# Patient Record
Sex: MALE | Race: WHITE | HISPANIC OR LATINO | Employment: FULL TIME | URBAN - METROPOLITAN AREA
[De-identification: names, ages, dates, MRNs, and addresses within clinical notes are randomized per-mention and may not be internally consistent; named-entity substitution may affect disease eponyms.]

---

## 2019-05-17 ENCOUNTER — HOSPITAL ENCOUNTER (OUTPATIENT)
Dept: RADIOLOGY | Facility: HOSPITAL | Age: 59
Discharge: HOME | End: 2019-05-17
Attending: NURSE PRACTITIONER
Payer: COMMERCIAL

## 2019-05-17 DIAGNOSIS — R10.30 INGUINAL PAIN: Primary | ICD-10-CM

## 2019-05-17 PROCEDURE — 76705 US ABDOMEN LIMITED_HERNIA: ICD-10-PCS | Mod: 26,,, | Performed by: RADIOLOGY

## 2019-05-17 PROCEDURE — 76705 ECHO EXAM OF ABDOMEN: CPT | Mod: TC,PN

## 2019-05-17 PROCEDURE — 76705 ECHO EXAM OF ABDOMEN: CPT | Mod: 26,,, | Performed by: RADIOLOGY

## 2021-11-08 ENCOUNTER — OFFICE VISIT (OUTPATIENT)
Dept: FAMILY MEDICINE | Facility: CLINIC | Age: 61
End: 2021-11-08
Payer: COMMERCIAL

## 2021-11-08 VITALS
WEIGHT: 176.81 LBS | OXYGEN SATURATION: 98 % | HEART RATE: 61 BPM | BODY MASS INDEX: 26.8 KG/M2 | DIASTOLIC BLOOD PRESSURE: 77 MMHG | HEIGHT: 68 IN | RESPIRATION RATE: 16 BRPM | SYSTOLIC BLOOD PRESSURE: 123 MMHG

## 2021-11-08 DIAGNOSIS — Z12.11 COLON CANCER SCREENING: ICD-10-CM

## 2021-11-08 DIAGNOSIS — Z00.00 ANNUAL PHYSICAL EXAM: Primary | ICD-10-CM

## 2021-11-08 PROCEDURE — 1159F MED LIST DOCD IN RCRD: CPT | Mod: S$GLB,,, | Performed by: FAMILY MEDICINE

## 2021-11-08 PROCEDURE — 99999 PR PBB SHADOW E&M-EST. PATIENT-LVL III: ICD-10-PCS | Mod: PBBFAC,,, | Performed by: FAMILY MEDICINE

## 2021-11-08 PROCEDURE — 3078F PR MOST RECENT DIASTOLIC BLOOD PRESSURE < 80 MM HG: ICD-10-PCS | Mod: S$GLB,,, | Performed by: FAMILY MEDICINE

## 2021-11-08 PROCEDURE — 3074F SYST BP LT 130 MM HG: CPT | Mod: S$GLB,,, | Performed by: FAMILY MEDICINE

## 2021-11-08 PROCEDURE — 4010F ACE/ARB THERAPY RXD/TAKEN: CPT | Mod: S$GLB,,, | Performed by: FAMILY MEDICINE

## 2021-11-08 PROCEDURE — 3008F PR BODY MASS INDEX (BMI) DOCUMENTED: ICD-10-PCS | Mod: S$GLB,,, | Performed by: FAMILY MEDICINE

## 2021-11-08 PROCEDURE — 4010F PR ACE/ARB THEARPY RXD/TAKEN: ICD-10-PCS | Mod: S$GLB,,, | Performed by: FAMILY MEDICINE

## 2021-11-08 PROCEDURE — 3074F PR MOST RECENT SYSTOLIC BLOOD PRESSURE < 130 MM HG: ICD-10-PCS | Mod: S$GLB,,, | Performed by: FAMILY MEDICINE

## 2021-11-08 PROCEDURE — 99386 PR PREVENTIVE VISIT,NEW,40-64: ICD-10-PCS | Mod: S$GLB,,, | Performed by: FAMILY MEDICINE

## 2021-11-08 PROCEDURE — 99386 PREV VISIT NEW AGE 40-64: CPT | Mod: S$GLB,,, | Performed by: FAMILY MEDICINE

## 2021-11-08 PROCEDURE — 3078F DIAST BP <80 MM HG: CPT | Mod: S$GLB,,, | Performed by: FAMILY MEDICINE

## 2021-11-08 PROCEDURE — 1159F PR MEDICATION LIST DOCUMENTED IN MEDICAL RECORD: ICD-10-PCS | Mod: S$GLB,,, | Performed by: FAMILY MEDICINE

## 2021-11-08 PROCEDURE — 3008F BODY MASS INDEX DOCD: CPT | Mod: S$GLB,,, | Performed by: FAMILY MEDICINE

## 2021-11-08 PROCEDURE — 99999 PR PBB SHADOW E&M-EST. PATIENT-LVL III: CPT | Mod: PBBFAC,,, | Performed by: FAMILY MEDICINE

## 2021-11-08 RX ORDER — LISINOPRIL 2.5 MG/1
2.5 TABLET ORAL DAILY
COMMUNITY
Start: 2021-08-25 | End: 2022-02-01

## 2021-11-08 RX ORDER — CHLORHEXIDINE GLUCONATE ORAL RINSE 1.2 MG/ML
SOLUTION DENTAL
COMMUNITY
Start: 2021-05-13 | End: 2022-02-01

## 2021-11-08 RX ORDER — ATORVASTATIN CALCIUM 20 MG/1
20 TABLET, FILM COATED ORAL DAILY
COMMUNITY
Start: 2021-08-25 | End: 2022-08-02 | Stop reason: SDUPTHER

## 2021-11-08 RX ORDER — ASPIRIN 325 MG
325 TABLET, DELAYED RELEASE (ENTERIC COATED) ORAL DAILY
COMMUNITY
End: 2022-02-01 | Stop reason: SDUPTHER

## 2021-11-08 RX ORDER — ATENOLOL AND CHLORTHALIDONE TABLET 50; 25 MG/1; MG/1
1 TABLET ORAL DAILY
COMMUNITY
Start: 2021-08-25 | End: 2022-06-01

## 2021-11-12 DIAGNOSIS — Z11.59 NEED FOR HEPATITIS C SCREENING TEST: ICD-10-CM

## 2021-12-06 ENCOUNTER — TELEPHONE (OUTPATIENT)
Dept: FAMILY MEDICINE | Facility: CLINIC | Age: 61
End: 2021-12-06
Payer: COMMERCIAL

## 2021-12-06 RX ORDER — POLYMYXIN B SULFATE AND TRIMETHOPRIM 1; 10000 MG/ML; [USP'U]/ML
1 SOLUTION OPHTHALMIC EVERY 6 HOURS
Qty: 10 ML | Refills: 0 | Status: SHIPPED | OUTPATIENT
Start: 2021-12-06 | End: 2022-02-01

## 2021-12-21 ENCOUNTER — PATIENT MESSAGE (OUTPATIENT)
Dept: FAMILY MEDICINE | Facility: CLINIC | Age: 61
End: 2021-12-21
Payer: COMMERCIAL

## 2021-12-21 LAB — NONINV COLON CA DNA+OCC BLD SCRN STL QL: NEGATIVE

## 2021-12-28 ENCOUNTER — TELEPHONE (OUTPATIENT)
Dept: FAMILY MEDICINE | Facility: CLINIC | Age: 61
End: 2021-12-28

## 2021-12-28 ENCOUNTER — CLINICAL SUPPORT (OUTPATIENT)
Dept: FAMILY MEDICINE | Facility: CLINIC | Age: 61
End: 2021-12-28
Payer: COMMERCIAL

## 2021-12-28 DIAGNOSIS — U07.1 COVID-19: Primary | ICD-10-CM

## 2021-12-28 DIAGNOSIS — Z11.52 ENCOUNTER FOR SCREENING FOR COVID-19: ICD-10-CM

## 2021-12-28 DIAGNOSIS — Z11.52 ENCOUNTER FOR SCREENING FOR COVID-19: Primary | ICD-10-CM

## 2021-12-28 PROCEDURE — U0002: ICD-10-PCS | Mod: QW,S$GLB,, | Performed by: FAMILY MEDICINE

## 2021-12-28 PROCEDURE — U0002 COVID-19 LAB TEST NON-CDC: HCPCS | Mod: QW,S$GLB,, | Performed by: FAMILY MEDICINE

## 2021-12-28 RX ORDER — PROMETHAZINE HYDROCHLORIDE AND DEXTROMETHORPHAN HYDROBROMIDE 6.25; 15 MG/5ML; MG/5ML
5 SYRUP ORAL EVERY 6 HOURS PRN
Qty: 240 ML | Refills: 1 | Status: SHIPPED | OUTPATIENT
Start: 2021-12-28 | End: 2022-02-01

## 2021-12-28 RX ORDER — AZITHROMYCIN 250 MG/1
TABLET, FILM COATED ORAL
Qty: 6 TABLET | Refills: 0 | Status: SHIPPED | OUTPATIENT
Start: 2021-12-28 | End: 2022-02-01

## 2021-12-28 RX ORDER — PREDNISONE 20 MG/1
20 TABLET ORAL 2 TIMES DAILY
Qty: 10 TABLET | Refills: 0 | Status: SHIPPED | OUTPATIENT
Start: 2021-12-28 | End: 2022-01-02

## 2021-12-29 ENCOUNTER — NURSE TRIAGE (OUTPATIENT)
Dept: ADMINISTRATIVE | Facility: CLINIC | Age: 61
End: 2021-12-29
Payer: COMMERCIAL

## 2021-12-30 ENCOUNTER — NURSE TRIAGE (OUTPATIENT)
Dept: ADMINISTRATIVE | Facility: CLINIC | Age: 61
End: 2021-12-30
Payer: COMMERCIAL

## 2021-12-30 ENCOUNTER — TELEPHONE (OUTPATIENT)
Dept: FAMILY MEDICINE | Facility: CLINIC | Age: 61
End: 2021-12-30
Payer: COMMERCIAL

## 2021-12-30 DIAGNOSIS — U07.1 COVID-19: Primary | ICD-10-CM

## 2022-01-02 ENCOUNTER — PATIENT MESSAGE (OUTPATIENT)
Dept: FAMILY MEDICINE | Facility: CLINIC | Age: 62
End: 2022-01-02
Payer: COMMERCIAL

## 2022-01-03 LAB
CTP QC/QA: YES
SARS-COV-2 RDRP RESP QL NAA+PROBE: POSITIVE

## 2022-01-05 ENCOUNTER — PATIENT MESSAGE (OUTPATIENT)
Dept: FAMILY MEDICINE | Facility: CLINIC | Age: 62
End: 2022-01-05
Payer: COMMERCIAL

## 2022-01-05 RX ORDER — PREDNISONE 20 MG/1
20 TABLET ORAL 2 TIMES DAILY
Qty: 10 TABLET | Refills: 0 | Status: SHIPPED | OUTPATIENT
Start: 2022-01-05 | End: 2022-01-10

## 2022-01-18 ENCOUNTER — PATIENT MESSAGE (OUTPATIENT)
Dept: FAMILY MEDICINE | Facility: CLINIC | Age: 62
End: 2022-01-18
Payer: COMMERCIAL

## 2022-01-18 DIAGNOSIS — R06.09 DOE (DYSPNEA ON EXERTION): Primary | ICD-10-CM

## 2022-01-19 ENCOUNTER — TELEPHONE (OUTPATIENT)
Dept: FAMILY MEDICINE | Facility: CLINIC | Age: 62
End: 2022-01-19
Payer: COMMERCIAL

## 2022-01-19 ENCOUNTER — PATIENT MESSAGE (OUTPATIENT)
Dept: FAMILY MEDICINE | Facility: CLINIC | Age: 62
End: 2022-01-19
Payer: COMMERCIAL

## 2022-01-24 ENCOUNTER — TELEPHONE (OUTPATIENT)
Dept: FAMILY MEDICINE | Facility: CLINIC | Age: 62
End: 2022-01-24
Payer: COMMERCIAL

## 2022-02-01 ENCOUNTER — PATIENT MESSAGE (OUTPATIENT)
Dept: CARDIOLOGY | Facility: CLINIC | Age: 62
End: 2022-02-01

## 2022-02-01 ENCOUNTER — OFFICE VISIT (OUTPATIENT)
Dept: CARDIOLOGY | Facility: CLINIC | Age: 62
End: 2022-02-01
Payer: COMMERCIAL

## 2022-02-01 VITALS
OXYGEN SATURATION: 97 % | DIASTOLIC BLOOD PRESSURE: 78 MMHG | WEIGHT: 180 LBS | SYSTOLIC BLOOD PRESSURE: 110 MMHG | BODY MASS INDEX: 27.28 KG/M2 | HEIGHT: 68 IN | HEART RATE: 64 BPM

## 2022-02-01 DIAGNOSIS — I10 ESSENTIAL HYPERTENSION: ICD-10-CM

## 2022-02-01 DIAGNOSIS — I20.89 STABLE ANGINA: Primary | ICD-10-CM

## 2022-02-01 DIAGNOSIS — I20.89 STABLE ANGINA PECTORIS: ICD-10-CM

## 2022-02-01 DIAGNOSIS — Z95.5 STATUS POST INSERTION OF DRUG ELUTING CORONARY ARTERY STENT: ICD-10-CM

## 2022-02-01 DIAGNOSIS — Z72.0 TOBACCO ABUSE: ICD-10-CM

## 2022-02-01 DIAGNOSIS — R06.09 DOE (DYSPNEA ON EXERTION): ICD-10-CM

## 2022-02-01 DIAGNOSIS — I25.2 HISTORY OF MYOCARDIAL INFARCTION: ICD-10-CM

## 2022-02-01 PROCEDURE — 99204 PR OFFICE/OUTPT VISIT, NEW, LEVL IV, 45-59 MIN: ICD-10-PCS | Mod: S$GLB,,, | Performed by: INTERNAL MEDICINE

## 2022-02-01 PROCEDURE — 3074F SYST BP LT 130 MM HG: CPT | Mod: CPTII,S$GLB,, | Performed by: INTERNAL MEDICINE

## 2022-02-01 PROCEDURE — 3078F PR MOST RECENT DIASTOLIC BLOOD PRESSURE < 80 MM HG: ICD-10-PCS | Mod: CPTII,S$GLB,, | Performed by: INTERNAL MEDICINE

## 2022-02-01 PROCEDURE — 99204 OFFICE O/P NEW MOD 45 MIN: CPT | Mod: S$GLB,,, | Performed by: INTERNAL MEDICINE

## 2022-02-01 PROCEDURE — 3074F PR MOST RECENT SYSTOLIC BLOOD PRESSURE < 130 MM HG: ICD-10-PCS | Mod: CPTII,S$GLB,, | Performed by: INTERNAL MEDICINE

## 2022-02-01 PROCEDURE — 3008F BODY MASS INDEX DOCD: CPT | Mod: CPTII,S$GLB,, | Performed by: INTERNAL MEDICINE

## 2022-02-01 PROCEDURE — 3008F PR BODY MASS INDEX (BMI) DOCUMENTED: ICD-10-PCS | Mod: CPTII,S$GLB,, | Performed by: INTERNAL MEDICINE

## 2022-02-01 PROCEDURE — 93000 EKG 12-LEAD: ICD-10-PCS | Mod: S$GLB,,, | Performed by: INTERNAL MEDICINE

## 2022-02-01 PROCEDURE — 3078F DIAST BP <80 MM HG: CPT | Mod: CPTII,S$GLB,, | Performed by: INTERNAL MEDICINE

## 2022-02-01 PROCEDURE — 1159F PR MEDICATION LIST DOCUMENTED IN MEDICAL RECORD: ICD-10-PCS | Mod: CPTII,S$GLB,, | Performed by: INTERNAL MEDICINE

## 2022-02-01 PROCEDURE — 93000 ELECTROCARDIOGRAM COMPLETE: CPT | Mod: S$GLB,,, | Performed by: INTERNAL MEDICINE

## 2022-02-01 PROCEDURE — 1159F MED LIST DOCD IN RCRD: CPT | Mod: CPTII,S$GLB,, | Performed by: INTERNAL MEDICINE

## 2022-02-01 RX ORDER — NITROGLYCERIN 0.4 MG/1
0.4 TABLET SUBLINGUAL EVERY 5 MIN PRN
Qty: 100 TABLET | Refills: 3 | Status: SHIPPED | OUTPATIENT
Start: 2022-02-01 | End: 2022-08-10 | Stop reason: SDUPTHER

## 2022-02-01 RX ORDER — ASPIRIN 81 MG/1
81 TABLET ORAL DAILY
Qty: 360 TABLET | Refills: 0 | Status: SHIPPED | OUTPATIENT
Start: 2022-02-01 | End: 2022-08-10 | Stop reason: SDUPTHER

## 2022-02-01 RX ORDER — FAMOTIDINE 20 MG/1
20 TABLET, FILM COATED ORAL 2 TIMES DAILY
Qty: 180 TABLET | Refills: 3 | Status: SHIPPED | OUTPATIENT
Start: 2022-02-01 | End: 2022-08-10 | Stop reason: SDUPTHER

## 2022-02-01 NOTE — PROGRESS NOTES
[facility] - Cardiology    Subjective:     Patient ID:  Giorgi Eden is a 61 y.o. male patient here for evaluation Shortness of Breath (DALTON) and Heart Problem (Chest burning)      HPI:  61-year-old male with history of MI in the past status post a PCI as per the patient, hypertension, hyperlipidemia, tobacco abuse here to establish care.  Patient reports that on exertion he has been developing a burning discomfort which resolves with rest.  He also reports shaking of his entire body a times.  He reports drinking multiple cups of coffee and diet Coke a day.    Review of Systems   All other systems reviewed and are negative.       Past Medical History:   Diagnosis Date    Hyperlipidemia     Hypertension     MI (myocardial infarction)     X2       Past Surgical History:   Procedure Laterality Date    CORONARY ANGIOPLASTY WITH STENT PLACEMENT      x2       Family History   Problem Relation Age of Onset    COPD Mother     Heart disease Mother     Stroke Mother        Social History     Socioeconomic History    Marital status: Single   Tobacco Use    Smoking status: Former Smoker     Packs/day: 1.00     Years: 25.00     Pack years: 25.00     Types: Cigarettes     Quit date: 2021     Years since quittin.5    Smokeless tobacco: Never Used    Tobacco comment: I now use a vape pen at 5 mg per day   Substance and Sexual Activity    Alcohol use: Yes     Comment: socially    Sexual activity: Yes     Partners: Female       Current Outpatient Medications   Medication Sig Dispense Refill    atenoloL-chlorthalidone (TENORETIC) 50-25 mg Tab Take 1 tablet by mouth once daily.      atorvastatin (LIPITOR) 20 MG tablet Take 20 mg by mouth once daily.      aspirin (ECOTRIN) 81 MG EC tablet Take 1 tablet (81 mg total) by mouth once daily. 360 tablet 0    azithromycin (Z-PREMA) 250 MG tablet Take two tablets on day 1, then 1 tablet on days 2-5. 6 tablet 0    chlorhexidine (PERIDEX) 0.12 % solution SMARTSIG:By Mouth       famotidine (PEPCID) 20 MG tablet Take 1 tablet (20 mg total) by mouth 2 (two) times daily. 180 tablet 3    nitroGLYCERIN (NITROSTAT) 0.4 MG SL tablet Place 1 tablet (0.4 mg total) under the tongue every 5 (five) minutes as needed for Chest pain. 100 tablet 3    polymyxin B sulf-trimethoprim (POLYTRIM) 10,000 unit- 1 mg/mL Drop Place 1 drop into both eyes every 6 (six) hours. 10 mL 0    promethazine-dextromethorphan (PROMETHAZINE-DM) 6.25-15 mg/5 mL Syrp Take 5 mLs by mouth every 6 (six) hours as needed (cough). 240 mL 1     No current facility-administered medications for this visit.       Review of patient's allergies indicates:  No Known Allergies      Objective:        Vitals:    02/01/22 1400   BP: 110/78   Pulse: 64       Physical Exam  Vitals reviewed.   HENT:      Mouth/Throat:      Mouth: Mucous membranes are moist.   Eyes:      Extraocular Movements: Extraocular movements intact.      Pupils: Pupils are equal, round, and reactive to light.   Cardiovascular:      Rate and Rhythm: Normal rate.      Pulses: Normal pulses.      Heart sounds: Normal heart sounds. No murmur heard.  No gallop.    Pulmonary:      Effort: Pulmonary effort is normal.      Breath sounds: Normal breath sounds.   Abdominal:      General: Bowel sounds are normal.      Palpations: Abdomen is soft.   Musculoskeletal:         General: Normal range of motion.      Cervical back: Normal range of motion.   Skin:     General: Skin is warm and dry.   Neurological:      General: No focal deficit present.      Mental Status: He is alert and oriented to person, place, and time.   Psychiatric:         Mood and Affect: Mood normal.         LIPIDS - LAST 2   No results found for: CHOL, HDL, LDLCALC, TRIG, CHOLHDL    CBC - LAST 2  No results found for: WBC, RBC, HGB, HCT, MCV, MCH, MCHC, RDW, PLT, MPV, GRAN, LYMPH, MONO, BASO, NRBC    CHEMISTRY & LIVER FUNCTION - LAST 2  No results found for: NA, K, CL, CO2, ANIONGAP, BUN, CREATININE, GLU, CALCIUM,  PH, MG, ALBUMIN, PROT, ALKPHOS, ALT, AST, BILITOT     CARDIAC PROFILE - LAST 2  No results found for: BNP, CPK, CPKMB, LDH, TROPONINI     COAGULATION - LAST 2  No results found for: LABPT, INR, APTT    ENDOCRINE & PSA - LAST 2  No results found for: HGBA1C, MICROALBUR, TSH, PROCAL, PSA     ECHOCARDIOGRAM RESULTS  No results found for this or any previous visit.      CURRENT/PREVIOUS VISIT EKG  No results found for this or any previous visit.  No valid procedures specified.   No results found for this or any previous visit.    No valid procedures specified.      His EKG today shows a sinus rhythm with small Q-waves in inferior leads concerning for inferior infarct.  Assessment:       1. Stable angina    2. DALTON (dyspnea on exertion)    3. Stable angina pectoris    4. Tobacco abuse    5. Essential hypertension    6. Status post insertion of drug eluting coronary artery stent    7. History of myocardial infarction           Plan:       Stable angina  -     Nuclear Stress Test; Future  -     Echo; Future    DALTON (dyspnea on exertion)  -     Ambulatory referral/consult to Cardiology  -     IN OFFICE EKG 12-LEAD (to [facility])    Stable angina pectoris  -     NM Myocardial Perfusion Spect Multi Pharmacologic; Future; Expected date: 02/01/2022    Tobacco abuse    Essential hypertension    Status post insertion of drug eluting coronary artery stent    History of myocardial infarction    Other orders  -     aspirin (ECOTRIN) 81 MG EC tablet; Take 1 tablet (81 mg total) by mouth once daily.  Dispense: 360 tablet; Refill: 0  -     famotidine (PEPCID) 20 MG tablet; Take 1 tablet (20 mg total) by mouth 2 (two) times daily.  Dispense: 180 tablet; Refill: 3  -     nitroGLYCERIN (NITROSTAT) 0.4 MG SL tablet; Place 1 tablet (0.4 mg total) under the tongue every 5 (five) minutes as needed for Chest pain.  Dispense: 100 tablet; Refill: 3    Will decrease aspirin to 81 mg especially given concerns for reflux.  Continue with statin.  LDL that  was checked at outside hospitals well controlled at around 64. Advised him to quit tobacco abuse.  Hypertension is well controlled.  Patient reports fatigue at home, will stop lisinopril and monitor.  Will evaluate his symptoms of chest discomfort on exertion with stress test.  Will also get an echocardiogram.  Patient instructed to moderate his caffeine intake.    Follow up in about 6 weeks (around 3/15/2022) for stable angina, stress test follow up. .          Tera Su MD  [facility] - Cardiology

## 2022-02-03 DIAGNOSIS — I10 ESSENTIAL HYPERTENSION: ICD-10-CM

## 2022-02-16 DIAGNOSIS — R07.9 CHEST PAIN, UNSPECIFIED TYPE: ICD-10-CM

## 2022-02-24 ENCOUNTER — TELEPHONE (OUTPATIENT)
Dept: CARDIOLOGY | Facility: HOSPITAL | Age: 62
End: 2022-02-24
Payer: COMMERCIAL

## 2022-02-25 ENCOUNTER — HOSPITAL ENCOUNTER (OUTPATIENT)
Dept: RADIOLOGY | Facility: HOSPITAL | Age: 62
Discharge: HOME | End: 2022-02-25
Attending: INTERNAL MEDICINE
Payer: COMMERCIAL

## 2022-02-25 ENCOUNTER — CLINICAL SUPPORT (OUTPATIENT)
Dept: CARDIOLOGY | Facility: HOSPITAL | Age: 62
End: 2022-02-25
Attending: INTERNAL MEDICINE
Payer: COMMERCIAL

## 2022-02-25 DIAGNOSIS — I20.89 STABLE ANGINA PECTORIS: ICD-10-CM

## 2022-02-25 DIAGNOSIS — I20.89 STABLE ANGINA: ICD-10-CM

## 2022-02-25 LAB
CV PHARM DOSE: 0.4 MG
CV STRESS BASE HR: 51 BPM
DIASTOLIC BLOOD PRESSURE: 82 MMHG
OHS CV CPX 1 MINUTE RECOVERY HEART RATE: 78 BPM
OHS CV CPX 85 PERCENT MAX PREDICTED HEART RATE MALE: 137
OHS CV CPX MAX PREDICTED HEART RATE: 161
OHS CV CPX PATIENT IS FEMALE: 0
OHS CV CPX PATIENT IS MALE: 1
OHS CV CPX PEAK DIASTOLIC BLOOD PRESSURE: 73 MMHG
OHS CV CPX PEAK HEAR RATE: 78 BPM
OHS CV CPX PEAK RATE PRESSURE PRODUCT: 8424
OHS CV CPX PEAK SYSTOLIC BLOOD PRESSURE: 108 MMHG
OHS CV CPX PERCENT MAX PREDICTED HEART RATE ACHIEVED: 48
OHS CV CPX RATE PRESSURE PRODUCT PRESENTING: 5814
SYSTOLIC BLOOD PRESSURE: 114 MMHG

## 2022-02-25 PROCEDURE — 93017 CV STRESS TEST TRACING ONLY: CPT

## 2022-02-25 PROCEDURE — 93018 NUCLEAR STRESS TEST (CUPID ONLY): ICD-10-PCS | Mod: ,,, | Performed by: INTERNAL MEDICINE

## 2022-02-25 PROCEDURE — 93016 NUCLEAR STRESS TEST (CUPID ONLY): ICD-10-PCS | Mod: ,,, | Performed by: INTERNAL MEDICINE

## 2022-02-25 PROCEDURE — 63600175 PHARM REV CODE 636 W HCPCS: Performed by: INTERNAL MEDICINE

## 2022-02-25 PROCEDURE — 93016 CV STRESS TEST SUPVJ ONLY: CPT | Mod: ,,, | Performed by: INTERNAL MEDICINE

## 2022-02-25 PROCEDURE — A9502 TC99M TETROFOSMIN: HCPCS

## 2022-02-25 PROCEDURE — 93018 CV STRESS TEST I&R ONLY: CPT | Mod: ,,, | Performed by: INTERNAL MEDICINE

## 2022-02-25 RX ORDER — REGADENOSON 0.08 MG/ML
0.4 INJECTION, SOLUTION INTRAVENOUS
Status: COMPLETED | OUTPATIENT
Start: 2022-02-25 | End: 2022-02-25

## 2022-02-25 RX ADMIN — REGADENOSON 0.4 MG: 0.08 INJECTION, SOLUTION INTRAVENOUS at 10:02

## 2022-03-08 ENCOUNTER — HOSPITAL ENCOUNTER (OUTPATIENT)
Dept: RADIOLOGY | Facility: HOSPITAL | Age: 62
Discharge: HOME | End: 2022-03-08
Attending: FAMILY MEDICINE
Payer: COMMERCIAL

## 2022-03-08 ENCOUNTER — PATIENT MESSAGE (OUTPATIENT)
Dept: FAMILY MEDICINE | Facility: CLINIC | Age: 62
End: 2022-03-08
Payer: COMMERCIAL

## 2022-03-08 DIAGNOSIS — R10.9 FLANK PAIN: ICD-10-CM

## 2022-03-08 DIAGNOSIS — R10.9 FLANK PAIN, ACUTE: Primary | ICD-10-CM

## 2022-03-08 DIAGNOSIS — R10.9 FLANK PAIN, ACUTE: ICD-10-CM

## 2022-03-08 PROCEDURE — 74018 XR ABDOMEN AP 1 VIEW: ICD-10-PCS | Mod: 26,,, | Performed by: RADIOLOGY

## 2022-03-08 PROCEDURE — 74018 RADEX ABDOMEN 1 VIEW: CPT | Mod: 26,,, | Performed by: RADIOLOGY

## 2022-03-08 PROCEDURE — 74018 RADEX ABDOMEN 1 VIEW: CPT | Mod: TC,FY

## 2022-03-09 ENCOUNTER — PATIENT MESSAGE (OUTPATIENT)
Dept: FAMILY MEDICINE | Facility: CLINIC | Age: 62
End: 2022-03-09
Payer: COMMERCIAL

## 2022-03-09 DIAGNOSIS — N20.0 NEPHROLITHIASIS: Primary | ICD-10-CM

## 2022-03-09 RX ORDER — TAMSULOSIN HYDROCHLORIDE 0.4 MG/1
0.4 CAPSULE ORAL DAILY
Qty: 30 CAPSULE | Refills: 0 | Status: SHIPPED | OUTPATIENT
Start: 2022-03-09 | End: 2022-03-31

## 2022-03-09 RX ORDER — ONDANSETRON 8 MG/1
8 TABLET, ORALLY DISINTEGRATING ORAL EVERY 8 HOURS PRN
Qty: 30 TABLET | Refills: 0 | Status: SHIPPED | OUTPATIENT
Start: 2022-03-09 | End: 2022-05-30

## 2022-03-09 RX ORDER — CIPROFLOXACIN 500 MG/1
500 TABLET ORAL EVERY 12 HOURS
Qty: 10 TABLET | Refills: 0 | Status: SHIPPED | OUTPATIENT
Start: 2022-03-09 | End: 2022-03-15

## 2022-03-09 RX ORDER — HYDROCODONE BITARTRATE AND ACETAMINOPHEN 5; 325 MG/1; MG/1
1 TABLET ORAL EVERY 6 HOURS PRN
Qty: 12 TABLET | Refills: 0 | Status: SHIPPED | OUTPATIENT
Start: 2022-03-09 | End: 2022-03-12

## 2022-03-10 ENCOUNTER — PATIENT MESSAGE (OUTPATIENT)
Dept: FAMILY MEDICINE | Facility: CLINIC | Age: 62
End: 2022-03-10
Payer: COMMERCIAL

## 2022-03-11 ENCOUNTER — OFFICE VISIT (OUTPATIENT)
Dept: CARDIOLOGY | Facility: CLINIC | Age: 62
End: 2022-03-11
Payer: COMMERCIAL

## 2022-03-11 VITALS
HEIGHT: 68 IN | OXYGEN SATURATION: 97 % | SYSTOLIC BLOOD PRESSURE: 114 MMHG | BODY MASS INDEX: 27.43 KG/M2 | DIASTOLIC BLOOD PRESSURE: 74 MMHG | WEIGHT: 181 LBS | HEART RATE: 63 BPM

## 2022-03-11 DIAGNOSIS — I25.2 HISTORY OF MYOCARDIAL INFARCTION: ICD-10-CM

## 2022-03-11 DIAGNOSIS — I10 ESSENTIAL HYPERTENSION: Primary | ICD-10-CM

## 2022-03-11 DIAGNOSIS — Z95.5 STATUS POST INSERTION OF DRUG ELUTING CORONARY ARTERY STENT: ICD-10-CM

## 2022-03-11 DIAGNOSIS — E78.2 MIXED HYPERLIPIDEMIA: ICD-10-CM

## 2022-03-11 DIAGNOSIS — Z72.0 TOBACCO ABUSE: ICD-10-CM

## 2022-03-11 DIAGNOSIS — I20.89 STABLE ANGINA: ICD-10-CM

## 2022-03-11 PROCEDURE — 4010F PR ACE/ARB THEARPY RXD/TAKEN: ICD-10-PCS | Mod: CPTII,S$GLB,, | Performed by: INTERNAL MEDICINE

## 2022-03-11 PROCEDURE — 3074F SYST BP LT 130 MM HG: CPT | Mod: CPTII,S$GLB,, | Performed by: INTERNAL MEDICINE

## 2022-03-11 PROCEDURE — 1160F PR REVIEW ALL MEDS BY PRESCRIBER/CLIN PHARMACIST DOCUMENTED: ICD-10-PCS | Mod: CPTII,S$GLB,, | Performed by: INTERNAL MEDICINE

## 2022-03-11 PROCEDURE — 3074F PR MOST RECENT SYSTOLIC BLOOD PRESSURE < 130 MM HG: ICD-10-PCS | Mod: CPTII,S$GLB,, | Performed by: INTERNAL MEDICINE

## 2022-03-11 PROCEDURE — 3008F PR BODY MASS INDEX (BMI) DOCUMENTED: ICD-10-PCS | Mod: CPTII,S$GLB,, | Performed by: INTERNAL MEDICINE

## 2022-03-11 PROCEDURE — 1160F RVW MEDS BY RX/DR IN RCRD: CPT | Mod: CPTII,S$GLB,, | Performed by: INTERNAL MEDICINE

## 2022-03-11 PROCEDURE — 3078F PR MOST RECENT DIASTOLIC BLOOD PRESSURE < 80 MM HG: ICD-10-PCS | Mod: CPTII,S$GLB,, | Performed by: INTERNAL MEDICINE

## 2022-03-11 PROCEDURE — 3008F BODY MASS INDEX DOCD: CPT | Mod: CPTII,S$GLB,, | Performed by: INTERNAL MEDICINE

## 2022-03-11 PROCEDURE — 1159F PR MEDICATION LIST DOCUMENTED IN MEDICAL RECORD: ICD-10-PCS | Mod: CPTII,S$GLB,, | Performed by: INTERNAL MEDICINE

## 2022-03-11 PROCEDURE — 1159F MED LIST DOCD IN RCRD: CPT | Mod: CPTII,S$GLB,, | Performed by: INTERNAL MEDICINE

## 2022-03-11 PROCEDURE — 4010F ACE/ARB THERAPY RXD/TAKEN: CPT | Mod: CPTII,S$GLB,, | Performed by: INTERNAL MEDICINE

## 2022-03-11 PROCEDURE — 99214 PR OFFICE/OUTPT VISIT, EST, LEVL IV, 30-39 MIN: ICD-10-PCS | Mod: S$GLB,,, | Performed by: INTERNAL MEDICINE

## 2022-03-11 PROCEDURE — 3078F DIAST BP <80 MM HG: CPT | Mod: CPTII,S$GLB,, | Performed by: INTERNAL MEDICINE

## 2022-03-11 PROCEDURE — 99214 OFFICE O/P EST MOD 30 MIN: CPT | Mod: S$GLB,,, | Performed by: INTERNAL MEDICINE

## 2022-03-11 RX ORDER — SODIUM CHLORIDE 9 MG/ML
INJECTION, SOLUTION INTRAVENOUS ONCE
Status: CANCELLED | OUTPATIENT
Start: 2022-03-11 | End: 2022-03-11

## 2022-03-11 RX ORDER — LISINOPRIL 2.5 MG/1
2.5 TABLET ORAL DAILY
COMMUNITY
End: 2022-08-02 | Stop reason: SDUPTHER

## 2022-03-11 NOTE — PROGRESS NOTES
[facility] - Cardiology    Subjective:     Patient ID:  Giorgi Eden is a 61 y.o. male patient here for evaluation Results (ECHO and STRESS)      HPI: 60 yo male with h/o CAD s/p PCI here for follow up burning discomfort in his chest.  He continues to report these symptoms with exertion especially during sex.  He reports he had similar symptoms prior to his stent.  For the same we did a stress test.  His stress test was negative for ischemia.  His echocardiogram shows normal EF.  He reports to would episodes since the last visit.    Review of Systems   All other systems reviewed and are negative.       Past Medical History:   Diagnosis Date    Hyperlipidemia     Hypertension     MI (myocardial infarction)     X2       Past Surgical History:   Procedure Laterality Date    CORONARY ANGIOPLASTY WITH STENT PLACEMENT      x2       Family History   Problem Relation Age of Onset    COPD Mother     Heart disease Mother     Stroke Mother        Social History     Socioeconomic History    Marital status: Single   Tobacco Use    Smoking status: Former Smoker     Packs/day: 1.00     Years: 25.00     Pack years: 25.00     Types: Cigarettes     Quit date: 2021     Years since quittin.6    Smokeless tobacco: Never Used    Tobacco comment: I now use a vape pen at 5 mg per day   Substance and Sexual Activity    Alcohol use: Yes     Comment: socially    Sexual activity: Yes     Partners: Female       Current Outpatient Medications   Medication Sig Dispense Refill    aspirin (ECOTRIN) 81 MG EC tablet Take 1 tablet (81 mg total) by mouth once daily. 360 tablet 0    atenoloL-chlorthalidone (TENORETIC) 50-25 mg Tab Take 1 tablet by mouth once daily.      ciprofloxacin HCl (CIPRO) 500 MG tablet Take 1 tablet (500 mg total) by mouth every 12 (twelve) hours. for 5 days 10 tablet 0    famotidine (PEPCID) 20 MG tablet Take 1 tablet (20 mg total) by mouth 2 (two) times daily. 180 tablet 3    HYDROcodone-acetaminophen (NORCO)  5-325 mg per tablet Take 1 tablet by mouth every 6 (six) hours as needed for Pain. 12 tablet 0    lisinopriL (PRINIVIL,ZESTRIL) 2.5 MG tablet Take 2.5 mg by mouth once daily.      nitroGLYCERIN (NITROSTAT) 0.4 MG SL tablet Place 1 tablet (0.4 mg total) under the tongue every 5 (five) minutes as needed for Chest pain. 100 tablet 3    ondansetron (ZOFRAN-ODT) 8 MG TbDL Take 1 tablet (8 mg total) by mouth every 8 (eight) hours as needed (nausea). 30 tablet 0    tamsulosin (FLOMAX) 0.4 mg Cap Take 1 capsule (0.4 mg total) by mouth once daily. 30 capsule 0    atorvastatin (LIPITOR) 20 MG tablet Take 20 mg by mouth once daily.       No current facility-administered medications for this visit.       Review of patient's allergies indicates:  No Known Allergies      Objective:        Vitals:    03/11/22 1603   BP: 114/74   Pulse: 63       Physical Exam  Vitals reviewed.   Constitutional:       Appearance: Normal appearance.   HENT:      Mouth/Throat:      Mouth: Mucous membranes are moist.   Eyes:      Extraocular Movements: Extraocular movements intact.      Pupils: Pupils are equal, round, and reactive to light.   Cardiovascular:      Rate and Rhythm: Normal rate and regular rhythm.      Pulses: Normal pulses.      Heart sounds: Normal heart sounds. No murmur heard.    No gallop.   Pulmonary:      Effort: Pulmonary effort is normal.      Breath sounds: Normal breath sounds.   Abdominal:      General: Bowel sounds are normal.      Palpations: Abdomen is soft.   Musculoskeletal:         General: Normal range of motion.      Cervical back: Normal range of motion.   Skin:     General: Skin is warm and dry.   Neurological:      General: No focal deficit present.      Mental Status: He is alert and oriented to person, place, and time.   Psychiatric:         Mood and Affect: Mood normal.         LIPIDS - LAST 2   No results found for: CHOL, HDL, LDLCALC, TRIG, CHOLHDL    CBC - LAST 2  Lab Results   Component Value Date     WBC 8.19 03/08/2022    RBC 5.96 03/08/2022    HGB 17.2 03/08/2022    HCT 50.7 03/08/2022    MCV 85 03/08/2022    MCH 28.9 03/08/2022    MCHC 33.9 03/08/2022    RDW 14.1 03/08/2022     03/08/2022    MPV 9.7 03/08/2022    GRAN 4.1 03/08/2022    GRAN 50.0 03/08/2022    LYMPH 2.9 03/08/2022    LYMPH 34.8 03/08/2022    MONO 1.0 03/08/2022    MONO 12.2 03/08/2022    BASO 0.04 03/08/2022    NRBC 0 03/08/2022       CHEMISTRY & LIVER FUNCTION - LAST 2  Lab Results   Component Value Date     03/08/2022    K 4.0 03/08/2022     03/08/2022    CO2 26 03/08/2022    ANIONGAP 9 03/08/2022    BUN 22 (H) 03/08/2022    CREATININE 1.1 03/08/2022    GLU 80 03/08/2022    CALCIUM 9.4 03/08/2022    ALBUMIN 4.2 03/08/2022    PROT 7.6 03/08/2022    ALKPHOS 54 (L) 03/08/2022    ALT 31 03/08/2022    AST 25 03/08/2022    BILITOT 0.4 03/08/2022        CARDIAC PROFILE - LAST 2  No results found for: BNP, CPK, CPKMB, LDH, TROPONINI     COAGULATION - LAST 2  No results found for: LABPT, INR, APTT    ENDOCRINE & PSA - LAST 2  No results found for: HGBA1C, MICROALBUR, TSH, PROCAL, PSA     ECHOCARDIOGRAM RESULTS  Results for orders placed during the hospital encounter of 02/25/22    Echo    Interpretation Summary  · The left ventricle is normal in size with mild concentric hypertrophy and normal systolic function.  · The estimated ejection fraction is 60%.  · Normal left ventricular diastolic function.  · Normal right ventricular size with normal right ventricular systolic function.      CURRENT/PREVIOUS VISIT EKG  Results for orders placed or performed in visit on 02/01/22   IN OFFICE EKG 12-LEAD (to [facility])    Collection Time: 02/01/22  2:04 PM    Narrative    Test Reason : R06.00,    Vent. Rate : 064 BPM     Atrial Rate : 064 BPM     P-R Int : 186 ms          QRS Dur : 092 ms      QT Int : 390 ms       P-R-T Axes : 039 -11 035 degrees     QTc Int : 402 ms    Normal sinus rhythm  Septal infarct ,age undetermined  Inferior infarct  ,age undetermined  Abnormal ECG  No previous ECGs available  Confirmed by Rony Han MD ([id number]) on 2/17/2022 2:00:12 PM    Referred By: BILL AZEVEDO           Confirmed By:Rony Han MD     No valid procedures specified.   Results for orders placed in visit on 02/25/22    Nuclear Stress Test    Interpretation Summary    PET CT Findings The nuclear portion of this study will be reported separately.    The EKG portion of this study is negative for ischemia.    The patient reported no chest pain during the stress test.    During stress, occasional PVCs are noted.    No valid procedures specified.        Assessment:       1. Essential hypertension    2. History of myocardial infarction    3. Status post insertion of drug eluting coronary artery stent    4. Mixed hyperlipidemia    5. Tobacco abuse    6. Stable angina           Plan:       Essential hypertension  -     Case Request-Cath Lab: Angiogram, Coronary, with Left Heart Cath; Standing    History of myocardial infarction    Status post insertion of drug eluting coronary artery stent  -     Case Request-Cath Lab: Angiogram, Coronary, with Left Heart Cath; Standing    Mixed hyperlipidemia    Tobacco abuse    Stable angina  -     Case Request-Cath Lab: Angiogram, Coronary, with Left Heart Cath; Standing  -     Full code; Standing  -     Vital signs; Standing  -     Cardiac Monitoring - Adult; Standing     Patient extremely concerned about his symptoms as they always happen with stress and improved with relaxation.  Given his risk factors as well as given persistent of symptoms will proceed with angiogram despite normal stress test.  Risk and benefits of the procedure were discussed with the patient including need for dual antiplatelet therapy.  Patient verbalized understanding.  Hypertension is well controlled, continue current medications.  Continue with aspirin.  Resume statin.  Advised tobacco cessation.  Continue with atenolol.  Discussed  importance of Mediterranean diet as well as regular physical exercise.    Follow up in about 4 weeks (around 4/8/2022) for post angiogram.          Tera Su MD  [facility] - Cardiology

## 2022-03-14 ENCOUNTER — TELEPHONE (OUTPATIENT)
Dept: CARDIOLOGY | Facility: CLINIC | Age: 62
End: 2022-03-14
Payer: COMMERCIAL

## 2022-03-14 NOTE — TELEPHONE ENCOUNTER
Angiogram  Pre op time is 03/23/2022 at 11am  Procedure time is 03/25/2022 at 7:30 am     Confirmed with patient.

## 2022-03-15 ENCOUNTER — HOSPITAL ENCOUNTER (OUTPATIENT)
Dept: RADIOLOGY | Facility: HOSPITAL | Age: 62
Discharge: HOME | End: 2022-03-15
Attending: NURSE PRACTITIONER
Payer: COMMERCIAL

## 2022-03-15 ENCOUNTER — PATIENT MESSAGE (OUTPATIENT)
Dept: UROLOGY | Facility: CLINIC | Age: 62
End: 2022-03-15

## 2022-03-15 ENCOUNTER — OFFICE VISIT (OUTPATIENT)
Dept: UROLOGY | Facility: CLINIC | Age: 62
End: 2022-03-15
Payer: COMMERCIAL

## 2022-03-15 ENCOUNTER — PATIENT MESSAGE (OUTPATIENT)
Dept: CARDIOLOGY | Facility: HOSPITAL | Age: 62
End: 2022-03-15
Payer: COMMERCIAL

## 2022-03-15 VITALS
DIASTOLIC BLOOD PRESSURE: 88 MMHG | HEART RATE: 69 BPM | BODY MASS INDEX: 27.76 KG/M2 | HEIGHT: 68 IN | WEIGHT: 183.19 LBS | SYSTOLIC BLOOD PRESSURE: 129 MMHG

## 2022-03-15 DIAGNOSIS — I71.40 ABDOMINAL AORTIC ANEURYSM (AAA) WITHOUT RUPTURE: Primary | ICD-10-CM

## 2022-03-15 DIAGNOSIS — R10.9 RIGHT FLANK PAIN: ICD-10-CM

## 2022-03-15 DIAGNOSIS — N20.0 NEPHROLITHIASIS: ICD-10-CM

## 2022-03-15 LAB
BILIRUB SERPL-MCNC: NORMAL MG/DL
BLOOD URINE, POC: NORMAL
CLARITY, POC UA: CLEAR
COLOR, POC UA: YELLOW
GLUCOSE UR QL STRIP: NORMAL
KETONES UR QL STRIP: NORMAL
LEUKOCYTE ESTERASE URINE, POC: NORMAL
NITRITE, POC UA: NORMAL
PH, POC UA: 6.5
PROTEIN, POC: NORMAL
SPECIFIC GRAVITY, POC UA: 1.02
UROBILINOGEN, POC UA: NORMAL

## 2022-03-15 PROCEDURE — 1160F RVW MEDS BY RX/DR IN RCRD: CPT | Mod: CPTII,S$GLB,, | Performed by: NURSE PRACTITIONER

## 2022-03-15 PROCEDURE — 3079F PR MOST RECENT DIASTOLIC BLOOD PRESSURE 80-89 MM HG: ICD-10-PCS | Mod: CPTII,S$GLB,, | Performed by: NURSE PRACTITIONER

## 2022-03-15 PROCEDURE — 74176 CT ABD & PELVIS W/O CONTRAST: CPT | Mod: 26,,, | Performed by: RADIOLOGY

## 2022-03-15 PROCEDURE — 99204 OFFICE O/P NEW MOD 45 MIN: CPT | Mod: S$GLB,,, | Performed by: NURSE PRACTITIONER

## 2022-03-15 PROCEDURE — 74176 CT ABD & PELVIS W/O CONTRAST: CPT | Mod: TC

## 2022-03-15 PROCEDURE — 81002 POCT URINE DIPSTICK WITHOUT MICROSCOPE: ICD-10-PCS | Mod: S$GLB,,, | Performed by: NURSE PRACTITIONER

## 2022-03-15 PROCEDURE — 4010F PR ACE/ARB THEARPY RXD/TAKEN: ICD-10-PCS | Mod: CPTII,S$GLB,, | Performed by: NURSE PRACTITIONER

## 2022-03-15 PROCEDURE — 3008F BODY MASS INDEX DOCD: CPT | Mod: CPTII,S$GLB,, | Performed by: NURSE PRACTITIONER

## 2022-03-15 PROCEDURE — 3074F PR MOST RECENT SYSTOLIC BLOOD PRESSURE < 130 MM HG: ICD-10-PCS | Mod: CPTII,S$GLB,, | Performed by: NURSE PRACTITIONER

## 2022-03-15 PROCEDURE — 3079F DIAST BP 80-89 MM HG: CPT | Mod: CPTII,S$GLB,, | Performed by: NURSE PRACTITIONER

## 2022-03-15 PROCEDURE — 81002 URINALYSIS NONAUTO W/O SCOPE: CPT | Mod: S$GLB,,, | Performed by: NURSE PRACTITIONER

## 2022-03-15 PROCEDURE — 3008F PR BODY MASS INDEX (BMI) DOCUMENTED: ICD-10-PCS | Mod: CPTII,S$GLB,, | Performed by: NURSE PRACTITIONER

## 2022-03-15 PROCEDURE — 3074F SYST BP LT 130 MM HG: CPT | Mod: CPTII,S$GLB,, | Performed by: NURSE PRACTITIONER

## 2022-03-15 PROCEDURE — 74176 CT RENAL STONE STUDY ABD PELVIS WO: ICD-10-PCS | Mod: 26,,, | Performed by: RADIOLOGY

## 2022-03-15 PROCEDURE — 99999 PR PBB SHADOW E&M-EST. PATIENT-LVL IV: CPT | Mod: PBBFAC,,, | Performed by: NURSE PRACTITIONER

## 2022-03-15 PROCEDURE — 1159F PR MEDICATION LIST DOCUMENTED IN MEDICAL RECORD: ICD-10-PCS | Mod: CPTII,S$GLB,, | Performed by: NURSE PRACTITIONER

## 2022-03-15 PROCEDURE — 1159F MED LIST DOCD IN RCRD: CPT | Mod: CPTII,S$GLB,, | Performed by: NURSE PRACTITIONER

## 2022-03-15 PROCEDURE — 99204 PR OFFICE/OUTPT VISIT, NEW, LEVL IV, 45-59 MIN: ICD-10-PCS | Mod: S$GLB,,, | Performed by: NURSE PRACTITIONER

## 2022-03-15 PROCEDURE — 1160F PR REVIEW ALL MEDS BY PRESCRIBER/CLIN PHARMACIST DOCUMENTED: ICD-10-PCS | Mod: CPTII,S$GLB,, | Performed by: NURSE PRACTITIONER

## 2022-03-15 PROCEDURE — 99999 PR PBB SHADOW E&M-EST. PATIENT-LVL IV: ICD-10-PCS | Mod: PBBFAC,,, | Performed by: NURSE PRACTITIONER

## 2022-03-15 PROCEDURE — 4010F ACE/ARB THERAPY RXD/TAKEN: CPT | Mod: CPTII,S$GLB,, | Performed by: NURSE PRACTITIONER

## 2022-03-15 NOTE — PROGRESS NOTES
[facility] Note  Staff: VERO ChengP-C    PCP: JOSELYN Villareal    Chief Complaint: Right Flank pain    Subjective:        HPI: Giorgi Eden is a 61 y.o. male NEW PT presents today with c/o ongoing Right sided flank pains for the past 2-3 weeks.    Pt initially was evaluated by Dr. Villareal PCP for his complaints.  Pt has hx of kidney stones, last episode was 2015.    KUB XR 3/8/22:  IMPRESSION:  1. Large amount of stool throughout the colon without plain film evidence for bowel obstruction.  2. Small linear calcification of the medial right abdomen may be vascular in etiology.  If there is clinical concern for nephrolithiasis, further evaluation may be obtained with CT abdomen pelvis.    REVIEW OF SYSTEMS:  A comprehensive 10 system review was performed and is negative except as noted above in HPI    PMHx:  Past Medical History:   Diagnosis Date    Hyperlipidemia     Hypertension     MI (myocardial infarction)     X2     PSHx:  Past Surgical History:   Procedure Laterality Date    CORONARY ANGIOPLASTY WITH STENT PLACEMENT      x2     Allergies:  Patient has no known allergies.    Medications: reviewed     Objective:     Vitals:    03/15/22 0901   BP: 129/88   Pulse: 69     General:WDWN in NAD  Eyes: PERRLA, normal conjunctiva  Respiratory: no increased work on breathing, clear to auscultation  Cardiovascular: regular rate and rhythm. No obvious extremity edema.  GI: palpation of masses. No tenderness. No hepatosplenomegaly to palpation.  Musculoskeletal: normal range of motion of bilateral upper extremities. Normal muscle strength and tone.  *When palpating Right flank area, pt jumped with ++Tenderness.  Skin: no obvious rashes or lesions. No tightening of skin noted.  Neurologic: CN grossly normal. Normal sensation.   Psychiatric: awake, alert and oriented x 3. Mood and affect normal. Cooperative.    LABS REVIEW:  UA today:  Color:Clear, Yellow  Spec. Grav.  1.020  PH  6.5  Negative for  leukocytes, nitrates, protein, glucose, ketones, urobili, bili, and blood.    Assessment:       1. Nephrolithiasis    2. Right flank pain          Plan:   Right Flank Pain, Hx of kidney stones    CT Renal Stone Study to be performed today, due to pt's severity of pain at this time.  Undetermined whether his pain is kidney stone related at this time and after assessment today.    F/u--We will contact pt after we review imaging results in order to determine next poc.  Pt and family verbalized understanding at this time.    [facility]: Active    Mariam Talbert, SHANIQUA-C

## 2022-03-16 ENCOUNTER — PATIENT MESSAGE (OUTPATIENT)
Dept: UROLOGY | Facility: CLINIC | Age: 62
End: 2022-03-16
Payer: COMMERCIAL

## 2022-03-16 ENCOUNTER — PATIENT MESSAGE (OUTPATIENT)
Dept: CARDIOLOGY | Facility: HOSPITAL | Age: 62
End: 2022-03-16
Payer: COMMERCIAL

## 2022-03-16 ENCOUNTER — PATIENT MESSAGE (OUTPATIENT)
Dept: FAMILY MEDICINE | Facility: CLINIC | Age: 62
End: 2022-03-16
Payer: COMMERCIAL

## 2022-03-16 ENCOUNTER — TELEPHONE (OUTPATIENT)
Dept: CARDIOLOGY | Facility: CLINIC | Age: 62
End: 2022-03-16
Payer: COMMERCIAL

## 2022-03-16 NOTE — TELEPHONE ENCOUNTER
----- Message from Elvin Sagastume MA sent at 3/16/2022  9:28 AM CDT -----  Contact: BO MARTÍNEZ [[id number]]  Type: Needs Medical Advice    Who Called: BO MARTÍNEZ [[id number]]  Best Call Back Number: [phone]  Inquiry/Question: Would you kindly call BO MARTÍNEZ [[id number]] regarding canceling upcoming procedure  Thank you~

## 2022-03-18 ENCOUNTER — OFFICE VISIT (OUTPATIENT)
Dept: FAMILY MEDICINE | Facility: CLINIC | Age: 62
End: 2022-03-18
Payer: COMMERCIAL

## 2022-03-18 ENCOUNTER — PATIENT MESSAGE (OUTPATIENT)
Dept: CARDIOLOGY | Facility: HOSPITAL | Age: 62
End: 2022-03-18
Payer: COMMERCIAL

## 2022-03-18 VITALS
HEART RATE: 77 BPM | OXYGEN SATURATION: 98 % | SYSTOLIC BLOOD PRESSURE: 108 MMHG | DIASTOLIC BLOOD PRESSURE: 70 MMHG | TEMPERATURE: 99 F | BODY MASS INDEX: 27.53 KG/M2 | RESPIRATION RATE: 15 BRPM | WEIGHT: 181.63 LBS | HEIGHT: 68 IN

## 2022-03-18 DIAGNOSIS — K59.04 CHRONIC IDIOPATHIC CONSTIPATION: Primary | ICD-10-CM

## 2022-03-18 DIAGNOSIS — M94.0 COSTOCHONDRITIS: ICD-10-CM

## 2022-03-18 DIAGNOSIS — I71.40 ABDOMINAL AORTIC ANEURYSM (AAA) WITHOUT RUPTURE: Primary | ICD-10-CM

## 2022-03-18 PROCEDURE — 99214 PR OFFICE/OUTPT VISIT, EST, LEVL IV, 30-39 MIN: ICD-10-PCS | Mod: S$GLB,,, | Performed by: FAMILY MEDICINE

## 2022-03-18 PROCEDURE — 1159F PR MEDICATION LIST DOCUMENTED IN MEDICAL RECORD: ICD-10-PCS | Mod: S$GLB,,, | Performed by: FAMILY MEDICINE

## 2022-03-18 PROCEDURE — 4010F PR ACE/ARB THEARPY RXD/TAKEN: ICD-10-PCS | Mod: S$GLB,,, | Performed by: FAMILY MEDICINE

## 2022-03-18 PROCEDURE — 3078F PR MOST RECENT DIASTOLIC BLOOD PRESSURE < 80 MM HG: ICD-10-PCS | Mod: S$GLB,,, | Performed by: FAMILY MEDICINE

## 2022-03-18 PROCEDURE — 4010F ACE/ARB THERAPY RXD/TAKEN: CPT | Mod: S$GLB,,, | Performed by: FAMILY MEDICINE

## 2022-03-18 PROCEDURE — 3008F BODY MASS INDEX DOCD: CPT | Mod: S$GLB,,, | Performed by: FAMILY MEDICINE

## 2022-03-18 PROCEDURE — 1160F RVW MEDS BY RX/DR IN RCRD: CPT | Mod: S$GLB,,, | Performed by: FAMILY MEDICINE

## 2022-03-18 PROCEDURE — 1160F PR REVIEW ALL MEDS BY PRESCRIBER/CLIN PHARMACIST DOCUMENTED: ICD-10-PCS | Mod: S$GLB,,, | Performed by: FAMILY MEDICINE

## 2022-03-18 PROCEDURE — 3078F DIAST BP <80 MM HG: CPT | Mod: S$GLB,,, | Performed by: FAMILY MEDICINE

## 2022-03-18 PROCEDURE — 99999 PR PBB SHADOW E&M-EST. PATIENT-LVL IV: ICD-10-PCS | Mod: PBBFAC,,, | Performed by: FAMILY MEDICINE

## 2022-03-18 PROCEDURE — 3008F PR BODY MASS INDEX (BMI) DOCUMENTED: ICD-10-PCS | Mod: S$GLB,,, | Performed by: FAMILY MEDICINE

## 2022-03-18 PROCEDURE — 3074F PR MOST RECENT SYSTOLIC BLOOD PRESSURE < 130 MM HG: ICD-10-PCS | Mod: S$GLB,,, | Performed by: FAMILY MEDICINE

## 2022-03-18 PROCEDURE — 99999 PR PBB SHADOW E&M-EST. PATIENT-LVL IV: CPT | Mod: PBBFAC,,, | Performed by: FAMILY MEDICINE

## 2022-03-18 PROCEDURE — 3074F SYST BP LT 130 MM HG: CPT | Mod: S$GLB,,, | Performed by: FAMILY MEDICINE

## 2022-03-18 PROCEDURE — 99214 OFFICE O/P EST MOD 30 MIN: CPT | Mod: S$GLB,,, | Performed by: FAMILY MEDICINE

## 2022-03-18 PROCEDURE — 1159F MED LIST DOCD IN RCRD: CPT | Mod: S$GLB,,, | Performed by: FAMILY MEDICINE

## 2022-03-18 RX ORDER — TIZANIDINE 4 MG/1
4 TABLET ORAL EVERY 6 HOURS PRN
Qty: 30 TABLET | Refills: 0 | Status: SHIPPED | OUTPATIENT
Start: 2022-03-18 | End: 2022-03-31 | Stop reason: SDUPTHER

## 2022-03-18 RX ORDER — PREDNISONE 20 MG/1
20 TABLET ORAL 2 TIMES DAILY
Qty: 10 TABLET | Refills: 0 | Status: SHIPPED | OUTPATIENT
Start: 2022-03-18 | End: 2022-03-23

## 2022-03-18 NOTE — PROGRESS NOTES
" Patient ID: Giorgi Eden is a 61 y.o. male.    Chief Complaint: Follow-up and Abdominal Pain (R side)      Reviewed family, medical, surgical, and social history.    No cp/sob  No change in mental status  No fever  No asymmetrical limb swelling    Objective:      /70 (BP Location: Left arm, Patient Position: Sitting, BP Method: Medium (Manual))   Pulse 77   Temp 98.6 °F (37 °C)   Resp 15   Ht 5' 8" (1.727 m)   Wt 82.4 kg (181 lb 9.6 oz)   SpO2 98%   BMI 27.61 kg/m²   RRR, CTAB, s/nt/nd, no c/c/e, non-toxic appearing, no focal weakness  Assessment:       1. Chronic idiopathic constipation    2. Costochondritis        Plan:       Chronic idiopathic constipation  -     linaCLOtide (LINZESS) 72 mcg Cap capsule; Take 1 capsule (72 mcg total) by mouth before breakfast. for 7 days  Dispense: 7 capsule; Refill: 0    Costochondritis  -     predniSONE (DELTASONE) 20 MG tablet; Take 1 tablet (20 mg total) by mouth 2 (two) times daily. for 5 days  Dispense: 10 tablet; Refill: 0  -     tiZANidine (ZANAFLEX) 4 MG tablet; Take 1 tablet (4 mg total) by mouth every 6 (six) hours as needed.  Dispense: 30 tablet; Refill: 0            Reviewed, monitored, evaluated, and assessed chronic conditions as outlined above  Continue current medicines, any changes noted above  Appears to have pain on R flank along rib  Worse when lying down  ttp over that area  CT scan without stones on R  linzess for constipation  Prednisone and tizanidine for pain  Labs, radiology studies, and procedures/notes from the last 3 months were reviewed.    Risks, benefits, and side effects were discussed with the patient. All questions were answered to the fullest satisfaction of the patient, and pt verbalized understanding and agreement to treatment plan. Pt was to call with any new or worsening symptoms, or present to the ER.    "

## 2022-03-23 ENCOUNTER — TELEPHONE (OUTPATIENT)
Dept: CARDIOLOGY | Facility: CLINIC | Age: 62
End: 2022-03-23
Payer: COMMERCIAL

## 2022-03-23 NOTE — TELEPHONE ENCOUNTER
----- Message from Robbie Howell sent at 3/23/2022 10:49 AM CDT -----  Type: Needs Medical Advice  Who Called:  Kimmie cline/ Dr Ryan office    Best Call Back Number: [phone]  Additional Information: Sts they received the referral but needs office notes and med list and testing for the AAA  before pt can be seen . .... Please advise == Thank you  Fax [phone]

## 2022-03-31 DIAGNOSIS — M94.0 COSTOCHONDRITIS: ICD-10-CM

## 2022-03-31 RX ORDER — TIZANIDINE 4 MG/1
4 TABLET ORAL EVERY 6 HOURS PRN
Qty: 30 TABLET | Refills: 11 | Status: SHIPPED | OUTPATIENT
Start: 2022-03-31 | End: 2022-08-11 | Stop reason: SDUPTHER

## 2022-05-30 ENCOUNTER — HOSPITAL ENCOUNTER (INPATIENT)
Facility: HOSPITAL | Age: 62
LOS: 1 days | Discharge: HOME | DRG: 247 | End: 2022-06-01
Attending: EMERGENCY MEDICINE | Admitting: INTERNAL MEDICINE
Payer: COMMERCIAL

## 2022-05-30 DIAGNOSIS — I24.9 ACS (ACUTE CORONARY SYNDROME): ICD-10-CM

## 2022-05-30 DIAGNOSIS — Z95.5 H/O HEART ARTERY STENT: ICD-10-CM

## 2022-05-30 DIAGNOSIS — Z95.5 HISTORY OF HEART ARTERY STENT: ICD-10-CM

## 2022-05-30 DIAGNOSIS — I20.89 ANGINA AT REST: Primary | ICD-10-CM

## 2022-05-30 DIAGNOSIS — R07.9 CHEST PAIN: ICD-10-CM

## 2022-05-30 DIAGNOSIS — Z95.5 STATUS POST INSERTION OF DRUG ELUTING CORONARY ARTERY STENT: ICD-10-CM

## 2022-05-30 LAB
ALBUMIN SERPL BCP-MCNC: 4.1 G/DL (ref 3.5–5.2)
ALP SERPL-CCNC: 51 U/L (ref 55–135)
ALT SERPL W/O P-5'-P-CCNC: 36 U/L (ref 10–44)
ANION GAP SERPL CALC-SCNC: 9 MMOL/L (ref 8–16)
AST SERPL-CCNC: 28 U/L (ref 10–40)
BASOPHILS # BLD AUTO: 0.05 K/UL (ref 0–0.2)
BASOPHILS NFR BLD: 0.6 % (ref 0–1.9)
BILIRUB SERPL-MCNC: 0.8 MG/DL (ref 0.1–1)
BNP SERPL-MCNC: 31 PG/ML (ref 0–99)
BUN SERPL-MCNC: 20 MG/DL (ref 6–20)
CALCIUM SERPL-MCNC: 9.4 MG/DL (ref 8.7–10.5)
CHLORIDE SERPL-SCNC: 100 MMOL/L (ref 95–110)
CO2 SERPL-SCNC: 27 MMOL/L (ref 23–29)
CREAT SERPL-MCNC: 1.3 MG/DL (ref 0.5–1.4)
DIFFERENTIAL METHOD: ABNORMAL
EOSINOPHIL # BLD AUTO: 0.2 K/UL (ref 0–0.5)
EOSINOPHIL NFR BLD: 2.4 % (ref 0–8)
ERYTHROCYTE [DISTWIDTH] IN BLOOD BY AUTOMATED COUNT: 14.4 % (ref 11.5–14.5)
EST. GFR  (AFRICAN AMERICAN): >60 ML/MIN/1.73 M^2
EST. GFR  (NON AFRICAN AMERICAN): 59.7 ML/MIN/1.73 M^2
GLUCOSE SERPL-MCNC: 101 MG/DL (ref 70–110)
HCT VFR BLD AUTO: 50.1 % (ref 40–54)
HGB BLD-MCNC: 17.2 G/DL (ref 14–18)
IMM GRANULOCYTES # BLD AUTO: 0.02 K/UL (ref 0–0.04)
IMM GRANULOCYTES NFR BLD AUTO: 0.2 % (ref 0–0.5)
INR PPP: 1.2
LYMPHOCYTES # BLD AUTO: 3.5 K/UL (ref 1–4.8)
LYMPHOCYTES NFR BLD: 38.6 % (ref 18–48)
MCH RBC QN AUTO: 28.8 PG (ref 27–31)
MCHC RBC AUTO-ENTMCNC: 34.3 G/DL (ref 32–36)
MCV RBC AUTO: 84 FL (ref 82–98)
MONOCYTES # BLD AUTO: 1.1 K/UL (ref 0.3–1)
MONOCYTES NFR BLD: 12.4 % (ref 4–15)
NEUTROPHILS # BLD AUTO: 4.1 K/UL (ref 1.8–7.7)
NEUTROPHILS NFR BLD: 45.8 % (ref 38–73)
NRBC BLD-RTO: 0 /100 WBC
PLATELET # BLD AUTO: 218 K/UL (ref 150–450)
PMV BLD AUTO: 9.8 FL (ref 9.2–12.9)
POTASSIUM SERPL-SCNC: 3.7 MMOL/L (ref 3.5–5.1)
PROT SERPL-MCNC: 7.2 G/DL (ref 6–8.4)
PROTHROMBIN TIME: 14 SEC (ref 11.4–13.7)
RBC # BLD AUTO: 5.98 M/UL (ref 4.6–6.2)
SARS-COV-2 RDRP RESP QL NAA+PROBE: NEGATIVE
SODIUM SERPL-SCNC: 136 MMOL/L (ref 136–145)
TROPONIN I SERPL DL<=0.01 NG/ML-MCNC: <0.03 NG/ML
WBC # BLD AUTO: 9.03 K/UL (ref 3.9–12.7)

## 2022-05-30 PROCEDURE — 80053 COMPREHEN METABOLIC PANEL: CPT | Performed by: EMERGENCY MEDICINE

## 2022-05-30 PROCEDURE — 96361 HYDRATE IV INFUSION ADD-ON: CPT

## 2022-05-30 PROCEDURE — 85025 COMPLETE CBC W/AUTO DIFF WBC: CPT | Performed by: EMERGENCY MEDICINE

## 2022-05-30 PROCEDURE — 85610 PROTHROMBIN TIME: CPT | Performed by: EMERGENCY MEDICINE

## 2022-05-30 PROCEDURE — 93010 EKG 12-LEAD: ICD-10-PCS | Mod: ,,, | Performed by: INTERNAL MEDICINE

## 2022-05-30 PROCEDURE — 96374 THER/PROPH/DIAG INJ IV PUSH: CPT

## 2022-05-30 PROCEDURE — 25000003 PHARM REV CODE 250: Performed by: EMERGENCY MEDICINE

## 2022-05-30 PROCEDURE — 83880 ASSAY OF NATRIURETIC PEPTIDE: CPT | Performed by: EMERGENCY MEDICINE

## 2022-05-30 PROCEDURE — U0002 COVID-19 LAB TEST NON-CDC: HCPCS | Performed by: EMERGENCY MEDICINE

## 2022-05-30 PROCEDURE — G0378 HOSPITAL OBSERVATION PER HR: HCPCS

## 2022-05-30 PROCEDURE — 93010 ELECTROCARDIOGRAM REPORT: CPT | Mod: ,,, | Performed by: INTERNAL MEDICINE

## 2022-05-30 PROCEDURE — 84484 ASSAY OF TROPONIN QUANT: CPT | Performed by: EMERGENCY MEDICINE

## 2022-05-30 PROCEDURE — 93005 ELECTROCARDIOGRAM TRACING: CPT | Performed by: INTERNAL MEDICINE

## 2022-05-30 PROCEDURE — 99285 EMERGENCY DEPT VISIT HI MDM: CPT | Mod: 25

## 2022-05-30 RX ORDER — NAPROXEN SODIUM 220 MG/1
324 TABLET, FILM COATED ORAL ONCE
Status: COMPLETED | OUTPATIENT
Start: 2022-05-30 | End: 2022-05-30

## 2022-05-30 RX ORDER — MAG HYDROX/ALUMINUM HYD/SIMETH 200-200-20
30 SUSPENSION, ORAL (FINAL DOSE FORM) ORAL ONCE
Status: COMPLETED | OUTPATIENT
Start: 2022-05-30 | End: 2022-05-30

## 2022-05-30 RX ORDER — LIDOCAINE HYDROCHLORIDE 20 MG/ML
10 SOLUTION OROPHARYNGEAL ONCE
Status: COMPLETED | OUTPATIENT
Start: 2022-05-30 | End: 2022-05-30

## 2022-05-30 RX ORDER — FAMOTIDINE 10 MG/ML
20 INJECTION INTRAVENOUS
Status: COMPLETED | OUTPATIENT
Start: 2022-05-30 | End: 2022-05-30

## 2022-05-30 RX ADMIN — ALUMINA, MAGNESIA, AND SIMETHICONE ORAL SUSPENSION REGULAR STRENGTH 30 ML: 1200; 1200; 120 SUSPENSION ORAL at 11:05

## 2022-05-30 RX ADMIN — FAMOTIDINE 20 MG: 10 INJECTION, SOLUTION INTRAVENOUS at 11:05

## 2022-05-30 RX ADMIN — LIDOCAINE HYDROCHLORIDE 10 ML: 20 SOLUTION ORAL; TOPICAL at 11:05

## 2022-05-30 RX ADMIN — ASPIRIN 81 MG 324 MG: 81 TABLET ORAL at 08:05

## 2022-05-30 RX ADMIN — SODIUM CHLORIDE 1000 ML: 0.9 INJECTION, SOLUTION INTRAVENOUS at 11:05

## 2022-05-30 NOTE — Clinical Note
The catheter was inserted into the ostium   left anterior descending. An angiography was performed of the left coronary arteries. Multiple views were taken. The angiography was performed via power injection. The injected amount was 8 mL contrast at 4 mL/s.

## 2022-05-30 NOTE — Clinical Note
The catheter was inserted into the ostium   right coronary artery. An angiography was performed of the right coronary arteries. Multiple views were taken. The angiography was performed via power injection. The injected amount was 6 mL contrast at 3 mL/s.

## 2022-05-30 NOTE — Clinical Note
The groin was prepped. The site was prepped with ChloraPrep. The site was clipped. The patient was draped. The patient was positioned supine. The patient was secured using safety straps.

## 2022-05-31 ENCOUNTER — PATIENT MESSAGE (OUTPATIENT)
Dept: FAMILY MEDICINE | Facility: CLINIC | Age: 62
End: 2022-05-31
Payer: COMMERCIAL

## 2022-05-31 ENCOUNTER — TELEPHONE (OUTPATIENT)
Dept: FAMILY MEDICINE | Facility: CLINIC | Age: 62
End: 2022-05-31
Payer: COMMERCIAL

## 2022-05-31 PROBLEM — R07.9 CHEST PAIN: Status: RESOLVED | Noted: 2022-05-30 | Resolved: 2022-05-31

## 2022-05-31 PROBLEM — I25.110 ATHEROSCLEROTIC HEART DISEASE OF NATIVE CORONARY ARTERY WITH UNSTABLE ANGINA PECTORIS: Status: ACTIVE | Noted: 2022-05-31

## 2022-05-31 LAB
ALBUMIN SERPL BCP-MCNC: 3.9 G/DL (ref 3.5–5.2)
ALP SERPL-CCNC: 50 U/L (ref 55–135)
ALT SERPL W/O P-5'-P-CCNC: 32 U/L (ref 10–44)
ANION GAP SERPL CALC-SCNC: 9 MMOL/L (ref 8–16)
ANION GAP SERPL CALC-SCNC: 9 MMOL/L (ref 8–16)
AST SERPL-CCNC: 26 U/L (ref 10–40)
BASOPHILS # BLD AUTO: 0.05 K/UL (ref 0–0.2)
BASOPHILS NFR BLD: 0.7 % (ref 0–1.9)
BILIRUB SERPL-MCNC: 1.2 MG/DL (ref 0.1–1)
BUN SERPL-MCNC: 17 MG/DL (ref 6–20)
BUN SERPL-MCNC: 19 MG/DL (ref 6–20)
CALCIUM SERPL-MCNC: 8.9 MG/DL (ref 8.7–10.5)
CALCIUM SERPL-MCNC: 9 MG/DL (ref 8.7–10.5)
CHLORIDE SERPL-SCNC: 102 MMOL/L (ref 95–110)
CHLORIDE SERPL-SCNC: 104 MMOL/L (ref 95–110)
CHOLEST SERPL-MCNC: 124 MG/DL (ref 120–199)
CHOLEST/HDLC SERPL: 4.3 {RATIO} (ref 2–5)
CO2 SERPL-SCNC: 24 MMOL/L (ref 23–29)
CO2 SERPL-SCNC: 25 MMOL/L (ref 23–29)
CREAT SERPL-MCNC: 0.9 MG/DL (ref 0.5–1.4)
CREAT SERPL-MCNC: 1 MG/DL (ref 0.5–1.4)
DIFFERENTIAL METHOD: ABNORMAL
EOSINOPHIL # BLD AUTO: 0.2 K/UL (ref 0–0.5)
EOSINOPHIL NFR BLD: 2.9 % (ref 0–8)
ERYTHROCYTE [DISTWIDTH] IN BLOOD BY AUTOMATED COUNT: 14.3 % (ref 11.5–14.5)
EST. GFR  (AFRICAN AMERICAN): >60 ML/MIN/1.73 M^2
EST. GFR  (AFRICAN AMERICAN): >60 ML/MIN/1.73 M^2
EST. GFR  (NON AFRICAN AMERICAN): >60 ML/MIN/1.73 M^2
EST. GFR  (NON AFRICAN AMERICAN): >60 ML/MIN/1.73 M^2
GLUCOSE SERPL-MCNC: 102 MG/DL (ref 70–110)
GLUCOSE SERPL-MCNC: 85 MG/DL (ref 70–110)
HCT VFR BLD AUTO: 49.8 % (ref 40–54)
HDLC SERPL-MCNC: 29 MG/DL (ref 40–75)
HDLC SERPL: 23.4 % (ref 20–50)
HGB BLD-MCNC: 16.7 G/DL (ref 14–18)
IMM GRANULOCYTES # BLD AUTO: 0.02 K/UL (ref 0–0.04)
IMM GRANULOCYTES NFR BLD AUTO: 0.3 % (ref 0–0.5)
LDLC SERPL CALC-MCNC: 49.8 MG/DL (ref 63–159)
LYMPHOCYTES # BLD AUTO: 3.3 K/UL (ref 1–4.8)
LYMPHOCYTES NFR BLD: 45 % (ref 18–48)
MAGNESIUM SERPL-MCNC: 1.8 MG/DL (ref 1.6–2.6)
MCH RBC QN AUTO: 28.8 PG (ref 27–31)
MCHC RBC AUTO-ENTMCNC: 33.5 G/DL (ref 32–36)
MCV RBC AUTO: 86 FL (ref 82–98)
MONOCYTES # BLD AUTO: 1 K/UL (ref 0.3–1)
MONOCYTES NFR BLD: 14 % (ref 4–15)
NEUTROPHILS # BLD AUTO: 2.7 K/UL (ref 1.8–7.7)
NEUTROPHILS NFR BLD: 37.1 % (ref 38–73)
NONHDLC SERPL-MCNC: 95 MG/DL
NRBC BLD-RTO: 0 /100 WBC
PLATELET # BLD AUTO: 216 K/UL (ref 150–450)
PMV BLD AUTO: 10.2 FL (ref 9.2–12.9)
POTASSIUM SERPL-SCNC: 3.2 MMOL/L (ref 3.5–5.1)
POTASSIUM SERPL-SCNC: 4 MMOL/L (ref 3.5–5.1)
PROT SERPL-MCNC: 6.9 G/DL (ref 6–8.4)
RBC # BLD AUTO: 5.8 M/UL (ref 4.6–6.2)
SODIUM SERPL-SCNC: 135 MMOL/L (ref 136–145)
SODIUM SERPL-SCNC: 138 MMOL/L (ref 136–145)
TRIGL SERPL-MCNC: 226 MG/DL (ref 30–150)
TROPONIN I SERPL DL<=0.01 NG/ML-MCNC: <0.03 NG/ML
TROPONIN I SERPL DL<=0.01 NG/ML-MCNC: <0.03 NG/ML
WBC # BLD AUTO: 7.22 K/UL (ref 3.9–12.7)

## 2022-05-31 PROCEDURE — C9600 PERC DRUG-EL COR STENT SING: HCPCS | Mod: RC | Performed by: GENERAL PRACTICE

## 2022-05-31 PROCEDURE — 99153 MOD SED SAME PHYS/QHP EA: CPT | Performed by: GENERAL PRACTICE

## 2022-05-31 PROCEDURE — 36415 COLL VENOUS BLD VENIPUNCTURE: CPT | Performed by: NURSE PRACTITIONER

## 2022-05-31 PROCEDURE — C1887 CATHETER, GUIDING: HCPCS | Performed by: GENERAL PRACTICE

## 2022-05-31 PROCEDURE — 21400001 HC TELEMETRY ROOM

## 2022-05-31 PROCEDURE — 36415 COLL VENOUS BLD VENIPUNCTURE: CPT

## 2022-05-31 PROCEDURE — 93454 CORONARY ARTERY ANGIO S&I: CPT | Mod: 26,59,51, | Performed by: GENERAL PRACTICE

## 2022-05-31 PROCEDURE — 25000003 PHARM REV CODE 250: Performed by: GENERAL PRACTICE

## 2022-05-31 PROCEDURE — C1760 CLOSURE DEV, VASC: HCPCS | Performed by: GENERAL PRACTICE

## 2022-05-31 PROCEDURE — 25500020 PHARM REV CODE 255: Performed by: GENERAL PRACTICE

## 2022-05-31 PROCEDURE — 99204 OFFICE O/P NEW MOD 45 MIN: CPT | Mod: 25,,, | Performed by: GENERAL PRACTICE

## 2022-05-31 PROCEDURE — C1894 INTRO/SHEATH, NON-LASER: HCPCS | Performed by: GENERAL PRACTICE

## 2022-05-31 PROCEDURE — 80061 LIPID PANEL: CPT | Performed by: NURSE PRACTITIONER

## 2022-05-31 PROCEDURE — 80048 BASIC METABOLIC PNL TOTAL CA: CPT | Performed by: NURSE PRACTITIONER

## 2022-05-31 PROCEDURE — 93454 CORONARY ARTERY ANGIO S&I: CPT | Mod: XU | Performed by: GENERAL PRACTICE

## 2022-05-31 PROCEDURE — 93454 PR CATH PLACE/CORONARY ANGIO, IMG SUPER/INTERP: ICD-10-PCS | Mod: 26,59,51, | Performed by: GENERAL PRACTICE

## 2022-05-31 PROCEDURE — C1725 CATH, TRANSLUMIN NON-LASER: HCPCS | Performed by: GENERAL PRACTICE

## 2022-05-31 PROCEDURE — 99152 MOD SED SAME PHYS/QHP 5/>YRS: CPT | Mod: ,,, | Performed by: GENERAL PRACTICE

## 2022-05-31 PROCEDURE — 99204 PR OFFICE/OUTPT VISIT, NEW, LEVL IV, 45-59 MIN: ICD-10-PCS | Mod: 25,,, | Performed by: GENERAL PRACTICE

## 2022-05-31 PROCEDURE — 92928 PR STENT: ICD-10-PCS | Mod: RC,,, | Performed by: GENERAL PRACTICE

## 2022-05-31 PROCEDURE — 99152 PR MOD CONSCIOUS SEDATION, SAME PHYS, 5+ YRS, FIRST 15 MIN: ICD-10-PCS | Mod: ,,, | Performed by: GENERAL PRACTICE

## 2022-05-31 PROCEDURE — 92928 PRQ TCAT PLMT NTRAC ST 1 LES: CPT | Mod: RC,,, | Performed by: GENERAL PRACTICE

## 2022-05-31 PROCEDURE — 80053 COMPREHEN METABOLIC PANEL: CPT

## 2022-05-31 PROCEDURE — 85025 COMPLETE CBC W/AUTO DIFF WBC: CPT | Performed by: NURSE PRACTITIONER

## 2022-05-31 PROCEDURE — 25000003 PHARM REV CODE 250: Performed by: INTERNAL MEDICINE

## 2022-05-31 PROCEDURE — 83735 ASSAY OF MAGNESIUM: CPT | Performed by: NURSE PRACTITIONER

## 2022-05-31 PROCEDURE — 63600175 PHARM REV CODE 636 W HCPCS: Performed by: GENERAL PRACTICE

## 2022-05-31 PROCEDURE — 84484 ASSAY OF TROPONIN QUANT: CPT | Performed by: NURSE PRACTITIONER

## 2022-05-31 PROCEDURE — 99152 MOD SED SAME PHYS/QHP 5/>YRS: CPT | Performed by: GENERAL PRACTICE

## 2022-05-31 PROCEDURE — C1769 GUIDE WIRE: HCPCS | Performed by: GENERAL PRACTICE

## 2022-05-31 PROCEDURE — C1874 STENT, COATED/COV W/DEL SYS: HCPCS | Performed by: GENERAL PRACTICE

## 2022-05-31 PROCEDURE — 25000003 PHARM REV CODE 250: Performed by: NURSE PRACTITIONER

## 2022-05-31 DEVICE — STENT RONYX27538UX RESOLUTE ONYX 2.75X38
Type: IMPLANTABLE DEVICE | Site: CORONARY | Status: FUNCTIONAL
Brand: RESOLUTE ONYX™

## 2022-05-31 DEVICE — ANGIO-SEAL VIP VASCULAR CLOSURE DEVICE
Type: IMPLANTABLE DEVICE | Site: CORONARY | Status: FUNCTIONAL
Brand: ANGIO-SEAL

## 2022-05-31 RX ORDER — ATENOLOL 50 MG/1
50 TABLET ORAL DAILY
Status: DISCONTINUED | OUTPATIENT
Start: 2022-05-31 | End: 2022-06-01 | Stop reason: HOSPADM

## 2022-05-31 RX ORDER — ASPIRIN 81 MG/1
81 TABLET ORAL DAILY
Status: DISCONTINUED | OUTPATIENT
Start: 2022-05-31 | End: 2022-05-31

## 2022-05-31 RX ORDER — ENOXAPARIN SODIUM 100 MG/ML
40 INJECTION SUBCUTANEOUS EVERY 24 HOURS
Status: DISCONTINUED | OUTPATIENT
Start: 2022-05-31 | End: 2022-05-31

## 2022-05-31 RX ORDER — LOPERAMIDE HYDROCHLORIDE 2 MG/1
2 CAPSULE ORAL
Status: DISCONTINUED | OUTPATIENT
Start: 2022-05-31 | End: 2022-06-01 | Stop reason: HOSPADM

## 2022-05-31 RX ORDER — POTASSIUM CHLORIDE 20 MEQ/1
40 TABLET, EXTENDED RELEASE ORAL ONCE
Status: COMPLETED | OUTPATIENT
Start: 2022-05-31 | End: 2022-05-31

## 2022-05-31 RX ORDER — SODIUM CHLORIDE 9 MG/ML
INJECTION, SOLUTION INTRAVENOUS CONTINUOUS
Status: DISCONTINUED | OUTPATIENT
Start: 2022-05-31 | End: 2022-06-01 | Stop reason: HOSPADM

## 2022-05-31 RX ORDER — ASPIRIN 81 MG/1
81 TABLET ORAL DAILY
Status: DISCONTINUED | OUTPATIENT
Start: 2022-06-01 | End: 2022-06-01 | Stop reason: HOSPADM

## 2022-05-31 RX ORDER — AMOXICILLIN 250 MG
1 CAPSULE ORAL 2 TIMES DAILY PRN
Status: DISCONTINUED | OUTPATIENT
Start: 2022-05-31 | End: 2022-06-01 | Stop reason: HOSPADM

## 2022-05-31 RX ORDER — FENTANYL CITRATE 50 UG/ML
INJECTION, SOLUTION INTRAMUSCULAR; INTRAVENOUS
Status: DISCONTINUED | OUTPATIENT
Start: 2022-05-31 | End: 2022-05-31 | Stop reason: HOSPADM

## 2022-05-31 RX ORDER — SODIUM CHLORIDE 9 MG/ML
INJECTION, SOLUTION INTRAVENOUS ONCE
Status: CANCELLED | OUTPATIENT
Start: 2022-05-31 | End: 2022-05-31

## 2022-05-31 RX ORDER — MIDAZOLAM HYDROCHLORIDE 1 MG/ML
INJECTION INTRAMUSCULAR; INTRAVENOUS
Status: DISCONTINUED | OUTPATIENT
Start: 2022-05-31 | End: 2022-05-31 | Stop reason: HOSPADM

## 2022-05-31 RX ORDER — NITROGLYCERIN 0.4 MG/1
0.4 TABLET SUBLINGUAL EVERY 5 MIN PRN
Status: DISCONTINUED | OUTPATIENT
Start: 2022-05-31 | End: 2022-06-01 | Stop reason: HOSPADM

## 2022-05-31 RX ORDER — FAMOTIDINE 20 MG/1
20 TABLET, FILM COATED ORAL 2 TIMES DAILY
Status: DISCONTINUED | OUTPATIENT
Start: 2022-05-31 | End: 2022-06-01 | Stop reason: HOSPADM

## 2022-05-31 RX ORDER — ONDANSETRON 2 MG/ML
4 INJECTION INTRAMUSCULAR; INTRAVENOUS EVERY 6 HOURS PRN
Status: DISCONTINUED | OUTPATIENT
Start: 2022-05-31 | End: 2022-06-01 | Stop reason: HOSPADM

## 2022-05-31 RX ORDER — TAMSULOSIN HYDROCHLORIDE 0.4 MG/1
1 CAPSULE ORAL DAILY
Status: DISCONTINUED | OUTPATIENT
Start: 2022-05-31 | End: 2022-06-01 | Stop reason: HOSPADM

## 2022-05-31 RX ORDER — DIPHENHYDRAMINE HCL 25 MG
50 CAPSULE ORAL
Status: CANCELLED | OUTPATIENT
Start: 2022-05-31

## 2022-05-31 RX ORDER — ACETAMINOPHEN 325 MG/1
650 TABLET ORAL EVERY 4 HOURS PRN
Status: DISCONTINUED | OUTPATIENT
Start: 2022-05-31 | End: 2022-06-01 | Stop reason: HOSPADM

## 2022-05-31 RX ORDER — PHENYLEPHRINE HYDROCHLORIDE 10 MG/ML
INJECTION INTRAVENOUS
Status: DISCONTINUED | OUTPATIENT
Start: 2022-05-31 | End: 2022-05-31 | Stop reason: HOSPADM

## 2022-05-31 RX ORDER — LISINOPRIL 2.5 MG/1
2.5 TABLET ORAL DAILY
Status: DISCONTINUED | OUTPATIENT
Start: 2022-05-31 | End: 2022-06-01 | Stop reason: HOSPADM

## 2022-05-31 RX ORDER — ACETAMINOPHEN 325 MG/1
650 TABLET ORAL EVERY 6 HOURS PRN
Status: DISCONTINUED | OUTPATIENT
Start: 2022-05-31 | End: 2022-06-01 | Stop reason: HOSPADM

## 2022-05-31 RX ORDER — SODIUM CHLORIDE 0.9 % (FLUSH) 0.9 %
10 SYRINGE (ML) INJECTION
Status: DISCONTINUED | OUTPATIENT
Start: 2022-05-31 | End: 2022-06-01 | Stop reason: HOSPADM

## 2022-05-31 RX ORDER — POTASSIUM CHLORIDE 20 MEQ/1
40 TABLET, EXTENDED RELEASE ORAL ONCE
Status: DISCONTINUED | OUTPATIENT
Start: 2022-05-31 | End: 2022-06-01 | Stop reason: HOSPADM

## 2022-05-31 RX ORDER — ATORVASTATIN CALCIUM 20 MG/1
20 TABLET, FILM COATED ORAL DAILY
Status: DISCONTINUED | OUTPATIENT
Start: 2022-05-31 | End: 2022-06-01 | Stop reason: HOSPADM

## 2022-05-31 RX ORDER — HEPARIN SODIUM 1000 [USP'U]/ML
INJECTION, SOLUTION INTRAVENOUS; SUBCUTANEOUS
Status: DISCONTINUED | OUTPATIENT
Start: 2022-05-31 | End: 2022-05-31 | Stop reason: HOSPADM

## 2022-05-31 RX ORDER — LIDOCAINE HYDROCHLORIDE 10 MG/ML
INJECTION, SOLUTION EPIDURAL; INFILTRATION; INTRACAUDAL; PERINEURAL
Status: DISCONTINUED | OUTPATIENT
Start: 2022-05-31 | End: 2022-05-31 | Stop reason: HOSPADM

## 2022-05-31 RX ORDER — NITROGLYCERIN 5 MG/ML
INJECTION, SOLUTION INTRAVENOUS
Status: DISCONTINUED | OUTPATIENT
Start: 2022-05-31 | End: 2022-05-31 | Stop reason: HOSPADM

## 2022-05-31 RX ADMIN — ATORVASTATIN CALCIUM 20 MG: 20 TABLET, FILM COATED ORAL at 08:05

## 2022-05-31 RX ADMIN — LISINOPRIL 2.5 MG: 2.5 TABLET ORAL at 08:05

## 2022-05-31 RX ADMIN — POTASSIUM CHLORIDE 40 MEQ: 1500 TABLET, EXTENDED RELEASE ORAL at 08:05

## 2022-05-31 RX ADMIN — SODIUM CHLORIDE: 0.9 INJECTION, SOLUTION INTRAVENOUS at 05:05

## 2022-05-31 RX ADMIN — TAMSULOSIN HYDROCHLORIDE 0.4 MG: 0.4 CAPSULE ORAL at 08:05

## 2022-05-31 RX ADMIN — ASPIRIN 81 MG: 81 TABLET, COATED ORAL at 08:05

## 2022-05-31 RX ADMIN — ATENOLOL 50 MG: 50 TABLET ORAL at 08:05

## 2022-05-31 RX ADMIN — TICAGRELOR 90 MG: 90 TABLET ORAL at 08:05

## 2022-05-31 RX ADMIN — FAMOTIDINE 20 MG: 20 TABLET ORAL at 08:05

## 2022-05-31 NOTE — SUBJECTIVE & OBJECTIVE
Interval History:  No acute events overnight  Cardiology following patient currently in cath lab getting stent      Objective:     Vital Signs (Most Recent):  Temp: 97.9 °F (36.6 °C) (05/31/22 1100)  Pulse: 61 (05/31/22 1100)  Resp: 18 (05/31/22 1100)  BP: (!) 136/91 (05/31/22 1100)  SpO2: 98 % (05/31/22 1100)   Vital Signs (24h Range):  Temp:  [97.5 °F (36.4 °C)-98.7 °F (37.1 °C)] 97.9 °F (36.6 °C)  Pulse:  [52-72] 61  Resp:  [13-31] 18  SpO2:  [95 %-100 %] 98 %  BP: ()/(57-91) 136/91     Weight: 83 kg (182 lb 15.7 oz)  Body mass index is 27.02 kg/m².    Intake/Output Summary (Last 24 hours) at 5/31/2022 1412  Last data filed at 5/31/2022 0900  Gross per 24 hour   Intake 60 ml   Output 300 ml   Net -240 ml      Physical Exam patient appears in no acute distress  In cath lab currently  Lungs symmetrical expansion no wheeze  Heart regular rate and rhythm  Abdomen is soft nontender  Extremities no edema no deformities  Neuro patient is alert and oriented x3  Motor exam is nonfocal   no Morales     Significant Labs: All pertinent labs within the past 24 hours have been reviewed.  BMP:   Recent Labs   Lab 05/31/22  0416         K 3.2*      CO2 25   BUN 19   CREATININE 0.9   CALCIUM 8.9   MG 1.8     CBC:   Recent Labs   Lab 05/30/22 2051 05/31/22  0416   WBC 9.03 7.22   HGB 17.2 16.7   HCT 50.1 49.8    216     CMP:   Recent Labs   Lab 05/30/22 2051 05/31/22  0416    138   K 3.7 3.2*    104   CO2 27 25    102   BUN 20 19   CREATININE 1.3 0.9   CALCIUM 9.4 8.9   PROT 7.2  --    ALBUMIN 4.1  --    BILITOT 0.8  --    ALKPHOS 51*  --    AST 28  --    ALT 36  --    ANIONGAP 9 9   EGFRNONAA 59.7* >60.0     Troponin:   Recent Labs   Lab 05/30/22 2051 05/31/22  0416 05/31/22  1011   TROPONINI <0.030 <0.030 <0.030       Significant Imaging:

## 2022-05-31 NOTE — PROGRESS NOTES
[facility] Medicine  Progress Note    Patient Name: Giorgi Eden  MRN: [id number]  Patient Class: OP- Observation   Admission Date: 5/30/2022  Length of Stay: 0 days  Attending Physician: Naldo Lang DO  Primary Care Provider: Nitin Villareal MD        Subjective:     Principal Problem:Atherosclerotic heart disease of native coronary artery with unstable angina pectoris        HPI:  No notes on file    Overview/Hospital Course:  5/31 Dr. Lang assume patient's care  Patient seen on cardiac unit and then again and cath lab.  Case discussed with Cardiology.  Will require stent      Interval History:  No acute events overnight  Cardiology following patient currently in cath lab getting stent      Objective:     Vital Signs (Most Recent):  Temp: 97.9 °F (36.6 °C) (05/31/22 1100)  Pulse: 61 (05/31/22 1100)  Resp: 18 (05/31/22 1100)  BP: (!) 136/91 (05/31/22 1100)  SpO2: 98 % (05/31/22 1100)   Vital Signs (24h Range):  Temp:  [97.5 °F (36.4 °C)-98.7 °F (37.1 °C)] 97.9 °F (36.6 °C)  Pulse:  [52-72] 61  Resp:  [13-31] 18  SpO2:  [95 %-100 %] 98 %  BP: ()/(57-91) 136/91     Weight: 83 kg (182 lb 15.7 oz)  Body mass index is 27.02 kg/m².    Intake/Output Summary (Last 24 hours) at 5/31/2022 1412  Last data filed at 5/31/2022 0900  Gross per 24 hour   Intake 60 ml   Output 300 ml   Net -240 ml      Physical Exam patient appears in no acute distress  In cath lab currently  Lungs symmetrical expansion no wheeze  Heart regular rate and rhythm  Abdomen is soft nontender  Extremities no edema no deformities  Neuro patient is alert and oriented x3  Motor exam is nonfocal   no Morales     Significant Labs: All pertinent labs within the past 24 hours have been reviewed.  BMP:   Recent Labs   Lab 05/31/22 0416         K 3.2*      CO2 25   BUN 19   CREATININE 0.9   CALCIUM 8.9   MG 1.8     CBC:   Recent Labs   Lab 05/30/22 2051 05/31/22 0416   WBC 9.03 7.22   HGB 17.2 16.7   HCT  50.1 49.8    216     CMP:   Recent Labs   Lab 05/30/22 2051 05/31/22  0416    138   K 3.7 3.2*    104   CO2 27 25    102   BUN 20 19   CREATININE 1.3 0.9   CALCIUM 9.4 8.9   PROT 7.2  --    ALBUMIN 4.1  --    BILITOT 0.8  --    ALKPHOS 51*  --    AST 28  --    ALT 36  --    ANIONGAP 9 9   EGFRNONAA 59.7* >60.0     Troponin:   Recent Labs   Lab 05/30/22 2051 05/31/22 0416 05/31/22  1011   TROPONINI <0.030 <0.030 <0.030       Significant Imaging:       Assessment/Plan:      * Atherosclerotic heart disease of native coronary artery with unstable angina pectoris      Patient currently in cath lab undergoing PCI    #1 CAD Hx stent 2010/2014. Now undergoing PCI  #2 Hypokalemia  #3 Dyslipidemia      Risk factor management  Telemetry monitoring  Cardiology following       VTE Risk Mitigation (From admission, onward)         Ordered     heparin (porcine) injection  As needed (PRN)         05/31/22 1342     IP VTE LOW RISK PATIENT  Once         05/31/22 0316                Discharge Planning   LISA:      Code Status: Full Code   Is the patient medically ready for discharge?:     Reason for patient still in hospital (select all that apply):                      Naldo Lang DO  Department of Hospital Medicine   [facility]

## 2022-05-31 NOTE — ED PROVIDER NOTES
Encounter Date: 2022       History     Chief Complaint   Patient presents with    Chest Pain     Midsternal chest pressure x 2 hours.  Hx of mi, states feels the same now.      61-year-old male with past medical history of MI, hypertension, hyperlipidemia presents secondary to chest pain.  Patient states his pain felt similar to his previous MI which he had . He states the pain was located to the left side of his chest.  He denies any nausea, vomiting, fever, chills, sweats.  Patient is otherwise stable and has no other complaints.        Review of patient's allergies indicates:  No Known Allergies  Past Medical History:   Diagnosis Date    Hyperlipidemia     Hypertension     MI (myocardial infarction)     X2     Past Surgical History:   Procedure Laterality Date    CORONARY ANGIOPLASTY WITH STENT PLACEMENT      x2     Family History   Problem Relation Age of Onset    COPD Mother     Heart disease Mother     Stroke Mother      Social History     Tobacco Use    Smoking status: Former Smoker     Packs/day: 1.00     Years: 25.00     Pack years: 25.00     Types: Cigarettes     Quit date: 2021     Years since quittin.8    Smokeless tobacco: Never Used    Tobacco comment: I now use a vape pen at 5 mg per day   Substance Use Topics    Alcohol use: Yes     Comment: socially     Review of Systems   Cardiovascular: Positive for chest pain.   All other systems reviewed and are negative.      Physical Exam     Initial Vitals [22]   BP Pulse Resp Temp SpO2   135/85 67 20 98.7 °F (37.1 °C) 100 %      MAP       --         Physical Exam    Nursing note and vitals reviewed.  Constitutional: He appears well-developed and well-nourished. He is not diaphoretic. No distress.   HENT:   Head: Normocephalic and atraumatic.   Mouth/Throat: Oropharynx is clear and moist.   Eyes: Conjunctivae and EOM are normal. Pupils are equal, round, and reactive to light.   Neck: Neck supple. No thyromegaly  present. No JVD present.   Normal range of motion.  Cardiovascular: Normal rate, regular rhythm and normal heart sounds. Exam reveals no gallop and no friction rub.    No murmur heard.  Pulmonary/Chest: Breath sounds normal. No respiratory distress. He has no wheezes. He exhibits no tenderness.   Abdominal: Abdomen is soft. Bowel sounds are normal. He exhibits no distension. There is no abdominal tenderness. There is no rebound and no guarding.   Musculoskeletal:         General: No tenderness or edema. Normal range of motion.      Cervical back: Normal range of motion and neck supple.     Neurological: He is alert and oriented to person, place, and time. He has normal strength. No cranial nerve deficit or sensory deficit.   Skin: Skin is warm and dry. Capillary refill takes less than 2 seconds. No rash noted. No erythema.   Psychiatric: He has a normal mood and affect.         ED Course   Procedures  Labs Reviewed   CBC W/ AUTO DIFFERENTIAL - Abnormal; Notable for the following components:       Result Value    Mono # 1.1 (*)     All other components within normal limits   COMPREHENSIVE METABOLIC PANEL - Abnormal; Notable for the following components:    Alkaline Phosphatase 51 (*)     eGFR if non  59.7 (*)     All other components within normal limits   PROTIME-INR - Abnormal; Notable for the following components:    PT 14.0 (*)     All other components within normal limits   B-TYPE NATRIURETIC PEPTIDE   TROPONIN I   SARS-COV-2 RNA AMPLIFICATION, QUAL          Imaging Results          X-Ray Chest AP Portable (In process)                  Medications   sodium chloride 0.9% bolus 1,000 mL (1,000 mLs Intravenous New Bag 5/30/22 2348)   aspirin chewable tablet 324 mg (324 mg Oral Given 5/30/22 2046)   famotidine (PF) injection 20 mg (20 mg Intravenous Given 5/30/22 2347)   aluminum-magnesium hydroxide-simethicone 200-200-20 mg/5 mL suspension 30 mL (30 mLs Oral Given 5/30/22 2347)     And   LIDOcaine  HCl 2% oral solution 10 mL (10 mLs Oral Given 5/30/22 9414)     Medical Decision Making:   Initial Assessment:   [age] year male initial assessment in mild distress secondary to chest pain.  Patient is alert oriented x3, neurologically and neurovascular intact no focal deficits.  He is nontoxic-appearing and vitals stable at this time.  Differential Diagnosis:   MI, GERD, anxiety, palpitations  Independently Interpreted Test(s):   I have ordered and independently interpreted X-rays - see prior notes.  I have ordered and independently interpreted EKG Reading(s) - see prior notes  Clinical Tests:   Lab Tests: Ordered and Reviewed  The following lab test(s) were unremarkable: CBC, CMP, Troponin and BNP  Radiological Study: Ordered and Reviewed  Medical Tests: Ordered and Reviewed  ED Management:  Patient has been reassessed noted to have no acute changes condition.  He is high risk for coronary artery disease and has been consult to hospitalist for admission.  He has remained stable while in the ED and Mr. Eden is aware of the plan and in agreement with admission.    Laboratory results and radiology imaging results reviewed.  Based on the patient's history, physical, and workup here in the emergency department I believe the patient requires admission for a diagnosis of ACS.  I discussed the patient's case with the on-call hospitalist who has agreed to evaluate the patient for admission.  In addition, I discussed the results with the patient and they have verbalized understanding of the results, plan, and need for admission.    ________________________  GRANT Arana MD   Emergency Medicine    Additional MDM:   Heart Score:    History:          Slightly suspicious.  ECG:             Normal  Age:               45-65 years  Risk factors: >= 3 risk factors or history of atherosclerotic disease  Troponin:       1-2x normal limit  Final Score: 4                       Clinical Impression:   Final diagnoses:  [R07.9] Chest  pain  [I24.9] ACS (acute coronary syndrome) (Primary)          ED Disposition Condition    Observation               Tal Arana MD  05/30/22 9232

## 2022-05-31 NOTE — ED NOTES
Pt provided food to patient per NP request, pt NPO after eating due to potential for angiogram. Delay in food due to code in Trauma 2.

## 2022-05-31 NOTE — H&P
[facility] Medicine History & Physical Examination   Patient Name: Giorgi Eden  MRN: [id number]  Patient Class: OP- Observation   Admission Date: 5/30/2022  8:10 PM  Length of Stay: 0  Attending Physician: Danie Allred MD  Primary Care Provider: Nitin Villareal MD  Face-to-Face encounter date: 05/30/2022  Code Status: full code  Chief Complaint: Chest Pain (Midsternal chest pressure x 2 hours.  Hx of mi, states feels the same now. )          Patient information was obtained from patient, past medical records and ER records.   HISTORY OF PRESENT ILLNESS:   History was obtained from the patient and ER physician Sign-out. Patient is a 61 year old male with history of CAD, HLD who presents to the ED with the complaint of chest pain that began 2 hours prior to his arrival to the ED. He describes the pain burning sensation which is similar to previous MI which he thought was reflux at the time. The pain does not radiate. He denies associated symptoms. He is followed by Cardiology, Dr. Su. He recently had cardiac workup in February that was negative for ischemia. He continued to have pain so the plan was to have an angiogram outpatient.      Patient denies fever, chills, cough, nausea, vomiting, abdominal pain, dysuria, diarrhea, melena, rectal bleeding, numbness, tingling or LOC.     In the ED he is afebrile. He is mildly hypotensive with SBP 90s. Map 70-80s. CBC and CMP unremarkable. BNP and Troponin wnl.   EKG shows NSR with no ST elevation.   CXR with no acute cardiopulmonary process. He is treated with IVF, ASA, pepcid, and GI cocktail. He is currently chest pain free.       REVIEW OF SYSTEMS:   10 Point Review of System was performed and was found to be negative except for that mentioned already in the HPI above.     PAST MEDICAL HISTORY:     Past Medical History:   Diagnosis Date    Hyperlipidemia     Hypertension     MI (myocardial infarction)     X2       PAST SURGICAL HISTORY:      Past Surgical History:   Procedure Laterality Date    CORONARY ANGIOPLASTY WITH STENT PLACEMENT      x2       ALLERGIES:   Patient has no known allergies.    FAMILY HISTORY:     Family History   Problem Relation Age of Onset    COPD Mother     Heart disease Mother     Stroke Mother        SOCIAL HISTORY:     Social History     Tobacco Use    Smoking status: Former Smoker     Packs/day: 1.00     Years: 25.00     Pack years: 25.00     Types: Cigarettes     Quit date: 2021     Years since quittin.8    Smokeless tobacco: Never Used    Tobacco comment: I now use a vape pen at 5 mg per day   Substance Use Topics    Alcohol use: Yes     Comment: socially        Social History     Substance and Sexual Activity   Sexual Activity Yes    Partners: Female        HOME MEDICATIONS:     Prior to Admission medications    Medication Sig Start Date End Date Taking? Authorizing Provider   aspirin (ECOTRIN) 81 MG EC tablet Take 1 tablet (81 mg total) by mouth once daily. 22 Yes Tera Su MD   atenoloL-chlorthalidone (TENORETIC) 50-25 mg Tab Take 1 tablet by mouth once daily. 21  Yes Historical Provider   atorvastatin (LIPITOR) 20 MG tablet Take 20 mg by mouth once daily. 21  Yes Historical Provider   famotidine (PEPCID) 20 MG tablet Take 1 tablet (20 mg total) by mouth 2 (two) times daily. 22 Yes Tera Su MD   lisinopriL (PRINIVIL,ZESTRIL) 2.5 MG tablet Take 2.5 mg by mouth once daily.   Yes Historical Provider   nitroGLYCERIN (NITROSTAT) 0.4 MG SL tablet Place 1 tablet (0.4 mg total) under the tongue every 5 (five) minutes as needed for Chest pain. 22 Yes Tera Su MD   tamsulosin (FLOMAX) 0.4 mg Cap TAKE 1 CAPSULE BY MOUTH EVERY DAY  Patient taking differently: Take 0.4 mg by mouth once daily. 3/31/22  Yes Nitin Villareal MD   tiZANidine (ZANAFLEX) 4 MG tablet Take 1 tablet (4 mg total) by mouth every 6 (six) hours as needed. 3/31/22  Yes Nitin MARTINES  MD Mono   ondansetron (ZOFRAN-ODT) 8 MG TbDL Take 1 tablet (8 mg total) by mouth every 8 (eight) hours as needed (nausea). 3/9/22 5/30/22  Nitin Villareal MD         PHYSICAL EXAM:   BP (!) 92/57   Pulse 65   Temp 98.7 °F (37.1 °C) (Oral)   Resp 18   Wt 81.6 kg (180 lb)   SpO2 95%   BMI 27.37 kg/m²   Vitals Reviewed  General appearance: Well-developed, well-nourished,  male   Skin: No Rash. Warm, dry.   Neuro: AAOx3. Follows commands. Motor and sensory exams grossly intact. Good tone. Power in all 4 extremities 5/5.   HENT: Atraumatic head. Moist mucous membranes of oral cavity.  Eyes: Normal extraocular movements. PERRLA  Neck: Supple. No evidence of lymphadenopathy. No thyroidomegaly.  Lungs: Clear to auscultation bilaterally. No wheezing present.   Heart: Regular rate and rhythm. S1 and S2 present with no murmurs/gallop/rub. No pedal edema. No JVD present.   Abdomen: Soft, non-distended, non-tender. No rebound tenderness/guarding. No masses or organomegaly. Bowel sounds are normal. Bladder is not palpable.   Extremities: No cyanosis, clubbing. Capillary refill less than 2 seconds.   Psych/mental status: Alert and oriented. Cooperative. Responds appropriately to questions.   EMERGENCY DEPARTMENT LABS AND IMAGING:     Labs Reviewed   CBC W/ AUTO DIFFERENTIAL - Abnormal; Notable for the following components:       Result Value    Mono # 1.1 (*)     All other components within normal limits   COMPREHENSIVE METABOLIC PANEL - Abnormal; Notable for the following components:    Alkaline Phosphatase 51 (*)     eGFR if non  59.7 (*)     All other components within normal limits   PROTIME-INR - Abnormal; Notable for the following components:    PT 14.0 (*)     All other components within normal limits   B-TYPE NATRIURETIC PEPTIDE   TROPONIN I   SARS-COV-2 RNA AMPLIFICATION, QUAL       X-Ray Chest AP Portable    (Results Pending)       ASSESSMENT & PLAN:   Giorgi Eden is a 61 y.o. male admitted  for    Active Hospital Problems    Diagnosis  POA    *Chest pain [R07.9]  Unknown                 Plan  Admit to Cardiology  Trend trop  Continue Asa/BB/ACEI/Statin/Nitro prn  Consult Cardiology; patient known to Dr. Su  IV hydration  Hold HCTZ        Diet: NPO    DVT Prophylaxis: Sub Q Lovenox for DVT prophylaxis. Encourage ambulation and OOB as tolerated.     Discharge Planning and Disposition:  Patient will be discharged in  1-2 days  ________________________________________________________________________________    Face-to-Face encounter date: 05/30/2022  Encounter included review of the medical records, interviewing and examining the patient face-to-face, discussion with other health care providers including emergency medicine physician, admission orders, interpreting lab/test results and formulating a plan of care.   Medical Decision Making during this encounter was  [_] Low Complexity  [_] Moderate Complexity  [x] High Complexity  _________________________________________________________________________________    INPATIENT LIST OF MEDICATIONS     Current Facility-Administered Medications:     sodium chloride 0.9% bolus 1,000 mL, 1,000 mL, Intravenous, Once, Tal Arana MD, Last Rate: 999 mL/hr at 05/30/22 2348, 1,000 mL at 05/30/22 2348    Current Outpatient Medications:     aspirin (ECOTRIN) 81 MG EC tablet, Take 1 tablet (81 mg total) by mouth once daily., Disp: 360 tablet, Rfl: 0    atenoloL-chlorthalidone (TENORETIC) 50-25 mg Tab, Take 1 tablet by mouth once daily., Disp: , Rfl:     atorvastatin (LIPITOR) 20 MG tablet, Take 20 mg by mouth once daily., Disp: , Rfl:     famotidine (PEPCID) 20 MG tablet, Take 1 tablet (20 mg total) by mouth 2 (two) times daily., Disp: 180 tablet, Rfl: 3    lisinopriL (PRINIVIL,ZESTRIL) 2.5 MG tablet, Take 2.5 mg by mouth once daily., Disp: , Rfl:     nitroGLYCERIN (NITROSTAT) 0.4 MG SL tablet, Place 1 tablet (0.4 mg total) under the tongue every 5 (five)  minutes as needed for Chest pain., Disp: 100 tablet, Rfl: 3    tamsulosin (FLOMAX) 0.4 mg Cap, TAKE 1 CAPSULE BY MOUTH EVERY DAY (Patient taking differently: Take 0.4 mg by mouth once daily.), Disp: 90 capsule, Rfl: 1    tiZANidine (ZANAFLEX) 4 MG tablet, Take 1 tablet (4 mg total) by mouth every 6 (six) hours as needed., Disp: 30 tablet, Rfl: 11      Scheduled Meds:   sodium chloride 0.9%  1,000 mL Intravenous Once     Continuous Infusions:  PRN Meds:.      Natacha Gil  [facility] Hospitalist  05/30/2022

## 2022-05-31 NOTE — NURSING TRANSFER
Nursing Transfer Note      5/31/2022     Reason patient is being transferred: Inpatient    Transfer To: 2510      Transfer via stretcher    Transfer with cardiac monitoring    Transported by Evonne KEARNEY      Medicines sent: NO    Any special needs or follow-up needed: No    Chart send with patient: Yes    Notified: spouse    Patient reassessed at: 5/31/2022  1550 (date, time)    Upon arrival to floor: cardiac monitor applied, patient oriented to room, call bell in reach and bed in lowest position. Pt is stable site is clean dry and intact

## 2022-05-31 NOTE — PLAN OF CARE
Critical access hospital  Initial Discharge Assessment       Primary Care Provider: Nitin Villareal MD    Admission Diagnosis: Chest pain [R07.9]    Admission Date: 5/30/2022  Expected Discharge Date:     Discharge Barriers Identified: (P) None     Attempted to make hospital follow up appt, but office was unable to schedule at this time.  Office to call patient to schedule.     Payor: BLUE CROSS BLUE SHIELD / Plan: BCBS ALL OUT OF STATE / Product Type: PPO /     Extended Emergency Contact Information  Primary Emergency Contact: kelley fang  Mobile Phone: [phone]  Relation: Significant other   needed? No    Discharge Plan A: (P) Home  Discharge Plan B: (P) Home      [id number] - [place], [place] - [address]  [address] [place] [address]  Phone: [phone] Fax: [phone]      Initial Assessment (most recent)       Adult Discharge Assessment - 05/31/22 1514          Discharge Assessment    Assessment Type Discharge Planning Assessment (P)      Confirmed/corrected address, phone number and insurance Yes (P)      Confirmed Demographics Correct on Facesheet (P)      Source of Information health record (P)    Patient in procedure, no family in room    Facility Arrived From: Home (P)      Do you expect to return to your current living situation? Yes (P)      Do you have help at home or someone to help you manage your care at home? Yes (P)      Prior to hospitilization cognitive status: Alert/Oriented (P)      Current cognitive status: Alert/Oriented (P)      Walking or Climbing Stairs Difficulty none (P)      Dressing/Bathing Difficulty none (P)      Readmission within 30 days? No (P)      Do you have prescription coverage? Yes (P)      Coverage BCBS commercial (P)      Are you on dialysis? No (P)      Do you take coumadin? No (P)      Discharge Plan A Home (P)      Discharge Plan B Home (P)      DME Needed Upon Discharge  none (P)      Discharge Barriers Identified None (P)

## 2022-05-31 NOTE — CONSULTS
[facility]  Department of Cardiology  Consult Note      PATIENT NAME: Giorgi Eden  MRN: [id number]  TODAY'S DATE: 05/31/2022  ADMIT DATE: 5/30/2022                          CONSULT REQUESTED BY: Naldo Lang DO    SUBJECTIVE     PRINCIPAL PROBLEM: Chest pain      REASON FOR CONSULT:  Chest pain       HPI:  61-year-old male with a past medical history of coronary artery disease status post PCI who came to the ED with a complaint chest pain at rest which reminded him of his similar chest pain prior to his previous heart attacks.  He describes these chest pains acid reflux in nature.  He is pretty active with his work but does notice that if he over exerts himself he will be can have the sensations of acid reflux in his chest.  He recently had a negative cardiac workup as an outpatient in February with Dr. Howard but continues to have these chest pain sensations.  He had previous PCI in [place] in 2010 and then again in 2014 in [place] with 2 stents placed then.  Of note he recently had a abdominal pelvic CT scan that showed a 3.1 cm abdominal aortic aneurysm with dilation into the left common iliac artery.  He was evaluated by vascular surgery for this who deemed to watch it and medically managed at this time.  His troponins have been negative so far this admission EKG with no acute ST changes and he is currently chest pain free at this time.  However given his previous history of stenting and continued chest pain with exertion and rest we will proceed with diagnostic angiogram.    Per H and P:    History was obtained from the patient and ER physician Sign-out. Patient is a 61 year old male with history of CAD, HLD who presents to the ED with the complaint of chest pain that began 2 hours prior to his arrival to the ED. He describes the pain burning sensation which is similar to previous MI which he thought was reflux at the time. The pain does not radiate. He denies associated symptoms. He is  followed by Cardiology, Dr. Su. He recently had cardiac workup in February that was negative for ischemia. He continued to have pain so the plan was to have an angiogram outpatient.       Patient denies fever, chills, cough, nausea, vomiting, abdominal pain, dysuria, diarrhea, melena, rectal bleeding, numbness, tingling or LOC.      In the ED he is afebrile. He is mildly hypotensive with SBP 90s. Map 70-80s. CBC and CMP unremarkable. BNP and Troponin wnl.   EKG shows NSR with no ST elevation.   CXR with no acute cardiopulmonary process. He is treated with IVF, ASA, pepcid, and GI cocktail. He is currently chest pain free.     Review of patient's allergies indicates:  No Known Allergies    Past Medical History:   Diagnosis Date    Hyperlipidemia     Hypertension     MI (myocardial infarction)     X2     Past Surgical History:   Procedure Laterality Date    CORONARY ANGIOPLASTY WITH STENT PLACEMENT      x2     Social History     Tobacco Use    Smoking status: Former Smoker     Packs/day: 1.00     Years: 25.00     Pack years: 25.00     Types: Cigarettes     Quit date: 2021     Years since quittin.8    Smokeless tobacco: Never Used    Tobacco comment: I now use a vape pen at 5 mg per day   Substance Use Topics    Alcohol use: Yes     Comment: socially        REVIEW OF SYSTEMS  CONSTITUTIONAL: Negative for chills, fatigue and fever.   EYES: No double vision, No blurred vision  NEURO: No headaches, No dizziness  RESPIRATORY: Negative for cough, shortness of breath and wheezing.    CARDIOVASCULAR: Negative for chest pain. Negative for palpitations and leg swelling.   GI: Negative for abdominal pain, No melena, diarrhea, nausea and vomiting.   : Negative for dysuria and frequency, Negative for hematuria  SKIN: Negative for bruising, Negative for edema or discoloration noted.   ENDOCRINE: Negative for polyphagia, Negative for heat intolerance, Negative for cold intolerance  PSYCHIATRIC: Negative for depression,  Negative for anxiety, Negative for memory loss  MUSCULOSKELETAL: Negative for neck pain, Negative for muscle weakness, Negative for back pain     OBJECTIVE     VITAL SIGNS (Most Recent)  Temp: 97.9 °F (36.6 °C) (05/31/22 1100)  Pulse: 61 (05/31/22 1100)  Resp: 18 (05/31/22 1100)  BP: (!) 136/91 (05/31/22 1100)  SpO2: 98 % (05/31/22 1100)    VENTILATION STATUS  Resp: 18 (05/31/22 1100)  SpO2: 98 % (05/31/22 1100)       I & O (Last 24H):    Intake/Output Summary (Last 24 hours) at 5/31/2022 1346  Last data filed at 5/31/2022 0900  Gross per 24 hour   Intake 60 ml   Output 300 ml   Net -240 ml       WEIGHTS  Wt Readings from Last 3 Encounters:   05/31/22 0714 83 kg (182 lb 15.7 oz)   05/30/22 2015 81.6 kg (180 lb)   03/18/22 1036 82.4 kg (181 lb 9.6 oz)   03/15/22 0901 83.1 kg (183 lb 3.2 oz)       PHYSICAL EXAM  GENERAL: well built, well nourished, well-developed in no apparent distress alert and oriented.   HEENT: Normocephalic. Pupils normal and conjunctivae normal.  Mucous membranes normal, no cyanosis or icterus, trachea central,no pallor or icterus is noted..   NECK: No JVD. No bruit..   THYROID: Thyroid not enlarged. No nodules present..   CARDIAC: Regular rate and rhythm. S1 is normal.S2 is normal.No gallops, clicks or murmurs noted at this time.  CHEST ANATOMY: normal.   LUNGS: Clear to auscultation. No wheezing or rhonchi..   ABDOMEN: Soft no masses or organomegaly.  No abdomen pulsations or bruits.  Normal bowel sounds. No pulsations and no masses felt, No guarding or rebound.   URINARY: No lozano catheter   EXTREMITIES: No cyanosis, clubbing or edema noted at this time., no calf tenderness bilaterally.   PERIPHERAL VASCULAR SYSTEM: Good palpable distal pulses.   CENTRAL NERVOUS SYSTEM: No focal motor or sensory deficits noted.   SKIN: Skin without lesions, moist, well perfused.   MUSCLE STRENGTH & TONE: No noteable weakness, atrophy or abnormal movement.     HOME MEDICATIONS:  No current  facility-administered medications on file prior to encounter.     Current Outpatient Medications on File Prior to Encounter   Medication Sig Dispense Refill    aspirin (ECOTRIN) 81 MG EC tablet Take 1 tablet (81 mg total) by mouth once daily. 360 tablet 0    atenoloL-chlorthalidone (TENORETIC) 50-25 mg Tab Take 1 tablet by mouth once daily.      atorvastatin (LIPITOR) 20 MG tablet Take 20 mg by mouth once daily.      famotidine (PEPCID) 20 MG tablet Take 1 tablet (20 mg total) by mouth 2 (two) times daily. 180 tablet 3    lisinopriL (PRINIVIL,ZESTRIL) 2.5 MG tablet Take 2.5 mg by mouth once daily.      nitroGLYCERIN (NITROSTAT) 0.4 MG SL tablet Place 1 tablet (0.4 mg total) under the tongue every 5 (five) minutes as needed for Chest pain. 100 tablet 3    tamsulosin (FLOMAX) 0.4 mg Cap TAKE 1 CAPSULE BY MOUTH EVERY DAY (Patient taking differently: Take 0.4 mg by mouth once daily.) 90 capsule 1    tiZANidine (ZANAFLEX) 4 MG tablet Take 1 tablet (4 mg total) by mouth every 6 (six) hours as needed. 30 tablet 11       SCHEDULED MEDS:   aspirin  81 mg Oral Daily    atenoloL  50 mg Oral Daily    atorvastatin  20 mg Oral Daily    enoxaparin  40 mg Subcutaneous Daily    famotidine  20 mg Oral BID    lisinopriL  2.5 mg Oral Daily    tamsulosin  1 capsule Oral Daily       CONTINUOUS INFUSIONS:    PRN MEDS:acetaminophen, fentaNYL, heparin (porcine), LIDOcaine (PF) 10 mg/ml (1%), loperamide, midazolam, nitroGLYCERIN, ondansetron, senna-docusate 8.6-50 mg, sodium chloride 0.9%, sodium chloride 0.9%    LABS AND DIAGNOSTICS     CBC LAST 3 DAYS  Recent Labs   Lab 05/30/22 2051 05/31/22  0416   WBC 9.03 7.22   RBC 5.98 5.80   HGB 17.2 16.7   HCT 50.1 49.8   MCV 84 86   MCH 28.8 28.8   MCHC 34.3 33.5   RDW 14.4 14.3    216   MPV 9.8 10.2   GRAN 45.8  4.1 37.1*  2.7   LYMPH 38.6  3.5 45.0  3.3   MONO 12.4  1.1* 14.0  1.0   BASO 0.05 0.05   NRBC 0 0       COAGULATION LAST 3 DAYS  Recent Labs   Lab 05/30/22 2051   LABPT  14.0*   INR 1.2       CHEMISTRY LAST 3 DAYS  Recent Labs   Lab 05/30/22 2051 05/31/22 0416    138   K 3.7 3.2*    104   CO2 27 25   ANIONGAP 9 9   BUN 20 19   CREATININE 1.3 0.9    102   CALCIUM 9.4 8.9   MG  --  1.8   ALBUMIN 4.1  --    PROT 7.2  --    ALKPHOS 51*  --    ALT 36  --    AST 28  --    BILITOT 0.8  --        CARDIAC PROFILE LAST 3 DAYS  Recent Labs   Lab 05/30/22 2051 05/31/22 0416 05/31/22  1011   BNP 31  --   --    TROPONINI <0.030 <0.030 <0.030       ENDOCRINE LAST 3 DAYS  No results for input(s): TSH, PROCAL in the last 168 hours.    LAST ARTERIAL BLOOD GAS  ABG  No results for input(s): PH, PO2, PCO2, HCO3, BE in the last 168 hours.    LAST 7 DAYS MICROBIOLOGY   Microbiology Results (last 7 days)       ** No results found for the last 168 hours. **            MOST RECENT IMAGING  X-Ray Chest AP Portable  XR CHEST 1 VIEW    CLINICAL HISTORY:  61 years Male chest pain    COMPARISON: None    FINDINGS: Cardiomediastinal silhouette is within normal limits. Lungs are normally expanded with no airspace consolidation. No pleural effusion or pneumothorax. No acute osseous abnormality.    IMPRESSION: No acute pulmonary process.    Electronically signed by:  Shelby Brown MD  5/31/2022 6:28 AM CDT Workstation: [id number]      ECHOCARDIOGRAM RESULTS (last 5)  Results for orders placed during the hospital encounter of 02/25/22    Echo    Interpretation Summary  · The left ventricle is normal in size with mild concentric hypertrophy and normal systolic function.  · The estimated ejection fraction is 60%.  · Normal left ventricular diastolic function.  · Normal right ventricular size with normal right ventricular systolic function.      CURRENT/PREVIOUS VISIT EKG  Results for orders placed or performed during the hospital encounter of 05/30/22   EKG 12-lead    Collection Time: 05/30/22  8:10 PM    Narrative    Test Reason : R07.9,    Vent. Rate : 066 BPM     Atrial Rate : 066 BPM      P-R Int : 178 ms          QRS Dur : 114 ms      QT Int : 394 ms       P-R-T Axes : 053 012 045 degrees     QTc Int : 413 ms    Normal sinus rhythm  Normal ECG  When compared with ECG of 01-FEB-2022 14:04,  Criteria for Septal infarct are no longer Present  No significant change was found    Referred By: [email or web address]   SELF           Confirmed By:            ASSESSMENT/PLAN:     Active Hospital Problems    Diagnosis    *Chest pain       ASSESSMENT & PLAN:   1. Chest pain   2. CAD s/p PCI 2010 & 2014   3. Hypokalemia     RECOMMENDATIONS:  Remain NPO for [facility] today with Dr. Gupta   Discussed with patient the risks and benefits of the procedure including, but not limited to, the following 1:1000 risk of Heart attack, stroke and death with 3-5% Risk of bleeding, vessel damage, and the need for emergent CABG Surgery.  All questions have been answered to patient's satisfaction.  Informed consent obtained  Will keep nothing by mouth post midnight and proceed with the coronary angiography possible PCI.     Replace K until above 4.   Continue current medical therapy including atenolol, lipitor, lisinopril.         Larissa Murillo PA-C  [facility]  Department of Cardiology  Date of Service: 05/31/2022        I have personally interviewed and examined the patient, I have reviewed the Physician Assistant's history and physical, assessment, and plan. I agree with the findings and plan.    PATIENT SEEN AND EXAMINED RECOMMEND URGENT HEART CATHETERIZATION   Romulo Gupta M.D.  [facility]  Department of Cardiology  Date of Service: 05/31/2022  1:46 PM

## 2022-05-31 NOTE — TELEPHONE ENCOUNTER
----- Message from Alejandro Souza sent at 5/31/2022  3:20 PM CDT -----  Contact: April ([facility])  Caller is requesting a sooner appointment.           Caller declined first available appointment listed below.           Caller will not accept being placed on the waitlist and is requesting a message be sent to doctor.         Name of Caller: April Lopez)         When is the first available appointment? 9/6         Symptoms: hospital follow up          Best Call Back Number: [phone] (mobile)         Additional Information:

## 2022-05-31 NOTE — PLAN OF CARE
Problem: Adult Inpatient Plan of Care  Goal: Plan of Care Review  Outcome: Ongoing, Progressing  Goal: Patient-Specific Goal (Individualized)  Outcome: Ongoing, Progressing  Goal: Absence of Hospital-Acquired Illness or Injury  Outcome: Ongoing, Progressing  Goal: Optimal Comfort and Wellbeing  Outcome: Ongoing, Progressing  Goal: Readiness for Transition of Care  Outcome: Ongoing, Progressing     Problem: Arrhythmia/Dysrhythmia (Cardiac Catheterization)  Goal: Stable Heart Rate and Rhythm  Outcome: Ongoing, Progressing     Problem: Bleeding (Cardiac Catheterization)  Goal: Absence of Bleeding  Outcome: Ongoing, Progressing     Problem: Contrast-Induced Injury Risk (Cardiac Catheterization)  Goal: Absence of Contrast-Induced Injury  Outcome: Ongoing, Progressing     Problem: Embolism (Cardiac Catheterization)  Goal: Absence of Embolism Signs and Symptoms  Outcome: Ongoing, Progressing     Problem: Ongoing Anesthesia/Sedation Effects (Cardiac Catheterization)  Goal: Anesthesia/Sedation Recovery  Outcome: Ongoing, Progressing     Problem: Pain (Cardiac Catheterization)  Goal: Acceptable Pain Control  Outcome: Ongoing, Progressing     Problem: Vascular Access Protection (Cardiac Catheterization)  Goal: Absence of Vascular Access Complication  Outcome: Ongoing, Progressing

## 2022-06-01 ENCOUNTER — TELEPHONE (OUTPATIENT)
Dept: CARDIOLOGY | Facility: CLINIC | Age: 62
End: 2022-06-01
Payer: COMMERCIAL

## 2022-06-01 VITALS
TEMPERATURE: 98 F | BODY MASS INDEX: 27.11 KG/M2 | HEIGHT: 69 IN | RESPIRATION RATE: 18 BRPM | DIASTOLIC BLOOD PRESSURE: 78 MMHG | OXYGEN SATURATION: 98 % | HEART RATE: 62 BPM | SYSTOLIC BLOOD PRESSURE: 123 MMHG | WEIGHT: 183 LBS

## 2022-06-01 LAB
BASOPHILS # BLD AUTO: 0.04 K/UL (ref 0–0.2)
BASOPHILS NFR BLD: 0.4 % (ref 0–1.9)
DIFFERENTIAL METHOD: ABNORMAL
EOSINOPHIL # BLD AUTO: 0.2 K/UL (ref 0–0.5)
EOSINOPHIL NFR BLD: 1.7 % (ref 0–8)
ERYTHROCYTE [DISTWIDTH] IN BLOOD BY AUTOMATED COUNT: 14.3 % (ref 11.5–14.5)
HCT VFR BLD AUTO: 48 % (ref 40–54)
HGB BLD-MCNC: 16.2 G/DL (ref 14–18)
IMM GRANULOCYTES # BLD AUTO: 0.04 K/UL (ref 0–0.04)
IMM GRANULOCYTES NFR BLD AUTO: 0.4 % (ref 0–0.5)
LYMPHOCYTES # BLD AUTO: 2.8 K/UL (ref 1–4.8)
LYMPHOCYTES NFR BLD: 27.5 % (ref 18–48)
MCH RBC QN AUTO: 29 PG (ref 27–31)
MCHC RBC AUTO-ENTMCNC: 33.8 G/DL (ref 32–36)
MCV RBC AUTO: 86 FL (ref 82–98)
MONOCYTES # BLD AUTO: 1.2 K/UL (ref 0.3–1)
MONOCYTES NFR BLD: 11.8 % (ref 4–15)
NEUTROPHILS # BLD AUTO: 6 K/UL (ref 1.8–7.7)
NEUTROPHILS NFR BLD: 58.2 % (ref 38–73)
NRBC BLD-RTO: 0 /100 WBC
PLATELET # BLD AUTO: 202 K/UL (ref 150–450)
PMV BLD AUTO: 10.2 FL (ref 9.2–12.9)
RBC # BLD AUTO: 5.58 M/UL (ref 4.6–6.2)
TROPONIN I SERPL DL<=0.01 NG/ML-MCNC: 0.05 NG/ML
WBC # BLD AUTO: 10.26 K/UL (ref 3.9–12.7)

## 2022-06-01 PROCEDURE — 84484 ASSAY OF TROPONIN QUANT: CPT | Performed by: GENERAL PRACTICE

## 2022-06-01 PROCEDURE — 25000003 PHARM REV CODE 250: Performed by: NURSE PRACTITIONER

## 2022-06-01 PROCEDURE — 85025 COMPLETE CBC W/AUTO DIFF WBC: CPT | Performed by: GENERAL PRACTICE

## 2022-06-01 PROCEDURE — 36415 COLL VENOUS BLD VENIPUNCTURE: CPT | Performed by: GENERAL PRACTICE

## 2022-06-01 PROCEDURE — 99233 SBSQ HOSP IP/OBS HIGH 50: CPT | Mod: ,,, | Performed by: GENERAL PRACTICE

## 2022-06-01 PROCEDURE — 25000003 PHARM REV CODE 250: Performed by: GENERAL PRACTICE

## 2022-06-01 PROCEDURE — 99233 PR SUBSEQUENT HOSPITAL CARE,LEVL III: ICD-10-PCS | Mod: ,,, | Performed by: GENERAL PRACTICE

## 2022-06-01 RX ORDER — ATENOLOL 50 MG/1
50 TABLET ORAL DAILY
Qty: 30 TABLET | Refills: 0 | Status: SHIPPED | OUTPATIENT
Start: 2022-06-01 | End: 2022-06-28 | Stop reason: SDUPTHER

## 2022-06-01 RX ADMIN — SODIUM CHLORIDE: 0.9 INJECTION, SOLUTION INTRAVENOUS at 04:06

## 2022-06-01 RX ADMIN — LISINOPRIL 2.5 MG: 2.5 TABLET ORAL at 08:06

## 2022-06-01 RX ADMIN — FAMOTIDINE 20 MG: 20 TABLET ORAL at 08:06

## 2022-06-01 RX ADMIN — TICAGRELOR 90 MG: 90 TABLET ORAL at 08:06

## 2022-06-01 RX ADMIN — TAMSULOSIN HYDROCHLORIDE 0.4 MG: 0.4 CAPSULE ORAL at 08:06

## 2022-06-01 RX ADMIN — ATENOLOL 50 MG: 50 TABLET ORAL at 08:06

## 2022-06-01 RX ADMIN — ASPIRIN 81 MG: 81 TABLET, COATED ORAL at 08:06

## 2022-06-01 RX ADMIN — ATORVASTATIN CALCIUM 20 MG: 20 TABLET, FILM COATED ORAL at 08:06

## 2022-06-01 NOTE — TELEPHONE ENCOUNTER
----- Message from Larissa Murillo PA-C sent at 6/1/2022 11:23 AM CDT -----  Can you make me a letter for him to return to work with light acitivty until next Tuesday and then he can return to work with full acitivty next Wednesday? He is being discharged today, we cna just post it to portal.

## 2022-06-01 NOTE — PLAN OF CARE
Problem: Adult Inpatient Plan of Care  Goal: Plan of Care Review  Outcome: Ongoing, Progressing  Goal: Patient-Specific Goal (Individualized)  Outcome: Ongoing, Progressing  Goal: Absence of Hospital-Acquired Illness or Injury  Outcome: Ongoing, Progressing  Goal: Optimal Comfort and Wellbeing  Outcome: Ongoing, Progressing  Goal: Readiness for Transition of Care  Outcome: Ongoing, Progressing     Problem: Arrhythmia/Dysrhythmia (Cardiac Catheterization)  Goal: Stable Heart Rate and Rhythm  Outcome: Ongoing, Progressing     Problem: Bleeding (Cardiac Catheterization)  Goal: Absence of Bleeding  Outcome: Ongoing, Progressing     Problem: Contrast-Induced Injury Risk (Cardiac Catheterization)  Goal: Absence of Contrast-Induced Injury  Outcome: Ongoing, Progressing     Problem: Embolism (Cardiac Catheterization)  Goal: Absence of Embolism Signs and Symptoms  Outcome: Ongoing, Progressing     Problem: Ongoing Anesthesia/Sedation Effects (Cardiac Catheterization)  Goal: Anesthesia/Sedation Recovery  Outcome: Ongoing, Progressing

## 2022-06-01 NOTE — PROGRESS NOTES
[facility]  Department of Cardiology  Progress Note    PATIENT NAME: Giorgi Eden  MRN: [id number]  TODAY'S DATE: 06/01/2022  ADMIT DATE: 5/30/2022    SUBJECTIVE     PRINCIPLE PROBLEM: Atherosclerotic heart disease of native coronary artery with unstable angina pectoris    INTERVAL HISTORY:    6/1/2022  Patient is status post PCI to the RCA.  He reports minimal bruising, no tenderness, no swelling, or no warmth to the femoral access site.  He denies any chest pain shortness of breath or anginal equivalents.  Vital signs are stable.    Review of patient's allergies indicates:  No Known Allergies    REVIEW OF SYSTEMS  CARDIOVASCULAR: No recent chest pain, palpitations, arm, neck, or jaw pain  RESPIRATORY: No recent fever, cough chills, SOB or congestion  : No blood in the urine  GI: No Nausea, vomiting, constipation, diarrhea, blood, or reflux.  MUSCULOSKELETAL: No myalgias  NEURO: No lightheadedness or dizziness  EYES: No Double vision, blurry, vision or headache     OBJECTIVE     VITAL SIGNS (Most Recent)  Temp: 98.3 °F (36.8 °C) (06/01/22 1100)  Pulse: 62 (06/01/22 1100)  Resp: 18 (06/01/22 1100)  BP: 123/78 (06/01/22 1100)  SpO2: 98 % (06/01/22 1100)    VENTILATION STATUS  Resp: 18 (06/01/22 1100)  SpO2: 98 % (06/01/22 1100)       I & O (Last 24H):No intake or output data in the 24 hours ending 06/01/22 1202    WEIGHTS  Wt Readings from Last 3 Encounters:   05/31/22 0714 83 kg (182 lb 15.7 oz)   05/30/22 2015 81.6 kg (180 lb)   03/18/22 1036 82.4 kg (181 lb 9.6 oz)   03/15/22 0901 83.1 kg (183 lb 3.2 oz)       PHYSICAL EXAM  CONSTITUTIONAL: Well built, well nourished in no apparent distress  NECK: no carotid bruit, no JVD  LUNGS: CTA  CHEST WALL: no tenderness  HEART: regular rate and rhythm, S1, S2 normal, no murmur, click, rub or gallop   ABDOMEN: soft, non-tender; bowel sounds normal; no masses,  no organomegaly  EXTREMITIES: NO TENDERNESS MINIMAL BRUISING TO FEMORAL ACCESS SITE Extremities  normal, no edema  NEURO: AAO X 3    SCHEDULED MEDS:   aspirin  81 mg Oral Daily    atenoloL  50 mg Oral Daily    atorvastatin  20 mg Oral Daily    famotidine  20 mg Oral BID    lisinopriL  2.5 mg Oral Daily    potassium chloride  40 mEq Oral Once    tamsulosin  1 capsule Oral Daily    ticagrelor  90 mg Oral BID       CONTINUOUS INFUSIONS:   sodium chloride 0.9% 125 mL/hr at 06/01/22 0438       PRN MEDS:acetaminophen, acetaminophen, loperamide, nitroGLYCERIN, ondansetron, senna-docusate 8.6-50 mg, sodium chloride 0.9%, sodium chloride 0.9%    LABS AND DIAGNOSTICS     CBC LAST 3 DAYS  Recent Labs   Lab 05/30/22 2051 05/31/22 0416 06/01/22  0430   WBC 9.03 7.22 10.26   RBC 5.98 5.80 5.58   HGB 17.2 16.7 16.2   HCT 50.1 49.8 48.0   MCV 84 86 86   MCH 28.8 28.8 29.0   MCHC 34.3 33.5 33.8   RDW 14.4 14.3 14.3    216 202   MPV 9.8 10.2 10.2   GRAN 45.8  4.1 37.1*  2.7 58.2  6.0   LYMPH 38.6  3.5 45.0  3.3 27.5  2.8   MONO 12.4  1.1* 14.0  1.0 11.8  1.2*   BASO 0.05 0.05 0.04   NRBC 0 0 0       COAGULATION LAST 3 DAYS  Recent Labs   Lab 05/30/22 2051   LABPT 14.0*   INR 1.2       CHEMISTRY LAST 3 DAYS  Recent Labs   Lab 05/30/22 2051 05/31/22 0416 05/31/22  1222    138 135*   K 3.7 3.2* 4.0    104 102   CO2 27 25 24   ANIONGAP 9 9 9   BUN 20 19 17   CREATININE 1.3 0.9 1.0    102 85   CALCIUM 9.4 8.9 9.0   MG  --  1.8  --    ALBUMIN 4.1  --  3.9   PROT 7.2  --  6.9   ALKPHOS 51*  --  50*   ALT 36  --  32   AST 28  --  26   BILITOT 0.8  --  1.2*       CARDIAC PROFILE LAST 3 DAYS  Recent Labs   Lab 05/30/22 2051 05/31/22  0416 05/31/22  1011 06/01/22  0430   BNP 31  --   --   --    TROPONINI <0.030 <0.030 <0.030 0.046*       ENDOCRINE LAST 3 DAYS  No results for input(s): TSH, PROCAL in the last 168 hours.    LAST ARTERIAL BLOOD GAS  ABG  No results for input(s): PH, PO2, PCO2, HCO3, BE in the last 168 hours.    LAST 7 DAYS MICROBIOLOGY   Microbiology Results (last 7 days)      ** No results found for the last 168 hours. **          MOST RECENT IMAGING  Cardiac catheterization  · The Prox RCA lesion was 99% stenosed.Diffuse disease with aneurysm  · The estimated blood loss was none.  · The PCI was successful.  · There was three vessel coronary artery disease.  · The Prox RCA-1 lesion was 99% stenosed with 10% stenosis   post-intervention.  · The Prox RCA to Mid RCA lesion was 70% stenosed with 0% stenosis   post-intervention.  · The Prox RCA-3 lesion was 80% stenosed with 0% stenosis   post-intervention.  · A stent was successfully placed. 38 mm drug-eluting stent to 7 by mm   across the subtotal occlusion the 1st angle the right coronary artery to   the distal vessel. There is as aneurysmal dilation post stenosis which is   residual  · The Dist Cx lesion was 70% stenosed. Jailed by stent place from mid   circumflex to the 1st obtuse marginal.  · The 2nd Mrg lesion was 50% stenosed. Distal in stent stenosis at the   ostium of the 2nd marginal.     The procedure log was documented by Documenter: Jasmine ALEXANDER RN and   verified by Romulo Gupta MD.    Date: 5/31/2022  Time: 2:21 PM      [facility]  Results for orders placed during the hospital encounter of 02/25/22    Echo    Interpretation Summary  · The left ventricle is normal in size with mild concentric hypertrophy and normal systolic function.  · The estimated ejection fraction is 60%.  · Normal left ventricular diastolic function.  · Normal right ventricular size with normal right ventricular systolic function.      CURRENT/PREVIOUS VISIT EKG  Results for orders placed or performed during the hospital encounter of 05/30/22   EKG 12-lead    Collection Time: 05/30/22  8:10 PM    Narrative    Test Reason : R07.9,    Vent. Rate : 066 BPM     Atrial Rate : 066 BPM     P-R Int : 178 ms          QRS Dur : 114 ms      QT Int : 394 ms       P-R-T Axes : 053 012 045 degrees     QTc Int : 413 ms    Normal sinus rhythm  Normal ECG  When compared  with ECG of 01-FEB-2022 14:04,  Criteria for Septal infarct are no longer Present  No significant change was found    Referred By: [email or web address]   SELF           Confirmed By:        ASSESSMENT/PLAN:     Active Hospital Problems    Diagnosis    *Atherosclerotic heart disease of native coronary artery with unstable angina pectoris       ASSESSMENT & PLAN:   1. Chest pain s/p PCI to RCA 5/31/2022    2. CAD s/p PCI 2010 & 2014   3. Hypokalemia - resolved      RECOMMENDATIONS:  Ok to d/c home, letter will be posted to chart to return to work   Continue brillinta indefinitely   Continue current medical therapy including atenolol, lipitor, lisinopril.  No signs of hematoma or infection at femoral access site   Follow up in our clinic 2-4 weeks time     Larissa Murillo PA-C  [facility]  Department of Cardiology  Date of Service: 06/01/2022      I have personally interviewed and examined the patient.  I have reviewed all the Physician Assistant's documentation, and agree with the plan.       Romulo Gupta M.D.  [facility]  Department of Cardiology  Date of Service: 06/01/2022  12:02 PM

## 2022-06-01 NOTE — PLAN OF CARE
Problem: Adult Inpatient Plan of Care  Goal: Plan of Care Review  Outcome: Met  Goal: Patient-Specific Goal (Individualized)  Outcome: Met  Goal: Absence of Hospital-Acquired Illness or Injury  Outcome: Met  Goal: Optimal Comfort and Wellbeing  Outcome: Met  Goal: Readiness for Transition of Care  Outcome: Met     Problem: Arrhythmia/Dysrhythmia (Cardiac Catheterization)  Goal: Stable Heart Rate and Rhythm  Outcome: Met     Problem: Bleeding (Cardiac Catheterization)  Goal: Absence of Bleeding  Outcome: Met     Problem: Contrast-Induced Injury Risk (Cardiac Catheterization)  Goal: Absence of Contrast-Induced Injury  Outcome: Met     Problem: Embolism (Cardiac Catheterization)  Goal: Absence of Embolism Signs and Symptoms  Outcome: Met     Problem: Ongoing Anesthesia/Sedation Effects (Cardiac Catheterization)  Goal: Anesthesia/Sedation Recovery  Outcome: Met     Problem: Pain (Cardiac Catheterization)  Goal: Acceptable Pain Control  Outcome: Met     Problem: Vascular Access Protection (Cardiac Catheterization)  Goal: Absence of Vascular Access Complication  Outcome: Met

## 2022-06-01 NOTE — LETTER
June 1, 2022      [facility] - Cardiology  [address] [place] [address]  Phone: [phone]  Fax: [phone]       Patient: Giorgi Eden   YOB: 1960  Date of Visit: 06/01/2022    To Whom It May Concern:    Claudia Eden  was at [facility] on 06/01/2022. The patient may return to work/school on 06/07/22 with light activity. He may return to full duty on 6/8/22. If you have any questions or concerns, or if I can be of further assistance, please do not hesitate to contact me.    Sincerely,    Larissa Murillo PA-C   [facility] - Department of Cardiology  Office: [phone]

## 2022-06-01 NOTE — DISCHARGE SUMMARY
[facility] Medicine  Discharge Summary      Patient Name: Giorgi Eden  MRN: [id number]  Patient Class: IP- Inpatient  Admission Date: 5/30/2022  Hospital Length of Stay: 1 days  Discharge Date and Time:  06/01/2022 8:53 AM  Attending Physician: Naldo Lang DO   Discharging Provider: Naldo Lang DO  Primary Care Provider: Nitin Villareal MD      HPI:   No notes on file    Procedure(s) (LRB):  Left heart cath (Left)  Percutaneous coronary intervention (N/A)  Stent, Drug Eluting, Single Vessel, Coronary      Hospital Course:   5/31 Dr. Lang assume patient's care  Patient seen on cardiac unit and then again and cath lab.  Case discussed with Cardiology.  Will require stent  6/1 no acute events overnight.  Patient appears stable for discharge  No chest pain or shortness of breath  Discussed in detail lifestyle modifications which include diet exercise and no tobacco products      Angiogram results 5/31  Summary       · The Prox RCA lesion was 99% stenosed.Diffuse disease with aneurysm  · The estimated blood loss was none.  · The PCI was successful.  · There was three vessel coronary artery disease.  · The Prox RCA-1 lesion was 99% stenosed with 10% stenosis post-intervention.  · The Prox RCA to Mid RCA lesion was 70% stenosed with 0% stenosis post-intervention.  · The Prox RCA-3 lesion was 80% stenosed with 0% stenosis post-intervention.  · A stent was successfully placed. 38 mm drug-eluting stent to 7 by mm across the subtotal occlusion the 1st angle the right coronary artery to the distal vessel. There is as aneurysmal dilation post stenosis which is residual  · The Dist Cx lesion was 70% stenosed. Jailed by stent place from mid circumflex to the 1st obtuse marginal.  · The 2nd Mrg lesion was 50% stenosed. Distal in stent stenosis at the ostium of the 2nd marginal.         Goals of Care Treatment Preferences:  Code Status: Full Code      Consults:   Consults (From admission, onward)         Status Ordering Provider     Inpatient consult to Cardiology  Once        Provider:  Romulo Gupta MD    Completed JAYLIN GIL     Inpatient consult to Hospitalist  Once        Provider:  Jaylin Gil NP    Acknowledged HAKAN ENGEL          No new Assessment & Plan notes have been filed under this hospital service since the last note was generated.  Service: Hospital Medicine    Final Active Diagnoses:    Diagnosis Date Noted POA    PRINCIPAL PROBLEM:  Atherosclerotic heart disease of native coronary artery with unstable angina pectoris [I25.110] 05/31/2022 Yes      Problems Resolved During this Admission:    Diagnosis Date Noted Date Resolved POA    Chest pain [R07.9] 05/30/2022 05/31/2022 Unknown       Discharged Condition: good  Patient appears no distress  Lungs clear  Heart regular rate rhythm  Neuro patient is alert oriented x3  Ambulatory in room    Disposition: Home or Self Care    Follow Up:   Follow-up Information     Tera Su MD Follow up in 1 week(s).    Specialties: Interventional Cardiology, Cardiology  Contact information:  [address] [address]  [phone]             Nitin Villareal MD Follow up in 1 week(s).    Specialty: Family Medicine  Contact information:  [address] [place], [place] [address]  [facility] [address] [phone]                       Patient Instructions:      Comprehensive metabolic panel   Standing Status: Future Standing Exp. Date: 07/30/23     CBC auto differential   Standing Status: Future Standing Exp. Date: 07/30/23     Urinalysis, Reflex to Urine Culture Urine, Clean Catch   Standing Status: Future Standing Exp. Date: 07/30/23     Order Specific Question Answer Comments   Preferred Collection Type Urine, Clean Catch    Specimen Source Urine      APTT   Standing Status: Future Standing Exp. Date: 07/30/23     Diet Cardiac     Activity as tolerated       Significant Diagnostic Studies: Labs:   BMP:   Recent Labs   Lab  05/30/22 2051 05/31/22 0416 05/31/22  1222    102 85    138 135*   K 3.7 3.2* 4.0    104 102   CO2 27 25 24   BUN 20 19 17   CREATININE 1.3 0.9 1.0   CALCIUM 9.4 8.9 9.0   MG  --  1.8  --    , CMP   Recent Labs   Lab 05/30/22 2051 05/31/22 0416 05/31/22  1222    138 135*   K 3.7 3.2* 4.0    104 102   CO2 27 25 24    102 85   BUN 20 19 17   CREATININE 1.3 0.9 1.0   CALCIUM 9.4 8.9 9.0   PROT 7.2  --  6.9   ALBUMIN 4.1  --  3.9   BILITOT 0.8  --  1.2*   ALKPHOS 51*  --  50*   AST 28  --  26   ALT 36  --  32   ANIONGAP 9 9 9   ESTGFRAFRICA >60.0 >60.0 >60.0   EGFRNONAA 59.7* >60.0 >60.0   , CBC   Recent Labs   Lab 05/30/22 2051 05/31/22 0416 06/01/22  0430   WBC 9.03 7.22 10.26   HGB 17.2 16.7 16.2   HCT 50.1 49.8 48.0    216 202   , Lipid Panel   Lab Results   Component Value Date    CHOL 124 05/31/2022    HDL 29 (L) 05/31/2022    LDLCALC 49.8 (L) 05/31/2022    TRIG 226 (H) 05/31/2022    CHOLHDL 23.4 05/31/2022   , Troponin   Recent Labs   Lab 05/31/22 0416 05/31/22  1011 06/01/22  0430   TROPONINI <0.030 <0.030 0.046*    and A1C: No results for input(s): HGBA1C in the last 4320 hours.    Pending Diagnostic Studies:     Procedure Component Value Units Date/Time    EKG 12-LEAD on arrival to floor [[id number]]     Order Status: Sent Lab Status: No result     EKG 12-LEAD starting tomorrow [[id number]]     Order Status: Sent Lab Status: No result          Medications:  Reconciled Home Medications:      Medication List      START taking these medications    atenoloL 50 MG tablet  Commonly known as: TENORMIN  Take 1 tablet (50 mg total) by mouth once daily.     ticagrelor 90 mg tablet  Commonly known as: BRILINTA  Take 1 tablet (90 mg total) by mouth 2 (two) times daily.        CONTINUE taking these medications    aspirin 81 MG EC tablet  Commonly known as: ECOTRIN  Take 1 tablet (81 mg total) by mouth once daily.     atorvastatin 20 MG tablet  Commonly known as:  LIPITOR  Take 20 mg by mouth once daily.     famotidine 20 MG tablet  Commonly known as: PEPCID  Take 1 tablet (20 mg total) by mouth 2 (two) times daily.     lisinopriL 2.5 MG tablet  Commonly known as: PRINIVIL,ZESTRIL  Take 2.5 mg by mouth once daily.     nitroGLYCERIN 0.4 MG SL tablet  Commonly known as: NITROSTAT  Place 1 tablet (0.4 mg total) under the tongue every 5 (five) minutes as needed for Chest pain.     tamsulosin 0.4 mg Cap  Commonly known as: FLOMAX  TAKE 1 CAPSULE BY MOUTH EVERY DAY     tiZANidine 4 MG tablet  Commonly known as: ZANAFLEX  Take 1 tablet (4 mg total) by mouth every 6 (six) hours as needed.        STOP taking these medications    atenoloL-chlorthalidone 50-25 mg Tab  Commonly known as: TENORETIC     ondansetron 8 MG Tbdl  Commonly known as: ZOFRAN-ODT            Indwelling Lines/Drains at time of discharge:   Lines/Drains/Airways     None                 Time spent on the discharge of patient: 32 minutes         Naldo Lang DO  Department of Hospital Medicine  [facility]

## 2022-06-01 NOTE — PLAN OF CARE
Discharge orders and chart reviewed with no further post-acute discharge needs identified at this time.  At this time, patient is cleared for discharge from Case Management standpoint.        06/01/22 0921   Final Note   Assessment Type Final Discharge Note   Anticipated Discharge Disposition Home   Post-Acute Status   Post-Acute Authorization Other   Other Status No Post-Acute Service Needs

## 2022-06-01 NOTE — PLAN OF CARE
05/31/22 2030   Patient Assessment/Suction   Level of Consciousness (AVPU) alert   Respiratory Effort Unlabored   Expansion/Accessory Muscles/Retractions expansion symmetric   All Lung Fields Breath Sounds clear   Rhythm/Pattern, Respiratory depth regular;pattern regular   Cough Frequency infrequent   Cough Type good;nonproductive   PRE-TX-O2   O2 Device (Oxygen Therapy) room air   SpO2 96 %   Pulse (!) 59   Resp 16

## 2022-06-07 ENCOUNTER — PATIENT MESSAGE (OUTPATIENT)
Dept: CARDIOLOGY | Facility: CLINIC | Age: 62
End: 2022-06-07
Payer: COMMERCIAL

## 2022-06-13 ENCOUNTER — OFFICE VISIT (OUTPATIENT)
Dept: FAMILY MEDICINE | Facility: CLINIC | Age: 62
End: 2022-06-13
Payer: COMMERCIAL

## 2022-06-13 VITALS
RESPIRATION RATE: 15 BRPM | DIASTOLIC BLOOD PRESSURE: 72 MMHG | SYSTOLIC BLOOD PRESSURE: 109 MMHG | HEIGHT: 69 IN | OXYGEN SATURATION: 96 % | BODY MASS INDEX: 27.3 KG/M2 | HEART RATE: 70 BPM | WEIGHT: 184.31 LBS

## 2022-06-13 DIAGNOSIS — Z09 HOSPITAL DISCHARGE FOLLOW-UP: Primary | ICD-10-CM

## 2022-06-13 DIAGNOSIS — I10 ESSENTIAL HYPERTENSION: ICD-10-CM

## 2022-06-13 DIAGNOSIS — Z95.5 STATUS POST INSERTION OF DRUG ELUTING CORONARY ARTERY STENT: ICD-10-CM

## 2022-06-13 PROCEDURE — 99999 PR PBB SHADOW E&M-EST. PATIENT-LVL IV: CPT | Mod: PBBFAC,,, | Performed by: FAMILY MEDICINE

## 2022-06-13 PROCEDURE — 4010F ACE/ARB THERAPY RXD/TAKEN: CPT | Mod: S$GLB,,, | Performed by: FAMILY MEDICINE

## 2022-06-13 PROCEDURE — 3008F BODY MASS INDEX DOCD: CPT | Mod: S$GLB,,, | Performed by: FAMILY MEDICINE

## 2022-06-13 PROCEDURE — 3008F PR BODY MASS INDEX (BMI) DOCUMENTED: ICD-10-PCS | Mod: S$GLB,,, | Performed by: FAMILY MEDICINE

## 2022-06-13 PROCEDURE — 1160F PR REVIEW ALL MEDS BY PRESCRIBER/CLIN PHARMACIST DOCUMENTED: ICD-10-PCS | Mod: S$GLB,,, | Performed by: FAMILY MEDICINE

## 2022-06-13 PROCEDURE — 1160F RVW MEDS BY RX/DR IN RCRD: CPT | Mod: S$GLB,,, | Performed by: FAMILY MEDICINE

## 2022-06-13 PROCEDURE — 99214 OFFICE O/P EST MOD 30 MIN: CPT | Mod: S$GLB,,, | Performed by: FAMILY MEDICINE

## 2022-06-13 PROCEDURE — 1159F MED LIST DOCD IN RCRD: CPT | Mod: S$GLB,,, | Performed by: FAMILY MEDICINE

## 2022-06-13 PROCEDURE — 99214 PR OFFICE/OUTPT VISIT, EST, LEVL IV, 30-39 MIN: ICD-10-PCS | Mod: S$GLB,,, | Performed by: FAMILY MEDICINE

## 2022-06-13 PROCEDURE — 3078F PR MOST RECENT DIASTOLIC BLOOD PRESSURE < 80 MM HG: ICD-10-PCS | Mod: S$GLB,,, | Performed by: FAMILY MEDICINE

## 2022-06-13 PROCEDURE — 1159F PR MEDICATION LIST DOCUMENTED IN MEDICAL RECORD: ICD-10-PCS | Mod: S$GLB,,, | Performed by: FAMILY MEDICINE

## 2022-06-13 PROCEDURE — 3078F DIAST BP <80 MM HG: CPT | Mod: S$GLB,,, | Performed by: FAMILY MEDICINE

## 2022-06-13 PROCEDURE — 4010F PR ACE/ARB THEARPY RXD/TAKEN: ICD-10-PCS | Mod: S$GLB,,, | Performed by: FAMILY MEDICINE

## 2022-06-13 PROCEDURE — 99999 PR PBB SHADOW E&M-EST. PATIENT-LVL IV: ICD-10-PCS | Mod: PBBFAC,,, | Performed by: FAMILY MEDICINE

## 2022-06-13 PROCEDURE — 3074F PR MOST RECENT SYSTOLIC BLOOD PRESSURE < 130 MM HG: ICD-10-PCS | Mod: S$GLB,,, | Performed by: FAMILY MEDICINE

## 2022-06-13 PROCEDURE — 3074F SYST BP LT 130 MM HG: CPT | Mod: S$GLB,,, | Performed by: FAMILY MEDICINE

## 2022-06-13 NOTE — PROGRESS NOTES
" Patient ID: Giorgi Eden is a 61 y.o. male.    Chief Complaint: Hospital Follow Up      Reviewed family, medical, surgical, and social history.    No cp/sob  No change in mental status  No fever  No asymmetrical limb swelling    Objective:      /72 (BP Location: Right arm, Patient Position: Sitting, BP Method: Medium (Manual))   Pulse 70   Resp 15   Ht 5' 9" (1.753 m)   Wt 83.6 kg (184 lb 4.8 oz)   SpO2 96%   BMI 27.22 kg/m²   RRR, CTAB, s/nt/nd, no c/c/e, non-toxic appearing, no focal weakness  Assessment:       1. Hospital discharge follow-up    2. Essential hypertension    3. Status post insertion of drug eluting coronary artery stent        Plan:       Hospital discharge follow-up    Essential hypertension    Status post insertion of drug eluting coronary artery stent            Reviewed, monitored, evaluated, and assessed chronic conditions as outlined above  Continue current medicines, any changes noted above  As shown  Labs, radiology studies, and procedures/notes from the last 3 months were reviewed.    Risks, benefits, and side effects were discussed with the patient. All questions were answered to the fullest satisfaction of the patient, and pt verbalized understanding and agreement to treatment plan. Pt was to call with any new or worsening symptoms, or present to the ER.    "

## 2022-06-13 NOTE — LETTER
June 13, 2022      [facility]  [address], [address]  [place] [address]  Phone: [phone]  Fax: [phone]       Patient: Giorgi Eden   YOB: 1960  Date of Visit: 06/13/2022    To Whom It May Concern:    Giorgi dEen  was at [facility] on 06/13/2022. The patient may return to work/school on 06/14/2022 with no restrictions. If you have any questions or concerns, or if I can be of further assistance, please do not hesitate to contact me.    Sincerely,    Thuy Frye LPN

## 2022-06-28 NOTE — TELEPHONE ENCOUNTER
----- Message from Romana Martínez sent at 6/28/2022 11:08 AM CDT -----  Type:  RX Refill Request    Who Called:pt     Refill or New Rx: Refill     RX Name and Strength:atenoloL (TENORMIN) 50 MG tablet  ticagrelor (BRILINTA) 90 mg tablet    Preferred Pharmacy with phone number:[id number] [address]    Local or Mail Order:Local     Ordering Provider:Dr. Villareal     [facility] Call Back Number:[phone]

## 2022-06-29 RX ORDER — ATENOLOL 50 MG/1
50 TABLET ORAL DAILY
Qty: 30 TABLET | Refills: 11 | Status: SHIPPED | OUTPATIENT
Start: 2022-06-29 | End: 2022-08-10 | Stop reason: SDUPTHER

## 2022-06-30 ENCOUNTER — TELEPHONE (OUTPATIENT)
Dept: FAMILY MEDICINE | Facility: CLINIC | Age: 62
End: 2022-06-30
Payer: COMMERCIAL

## 2022-06-30 NOTE — TELEPHONE ENCOUNTER
Spoke with patient. Patient would like to have all medication through mail service. Advised to send via [employer] with what service he wants to use. Verbalized understanding

## 2022-06-30 NOTE — TELEPHONE ENCOUNTER
----- Message from Spike Warren sent at 6/30/2022  3:27 PM CDT -----  Type: Needs Medical Advice  Who Called:  pt  Best Call Back Number: [phone]     Additional Information: pt need someone to call him back he need to change where his RX go--please advise-thank you

## 2022-07-05 ENCOUNTER — TELEPHONE (OUTPATIENT)
Dept: FAMILY MEDICINE | Facility: CLINIC | Age: 62
End: 2022-07-05
Payer: COMMERCIAL

## 2022-07-05 ENCOUNTER — PATIENT MESSAGE (OUTPATIENT)
Dept: FAMILY MEDICINE | Facility: CLINIC | Age: 62
End: 2022-07-05
Payer: COMMERCIAL

## 2022-07-07 NOTE — TELEPHONE ENCOUNTER
Spoke with pt yesterday, he is going to call and verify which pharmacy it needs to be sent to and notify office with info.

## 2022-08-02 DIAGNOSIS — N20.0 NEPHROLITHIASIS: ICD-10-CM

## 2022-08-03 RX ORDER — ATORVASTATIN CALCIUM 20 MG/1
20 TABLET, FILM COATED ORAL DAILY
Qty: 90 TABLET | Refills: 0 | Status: SHIPPED | OUTPATIENT
Start: 2022-08-03 | End: 2022-08-10 | Stop reason: SDUPTHER

## 2022-08-03 RX ORDER — TICAGRELOR 90 MG/1
90 TABLET, FILM COATED ORAL 2 TIMES DAILY
Qty: 60 TABLET | Refills: 11 | Status: SHIPPED | OUTPATIENT
Start: 2022-08-03 | End: 2022-08-03 | Stop reason: SDUPTHER

## 2022-08-03 RX ORDER — TAMSULOSIN HYDROCHLORIDE 0.4 MG/1
0.4 CAPSULE ORAL DAILY
Qty: 90 CAPSULE | Refills: 0 | Status: SHIPPED | OUTPATIENT
Start: 2022-08-03 | End: 2022-08-11 | Stop reason: SDUPTHER

## 2022-08-03 RX ORDER — LISINOPRIL 2.5 MG/1
2.5 TABLET ORAL DAILY
Qty: 90 TABLET | Refills: 0 | Status: SHIPPED | OUTPATIENT
Start: 2022-08-03 | End: 2022-08-10 | Stop reason: SDUPTHER

## 2022-08-03 RX ORDER — TICAGRELOR 90 MG/1
90 TABLET, FILM COATED ORAL 2 TIMES DAILY
Qty: 180 TABLET | Refills: 11 | Status: SHIPPED | OUTPATIENT
Start: 2022-08-03 | End: 2022-08-10 | Stop reason: SDUPTHER

## 2022-08-10 DIAGNOSIS — N20.0 NEPHROLITHIASIS: ICD-10-CM

## 2022-08-10 DIAGNOSIS — M94.0 COSTOCHONDRITIS: ICD-10-CM

## 2022-08-10 RX ORDER — NITROGLYCERIN 0.4 MG/1
0.4 TABLET SUBLINGUAL EVERY 5 MIN PRN
Qty: 100 TABLET | Refills: 3 | Status: SHIPPED | OUTPATIENT
Start: 2022-08-10 | End: 2022-08-11 | Stop reason: SDUPTHER

## 2022-08-10 RX ORDER — ATENOLOL 50 MG/1
50 TABLET ORAL DAILY
Qty: 30 TABLET | Refills: 11 | Status: SHIPPED | OUTPATIENT
Start: 2022-08-10 | End: 2022-08-11 | Stop reason: SDUPTHER

## 2022-08-10 RX ORDER — FAMOTIDINE 20 MG/1
20 TABLET, FILM COATED ORAL 2 TIMES DAILY
Qty: 180 TABLET | Refills: 3 | Status: SHIPPED | OUTPATIENT
Start: 2022-08-10 | End: 2022-08-11 | Stop reason: SDUPTHER

## 2022-08-10 RX ORDER — ATORVASTATIN CALCIUM 20 MG/1
20 TABLET, FILM COATED ORAL DAILY
Qty: 90 TABLET | Refills: 0 | Status: SHIPPED | OUTPATIENT
Start: 2022-08-10 | End: 2022-08-11 | Stop reason: SDUPTHER

## 2022-08-10 RX ORDER — LISINOPRIL 2.5 MG/1
2.5 TABLET ORAL DAILY
Qty: 90 TABLET | Refills: 0 | Status: SHIPPED | OUTPATIENT
Start: 2022-08-10 | End: 2022-08-11 | Stop reason: SDUPTHER

## 2022-08-10 RX ORDER — TICAGRELOR 90 MG/1
90 TABLET, FILM COATED ORAL 2 TIMES DAILY
Qty: 180 TABLET | Refills: 11 | Status: SHIPPED | OUTPATIENT
Start: 2022-08-10 | End: 2022-08-11 | Stop reason: SDUPTHER

## 2022-08-10 RX ORDER — ASPIRIN 81 MG/1
81 TABLET ORAL DAILY
Qty: 360 TABLET | Refills: 0 | Status: SHIPPED | OUTPATIENT
Start: 2022-08-10 | End: 2025-08-11

## 2022-08-12 RX ORDER — TIZANIDINE 4 MG/1
4 TABLET ORAL EVERY 6 HOURS PRN
Qty: 30 TABLET | Refills: 11 | Status: SHIPPED | OUTPATIENT
Start: 2022-08-12 | End: 2023-01-20 | Stop reason: SDUPTHER

## 2022-08-12 RX ORDER — ATORVASTATIN CALCIUM 20 MG/1
20 TABLET, FILM COATED ORAL DAILY
Qty: 90 TABLET | Refills: 0 | Status: SHIPPED | OUTPATIENT
Start: 2022-08-12 | End: 2023-01-20 | Stop reason: SDUPTHER

## 2022-08-12 RX ORDER — TICAGRELOR 90 MG/1
90 TABLET, FILM COATED ORAL 2 TIMES DAILY
Qty: 180 TABLET | Refills: 11 | Status: SHIPPED | OUTPATIENT
Start: 2022-08-12 | End: 2022-12-21 | Stop reason: SDUPTHER

## 2022-08-12 RX ORDER — LISINOPRIL 2.5 MG/1
2.5 TABLET ORAL DAILY
Qty: 90 TABLET | Refills: 0 | Status: SHIPPED | OUTPATIENT
Start: 2022-08-12 | End: 2022-12-21 | Stop reason: SDUPTHER

## 2022-08-12 RX ORDER — TAMSULOSIN HYDROCHLORIDE 0.4 MG/1
0.4 CAPSULE ORAL DAILY
Qty: 90 CAPSULE | Refills: 0 | Status: SHIPPED | OUTPATIENT
Start: 2022-08-12 | End: 2023-01-20 | Stop reason: SDUPTHER

## 2022-08-12 RX ORDER — FAMOTIDINE 20 MG/1
20 TABLET, FILM COATED ORAL 2 TIMES DAILY
Qty: 180 TABLET | Refills: 3 | Status: SHIPPED | OUTPATIENT
Start: 2022-08-12 | End: 2024-03-20

## 2022-08-12 RX ORDER — NITROGLYCERIN 0.4 MG/1
0.4 TABLET SUBLINGUAL EVERY 5 MIN PRN
Qty: 100 TABLET | Refills: 3 | Status: ON HOLD | OUTPATIENT
Start: 2022-08-12 | End: 2023-01-26 | Stop reason: CLARIF

## 2022-08-12 RX ORDER — ATENOLOL 50 MG/1
50 TABLET ORAL DAILY
Qty: 30 TABLET | Refills: 11 | Status: SHIPPED | OUTPATIENT
Start: 2022-08-12 | End: 2023-11-06 | Stop reason: SDUPTHER

## 2022-09-12 PROBLEM — Z09 HOSPITAL DISCHARGE FOLLOW-UP: Status: RESOLVED | Noted: 2022-06-13 | Resolved: 2022-09-12

## 2022-09-18 ENCOUNTER — PATIENT MESSAGE (OUTPATIENT)
Dept: FAMILY MEDICINE | Facility: CLINIC | Age: 62
End: 2022-09-18
Payer: COMMERCIAL

## 2022-09-18 DIAGNOSIS — I10 ESSENTIAL HYPERTENSION: Primary | ICD-10-CM

## 2022-09-24 ENCOUNTER — LAB VISIT (OUTPATIENT)
Dept: LAB | Facility: HOSPITAL | Age: 62
End: 2022-09-24
Attending: FAMILY MEDICINE
Payer: COMMERCIAL

## 2022-09-24 DIAGNOSIS — I10 ESSENTIAL HYPERTENSION: ICD-10-CM

## 2022-09-24 LAB
ALBUMIN SERPL BCP-MCNC: 4.1 G/DL (ref 3.5–5.2)
ALP SERPL-CCNC: 69 U/L (ref 55–135)
ALT SERPL W/O P-5'-P-CCNC: 25 U/L (ref 10–44)
ANION GAP SERPL CALC-SCNC: 11 MMOL/L (ref 8–16)
AST SERPL-CCNC: 21 U/L (ref 10–40)
BASOPHILS # BLD AUTO: 0.04 K/UL (ref 0–0.2)
BASOPHILS NFR BLD: 0.5 % (ref 0–1.9)
BILIRUB SERPL-MCNC: 0.8 MG/DL (ref 0.1–1)
BUN SERPL-MCNC: 19 MG/DL (ref 6–20)
CALCIUM SERPL-MCNC: 9.5 MG/DL (ref 8.7–10.5)
CHLORIDE SERPL-SCNC: 106 MMOL/L (ref 95–110)
CHOLEST SERPL-MCNC: 111 MG/DL (ref 120–199)
CHOLEST/HDLC SERPL: 2.5 {RATIO} (ref 2–5)
CO2 SERPL-SCNC: 23 MMOL/L (ref 23–29)
CREAT SERPL-MCNC: 1 MG/DL (ref 0.5–1.4)
DIFFERENTIAL METHOD: ABNORMAL
EOSINOPHIL # BLD AUTO: 0.2 K/UL (ref 0–0.5)
EOSINOPHIL NFR BLD: 2.3 % (ref 0–8)
ERYTHROCYTE [DISTWIDTH] IN BLOOD BY AUTOMATED COUNT: 14.9 % (ref 11.5–14.5)
EST. GFR  (NO RACE VARIABLE): >60 ML/MIN/1.73 M^2
ESTIMATED AVG GLUCOSE: 108 MG/DL (ref 68–131)
GLUCOSE SERPL-MCNC: 99 MG/DL (ref 70–110)
HBA1C MFR BLD: 5.4 % (ref 4–5.6)
HCT VFR BLD AUTO: 52.9 % (ref 40–54)
HDLC SERPL-MCNC: 45 MG/DL (ref 40–75)
HDLC SERPL: 40.5 % (ref 20–50)
HGB BLD-MCNC: 17.7 G/DL (ref 14–18)
IMM GRANULOCYTES # BLD AUTO: 0.03 K/UL (ref 0–0.04)
IMM GRANULOCYTES NFR BLD AUTO: 0.4 % (ref 0–0.5)
LDLC SERPL CALC-MCNC: 50.4 MG/DL (ref 63–159)
LYMPHOCYTES # BLD AUTO: 2.3 K/UL (ref 1–4.8)
LYMPHOCYTES NFR BLD: 29 % (ref 18–48)
MCH RBC QN AUTO: 28.4 PG (ref 27–31)
MCHC RBC AUTO-ENTMCNC: 33.5 G/DL (ref 32–36)
MCV RBC AUTO: 85 FL (ref 82–98)
MONOCYTES # BLD AUTO: 1 K/UL (ref 0.3–1)
MONOCYTES NFR BLD: 13 % (ref 4–15)
NEUTROPHILS # BLD AUTO: 4.3 K/UL (ref 1.8–7.7)
NEUTROPHILS NFR BLD: 54.8 % (ref 38–73)
NONHDLC SERPL-MCNC: 66 MG/DL
NRBC BLD-RTO: 0 /100 WBC
PLATELET # BLD AUTO: 252 K/UL (ref 150–450)
PMV BLD AUTO: 10.2 FL (ref 9.2–12.9)
POTASSIUM SERPL-SCNC: 4.9 MMOL/L (ref 3.5–5.1)
PROT SERPL-MCNC: 7.9 G/DL (ref 6–8.4)
RBC # BLD AUTO: 6.23 M/UL (ref 4.6–6.2)
SODIUM SERPL-SCNC: 140 MMOL/L (ref 136–145)
T4 FREE SERPL-MCNC: 0.82 NG/DL (ref 0.71–1.51)
TRIGL SERPL-MCNC: 78 MG/DL (ref 30–150)
TSH SERPL DL<=0.005 MIU/L-ACNC: 1.75 UIU/ML (ref 0.4–4)
WBC # BLD AUTO: 7.85 K/UL (ref 3.9–12.7)

## 2022-09-24 PROCEDURE — 85025 COMPLETE CBC W/AUTO DIFF WBC: CPT | Performed by: FAMILY MEDICINE

## 2022-09-24 PROCEDURE — 80061 LIPID PANEL: CPT | Performed by: FAMILY MEDICINE

## 2022-09-24 PROCEDURE — 84443 ASSAY THYROID STIM HORMONE: CPT | Performed by: FAMILY MEDICINE

## 2022-09-24 PROCEDURE — 80053 COMPREHEN METABOLIC PANEL: CPT | Performed by: FAMILY MEDICINE

## 2022-09-24 PROCEDURE — 83036 HEMOGLOBIN GLYCOSYLATED A1C: CPT | Performed by: FAMILY MEDICINE

## 2022-09-24 PROCEDURE — 36415 COLL VENOUS BLD VENIPUNCTURE: CPT | Performed by: FAMILY MEDICINE

## 2022-09-24 PROCEDURE — 84439 ASSAY OF FREE THYROXINE: CPT | Performed by: FAMILY MEDICINE

## 2022-09-30 ENCOUNTER — OFFICE VISIT (OUTPATIENT)
Dept: FAMILY MEDICINE | Facility: CLINIC | Age: 62
End: 2022-09-30
Payer: COMMERCIAL

## 2022-09-30 VITALS
OXYGEN SATURATION: 97 % | BODY MASS INDEX: 26.69 KG/M2 | HEIGHT: 69 IN | SYSTOLIC BLOOD PRESSURE: 123 MMHG | WEIGHT: 180.19 LBS | RESPIRATION RATE: 17 BRPM | DIASTOLIC BLOOD PRESSURE: 84 MMHG | HEART RATE: 69 BPM

## 2022-09-30 DIAGNOSIS — L98.9 FOOT LESION: ICD-10-CM

## 2022-09-30 DIAGNOSIS — Z00.00 ANNUAL PHYSICAL EXAM: Primary | ICD-10-CM

## 2022-09-30 PROCEDURE — 4010F PR ACE/ARB THEARPY RXD/TAKEN: ICD-10-PCS | Mod: S$GLB,,, | Performed by: FAMILY MEDICINE

## 2022-09-30 PROCEDURE — 3074F SYST BP LT 130 MM HG: CPT | Mod: S$GLB,,, | Performed by: FAMILY MEDICINE

## 2022-09-30 PROCEDURE — 3008F PR BODY MASS INDEX (BMI) DOCUMENTED: ICD-10-PCS | Mod: S$GLB,,, | Performed by: FAMILY MEDICINE

## 2022-09-30 PROCEDURE — 4010F ACE/ARB THERAPY RXD/TAKEN: CPT | Mod: S$GLB,,, | Performed by: FAMILY MEDICINE

## 2022-09-30 PROCEDURE — 1159F PR MEDICATION LIST DOCUMENTED IN MEDICAL RECORD: ICD-10-PCS | Mod: S$GLB,,, | Performed by: FAMILY MEDICINE

## 2022-09-30 PROCEDURE — 99999 PR PBB SHADOW E&M-EST. PATIENT-LVL IV: CPT | Mod: PBBFAC,,, | Performed by: FAMILY MEDICINE

## 2022-09-30 PROCEDURE — 99396 PR PREVENTIVE VISIT,EST,40-64: ICD-10-PCS | Mod: S$GLB,,, | Performed by: FAMILY MEDICINE

## 2022-09-30 PROCEDURE — 1160F PR REVIEW ALL MEDS BY PRESCRIBER/CLIN PHARMACIST DOCUMENTED: ICD-10-PCS | Mod: S$GLB,,, | Performed by: FAMILY MEDICINE

## 2022-09-30 PROCEDURE — 1159F MED LIST DOCD IN RCRD: CPT | Mod: S$GLB,,, | Performed by: FAMILY MEDICINE

## 2022-09-30 PROCEDURE — 3008F BODY MASS INDEX DOCD: CPT | Mod: S$GLB,,, | Performed by: FAMILY MEDICINE

## 2022-09-30 PROCEDURE — 3074F PR MOST RECENT SYSTOLIC BLOOD PRESSURE < 130 MM HG: ICD-10-PCS | Mod: S$GLB,,, | Performed by: FAMILY MEDICINE

## 2022-09-30 PROCEDURE — 99999 PR PBB SHADOW E&M-EST. PATIENT-LVL IV: ICD-10-PCS | Mod: PBBFAC,,, | Performed by: FAMILY MEDICINE

## 2022-09-30 PROCEDURE — 1160F RVW MEDS BY RX/DR IN RCRD: CPT | Mod: S$GLB,,, | Performed by: FAMILY MEDICINE

## 2022-09-30 PROCEDURE — 99396 PREV VISIT EST AGE 40-64: CPT | Mod: S$GLB,,, | Performed by: FAMILY MEDICINE

## 2022-09-30 PROCEDURE — 3079F PR MOST RECENT DIASTOLIC BLOOD PRESSURE 80-89 MM HG: ICD-10-PCS | Mod: S$GLB,,, | Performed by: FAMILY MEDICINE

## 2022-09-30 PROCEDURE — 3044F PR MOST RECENT HEMOGLOBIN A1C LEVEL <7.0%: ICD-10-PCS | Mod: S$GLB,,, | Performed by: FAMILY MEDICINE

## 2022-09-30 PROCEDURE — 3079F DIAST BP 80-89 MM HG: CPT | Mod: S$GLB,,, | Performed by: FAMILY MEDICINE

## 2022-09-30 PROCEDURE — 3044F HG A1C LEVEL LT 7.0%: CPT | Mod: S$GLB,,, | Performed by: FAMILY MEDICINE

## 2022-09-30 NOTE — PROGRESS NOTES
" Patient ID: Giorgi Eden is a 61 y.o. male.    Chief Complaint: Follow-up (BW review)      Reviewed family, medical, surgical, and social history.    No cp/sob  No change in mental status  No fever  No asymmetrical limb swelling    Objective:      /84 (BP Location: Left arm, Patient Position: Sitting, BP Method: Medium (Manual))   Pulse 69   Resp 17   Ht 5' 9" (1.753 m)   Wt 81.7 kg (180 lb 3.2 oz)   SpO2 97%   BMI 26.61 kg/m²   RRR, CTAB, s/nt/nd, no c/c/e, non-toxic appearing, no focal weakness  Assessment:       1. Annual physical exam    2. Foot lesion        Plan:       Annual physical exam    Foot lesion  -     Ambulatory referral/consult to Podiatry; Future; Expected date: 10/07/2022            Continue current medicines, any changes noted above  Doing well, reviewed blood work, continue current meds  Labs, radiology studies, and procedures/notes from the last 3 months were reviewed.    Risks, benefits, and side effects were discussed with the patient. All questions were answered to the fullest satisfaction of the patient, and pt verbalized understanding and agreement to treatment plan. Pt was to call with any new or worsening symptoms, or present to the ER.      "

## 2022-10-13 ENCOUNTER — TELEPHONE (OUTPATIENT)
Dept: CARDIOLOGY | Facility: CLINIC | Age: 62
End: 2022-10-13
Payer: COMMERCIAL

## 2022-10-13 NOTE — TELEPHONE ENCOUNTER
----- Message from Radha Day sent at 10/12/2022 11:39 AM CDT -----  Contact: friend Salima  Patient needs to naomy his hospital f/u from July of this year that he had to cancel.  Call patient back at [phone] and thanks

## 2022-10-13 NOTE — TELEPHONE ENCOUNTER
----- Message from Robbie Howell sent at 10/12/2022 11:07 AM CDT -----  Type:  Sooner Appointment Request    Caller is requesting a sooner appointment.  Caller declined first available appointment listed below.  Caller will not accept being placed on the waitlist and is requesting a message be sent to doctor.    Name of Caller:  Pt  When is the first available appointment?     Symptoms:  still needs F/U  Best Call Back Number:  [phone]   Additional Information:  Will be in [place] on Friday for his ultrasound and for another appt.  wanting to see if he can get an appt to see him.  Please advise -- thank you

## 2022-10-14 ENCOUNTER — OFFICE VISIT (OUTPATIENT)
Dept: PODIATRY | Facility: CLINIC | Age: 62
End: 2022-10-14
Payer: COMMERCIAL

## 2022-10-14 ENCOUNTER — HOSPITAL ENCOUNTER (OUTPATIENT)
Dept: RADIOLOGY | Facility: CLINIC | Age: 62
Discharge: HOME | End: 2022-10-14
Attending: PODIATRIST
Payer: COMMERCIAL

## 2022-10-14 VITALS — BODY MASS INDEX: 27.58 KG/M2 | WEIGHT: 182 LBS | RESPIRATION RATE: 18 BRPM | HEIGHT: 68 IN

## 2022-10-14 DIAGNOSIS — M20.42 HAMMER TOES OF BOTH FEET: Primary | ICD-10-CM

## 2022-10-14 DIAGNOSIS — M79.674 PAIN IN TOES OF BOTH FEET: ICD-10-CM

## 2022-10-14 DIAGNOSIS — M21.619 BUNION: ICD-10-CM

## 2022-10-14 DIAGNOSIS — L85.1 ACQUIRED KERATODERMA: ICD-10-CM

## 2022-10-14 DIAGNOSIS — M79.675 PAIN IN TOES OF BOTH FEET: ICD-10-CM

## 2022-10-14 DIAGNOSIS — M20.41 HAMMER TOES OF BOTH FEET: Primary | ICD-10-CM

## 2022-10-14 PROCEDURE — 3044F PR MOST RECENT HEMOGLOBIN A1C LEVEL <7.0%: ICD-10-PCS | Mod: CPTII,S$GLB,, | Performed by: PODIATRIST

## 2022-10-14 PROCEDURE — 4010F ACE/ARB THERAPY RXD/TAKEN: CPT | Mod: CPTII,S$GLB,, | Performed by: PODIATRIST

## 2022-10-14 PROCEDURE — 73630 XR FOOT COMPLETE 3 VIEW BILATERAL: ICD-10-PCS | Mod: S$GLB,,, | Performed by: RADIOLOGY

## 2022-10-14 PROCEDURE — 3044F HG A1C LEVEL LT 7.0%: CPT | Mod: CPTII,S$GLB,, | Performed by: PODIATRIST

## 2022-10-14 PROCEDURE — 73630 X-RAY EXAM OF FOOT: CPT | Mod: S$GLB,,, | Performed by: RADIOLOGY

## 2022-10-14 PROCEDURE — 99203 PR OFFICE/OUTPT VISIT, NEW, LEVL III, 30-44 MIN: ICD-10-PCS | Mod: S$GLB,,, | Performed by: PODIATRIST

## 2022-10-14 PROCEDURE — 4010F PR ACE/ARB THEARPY RXD/TAKEN: ICD-10-PCS | Mod: CPTII,S$GLB,, | Performed by: PODIATRIST

## 2022-10-14 PROCEDURE — 1160F PR REVIEW ALL MEDS BY PRESCRIBER/CLIN PHARMACIST DOCUMENTED: ICD-10-PCS | Mod: CPTII,S$GLB,, | Performed by: PODIATRIST

## 2022-10-14 PROCEDURE — 1159F MED LIST DOCD IN RCRD: CPT | Mod: CPTII,S$GLB,, | Performed by: PODIATRIST

## 2022-10-14 PROCEDURE — 99203 OFFICE O/P NEW LOW 30 MIN: CPT | Mod: S$GLB,,, | Performed by: PODIATRIST

## 2022-10-14 PROCEDURE — 1159F PR MEDICATION LIST DOCUMENTED IN MEDICAL RECORD: ICD-10-PCS | Mod: CPTII,S$GLB,, | Performed by: PODIATRIST

## 2022-10-14 PROCEDURE — 1160F RVW MEDS BY RX/DR IN RCRD: CPT | Mod: CPTII,S$GLB,, | Performed by: PODIATRIST

## 2022-10-14 NOTE — LETTER
October 14, 2022      [facility] - Podiatry  [address]  [place] [place] [address]  Phone: [phone]  Fax: [phone]       Patient: Bo Eden   YOB: 1960  Date of Visit: 10/14/2022    To Whom It May Concern:    Claudia Eden  was at [facility] on 10/14/2022. The patient may return to work/school on 10/17/2022 with no restrictions. If you have any questions or concerns, or if I can be of further assistance, please do not hesitate to contact me.    Sincerely,    Electronically Signed by: NATASHA Elizondo MA

## 2022-10-14 NOTE — PROGRESS NOTES
"  [address]  [place] [address]  Phone: [phone]   [phone]    Patient's PCP:Nitin Villareal MD  Referring Provider: Aaareferral Self    Subjective:      Chief Complaint:: Foot Problem (Nodules to bilateral second toes)    RAF Eden is a 61 y.o. male who presents today with a complaint of nodules to bilateral second toes lasting for over three years. Onset of symptoms raised nodule bilateral second toes and reports no trauma.  Current symptoms include inflammation, raised nodule increasing in size, occasional sharp pain.  Aggravating factors are shoe gear. Symptoms have progressed over time. Treatment to date have included skin shaving.    Systemic Doctor: Nitin Villareal MD  Date Last Seen: 09/30/2022    Vitals:    10/14/22 1002   Resp: 18   Weight: 82.6 kg (182 lb)   Height: 5' 8" (1.727 m)   PainSc: 0-No pain      Shoe Size: 9    Past Surgical History:   Procedure Laterality Date    CORONARY ANGIOPLASTY WITH STENT PLACEMENT      x2    LEFT HEART CATHETERIZATION Left 5/31/2022    Procedure: Left heart cath;  Surgeon: Romulo Gupta MD;  Location: [facility] CATH/EP LAB;  Service: Cardiology;  Laterality: Left;     Past Medical History:   Diagnosis Date    Hyperlipidemia     Hypertension     MI (myocardial infarction)     X2     Family History   Problem Relation Age of Onset    COPD Mother     Heart disease Mother     Stroke Mother         Social History:   Marital Status: Single  Alcohol History:  reports current alcohol use.  Tobacco History:  reports that he quit smoking about 14 months ago. His smoking use included cigarettes. He has a 25.00 pack-year smoking history. He has never used smokeless tobacco.  Drug History:  has no history on file for drug use.    Review of patient's allergies indicates:  No Known Allergies    Current Outpatient Medications   Medication Sig Dispense Refill    aspirin (ECOTRIN) 81 MG EC tablet Take 1 tablet (81 mg total) by mouth once daily. 360 " tablet 0    atenoloL (TENORMIN) 50 MG tablet Take 1 tablet (50 mg total) by mouth once daily. 30 tablet 11    atorvastatin (LIPITOR) 20 MG tablet Take 1 tablet (20 mg total) by mouth once daily. 90 tablet 0    famotidine (PEPCID) 20 MG tablet Take 1 tablet (20 mg total) by mouth 2 (two) times daily. 180 tablet 3    lisinopriL (PRINIVIL,ZESTRIL) 2.5 MG tablet Take 1 tablet (2.5 mg total) by mouth once daily. 90 tablet 0    nitroGLYCERIN (NITROSTAT) 0.4 MG SL tablet Place 1 tablet (0.4 mg total) under the tongue every 5 (five) minutes as needed for Chest pain. 100 tablet 3    tamsulosin (FLOMAX) 0.4 mg Cap Take 1 capsule (0.4 mg total) by mouth once daily. 90 capsule 0    ticagrelor (BRILINTA) 90 mg tablet Take 1 tablet (90 mg total) by mouth 2 (two) times daily. 180 tablet 11    tiZANidine (ZANAFLEX) 4 MG tablet Take 1 tablet (4 mg total) by mouth every 6 (six) hours as needed. 30 tablet 11     No current facility-administered medications for this visit.       Review of Systems   Constitutional:  Negative for chills, fatigue, fever and unexpected weight change.   HENT:  Negative for hearing loss and trouble swallowing.    Eyes:  Negative for photophobia and visual disturbance.   Respiratory:  Negative for cough, shortness of breath and wheezing.    Cardiovascular:  Negative for chest pain, palpitations and leg swelling.   Gastrointestinal:  Negative for abdominal pain and nausea.   Genitourinary:  Negative for dysuria and frequency.   Musculoskeletal:  Negative for arthralgias, back pain, gait problem, joint swelling and myalgias.   Skin:  Negative for rash and wound.   Neurological:  Negative for tremors, seizures, weakness, numbness and headaches.   Hematological:  Does not bruise/bleed easily.       Objective:        Physical Exam:   Foot Exam    General  General Appearance: appears stated age and healthy   Orientation: alert and oriented to person, place, and time   Affect: appropriate   Gait: unimpaired        Right Foot/Ankle     Inspection and Palpation  Ecchymosis: none  Tenderness: none   Swelling: none   Arch: normal  Hammertoes: second toe  Hallux valgus: yes  Hallux limitus: yes  Skin Exam: skin intact; no drainage, no ulcer and no erythema   Neurovascular  Dorsalis pedis: 2+  Posterior tibial: 2+  Capillary Refill: 2+  Varicose veins: not present  Saphenous nerve sensation: normal  Tibial nerve sensation: normal  Superficial peroneal nerve sensation: normal  Deep peroneal nerve sensation: normal  Sural nerve sensation: normal    Edema  Type of edema: non-pitting    Muscle Strength  Ankle dorsiflexion: 5  Ankle plantar flexion: 5  Ankle inversion: 5  Ankle eversion: 5  Great toe extension: 5  Great toe flexion: 5    Range of Motion    Normal right ankle ROM    Tests  Anterior drawer: negative   Talar tilt: negative   PT Tinel's sign: negative    Paresthesia: negative    Left Foot/Ankle      Inspection and Palpation  Ecchymosis: none  Tenderness: none   Swelling: none   Arch: normal  Hammertoes: second toe  Hallux valgus: yes  Hallux limitus: yes  Skin Exam: callus; no drainage, no ulcer and no erythema   Neurovascular  Dorsalis pedis: 2+  Posterior tibial: 2+  Capillary refill: 2+  Varicose veins: not present  Saphenous nerve sensation: normal  Tibial nerve sensation: normal  Superficial peroneal nerve sensation: normal  Deep peroneal nerve sensation: normal  Sural nerve sensation: normal    Edema  Type of edema: non-pitting    Muscle Strength  Ankle dorsiflexion: 5  Ankle plantar flexion: 5  Ankle inversion: 5  Ankle eversion: 5  Great toe extension: 5  Great toe flexion: 5    Range of Motion    Normal left ankle ROM    Tests  Anterior drawer: negative   Talar tilt: negative   PT Tinel's sign: negative  Paresthesia: negative    Physical Exam  Cardiovascular:      Pulses:           Dorsalis pedis pulses are 2+ on the right side and 2+ on the left side.        Posterior tibial pulses are 2+ on the right side  and 2+ on the left side.   Musculoskeletal:      Right foot: Bunion present.      Left foot: Bunion present.   Feet:      Right foot:      Skin integrity: No ulcer or erythema.      Left foot:      Skin integrity: Callus present. No ulcer or erythema.             Right Ankle/Foot Exam     Range of Motion   The patient has normal right ankle ROM.    Left Ankle/Foot Exam     Range of Motion   The patient has normal left ankle ROM.       Muscle Strength   Right Lower Extremity   Ankle Dorsiflexion:  5   Plantar flexion:  5/5  Left Lower Extremity   Ankle Dorsiflexion:  5   Plantar flexion:  5/5     Vascular Exam     Right Pulses  Dorsalis Pedis:      2+  Posterior Tibial:      2+        Left Pulses  Dorsalis Pedis:      2+  Posterior Tibial:      2+         Imaging: X-Ray Foot Complete Bilateral  Narrative: EXAMINATION:  XR FOOT COMPLETE 3 VIEW BILATERAL    CLINICAL HISTORY:  Pain in right toe(s)    TECHNIQUE:  AP, lateral, and oblique views of both feet were performed.    COMPARISON:  None    FINDINGS:  Bilaterally there is degenerative change at the 1st metatarsophalangeal joints, hallux valgus, bunion formation 1st metatarsal heads.  Bilateral plantar calcaneal spurs larger on the right and small left dorsal calcaneal spur.  No acute fracture or dislocation.  Fairly flat plantar arch is particularly right  Impression: As above    Electronically signed by: Ngoc Alonzo MD  Date:    10/14/2022  Time:    10:52             Assessment:       1. Hammer toes of both feet    2. Bunion    3. Acquired keratoderma    4. Pain in toes of both feet      Plan:   Hammer toes of both feet    Bunion    Acquired keratoderma    Pain in toes of both feet  -     X-Ray Foot Complete Bilateral    Follow up if symptoms worsen or fail to improve.    Procedures        Discussed the patient's wife that the callus buildup on the dorsal 2nd toe is due to his hammer deformity and pressure in his shoe gear.  I shaved the callus down today as a  courtesy.  Recommend pumice stone urea cream for continued management.  Also discussed proper offloading and shoe gear.  I discussed with them that ultimately the only way to completely resolve this would be surgical correction of the hammer deformity.    Counseling:     I provided patient education verbally regarding:   Patient diagnosis, treatment options, as well as alternatives, risks, and benefits.     I discussed hammertoe deformity and conservative treatment of deep, wider shoes, padding of the sore areas, OTC NSAID, skin softners, palliative care.  I discussed surgical procedures of fusion of the PIPJ of the toes with wires or implants, the follow up and the possible complications.      This note was created using Dragon voice recognition software that occasionally misinterpreted phrases or words.

## 2022-10-19 NOTE — PATIENT INSTRUCTIONS
What Are Mallet, Hammer, and Claw Toes?  Mallet, hammer, and claw toes are most often caused by wearing shoes that are too short or heels that are too high. This jams the toes against the front of the shoe and causes one or more joints to bend. Rarely, disease can cause the joints in the toes to bend. Mallet, hammer, and claw toes are among the most common toe problems. They occur most often in the longest of the four smaller toes.      Inside your toes  There are 3 bones in each of your 4 smaller toes. Where 2 bones connect is called a joint. Normally the toes lie flat. But pressure on the toes or the front of the foot can cause one or more joints to bend. This curls the toe. Toes that stay curled are called mallet toes, hammer toes, or claw toes, depending on which joints are bent.    Symptoms  You may feel pain in the toe or in the ball of your foot. A corn (a hard growth of skin on the top of the toe) may form where the toe rubs against the top of the shoe. Or a callus (a hard growth of skin on the bottom of the foot) may form under the tip of the toe or on the ball of the foot. Corns and calluses can also be painful.    Date Last Reviewed: 10/18/2015  © 9367-7962 [employer][MD], [employer]. [address] [place] [address], [place] [address]. All rights reserved. This information is not intended as a substitute for professional medical care. Always follow your healthcare professional's instructions.

## 2022-10-27 ENCOUNTER — PATIENT MESSAGE (OUTPATIENT)
Dept: CARDIOLOGY | Facility: CLINIC | Age: 62
End: 2022-10-27
Payer: COMMERCIAL

## 2022-10-27 ENCOUNTER — PATIENT MESSAGE (OUTPATIENT)
Dept: FAMILY MEDICINE | Facility: CLINIC | Age: 62
End: 2022-10-27
Payer: COMMERCIAL

## 2022-10-27 DIAGNOSIS — I83.893 VARICOSE VEINS OF BILATERAL LOWER EXTREMITIES WITH OTHER COMPLICATIONS: Primary | ICD-10-CM

## 2022-11-06 ENCOUNTER — PATIENT MESSAGE (OUTPATIENT)
Dept: FAMILY MEDICINE | Facility: CLINIC | Age: 62
End: 2022-11-06
Payer: COMMERCIAL

## 2022-11-07 DIAGNOSIS — I87.2 CHRONIC VENOUS INSUFFICIENCY: Primary | ICD-10-CM

## 2022-11-14 ENCOUNTER — HOSPITAL ENCOUNTER (OUTPATIENT)
Dept: VASCULAR SURGERY | Facility: CLINIC | Age: 62
Discharge: HOME | End: 2022-11-14
Attending: SURGERY
Payer: COMMERCIAL

## 2022-11-14 DIAGNOSIS — I87.2 CHRONIC VENOUS INSUFFICIENCY: ICD-10-CM

## 2022-11-14 PROCEDURE — 93970 PR US DUPLEX, UPPER OR LOWER EXT VENOUS,COMPLETE BILAT: ICD-10-PCS | Mod: S$GLB,,, | Performed by: SURGERY

## 2022-11-14 PROCEDURE — 93970 EXTREMITY STUDY: CPT | Mod: S$GLB,,, | Performed by: SURGERY

## 2022-11-30 ENCOUNTER — OFFICE VISIT (OUTPATIENT)
Dept: VASCULAR SURGERY | Facility: CLINIC | Age: 62
End: 2022-11-30
Payer: COMMERCIAL

## 2022-11-30 VITALS
BODY MASS INDEX: 27.58 KG/M2 | HEIGHT: 68 IN | SYSTOLIC BLOOD PRESSURE: 150 MMHG | HEART RATE: 86 BPM | DIASTOLIC BLOOD PRESSURE: 100 MMHG | WEIGHT: 182 LBS

## 2022-11-30 DIAGNOSIS — I87.2 CHRONIC VENOUS INSUFFICIENCY: Primary | ICD-10-CM

## 2022-11-30 DIAGNOSIS — I71.40 ABDOMINAL AORTIC ANEURYSM (AAA) WITHOUT RUPTURE: ICD-10-CM

## 2022-11-30 PROCEDURE — 4010F ACE/ARB THERAPY RXD/TAKEN: CPT | Mod: CPTII,S$GLB,, | Performed by: SURGERY

## 2022-11-30 PROCEDURE — 1159F PR MEDICATION LIST DOCUMENTED IN MEDICAL RECORD: ICD-10-PCS | Mod: CPTII,S$GLB,, | Performed by: SURGERY

## 2022-11-30 PROCEDURE — 3044F HG A1C LEVEL LT 7.0%: CPT | Mod: CPTII,S$GLB,, | Performed by: SURGERY

## 2022-11-30 PROCEDURE — 3080F PR MOST RECENT DIASTOLIC BLOOD PRESSURE >= 90 MM HG: ICD-10-PCS | Mod: CPTII,S$GLB,, | Performed by: SURGERY

## 2022-11-30 PROCEDURE — 3077F PR MOST RECENT SYSTOLIC BLOOD PRESSURE >= 140 MM HG: ICD-10-PCS | Mod: CPTII,S$GLB,, | Performed by: SURGERY

## 2022-11-30 PROCEDURE — 4010F PR ACE/ARB THEARPY RXD/TAKEN: ICD-10-PCS | Mod: CPTII,S$GLB,, | Performed by: SURGERY

## 2022-11-30 PROCEDURE — 3080F DIAST BP >= 90 MM HG: CPT | Mod: CPTII,S$GLB,, | Performed by: SURGERY

## 2022-11-30 PROCEDURE — 3008F BODY MASS INDEX DOCD: CPT | Mod: CPTII,S$GLB,, | Performed by: SURGERY

## 2022-11-30 PROCEDURE — 3077F SYST BP >= 140 MM HG: CPT | Mod: CPTII,S$GLB,, | Performed by: SURGERY

## 2022-11-30 PROCEDURE — 3044F PR MOST RECENT HEMOGLOBIN A1C LEVEL <7.0%: ICD-10-PCS | Mod: CPTII,S$GLB,, | Performed by: SURGERY

## 2022-11-30 PROCEDURE — 3008F PR BODY MASS INDEX (BMI) DOCUMENTED: ICD-10-PCS | Mod: CPTII,S$GLB,, | Performed by: SURGERY

## 2022-11-30 PROCEDURE — 1159F MED LIST DOCD IN RCRD: CPT | Mod: CPTII,S$GLB,, | Performed by: SURGERY

## 2022-11-30 PROCEDURE — 99204 OFFICE O/P NEW MOD 45 MIN: CPT | Mod: S$GLB,,, | Performed by: SURGERY

## 2022-11-30 PROCEDURE — 99204 PR OFFICE/OUTPT VISIT, NEW, LEVL IV, 45-59 MIN: ICD-10-PCS | Mod: S$GLB,,, | Performed by: SURGERY

## 2022-11-30 NOTE — PROGRESS NOTES
Ian Castorena MD, RPVI                                 [facility]                           [facility]                             11/30/2022    HPI:  Giorgi Eden is a 62 y.o. male with   Patient Active Problem List   Diagnosis    Essential hypertension    Status post insertion of drug eluting coronary artery stent    History of myocardial infarction    Mixed hyperlipidemia    Tobacco abuse    Atherosclerotic heart disease of native coronary artery with unstable angina pectoris    Chronic venous insufficiency    being managed by PCP and specialists who is here today for evaluation of BLE edema and RLE pain.  Patient states location is R calf occurring for months.  Associated signs and symptoms include edema.  Quality is pain and severity is 3/10.  Symptoms began yrs ago.  Alleviating factors include elevation.  Worsening factors include dependency.  Patient has not been wearing compression stockings for greater than 3 months.  noFH of venous disease.  Symptoms do limit patient's functional status and daily activities.  no DVT history.  no venous interventions.  no low sodium diet.  no excessive water intake.    Migraine with aura: no  PFO/ASD/right to left shunt: no  Pregnant: no  Breastfeeding: no    no MI  no Stroke  Tobacco use: no    Past Medical History:   Diagnosis Date    Hyperlipidemia     Hypertension     MI (myocardial infarction)     X2     Past Surgical History:   Procedure Laterality Date    CORONARY ANGIOPLASTY WITH STENT PLACEMENT      x2    LEFT HEART CATHETERIZATION Left 5/31/2022    Procedure: Left heart cath;  Surgeon: Romulo Gupta MD;  Location: [facility] CATH/EP LAB;  Service: Cardiology;  Laterality: Left;     Family History   Problem Relation Age of Onset    COPD Mother     Heart disease Mother     Stroke Mother      Social History     Socioeconomic History    Marital status: Single   Tobacco Use    Smoking status: Former     Packs/day: 1.00     Years: 25.00      Pack years: 25.00     Types: Cigarettes     Quit date: 2021     Years since quittin.3    Smokeless tobacco: Never    Tobacco comments:     I now use a vape pen at 5 mg per day   Substance and Sexual Activity    Alcohol use: Yes     Comment: socially    Sexual activity: Yes     Partners: Female       Current Outpatient Medications:     aspirin (ECOTRIN) 81 MG EC tablet, Take 1 tablet (81 mg total) by mouth once daily., Disp: 360 tablet, Rfl: 0    atenoloL (TENORMIN) 50 MG tablet, Take 1 tablet (50 mg total) by mouth once daily., Disp: 30 tablet, Rfl: 11    atorvastatin (LIPITOR) 20 MG tablet, Take 1 tablet (20 mg total) by mouth once daily., Disp: 90 tablet, Rfl: 0    famotidine (PEPCID) 20 MG tablet, Take 1 tablet (20 mg total) by mouth 2 (two) times daily., Disp: 180 tablet, Rfl: 3    lisinopriL (PRINIVIL,ZESTRIL) 2.5 MG tablet, Take 1 tablet (2.5 mg total) by mouth once daily., Disp: 90 tablet, Rfl: 0    nitroGLYCERIN (NITROSTAT) 0.4 MG SL tablet, Place 1 tablet (0.4 mg total) under the tongue every 5 (five) minutes as needed for Chest pain., Disp: 100 tablet, Rfl: 3    tamsulosin (FLOMAX) 0.4 mg Cap, Take 1 capsule (0.4 mg total) by mouth once daily., Disp: 90 capsule, Rfl: 0    tiZANidine (ZANAFLEX) 4 MG tablet, Take 1 tablet (4 mg total) by mouth every 6 (six) hours as needed., Disp: 30 tablet, Rfl: 11    ticagrelor (BRILINTA) 90 mg tablet, Take 1 tablet (90 mg total) by mouth 2 (two) times daily., Disp: 180 tablet, Rfl: 11    REVIEW OF SYSTEMS:  General: No fevers or chills; ENT: No sore throat; Allergy and Immunology: no persistent infections; Hematological and Lymphatic: No history of bleeding or easy bruising; Endocrine: negative; Respiratory: no cough, shortness of breath, or wheezing; Cardiovascular: no chest pain or dyspnea on exertion; Gastrointestinal: no abdominal pain/back, change in bowel habits, or bloody stools; Genito-Urinary: no dysuria, trouble voiding, or hematuria;  Musculoskeletal: edema; Neurological: no TIA or stroke symptoms; Psychiatric: no nervousness, anxiety or depression.    PHYSICAL EXAM:      Pulse: 86         General appearance:  Alert, well-appearing, and in no distress.  Oriented to person, place, and time                    Neurological: Normal speech, no focal findings noted; CN II - XII grossly intact. RLE with sensation to light touch, LLE with sensation to light touch.            Musculoskeletal: Digits/nail without cyanosis/clubbing.  Strength 5/5 BLE.                    Neck: Supple, no significant adenopathy                  Chest:  No wheezes, symmetric air entry. No use of accessory muscles               Cardiac: Normal rate and regular rhythm            Abdomen: Soft, nontender, nondistended      Extremities:   2+ R DP pulse, 2+ L DP pulse      1+ RLE edema, 1+ LLE edema    Skin:  RLE no ulcer; LLE no ulcer      RLE no spider veins, LLE no spider veins      RLE + varicose veins, LLE + varicose veins    CEAP 3/3    VCSS 10    LAB RESULTS:  No results found for: CBC  Lab Results   Component Value Date    INR 1.2 05/30/2022     Lab Results   Component Value Date     09/24/2022    K 4.9 09/24/2022     09/24/2022    CO2 23 09/24/2022    GLU 99 09/24/2022    BUN 19 09/24/2022    CREATININE 1.0 09/24/2022    CALCIUM 9.5 09/24/2022    ANIONGAP 11 09/24/2022    EGFRNONAA >60.0 05/31/2022     Lab Results   Component Value Date    WBC 7.85 09/24/2022    RBC 6.23 (H) 09/24/2022    HGB 17.7 09/24/2022    HCT 52.9 09/24/2022    MCV 85 09/24/2022    MCH 28.4 09/24/2022    MCHC 33.5 09/24/2022    RDW 14.9 (H) 09/24/2022     09/24/2022    MPV 10.2 09/24/2022    GRAN 4.3 09/24/2022    GRAN 54.8 09/24/2022    LYMPH 2.3 09/24/2022    LYMPH 29.0 09/24/2022    MONO 1.0 09/24/2022    MONO 13.0 09/24/2022    EOS 0.2 09/24/2022    BASO 0.04 09/24/2022    EOSINOPHIL 2.3 09/24/2022    BASOPHIL 0.5 09/24/2022    DIFFMETHOD Automated 09/24/2022     .  Lab Results    Component Value Date    HGBA1C 5.4 09/24/2022       IMAGING:  All pertinent imaging has been reviewed and interpreted independently.    Venous US 11/2022 Impression:  Details    Narrative    Indication   ========     Venous Insufficiency     Lower Extremity Veins   =================     Rt CFV:    complete compression, complete color fill   Rt prox femoral V: complete compression, complete color fill   Rt mid femoral V:  complete compression, complete color fill   Rt distal femoral V:   complete compression, complete color fill   Rt prox popliteal V:   complete compression, complete color fill   Rt mid popliteal V:    complete compression, complete color fill   Rt distal popliteal V: complete compression, complete color fill   Rt post tibial V:  complete compression, complete color fill,   Rt peroneal V: complete compression, complete color fill   Rt GSV:    complete compression, complete color fill, significant reflux   Rt SSV:    complete compression, complete color fill, no reflux   Add rt lower V details:    complete compression, complete color fill, Reflux Present   Lt CFV:    complete compression, complete color fill   Lt prox femoral V: complete compression, complete color fill   Lt mid femoral V:  complete compression, complete color fill   Lt distal femoral V:   complete compression, complete color fill   Lt prox popliteal V:   complete compression, complete color fill   Lt mid popliteal V:    complete compression, complete color fill   Lt distal popliteal V: complete compression, complete color fill   Lt post tibial V:  complete compression, complete color fill   Lt peroneal V: complete compression, complete color fill   Lt GSV:    complete compression, complete color fill, significant reflux   Lt SSV:    complete compression, complete color fill, no reflux   Add lt lower V details:    complete compression, complete color fill, Reflux Present   Additional lower vein: Saphenofemoral Junction   Other: No  reflux noted in the Accessory Vein     Lower Extremity Vein Mapping   =======================     Rt prox GSV thigh AP diam  6.2 mm   Rt mid GSV thigh AP diam   5.8 mm   Rt distal GSV thigh AP diam    8.3 mm   Rt GSV knee AP diam    6.5 mm   Rt prox GSV calf AP diam   4.0 mm   Rt mid GSV calf AP diam    2.4 mm   Rt distal GSV calf AP diam 1.9 mm   Lt prox GSV thigh AP diam  3.7 mm   Lt mid GSV thigh AP diam   3.4 mm   Lt distal GSV thigh AP diam    3.4 mm   Lt GSV knee AP diam    4.1 mm   Lt prox GSV calf AP diam   4.3 mm   Lt mid GSV calf AP diam    3.0 mm   Lt distal GSV calf AP diam 2.6 mm     Comment   ========     The diagnostic criteria used in our lab: Significant reflux is present when retrograde flow is noted both in the vein and at the   saphenofemoral junction (SFJ) or saphenopopliteal junction (SPJ)for 500 milliseconds (.5 second) following provocative maneuvers   when the patient is in the reverse trendelenburg position.     Impression   =========     Right Leg: Duplex imaging of the right lower extremity veins demonstrates patent and compressible veins.   There is no evidence of a venous thrombosis in the deep or superficial veins.   Significant reflux noted in the right GSV including the Saphenofemoral Junction (SFJ). No significant reflux noted in the SSV or   accessory vein.   Incidental finding of dilated distal SFA measuring 2.1 cm x 2.3 cm and proximal popliteal artery measuring 1.8 cm x 1.6 cm.     Left Leg: Duplex imaging of the left lower extremity veins demonstrates patent and compressible veins.   There is no evidence of a venous thrombosis in the deep or superficial veins.   Significant reflux noted in the left GSV including the Saphenofemoral Junction (SFJ). No significant reflux noted in the SSV or   accessory vein.   Incidental finding of dilated distal SFA measuring 2.1 cm x 2.1 cm, proximal popliteal artery measuring 1.9 cm x 1.9 cm and mid   popliteal artery measuring 2.3 cm x 2.0 cm.        DATE OF SERVICE: 11/14/2022                                                        Sonographer: AG HASTINGS RVS   Electronically Signed by: Shantanu Gu II at 11/15/2022-13:34           Specimen Collected: 11/14/22 13:48 Last Resulted: 11/15/22 13:37             IMP/PLAN:  62 y.o. male with   Patient Active Problem List   Diagnosis    Essential hypertension    Status post insertion of drug eluting coronary artery stent    History of myocardial infarction    Mixed hyperlipidemia    Tobacco abuse    Atherosclerotic heart disease of native coronary artery with unstable angina pectoris    Chronic venous insufficiency    being managed by PCP and specialists who is here today for evaluation of venous insufficiency and BLE asymptomatic SFA and popliteal aneurysms.    -recommend compression with Rx stockings, elevation, dietary changes associated with water and sodium intake discussed at length with patient  -Exercise   -CTA AP runoff to eval femoropopliteal aneurysms  -RTC 4 weeks for further evaluation    I spent 12 minutes evaluating this patient and greater than 50% of the time was spent counseling, coordinator care and discussing the plan of care.  All questions were answered and patient stated understanding with agreement with the above treatment plan.    Ian Castorena MD [facility]  Vascular and Endovascular Surgery

## 2022-12-12 DIAGNOSIS — I10 ESSENTIAL HYPERTENSION: Primary | ICD-10-CM

## 2022-12-12 DIAGNOSIS — I73.9 PERIPHERAL VASCULAR DISEASE, UNSPECIFIED: ICD-10-CM

## 2022-12-21 ENCOUNTER — OFFICE VISIT (OUTPATIENT)
Dept: CARDIOLOGY | Facility: CLINIC | Age: 62
End: 2022-12-21
Payer: COMMERCIAL

## 2022-12-21 VITALS
HEART RATE: 68 BPM | BODY MASS INDEX: 29.73 KG/M2 | HEIGHT: 68 IN | SYSTOLIC BLOOD PRESSURE: 148 MMHG | WEIGHT: 196.19 LBS | DIASTOLIC BLOOD PRESSURE: 98 MMHG

## 2022-12-21 DIAGNOSIS — Z95.5 STATUS POST INSERTION OF DRUG ELUTING CORONARY ARTERY STENT: ICD-10-CM

## 2022-12-21 DIAGNOSIS — I10 PRIMARY HYPERTENSION: Primary | ICD-10-CM

## 2022-12-21 DIAGNOSIS — E78.2 MIXED HYPERLIPIDEMIA: ICD-10-CM

## 2022-12-21 DIAGNOSIS — I71.43 INFRARENAL ABDOMINAL AORTIC ANEURYSM (AAA) WITHOUT RUPTURE: ICD-10-CM

## 2022-12-21 DIAGNOSIS — Z95.5 H/O HEART ARTERY STENT: ICD-10-CM

## 2022-12-21 DIAGNOSIS — I87.2 CHRONIC VENOUS INSUFFICIENCY: ICD-10-CM

## 2022-12-21 PROCEDURE — 3008F PR BODY MASS INDEX (BMI) DOCUMENTED: ICD-10-PCS | Mod: CPTII,S$GLB,, | Performed by: GENERAL PRACTICE

## 2022-12-21 PROCEDURE — 99214 OFFICE O/P EST MOD 30 MIN: CPT | Mod: S$GLB,,, | Performed by: GENERAL PRACTICE

## 2022-12-21 PROCEDURE — 93000 EKG 12-LEAD: ICD-10-PCS | Mod: S$GLB,,, | Performed by: GENERAL PRACTICE

## 2022-12-21 PROCEDURE — 3008F BODY MASS INDEX DOCD: CPT | Mod: CPTII,S$GLB,, | Performed by: GENERAL PRACTICE

## 2022-12-21 PROCEDURE — 99214 PR OFFICE/OUTPT VISIT, EST, LEVL IV, 30-39 MIN: ICD-10-PCS | Mod: S$GLB,,, | Performed by: GENERAL PRACTICE

## 2022-12-21 PROCEDURE — 99999 PR PBB SHADOW E&M-EST. PATIENT-LVL III: CPT | Mod: PBBFAC,,, | Performed by: GENERAL PRACTICE

## 2022-12-21 PROCEDURE — 3044F HG A1C LEVEL LT 7.0%: CPT | Mod: CPTII,S$GLB,, | Performed by: GENERAL PRACTICE

## 2022-12-21 PROCEDURE — 4010F PR ACE/ARB THEARPY RXD/TAKEN: ICD-10-PCS | Mod: CPTII,S$GLB,, | Performed by: GENERAL PRACTICE

## 2022-12-21 PROCEDURE — 1159F PR MEDICATION LIST DOCUMENTED IN MEDICAL RECORD: ICD-10-PCS | Mod: CPTII,S$GLB,, | Performed by: GENERAL PRACTICE

## 2022-12-21 PROCEDURE — 3080F PR MOST RECENT DIASTOLIC BLOOD PRESSURE >= 90 MM HG: ICD-10-PCS | Mod: CPTII,S$GLB,, | Performed by: GENERAL PRACTICE

## 2022-12-21 PROCEDURE — 3044F PR MOST RECENT HEMOGLOBIN A1C LEVEL <7.0%: ICD-10-PCS | Mod: CPTII,S$GLB,, | Performed by: GENERAL PRACTICE

## 2022-12-21 PROCEDURE — 3077F SYST BP >= 140 MM HG: CPT | Mod: CPTII,S$GLB,, | Performed by: GENERAL PRACTICE

## 2022-12-21 PROCEDURE — 3080F DIAST BP >= 90 MM HG: CPT | Mod: CPTII,S$GLB,, | Performed by: GENERAL PRACTICE

## 2022-12-21 PROCEDURE — 1159F MED LIST DOCD IN RCRD: CPT | Mod: CPTII,S$GLB,, | Performed by: GENERAL PRACTICE

## 2022-12-21 PROCEDURE — 3077F PR MOST RECENT SYSTOLIC BLOOD PRESSURE >= 140 MM HG: ICD-10-PCS | Mod: CPTII,S$GLB,, | Performed by: GENERAL PRACTICE

## 2022-12-21 PROCEDURE — 93000 ELECTROCARDIOGRAM COMPLETE: CPT | Mod: S$GLB,,, | Performed by: GENERAL PRACTICE

## 2022-12-21 PROCEDURE — 4010F ACE/ARB THERAPY RXD/TAKEN: CPT | Mod: CPTII,S$GLB,, | Performed by: GENERAL PRACTICE

## 2022-12-21 PROCEDURE — 99999 PR PBB SHADOW E&M-EST. PATIENT-LVL III: ICD-10-PCS | Mod: PBBFAC,,, | Performed by: GENERAL PRACTICE

## 2022-12-21 NOTE — LETTER
December 21, 2022      [facility]  [address], [address]  [place] [address]  Phone: [phone]  Fax: [phone]       Patient: Giorgi Eden   YOB: 1960  Date of Visit: 12/21/2022    To Whom It May Concern:    Claudia Eden  was at [facility] on 12/21/2022. The patient may return to work/school on 12/22/2022 with no restrictions. If you have any questions or concerns, or if I can be of further assistance, please do not hesitate to contact me.    Sincerely,    Shamika Perales MA

## 2022-12-21 NOTE — PROGRESS NOTES
Subjective:    Patient ID:  Giorgi Eden is a 62 y.o. male who presents for follow-up of No chief complaint on file.      HPI:    5/31/22    The Prox RCA lesion was 99% stenosed.Diffuse disease with aneurysm  The estimated blood loss was none.  The PCI was successful.  There was three vessel coronary artery disease.  The Prox RCA-1 lesion was 99% stenosed with 10% stenosis post-intervention.  The Prox RCA to Mid RCA lesion was 70% stenosed with 0% stenosis post-intervention.  The Prox RCA-3 lesion was 80% stenosed with 0% stenosis post-intervention.  A stent was successfully placed. 38 mm drug-eluting stent to 7 by mm across the subtotal occlusion the 1st angle the right coronary artery to the distal vessel. There is as aneurysmal dilation post stenosis which is residual  The Dist Cx lesion was 70% stenosed. Jailed by stent place from mid circumflex to the 1st obtuse marginal.  The 2nd Mrg lesion was 50% stenosed. Distal in stent stenosis at the ostium of the 2nd marginal.     The procedure log was documented by Documenter: Jasmine ALEXANDER RN and verified by Romulo Gupta MD.     Date: 5/31/2022  Time: 2:21 PM     Procedures    Left heart cath   Percutaneous coronary intervention   Stent, Drug Eluting, Single Vessel, Coronary     Result Image Hyperlink     Show images for Cardiac catheterization  Pre Procedure Diagnosis    Chest pain [R07.9]Status post insertion of drug eluting coronary artery stent [Z95.5]    Post Procedure Diagnosis    Chest pain [R07.9]Status post insertion of drug eluting coronary artery stent [Z95.5]      Encounter-Level Documents:    Scan on 5/31/2022 12:45 PM         Reviewed By    Larissa Murillo PA-C on 5/31/2022 16:33     Procedural Details    Giorgi Eden was counseled regarding the potential benefits, risks, and alternatives to the procedure(s). The patient gave informed consent and consent forms were signed. The patient was brought to the cath lab in a fasting state, prepped, and draped  in the usual sterile manner.        Clinical Summary    61-year-old male with history of stent times 3 LAD diagonal and circumflex presents with chest discomfort consistent with angina with heavy exertion and sexual activity.  He had negative stress test was planned to have angiogram as an outpatient a comes in with increasing symptoms.         Coronary Findings      Diagnostic  Dominance: Right      Left Anterior Descending   Previously placed Mid LAD stent (unknown type) is widely patent.      First Diagonal Branch   Previously placed 1st Diag stent (unknown type) is widely patent.      Left Circumflex   Previously placed Prox Cx to Mid Cx stent (unknown type) is widely patent.   Dist Cx lesion was 70% stenosed. The lesion was distal to major branch. The lesion shape was eccentric.      Second Obtuse Marginal Branch   2nd Mrg lesion was 50% stenosed. The lesion was located at the bifurcation. The lesion was previously treated using a stent of unknown type.      Right Coronary Artery   Prox RCA-1 lesion was 99% stenosed. The lesion was located at the bend. The lesion was 40 mm long. The lesion contour was irregular. The lesion was a type C lesion. The lesion was not previously treated. subtotal occlusion Proximal RCA WITH COLLATEAL.Diffuse lumpy bumpy disease. Aneurysmal proximal mid vessel   Prox RCA-2 lesion was 30% stenosed. The lesion shape was eccentric. aneurysm   Prox RCA-3 lesion was 80% stenosed.   Prox RCA to Mid RCA lesion was 70% stenosed.   Dist RCA lesion was 40% stenosed. aneurysm      Right Posterior Atrioventricular Artery   RPAV lesion was 30% stenosed. aneurysm        Intervention          Prox RCA-1 lesion   Stent (Also treats lesions: Prox RCA-2, Prox RCA-3, and Prox RCA to Mid RCA)   A drug-eluting stent was successfully placed. The stent was deployed by way of balloon expansion. The stent balloon placed across lesion, balloon was removed and was deployed. There were multiple inflations. The  stent was deployed at 8 elizabeth. The stent was redilated a second time at 12 elizabeth. The stent was redilated a third time at 17 elizabeth.   Post-Intervention Lesion Assessment   There is a 10% residual stenosis post intervention.      Prox RCA-2 lesion   Stent (Also treats lesions: Prox RCA-1, Prox RCA-3, and Prox RCA to Mid RCA)   See details in Prox RCA-1 lesion.   Post-Intervention Lesion Assessment   There is a 0% residual stenosis post intervention.      Prox RCA-3 lesion   Stent (Also treats lesions: Prox RCA-1, Prox RCA-2, and Prox RCA to Mid RCA)   See details in Prox RCA-1 lesion.   Post-Intervention Lesion Assessment   There is a 0% residual stenosis post intervention.      Prox RCA to Mid RCA lesion   Stent (Also treats lesions: Prox RCA-1, Prox RCA-2, and Prox RCA-3)   See details in Prox RCA-1 lesion.   Post-Intervention Lesion Assessment   There is a 0% residual stenosis post intervention.                                                    Recommendations     Maximize medical management.   ASA 81mg.   Tobacco cessation counseling.   Statin therapy.   Ticagrelor (Brilinta) indefinitely.       Complications      Complications documented before study signed (5/31/2022  2:43 PM)       No complications were associated with this study.   Documented by Romulo Gupta MD - 5/31/2022  2:21 PM        =========     Right Leg: Duplex imaging of the right lower extremity veins demonstrates patent and compressible veins.   There is no evidence of a venous thrombosis in the deep or superficial veins.   Significant reflux noted in the right GSV including the Saphenofemoral Junction (SFJ). No significant reflux noted in the SSV or   accessory vein.   Incidental finding of dilated distal SFA measuring 2.1 cm x 2.3 cm and proximal popliteal artery measuring 1.8 cm x 1.6 cm.     Left Leg: Duplex imaging of the left lower extremity veins demonstrates patent and compressible veins.   There is no evidence of a venous thrombosis in  the deep or superficial veins.   Significant reflux noted in the left GSV including the Saphenofemoral Junction (SFJ). No significant reflux noted in the SSV or   accessory vein.   Incidental finding of dilated distal SFA measuring 2.1 cm x 2.1 cm, proximal popliteal artery measuring 1.9 cm x 1.9 cm and mid   popliteal artery measuring 2.3 cm x 2.0 cm.          Review of patient's allergies indicates:  No Known Allergies    Past Medical History:   Diagnosis Date    Hyperlipidemia     Hypertension     MI (myocardial infarction)     X2     Past Surgical History:   Procedure Laterality Date    CORONARY ANGIOPLASTY WITH STENT PLACEMENT      x2    LEFT HEART CATHETERIZATION Left 2022    Procedure: Left heart cath;  Surgeon: Romulo Gupta MD;  Location: [facility] CATH/EP LAB;  Service: Cardiology;  Laterality: Left;     Social History     Tobacco Use    Smoking status: Former     Packs/day: 1.00     Years: 25.00     Pack years: 25.00     Types: Cigarettes     Quit date: 2021     Years since quittin.3    Smokeless tobacco: Never    Tobacco comments:     I now use a vape pen at 5 mg per day   Substance Use Topics    Alcohol use: Yes     Comment: socially     Family History   Problem Relation Age of Onset    COPD Mother     Heart disease Mother     Stroke Mother         Review of Systems:   Constitution: Negative for diaphoresis and fever.   HEENT: Negative for nosebleeds.    Cardiovascular: Negative for chest pain       No dyspnea on exertion       No leg swelling        No palpitations  Respiratory: Negative for shortness of breath and wheezing.    Hematologic/Lymphatic: Negative for bleeding problem. Does not bruise/bleed easily.   Skin: Negative for color change and rash.   Musculoskeletal: Negative for falls and myalgias.   Gastrointestinal: Negative for hematemesis and hematochezia.   Genitourinary: Negative for hematuria.   Neurological: Negative for dizziness and light-headedness.    Psychiatric/Behavioral: Negative for altered mental status and memory loss.          Objective:        There were no vitals filed for this visit.    Lab Results   Component Value Date    WBC 7.85 09/24/2022    HGB 17.7 09/24/2022    HCT 52.9 09/24/2022     09/24/2022    CHOL 111 (L) 09/24/2022    TRIG 78 09/24/2022    HDL 45 09/24/2022    ALT 25 09/24/2022    AST 21 09/24/2022     09/24/2022    K 4.9 09/24/2022     09/24/2022    CREATININE 1.0 09/24/2022    BUN 19 09/24/2022    CO2 23 09/24/2022    TSH 1.746 09/24/2022    INR 1.2 05/30/2022    HGBA1C 5.4 09/24/2022        ECHOCARDIOGRAM RESULTS  Results for orders placed during the hospital encounter of 02/25/22    Echo    Interpretation Summary  · The left ventricle is normal in size with mild concentric hypertrophy and normal systolic function.  · The estimated ejection fraction is 60%.  · Normal left ventricular diastolic function.  · Normal right ventricular size with normal right ventricular systolic function.        CURRENT/PREVIOUS VISIT EKG  Results for orders placed or performed during the hospital encounter of 05/30/22   EKG 12-lead    Collection Time: 05/30/22  8:10 PM    Narrative    Test Reason : R07.9,    Vent. Rate : 066 BPM     Atrial Rate : 066 BPM     P-R Int : 178 ms          QRS Dur : 114 ms      QT Int : 394 ms       P-R-T Axes : 053 012 045 degrees     QTc Int : 413 ms    Normal sinus rhythm  Normal ECG  When compared with ECG of 01-FEB-2022 14:04,  Criteria for Septal infarct are no longer Present  No significant change was found  Confirmed by Hakan Fulton MD ([id number]) on 6/2/2022 2:29:49 PM    Referred By: KERRIE   SELF           Confirmed By:Hakan Fulton MD     No valid procedures specified.   Results for orders placed in visit on 02/25/22    Nuclear Stress Test    Interpretation Summary    PET CT Findings The nuclear portion of this study will be reported separately.    The EKG portion of this study is negative for  ischemia.    The patient reported no chest pain during the stress test.    During stress, occasional PVCs are noted.      Physical Exam:  CONSTITUTIONAL: No fever, no chills  HEENT: Normocephalic, atraumatic,pupils reactive to light                 NECK:  No JVD no carotid bruit  CVS: S1S2+, RRR, no murmurs,   LUNGS: Clear  ABDOMEN: Soft, NT, BS+  EXTREMITIES: No cyanosis, edema  : No lozano catheter  NEURO: AAO X 3  PSY: Normal affect      Medication List with Changes/Refills   Current Medications    ASPIRIN (ECOTRIN) 81 MG EC TABLET    Take 1 tablet (81 mg total) by mouth once daily.    ATENOLOL (TENORMIN) 50 MG TABLET    Take 1 tablet (50 mg total) by mouth once daily.    ATORVASTATIN (LIPITOR) 20 MG TABLET    Take 1 tablet (20 mg total) by mouth once daily.    FAMOTIDINE (PEPCID) 20 MG TABLET    Take 1 tablet (20 mg total) by mouth 2 (two) times daily.    LISINOPRIL (PRINIVIL,ZESTRIL) 2.5 MG TABLET    Take 1 tablet (2.5 mg total) by mouth once daily.    NITROGLYCERIN (NITROSTAT) 0.4 MG SL TABLET    Place 1 tablet (0.4 mg total) under the tongue every 5 (five) minutes as needed for Chest pain.    TAMSULOSIN (FLOMAX) 0.4 MG CAP    Take 1 capsule (0.4 mg total) by mouth once daily.    TICAGRELOR (BRILINTA) 90 MG TABLET    Take 1 tablet (90 mg total) by mouth 2 (two) times daily.    TIZANIDINE (ZANAFLEX) 4 MG TABLET    Take 1 tablet (4 mg total) by mouth every 6 (six) hours as needed.             Assessment:       No diagnosis found.     Plan:     Problem List Items Addressed This Visit    None      No follow-ups on file.    The patients questions were answered, they verbalized understanding, and agreed with the treatment plan.     TRACY VALERIO MD  [facility]

## 2022-12-22 ENCOUNTER — HOSPITAL ENCOUNTER (OUTPATIENT)
Dept: RADIOLOGY | Facility: OTHER | Age: 62
Discharge: HOME | End: 2022-12-22
Attending: SURGERY
Payer: COMMERCIAL

## 2022-12-22 DIAGNOSIS — I73.9 PERIPHERAL VASCULAR DISEASE, UNSPECIFIED: ICD-10-CM

## 2022-12-22 PROCEDURE — 25500020 PHARM REV CODE 255: Performed by: SURGERY

## 2022-12-22 PROCEDURE — 75635 CT ANGIO ABDOMINAL ARTERIES: CPT | Mod: TC

## 2022-12-22 PROCEDURE — 75635 CTA RUNOFF ABD PEL BILAT LOWER EXT: ICD-10-PCS | Mod: 26,,, | Performed by: RADIOLOGY

## 2022-12-22 PROCEDURE — 75635 CT ANGIO ABDOMINAL ARTERIES: CPT | Mod: 26,,, | Performed by: RADIOLOGY

## 2022-12-22 RX ADMIN — IOHEXOL 125 ML: 350 INJECTION, SOLUTION INTRAVENOUS at 12:12

## 2023-01-11 DIAGNOSIS — Z11.59 NEED FOR HEPATITIS C SCREENING TEST: ICD-10-CM

## 2023-01-13 ENCOUNTER — OFFICE VISIT (OUTPATIENT)
Dept: VASCULAR SURGERY | Facility: CLINIC | Age: 63
End: 2023-01-13
Payer: COMMERCIAL

## 2023-01-13 VITALS
SYSTOLIC BLOOD PRESSURE: 144 MMHG | WEIGHT: 196 LBS | DIASTOLIC BLOOD PRESSURE: 86 MMHG | HEIGHT: 68 IN | HEART RATE: 70 BPM | BODY MASS INDEX: 29.7 KG/M2

## 2023-01-13 DIAGNOSIS — I72.4 POPLITEAL ARTERY ANEURYSM: Primary | ICD-10-CM

## 2023-01-13 DIAGNOSIS — I72.4 FEMORAL ARTERY ANEURYSM: ICD-10-CM

## 2023-01-13 PROCEDURE — 1159F MED LIST DOCD IN RCRD: CPT | Mod: CPTII,S$GLB,, | Performed by: SURGERY

## 2023-01-13 PROCEDURE — 99214 OFFICE O/P EST MOD 30 MIN: CPT | Mod: S$GLB,,, | Performed by: SURGERY

## 2023-01-13 PROCEDURE — 3008F PR BODY MASS INDEX (BMI) DOCUMENTED: ICD-10-PCS | Mod: CPTII,S$GLB,, | Performed by: SURGERY

## 2023-01-13 PROCEDURE — 1159F PR MEDICATION LIST DOCUMENTED IN MEDICAL RECORD: ICD-10-PCS | Mod: CPTII,S$GLB,, | Performed by: SURGERY

## 2023-01-13 PROCEDURE — 3079F PR MOST RECENT DIASTOLIC BLOOD PRESSURE 80-89 MM HG: ICD-10-PCS | Mod: CPTII,S$GLB,, | Performed by: SURGERY

## 2023-01-13 PROCEDURE — 99214 PR OFFICE/OUTPT VISIT, EST, LEVL IV, 30-39 MIN: ICD-10-PCS | Mod: S$GLB,,, | Performed by: SURGERY

## 2023-01-13 PROCEDURE — 3079F DIAST BP 80-89 MM HG: CPT | Mod: CPTII,S$GLB,, | Performed by: SURGERY

## 2023-01-13 PROCEDURE — 3008F BODY MASS INDEX DOCD: CPT | Mod: CPTII,S$GLB,, | Performed by: SURGERY

## 2023-01-13 PROCEDURE — 3077F PR MOST RECENT SYSTOLIC BLOOD PRESSURE >= 140 MM HG: ICD-10-PCS | Mod: CPTII,S$GLB,, | Performed by: SURGERY

## 2023-01-13 PROCEDURE — 3077F SYST BP >= 140 MM HG: CPT | Mod: CPTII,S$GLB,, | Performed by: SURGERY

## 2023-01-13 NOTE — H&P (VIEW-ONLY)
Ian Castorena MD, RPVI                                 [facility]                           [facility]                             01/13/2023    HPI:  Giorgi Eden is a 62 y.o. male with   Patient Active Problem List   Diagnosis    Essential hypertension    Status post insertion of drug eluting coronary artery stent    History of myocardial infarction    Mixed hyperlipidemia    Tobacco abuse    Atherosclerotic heart disease of native coronary artery with unstable angina pectoris    Chronic venous insufficiency    being managed by PCP and specialists who is here today for evaluation of BLE edema and RLE pain.  Patient states location is R calf occurring for months.  Associated signs and symptoms include edema.  Quality is pain and severity is 3/10.  Symptoms began yrs ago.  Alleviating factors include elevation.  Worsening factors include dependency.  Patient has not been wearing compression stockings for greater than 3 months.  No FH of venous disease.  Symptoms do limit patient's functional status and daily activities.  no DVT history.  no venous interventions.  no low sodium diet.  no excessive water intake.    Migraine with aura: no  PFO/ASD/right to left shunt: no  Pregnant: no  Breastfeeding: no    no MI  no Stroke  Tobacco use: no    1/2023:  c/o persistent BLE edema and pain despite compression and elevation > 3 mo.  No claudication.    Past Medical History:   Diagnosis Date    Hyperlipidemia     Hypertension     MI (myocardial infarction)     X2     Past Surgical History:   Procedure Laterality Date    CORONARY ANGIOPLASTY WITH STENT PLACEMENT      x2    LEFT HEART CATHETERIZATION Left 5/31/2022    Procedure: Left heart cath;  Surgeon: Romulo Gupta MD;  Location: [facility] CATH/EP LAB;  Service: Cardiology;  Laterality: Left;     Family History   Problem Relation Age of Onset    COPD Mother     Heart disease Mother     Stroke Mother      Social History     Socioeconomic  History    Marital status: Single   Tobacco Use    Smoking status: Former     Packs/day: 1.00     Years: 25.00     Pack years: 25.00     Types: Cigarettes     Quit date: 2021     Years since quittin.4    Smokeless tobacco: Never    Tobacco comments:     I now use a vape pen at 5 mg per day   Substance and Sexual Activity    Alcohol use: Yes     Comment: socially    Sexual activity: Yes     Partners: Female       Current Outpatient Medications:     aspirin (ECOTRIN) 81 MG EC tablet, Take 1 tablet (81 mg total) by mouth once daily., Disp: 360 tablet, Rfl: 0    atenoloL (TENORMIN) 50 MG tablet, Take 1 tablet (50 mg total) by mouth once daily., Disp: 30 tablet, Rfl: 11    atorvastatin (LIPITOR) 20 MG tablet, Take 1 tablet (20 mg total) by mouth once daily., Disp: 90 tablet, Rfl: 0    famotidine (PEPCID) 20 MG tablet, Take 1 tablet (20 mg total) by mouth 2 (two) times daily., Disp: 180 tablet, Rfl: 3    lisinopriL 10 MG Tab, Take 1 tablet (10 mg total) by mouth once daily., Disp: 30 tablet, Rfl: 2    nitroGLYCERIN (NITROSTAT) 0.4 MG SL tablet, Place 1 tablet (0.4 mg total) under the tongue every 5 (five) minutes as needed for Chest pain., Disp: 100 tablet, Rfl: 3    tamsulosin (FLOMAX) 0.4 mg Cap, Take 1 capsule (0.4 mg total) by mouth once daily., Disp: 90 capsule, Rfl: 0    ticagrelor (BRILINTA) 90 mg tablet, Take 1 tablet (90 mg total) by mouth 2 (two) times daily., Disp: 180 tablet, Rfl: 3    tiZANidine (ZANAFLEX) 4 MG tablet, Take 1 tablet (4 mg total) by mouth every 6 (six) hours as needed., Disp: 30 tablet, Rfl: 11    REVIEW OF SYSTEMS:  General: No fevers or chills; ENT: No sore throat; Allergy and Immunology: no persistent infections; Hematological and Lymphatic: No history of bleeding or easy bruising; Endocrine: negative; Respiratory: no cough, shortness of breath, or wheezing; Cardiovascular: no chest pain or dyspnea on exertion; Gastrointestinal: no abdominal pain/back, change in bowel habits, or  bloody stools; Genito-Urinary: no dysuria, trouble voiding, or hematuria; Musculoskeletal: edema; Neurological: no TIA or stroke symptoms; Psychiatric: no nervousness, anxiety or depression.    PHYSICAL EXAM:                General appearance:  Alert, well-appearing, and in no distress.  Oriented to person, place, and time                    Neurological: Normal speech, no focal findings noted; CN II - XII grossly intact. RLE with sensation to light touch, LLE with sensation to light touch.            Musculoskeletal: Digits/nail without cyanosis/clubbing.  Strength 5/5 BLE.                    Neck: Supple, no significant adenopathy                  Chest:  No wheezes, symmetric air entry. No use of accessory muscles               Cardiac: Normal rate and regular rhythm            Abdomen: Soft, nontender, nondistended      Extremities:   2+ R DP pulse, 2+ L DP pulse      1+ RLE edema, 1+ LLE edema    Skin:  RLE no ulcer; LLE no ulcer      RLE no spider veins, LLE no spider veins      RLE + varicose veins, LLE + varicose veins    CEAP 3/3    VCSS 10    LAB RESULTS:  No results found for: CBC  Lab Results   Component Value Date    INR 1.2 05/30/2022     Lab Results   Component Value Date     12/22/2022    K 4.2 12/22/2022     12/22/2022    CO2 23 12/22/2022    GLU 83 12/22/2022    BUN 14 12/22/2022    CREATININE 1.0 12/22/2022    CALCIUM 9.3 12/22/2022    ANIONGAP 10 12/22/2022    EGFRNONAA >60.0 05/31/2022     Lab Results   Component Value Date    WBC 7.85 09/24/2022    RBC 6.23 (H) 09/24/2022    HGB 17.7 09/24/2022    HCT 52.9 09/24/2022    MCV 85 09/24/2022    MCH 28.4 09/24/2022    MCHC 33.5 09/24/2022    RDW 14.9 (H) 09/24/2022     09/24/2022    MPV 10.2 09/24/2022    GRAN 4.3 09/24/2022    GRAN 54.8 09/24/2022    LYMPH 2.3 09/24/2022    LYMPH 29.0 09/24/2022    MONO 1.0 09/24/2022    MONO 13.0 09/24/2022    EOS 0.2 09/24/2022    BASO 0.04 09/24/2022    EOSINOPHIL 2.3 09/24/2022    BASOPHIL  0.5 09/24/2022    DIFFMETHOD Automated 09/24/2022     .  Lab Results   Component Value Date    HGBA1C 5.4 09/24/2022       IMAGING:  All pertinent imaging has been reviewed and interpreted independently.    Venous US 11/2022 Impression:  Details    Narrative    Indication   ========     Venous Insufficiency     Lower Extremity Veins   =================     Rt CFV:    complete compression, complete color fill   Rt prox femoral V: complete compression, complete color fill   Rt mid femoral V:  complete compression, complete color fill   Rt distal femoral V:   complete compression, complete color fill   Rt prox popliteal V:   complete compression, complete color fill   Rt mid popliteal V:    complete compression, complete color fill   Rt distal popliteal V: complete compression, complete color fill   Rt post tibial V:  complete compression, complete color fill,   Rt peroneal V: complete compression, complete color fill   Rt GSV:    complete compression, complete color fill, significant reflux   Rt SSV:    complete compression, complete color fill, no reflux   Add rt lower V details:    complete compression, complete color fill, Reflux Present   Lt CFV:    complete compression, complete color fill   Lt prox femoral V: complete compression, complete color fill   Lt mid femoral V:  complete compression, complete color fill   Lt distal femoral V:   complete compression, complete color fill   Lt prox popliteal V:   complete compression, complete color fill   Lt mid popliteal V:    complete compression, complete color fill   Lt distal popliteal V: complete compression, complete color fill   Lt post tibial V:  complete compression, complete color fill   Lt peroneal V: complete compression, complete color fill   Lt GSV:    complete compression, complete color fill, significant reflux   Lt SSV:    complete compression, complete color fill, no reflux   Add lt lower V details:    complete compression, complete color fill, Reflux  Present   Additional lower vein: Saphenofemoral Junction   Other: No reflux noted in the Accessory Vein     Lower Extremity Vein Mapping   =======================     Rt prox GSV thigh AP diam  6.2 mm   Rt mid GSV thigh AP diam   5.8 mm   Rt distal GSV thigh AP diam    8.3 mm   Rt GSV knee AP diam    6.5 mm   Rt prox GSV calf AP diam   4.0 mm   Rt mid GSV calf AP diam    2.4 mm   Rt distal GSV calf AP diam 1.9 mm   Lt prox GSV thigh AP diam  3.7 mm   Lt mid GSV thigh AP diam   3.4 mm   Lt distal GSV thigh AP diam    3.4 mm   Lt GSV knee AP diam    4.1 mm   Lt prox GSV calf AP diam   4.3 mm   Lt mid GSV calf AP diam    3.0 mm   Lt distal GSV calf AP diam 2.6 mm     Comment   ========     The diagnostic criteria used in our lab: Significant reflux is present when retrograde flow is noted both in the vein and at the   saphenofemoral junction (SFJ) or saphenopopliteal junction (SPJ)for 500 milliseconds (.5 second) following provocative maneuvers   when the patient is in the reverse trendelenburg position.     Impression   =========     Right Leg: Duplex imaging of the right lower extremity veins demonstrates patent and compressible veins.   There is no evidence of a venous thrombosis in the deep or superficial veins.   Significant reflux noted in the right GSV including the Saphenofemoral Junction (SFJ). No significant reflux noted in the SSV or   accessory vein.   Incidental finding of dilated distal SFA measuring 2.1 cm x 2.3 cm and proximal popliteal artery measuring 1.8 cm x 1.6 cm.     Left Leg: Duplex imaging of the left lower extremity veins demonstrates patent and compressible veins.   There is no evidence of a venous thrombosis in the deep or superficial veins.   Significant reflux noted in the left GSV including the Saphenofemoral Junction (SFJ). No significant reflux noted in the SSV or   accessory vein.   Incidental finding of dilated distal SFA measuring 2.1 cm x 2.1 cm, proximal popliteal artery measuring  1.9 cm x 1.9 cm and mid   popliteal artery measuring 2.3 cm x 2.0 cm.       DATE OF SERVICE: 11/14/2022                                                        Sonographer: AG HASTINGS RVS   Electronically Signed by: Shantanu Gu II at 11/15/2022-13:34           Specimen Collected: 11/14/22 13:48 Last Resulted: 11/15/22 13:37           CTA BLE runoff 2023 reviewed.      IMP/PLAN:  62 y.o. male with   Patient Active Problem List   Diagnosis    Essential hypertension    Status post insertion of drug eluting coronary artery stent    History of myocardial infarction    Mixed hyperlipidemia    Tobacco abuse    Atherosclerotic heart disease of native coronary artery with unstable angina pectoris    Chronic venous insufficiency    being managed by PCP and specialists who is here today for evaluation of venous insufficiency and BLE asymptomatic SFA and popliteal aneurysms.    -recommend compression with Rx stockings, elevation, dietary changes associated with water and sodium intake discussed at length with patient  -Exercise   -CTA AP runoff to shows bilateral >2cm femoropopliteal aneurysms, asymptomatic - rec staged repairs R first.  Rec BLE angiogram prior to vein bypass  -Scheduled 1/26/23    I spent 12 minutes evaluating this patient and greater than 50% of the time was spent counseling, coordinator care and discussing the plan of care.  All questions were answered and patient stated understanding with agreement with the above treatment plan.    Ian Castorena MD [facility]  Vascular and Endovascular Surgery

## 2023-01-13 NOTE — PROGRESS NOTES
Ian Casotrena MD, RPVI                                 [facility]                           [facility]                             01/13/2023    HPI:  Giorgi Eden is a 62 y.o. male with   Patient Active Problem List   Diagnosis    Essential hypertension    Status post insertion of drug eluting coronary artery stent    History of myocardial infarction    Mixed hyperlipidemia    Tobacco abuse    Atherosclerotic heart disease of native coronary artery with unstable angina pectoris    Chronic venous insufficiency    being managed by PCP and specialists who is here today for evaluation of BLE edema and RLE pain.  Patient states location is R calf occurring for months.  Associated signs and symptoms include edema.  Quality is pain and severity is 3/10.  Symptoms began yrs ago.  Alleviating factors include elevation.  Worsening factors include dependency.  Patient has not been wearing compression stockings for greater than 3 months.  No FH of venous disease.  Symptoms do limit patient's functional status and daily activities.  no DVT history.  no venous interventions.  no low sodium diet.  no excessive water intake.    Migraine with aura: no  PFO/ASD/right to left shunt: no  Pregnant: no  Breastfeeding: no    no MI  no Stroke  Tobacco use: no    1/2023:  c/o persistent BLE edema and pain despite compression and elevation > 3 mo.  No claudication.    Past Medical History:   Diagnosis Date    Hyperlipidemia     Hypertension     MI (myocardial infarction)     X2     Past Surgical History:   Procedure Laterality Date    CORONARY ANGIOPLASTY WITH STENT PLACEMENT      x2    LEFT HEART CATHETERIZATION Left 5/31/2022    Procedure: Left heart cath;  Surgeon: Romulo Gupta MD;  Location: [facility] CATH/EP LAB;  Service: Cardiology;  Laterality: Left;     Family History   Problem Relation Age of Onset    COPD Mother     Heart disease Mother     Stroke Mother      Social History     Socioeconomic  History    Marital status: Single   Tobacco Use    Smoking status: Former     Packs/day: 1.00     Years: 25.00     Pack years: 25.00     Types: Cigarettes     Quit date: 2021     Years since quittin.4    Smokeless tobacco: Never    Tobacco comments:     I now use a vape pen at 5 mg per day   Substance and Sexual Activity    Alcohol use: Yes     Comment: socially    Sexual activity: Yes     Partners: Female       Current Outpatient Medications:     aspirin (ECOTRIN) 81 MG EC tablet, Take 1 tablet (81 mg total) by mouth once daily., Disp: 360 tablet, Rfl: 0    atenoloL (TENORMIN) 50 MG tablet, Take 1 tablet (50 mg total) by mouth once daily., Disp: 30 tablet, Rfl: 11    atorvastatin (LIPITOR) 20 MG tablet, Take 1 tablet (20 mg total) by mouth once daily., Disp: 90 tablet, Rfl: 0    famotidine (PEPCID) 20 MG tablet, Take 1 tablet (20 mg total) by mouth 2 (two) times daily., Disp: 180 tablet, Rfl: 3    lisinopriL 10 MG Tab, Take 1 tablet (10 mg total) by mouth once daily., Disp: 30 tablet, Rfl: 2    nitroGLYCERIN (NITROSTAT) 0.4 MG SL tablet, Place 1 tablet (0.4 mg total) under the tongue every 5 (five) minutes as needed for Chest pain., Disp: 100 tablet, Rfl: 3    tamsulosin (FLOMAX) 0.4 mg Cap, Take 1 capsule (0.4 mg total) by mouth once daily., Disp: 90 capsule, Rfl: 0    ticagrelor (BRILINTA) 90 mg tablet, Take 1 tablet (90 mg total) by mouth 2 (two) times daily., Disp: 180 tablet, Rfl: 3    tiZANidine (ZANAFLEX) 4 MG tablet, Take 1 tablet (4 mg total) by mouth every 6 (six) hours as needed., Disp: 30 tablet, Rfl: 11    REVIEW OF SYSTEMS:  General: No fevers or chills; ENT: No sore throat; Allergy and Immunology: no persistent infections; Hematological and Lymphatic: No history of bleeding or easy bruising; Endocrine: negative; Respiratory: no cough, shortness of breath, or wheezing; Cardiovascular: no chest pain or dyspnea on exertion; Gastrointestinal: no abdominal pain/back, change in bowel habits, or  bloody stools; Genito-Urinary: no dysuria, trouble voiding, or hematuria; Musculoskeletal: edema; Neurological: no TIA or stroke symptoms; Psychiatric: no nervousness, anxiety or depression.    PHYSICAL EXAM:                General appearance:  Alert, well-appearing, and in no distress.  Oriented to person, place, and time                    Neurological: Normal speech, no focal findings noted; CN II - XII grossly intact. RLE with sensation to light touch, LLE with sensation to light touch.            Musculoskeletal: Digits/nail without cyanosis/clubbing.  Strength 5/5 BLE.                    Neck: Supple, no significant adenopathy                  Chest:  No wheezes, symmetric air entry. No use of accessory muscles               Cardiac: Normal rate and regular rhythm            Abdomen: Soft, nontender, nondistended      Extremities:   2+ R DP pulse, 2+ L DP pulse      1+ RLE edema, 1+ LLE edema    Skin:  RLE no ulcer; LLE no ulcer      RLE no spider veins, LLE no spider veins      RLE + varicose veins, LLE + varicose veins    CEAP 3/3    VCSS 10    LAB RESULTS:  No results found for: CBC  Lab Results   Component Value Date    INR 1.2 05/30/2022     Lab Results   Component Value Date     12/22/2022    K 4.2 12/22/2022     12/22/2022    CO2 23 12/22/2022    GLU 83 12/22/2022    BUN 14 12/22/2022    CREATININE 1.0 12/22/2022    CALCIUM 9.3 12/22/2022    ANIONGAP 10 12/22/2022    EGFRNONAA >60.0 05/31/2022     Lab Results   Component Value Date    WBC 7.85 09/24/2022    RBC 6.23 (H) 09/24/2022    HGB 17.7 09/24/2022    HCT 52.9 09/24/2022    MCV 85 09/24/2022    MCH 28.4 09/24/2022    MCHC 33.5 09/24/2022    RDW 14.9 (H) 09/24/2022     09/24/2022    MPV 10.2 09/24/2022    GRAN 4.3 09/24/2022    GRAN 54.8 09/24/2022    LYMPH 2.3 09/24/2022    LYMPH 29.0 09/24/2022    MONO 1.0 09/24/2022    MONO 13.0 09/24/2022    EOS 0.2 09/24/2022    BASO 0.04 09/24/2022    EOSINOPHIL 2.3 09/24/2022    BASOPHIL  0.5 09/24/2022    DIFFMETHOD Automated 09/24/2022     .  Lab Results   Component Value Date    HGBA1C 5.4 09/24/2022       IMAGING:  All pertinent imaging has been reviewed and interpreted independently.    Venous US 11/2022 Impression:  Details    Narrative    Indication   ========     Venous Insufficiency     Lower Extremity Veins   =================     Rt CFV:    complete compression, complete color fill   Rt prox femoral V: complete compression, complete color fill   Rt mid femoral V:  complete compression, complete color fill   Rt distal femoral V:   complete compression, complete color fill   Rt prox popliteal V:   complete compression, complete color fill   Rt mid popliteal V:    complete compression, complete color fill   Rt distal popliteal V: complete compression, complete color fill   Rt post tibial V:  complete compression, complete color fill,   Rt peroneal V: complete compression, complete color fill   Rt GSV:    complete compression, complete color fill, significant reflux   Rt SSV:    complete compression, complete color fill, no reflux   Add rt lower V details:    complete compression, complete color fill, Reflux Present   Lt CFV:    complete compression, complete color fill   Lt prox femoral V: complete compression, complete color fill   Lt mid femoral V:  complete compression, complete color fill   Lt distal femoral V:   complete compression, complete color fill   Lt prox popliteal V:   complete compression, complete color fill   Lt mid popliteal V:    complete compression, complete color fill   Lt distal popliteal V: complete compression, complete color fill   Lt post tibial V:  complete compression, complete color fill   Lt peroneal V: complete compression, complete color fill   Lt GSV:    complete compression, complete color fill, significant reflux   Lt SSV:    complete compression, complete color fill, no reflux   Add lt lower V details:    complete compression, complete color fill, Reflux  Present   Additional lower vein: Saphenofemoral Junction   Other: No reflux noted in the Accessory Vein     Lower Extremity Vein Mapping   =======================     Rt prox GSV thigh AP diam  6.2 mm   Rt mid GSV thigh AP diam   5.8 mm   Rt distal GSV thigh AP diam    8.3 mm   Rt GSV knee AP diam    6.5 mm   Rt prox GSV calf AP diam   4.0 mm   Rt mid GSV calf AP diam    2.4 mm   Rt distal GSV calf AP diam 1.9 mm   Lt prox GSV thigh AP diam  3.7 mm   Lt mid GSV thigh AP diam   3.4 mm   Lt distal GSV thigh AP diam    3.4 mm   Lt GSV knee AP diam    4.1 mm   Lt prox GSV calf AP diam   4.3 mm   Lt mid GSV calf AP diam    3.0 mm   Lt distal GSV calf AP diam 2.6 mm     Comment   ========     The diagnostic criteria used in our lab: Significant reflux is present when retrograde flow is noted both in the vein and at the   saphenofemoral junction (SFJ) or saphenopopliteal junction (SPJ)for 500 milliseconds (.5 second) following provocative maneuvers   when the patient is in the reverse trendelenburg position.     Impression   =========     Right Leg: Duplex imaging of the right lower extremity veins demonstrates patent and compressible veins.   There is no evidence of a venous thrombosis in the deep or superficial veins.   Significant reflux noted in the right GSV including the Saphenofemoral Junction (SFJ). No significant reflux noted in the SSV or   accessory vein.   Incidental finding of dilated distal SFA measuring 2.1 cm x 2.3 cm and proximal popliteal artery measuring 1.8 cm x 1.6 cm.     Left Leg: Duplex imaging of the left lower extremity veins demonstrates patent and compressible veins.   There is no evidence of a venous thrombosis in the deep or superficial veins.   Significant reflux noted in the left GSV including the Saphenofemoral Junction (SFJ). No significant reflux noted in the SSV or   accessory vein.   Incidental finding of dilated distal SFA measuring 2.1 cm x 2.1 cm, proximal popliteal artery measuring  1.9 cm x 1.9 cm and mid   popliteal artery measuring 2.3 cm x 2.0 cm.       DATE OF SERVICE: 11/14/2022                                                        Sonographer: AG HASTINGS RVS   Electronically Signed by: Shantanu Gu II at 11/15/2022-13:34           Specimen Collected: 11/14/22 13:48 Last Resulted: 11/15/22 13:37           CTA BLE runoff 2023 reviewed.      IMP/PLAN:  62 y.o. male with   Patient Active Problem List   Diagnosis    Essential hypertension    Status post insertion of drug eluting coronary artery stent    History of myocardial infarction    Mixed hyperlipidemia    Tobacco abuse    Atherosclerotic heart disease of native coronary artery with unstable angina pectoris    Chronic venous insufficiency    being managed by PCP and specialists who is here today for evaluation of venous insufficiency and BLE asymptomatic SFA and popliteal aneurysms.    -recommend compression with Rx stockings, elevation, dietary changes associated with water and sodium intake discussed at length with patient  -Exercise   -CTA AP runoff to shows bilateral >2cm femoropopliteal aneurysms, asymptomatic - rec staged repairs R first.  Rec BLE angiogram prior to vein bypass  -Scheduled 1/26/23    I spent 12 minutes evaluating this patient and greater than 50% of the time was spent counseling, coordinator care and discussing the plan of care.  All questions were answered and patient stated understanding with agreement with the above treatment plan.    Ian Castorena MD [facility]  Vascular and Endovascular Surgery

## 2023-01-17 ENCOUNTER — PATIENT MESSAGE (OUTPATIENT)
Dept: SURGERY | Facility: HOSPITAL | Age: 63
End: 2023-01-17
Payer: COMMERCIAL

## 2023-01-24 ENCOUNTER — HOSPITAL ENCOUNTER (OUTPATIENT)
Dept: PREADMISSION TESTING | Facility: HOSPITAL | Age: 63
Discharge: HOME | End: 2023-01-24
Attending: SURGERY
Payer: COMMERCIAL

## 2023-01-24 VITALS
SYSTOLIC BLOOD PRESSURE: 136 MMHG | OXYGEN SATURATION: 98 % | DIASTOLIC BLOOD PRESSURE: 93 MMHG | RESPIRATION RATE: 18 BRPM | WEIGHT: 189.63 LBS | HEART RATE: 82 BPM | BODY MASS INDEX: 28.74 KG/M2 | HEIGHT: 68 IN

## 2023-01-24 DIAGNOSIS — Z01.818 PREOP TESTING: Primary | ICD-10-CM

## 2023-01-24 LAB
BASOPHILS # BLD AUTO: 0.03 K/UL (ref 0–0.2)
BASOPHILS NFR BLD: 0.3 % (ref 0–1.9)
DIFFERENTIAL METHOD: ABNORMAL
EOSINOPHIL # BLD AUTO: 0.1 K/UL (ref 0–0.5)
EOSINOPHIL NFR BLD: 0.9 % (ref 0–8)
ERYTHROCYTE [DISTWIDTH] IN BLOOD BY AUTOMATED COUNT: 14.7 % (ref 11.5–14.5)
HCT VFR BLD AUTO: 51 % (ref 40–54)
HGB BLD-MCNC: 17.2 G/DL (ref 14–18)
IMM GRANULOCYTES # BLD AUTO: 0.03 K/UL (ref 0–0.04)
IMM GRANULOCYTES NFR BLD AUTO: 0.3 % (ref 0–0.5)
LYMPHOCYTES # BLD AUTO: 2.4 K/UL (ref 1–4.8)
LYMPHOCYTES NFR BLD: 22.6 % (ref 18–48)
MCH RBC QN AUTO: 27.8 PG (ref 27–31)
MCHC RBC AUTO-ENTMCNC: 33.7 G/DL (ref 32–36)
MCV RBC AUTO: 83 FL (ref 82–98)
MONOCYTES # BLD AUTO: 1.3 K/UL (ref 0.3–1)
MONOCYTES NFR BLD: 12.2 % (ref 4–15)
NEUTROPHILS # BLD AUTO: 6.8 K/UL (ref 1.8–7.7)
NEUTROPHILS NFR BLD: 63.7 % (ref 38–73)
NRBC BLD-RTO: 0 /100 WBC
PLATELET # BLD AUTO: 255 K/UL (ref 150–450)
PMV BLD AUTO: 10.2 FL (ref 9.2–12.9)
RBC # BLD AUTO: 6.18 M/UL (ref 4.6–6.2)
WBC # BLD AUTO: 10.72 K/UL (ref 3.9–12.7)

## 2023-01-24 PROCEDURE — 36415 COLL VENOUS BLD VENIPUNCTURE: CPT | Performed by: SURGERY

## 2023-01-24 PROCEDURE — 85025 COMPLETE CBC W/AUTO DIFF WBC: CPT | Performed by: SURGERY

## 2023-01-24 NOTE — DISCHARGE INSTRUCTIONS
ANGIOGRAM IN RADIOLOGY    Before 7 AM, enter through the Emergency Entrance..   After 7 AM enter through the Main Entrance.        Your procedure  is scheduled for _____1/26/23____________.    Call [phone] between 2pm and 5pm on ____1/25/23___to find out your arrival time for the day of surgery.    You may have one visitor.  No children allowed.      You will be going to the Same Day Surgery Unit on the 2nd floor of the hospital.    Important instructions:  Do not eat anything after midnight.  You may have water to take your medications.    Do not take any diabetic medication on the morning of surgery unless instructed to do so by your doctor or pre op nurse.    Metformin and Synjardy must be stopped two days before your procedure.  Do not take on the day of procedure.  Resume when instructed.      Please shower the night before and the morning of your surgery.      Use Chlorhexidine soap as instructed by your pre op nurse.   Please place clean linens on your bed the night before surgery. Please wear fresh clean clothing after each shower.    No shaving of procedural area at least 4-5 days before surgery due to increased risk of skin irritation and/or possible infection.    Contact lenses and removable denture work may not be worn during your procedure.    You may wear deodorant only.     Do not wear powder, body lotion, perfume/cologne or make-up.    Do not wear any jewelry or have any metal on your body.    You will be asked to remove any dentures or partials for the procedure.    If you are going home on the same day of surgery, you must arrange for a family member or a friend to drive you home.  Public transportation is prohibited.  You will not be able to drive home if you were given anesthesia or sedation.    Please leave money and valuables home.      You may bring your cell phone.    Call the doctor if fever or illness should occur before your surgery.    Call [phone] to contact us here if  needed.

## 2023-01-26 ENCOUNTER — HOSPITAL ENCOUNTER (OUTPATIENT)
Facility: HOSPITAL | Age: 63
Discharge: HOME | End: 2023-01-26
Attending: SURGERY | Admitting: SURGERY
Payer: COMMERCIAL

## 2023-01-26 VITALS
OXYGEN SATURATION: 99 % | SYSTOLIC BLOOD PRESSURE: 142 MMHG | HEART RATE: 59 BPM | WEIGHT: 189.56 LBS | RESPIRATION RATE: 18 BRPM | TEMPERATURE: 99 F | DIASTOLIC BLOOD PRESSURE: 89 MMHG | BODY MASS INDEX: 28.82 KG/M2

## 2023-01-26 DIAGNOSIS — I72.4 FEMORAL ARTERY ANEURYSM: ICD-10-CM

## 2023-01-26 DIAGNOSIS — I72.4 POPLITEAL ARTERY ANEURYSM: ICD-10-CM

## 2023-01-26 PROCEDURE — 36200 PLACE CATHETER IN AORTA: CPT | Mod: ,,, | Performed by: SURGERY

## 2023-01-26 PROCEDURE — 25500020 PHARM REV CODE 255: Performed by: SURGERY

## 2023-01-26 PROCEDURE — 75710 PR  ANGIO EXTREMITY UNILAT: ICD-10-PCS | Mod: 26,,, | Performed by: SURGERY

## 2023-01-26 PROCEDURE — 63600175 PHARM REV CODE 636 W HCPCS: Performed by: SURGERY

## 2023-01-26 PROCEDURE — 75710 ARTERY X-RAYS ARM/LEG: CPT | Performed by: SURGERY

## 2023-01-26 PROCEDURE — 99152 MOD SED SAME PHYS/QHP 5/>YRS: CPT | Mod: ,,, | Performed by: SURGERY

## 2023-01-26 PROCEDURE — 36200 PLACE CATHETER IN AORTA: CPT | Performed by: SURGERY

## 2023-01-26 PROCEDURE — 25000003 PHARM REV CODE 250: Performed by: SURGERY

## 2023-01-26 PROCEDURE — 36200 PR PLACE CATH AORTA: ICD-10-PCS | Mod: ,,, | Performed by: SURGERY

## 2023-01-26 PROCEDURE — 75710 ARTERY X-RAYS ARM/LEG: CPT | Mod: 26,,, | Performed by: SURGERY

## 2023-01-26 PROCEDURE — 99152 PR MOD CONSCIOUS SEDATION, SAME PHYS, 5+ YRS, FIRST 15 MIN: ICD-10-PCS | Mod: ,,, | Performed by: SURGERY

## 2023-01-26 RX ORDER — CEFAZOLIN SODIUM 2 G/50ML
2 SOLUTION INTRAVENOUS ONCE
Status: DISCONTINUED | OUTPATIENT
Start: 2023-01-26 | End: 2023-01-26 | Stop reason: HOSPADM

## 2023-01-26 RX ORDER — HEPARIN SODIUM 1000 [USP'U]/ML
INJECTION, SOLUTION INTRAVENOUS; SUBCUTANEOUS
Status: COMPLETED | OUTPATIENT
Start: 2023-01-26 | End: 2023-01-26

## 2023-01-26 RX ORDER — LIDOCAINE HYDROCHLORIDE 10 MG/ML
INJECTION INFILTRATION; PERINEURAL
Status: COMPLETED | OUTPATIENT
Start: 2023-01-26 | End: 2023-01-26

## 2023-01-26 RX ORDER — ACETAMINOPHEN 325 MG/1
650 TABLET ORAL ONCE
Status: DISCONTINUED | OUTPATIENT
Start: 2023-01-26 | End: 2023-01-26 | Stop reason: HOSPADM

## 2023-01-26 RX ORDER — FENTANYL CITRATE 50 UG/ML
INJECTION, SOLUTION INTRAMUSCULAR; INTRAVENOUS
Status: COMPLETED | OUTPATIENT
Start: 2023-01-26 | End: 2023-01-26

## 2023-01-26 RX ORDER — MIDAZOLAM HYDROCHLORIDE 1 MG/ML
INJECTION INTRAMUSCULAR; INTRAVENOUS
Status: COMPLETED | OUTPATIENT
Start: 2023-01-26 | End: 2023-01-26

## 2023-01-26 RX ORDER — PROTAMINE SULFATE 10 MG/ML
INJECTION, SOLUTION INTRAVENOUS
Status: COMPLETED | OUTPATIENT
Start: 2023-01-26 | End: 2023-01-26

## 2023-01-26 RX ORDER — SODIUM CHLORIDE 9 MG/ML
INJECTION, SOLUTION INTRAVENOUS CONTINUOUS
Status: DISCONTINUED | OUTPATIENT
Start: 2023-01-26 | End: 2023-01-26 | Stop reason: HOSPADM

## 2023-01-26 RX ADMIN — FENTANYL CITRATE 50 MCG: 50 INJECTION, SOLUTION INTRAMUSCULAR; INTRAVENOUS at 08:01

## 2023-01-26 RX ADMIN — PROTAMINE SULFATE 5 MG: 10 INJECTION, SOLUTION INTRAVENOUS at 09:01

## 2023-01-26 RX ADMIN — LIDOCAINE HYDROCHLORIDE 10 ML: 10 INJECTION, SOLUTION INFILTRATION; PERINEURAL at 08:01

## 2023-01-26 RX ADMIN — IOHEXOL 100 ML: 350 INJECTION, SOLUTION INTRAVENOUS at 09:01

## 2023-01-26 RX ADMIN — HEPARIN SODIUM 8000 UNITS: 1000 INJECTION, SOLUTION INTRAVENOUS; SUBCUTANEOUS at 09:01

## 2023-01-26 RX ADMIN — PROTAMINE SULFATE 30 MG: 10 INJECTION, SOLUTION INTRAVENOUS at 09:01

## 2023-01-26 RX ADMIN — MIDAZOLAM HYDROCHLORIDE 1 MG: 1 INJECTION INTRAMUSCULAR; INTRAVENOUS at 08:01

## 2023-01-26 RX ADMIN — HEPARIN SODIUM 2000 UNITS: 1000 INJECTION, SOLUTION INTRAVENOUS; SUBCUTANEOUS at 09:01

## 2023-01-26 RX ADMIN — MIDAZOLAM HYDROCHLORIDE 0.5 MG: 1 INJECTION INTRAMUSCULAR; INTRAVENOUS at 08:01

## 2023-01-26 RX ADMIN — FENTANYL CITRATE 25 MCG: 50 INJECTION, SOLUTION INTRAMUSCULAR; INTRAVENOUS at 08:01

## 2023-01-26 NOTE — OP NOTE
Date:01/26/2023     Surgeon: Ian Castorena MD RPVI     Assistant: None     Pre-op Diagnosis:   1. Bilateral popliteal aneurysms  2.   Patient Active Problem List    Diagnosis Date Noted    Chronic venous insufficiency 11/14/2022    Atherosclerotic heart disease of native coronary artery with unstable angina pectoris 05/31/2022    Mixed hyperlipidemia 03/11/2022    Tobacco abuse 03/11/2022    Essential hypertension 02/01/2022    Status post insertion of drug eluting coronary artery stent 02/01/2022    History of myocardial infarction 02/01/2022       Post-op Diagnosis: Same     Procedures:   1. US guided L CFA percutaneous access  2. L femoral angiogram  3. Aortogram  4. Moderate sedation  5. RLE angiogram  6. 5fr Mynx     Anesthesia: Local     EBL: Minimal     Findings:      Complications:  None; patient tolerated the procedure well.     Disposition: Recovery- hemodynamically stable in good condition     Attending Attestation: I was present and scrubbed for the entire procedure.  After an informed consent was obtained the patient was brought to the interventional suite and placed in the supine position. Both the patient and procedure were confirmed and identified during timeout process. The patient received perioperative antibiotics. The patient's bilateral groins were prepped and draped in usual sterile fashion. Using ultrasound guidance, the L common femoral artery vessel patency was confirmed. Then direct ultrasound guidance the L CFA was entered with a 21-G needle, Mandril wire and 4-Fr micropuncture needle. Then under fluoroscopic guidance, an 0.035-in wire was placed into the distal aorta. The micropuncture dilator was then exchanged over the wire for a 6-Slovenian sheath. Sheathogram demonstrated access in the CFA. Next a VCF-catheter was placed over the wire and into the distal aorta under fluoroscopy and an aortogram with R leg angiogram was performed which demonstrated the above findings.    Based on the  images from this diagnostic angio, a decision was made not to intervene.    We had systemically heparinized the patient with 8000u of heparin.  Heparin was reversed with protamine. We then deployed 5fr Mynx closure device without issue. At the conclusion of the case the patient was noted to have no evidence of a groin hematoma. All instrument and sponge counts were correct at the end of the case. The patient tolerated the procedure well and was transferred to the pacu for further recovery.      MODERATE SEDATION   I was present for moderate sedation and monitored the patients cardio respiratory functions during the procedure. See nurses notes for Intra-service start and end times and for the log of the medications, doseage and route.     Time of sedation:  30 mins.

## 2023-01-26 NOTE — Clinical Note
Bilateral: Groin.   Scrubbed with Chlorhexidine/Alcohol.    Hair: Clipped.  Skin prep dry before draping.  Prepped by: Rosalva Reza, RT 1/26/2023 8:51 AM.

## 2023-01-26 NOTE — BRIEF OP NOTE
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    DISPOSITION: Home or Self Care    FINAL DIAGNOSIS:  <principal problem not specified>    FOLLOWUP: for RLE bypass    DISCHARGE INSTRUCTIONS:    Discharge Procedure Orders   Remove dressing in 48 hours     Activity as tolerated

## 2023-01-26 NOTE — DISCHARGE INSTRUCTIONS
Vascular Closure Device /post angiogram    -Re apply a clean, dry band aid and every day for five days or until a scab has formed at the site.  Change the band aid as needed  -Keep the site dry and clean.  -You may shower 24 hours after the procedure, but do not bathe or use a pool until the wound had completely closed.  -Gently clean your puncture site with soap and warm water.  -After showering , gently pat-dry the site with a clean towel; then let the site air dry before covering with a band aid  -Limit tight fitting clothes or underwear that my irritate the puncture site until the site has healed.    Daily Activities    Do not drive, drink alcohol, or sign legal documents for 24 hours, or if taking narcotic pain medication.    Day of discharge  -No driving  Modify activity for 3-5 days  -No heavy lifting of anything over 5 pounds  (equivalent to a 1/2 gallon of milk)  -No pushing or pulling.  -No vigorous activity or straining  -Avoid stairs unless necessary : if necessary, take them slowly.  -Coughing, sneezing, or straining for a bowel movement:  Support your groin by pressing with your palm on top of the dressing/bandage.  -Sexual activity : check with your doctor  -No strenuous exercise.  -Avoid driving unless necessary.  Talk to your doctor about returning back to work, which depends on your type of work., your procedure and any medication you might be taking.       If site is bleeding  lay down and hold pressure  for 20 minutes . If stops bleeding , bedrest 4 hours. If does not stop bleeding call 911;

## 2023-01-26 NOTE — LETTER
January 26, 2023         [place] [address]  Phone: [phone]       Patient: Giorgi Eden   YOB: 1960  Date of Visit: 01/26/2023    To Whom It May Concern:    Claudia Eden  was at [facility] on 01/26/2023. The patient may return to work on Monday 1/30/23 with no restrictions. If you have any questions or concerns, or if I can be of further assistance, please do not hesitate to contact me.    Sincerely,    Adela Keita NP

## 2023-01-26 NOTE — INTERVAL H&P NOTE
The patient has been examined and the H&P has been reviewed:    I concur with the findings and changes have been noted since the H&P was written: Patient presenting for BLE angiogram.     Procedure risks, benefits and alternative options discussed and understood by patient/family.      Mallampati Scale Class II   ASA Classification Class III         There are no hospital problems to display for this patient.

## 2023-01-27 DIAGNOSIS — I10 ESSENTIAL HYPERTENSION: Primary | ICD-10-CM

## 2023-01-30 ENCOUNTER — PATIENT MESSAGE (OUTPATIENT)
Dept: VASCULAR SURGERY | Facility: CLINIC | Age: 63
End: 2023-01-30
Payer: COMMERCIAL

## 2023-01-31 ENCOUNTER — TELEPHONE (OUTPATIENT)
Dept: VASCULAR SURGERY | Facility: CLINIC | Age: 63
End: 2023-01-31
Payer: COMMERCIAL

## 2023-01-31 NOTE — TELEPHONE ENCOUNTER
Received message from pt about concerns he has about a procedure. Attempted to reach pt but was unable. SHEFALI

## 2023-02-01 DIAGNOSIS — I72.4 POPLITEAL ARTERY ANEURYSM: Primary | ICD-10-CM

## 2023-02-05 ENCOUNTER — PATIENT MESSAGE (OUTPATIENT)
Dept: SURGERY | Facility: HOSPITAL | Age: 63
End: 2023-02-05
Payer: COMMERCIAL

## 2023-02-06 ENCOUNTER — PATIENT MESSAGE (OUTPATIENT)
Dept: SURGERY | Facility: HOSPITAL | Age: 63
End: 2023-02-06
Payer: COMMERCIAL

## 2023-02-17 DIAGNOSIS — I72.4 POPLITEAL ARTERY ANEURYSM: Primary | ICD-10-CM

## 2023-02-19 ENCOUNTER — PATIENT MESSAGE (OUTPATIENT)
Dept: SURGERY | Facility: HOSPITAL | Age: 63
End: 2023-02-19
Payer: COMMERCIAL

## 2023-02-23 ENCOUNTER — TELEPHONE (OUTPATIENT)
Dept: VASCULAR SURGERY | Facility: CLINIC | Age: 63
End: 2023-02-23
Payer: COMMERCIAL

## 2023-02-23 DIAGNOSIS — I72.4 FEMORAL ARTERY ANEURYSM: Primary | ICD-10-CM

## 2023-02-23 NOTE — TELEPHONE ENCOUNTER
Spoke with Pt suggest for them to get in contact with insurance to see if the procedure was approved.    ----- Message from Diandra Villatoro sent at 2/23/2023  2:07 PM CST -----  Regarding: Patient Call Back  .Type: Patient Call Back    Who called: self     What is the request in detail: checking if ins has approved surg for April 3     Can the clinic reply by [facility]? Call     Would the patient rather a call back or a response via [facility]? Call     Best call back number: .[phone]

## 2023-02-27 ENCOUNTER — TELEPHONE (OUTPATIENT)
Dept: CARDIOLOGY | Facility: CLINIC | Age: 63
End: 2023-02-27
Payer: COMMERCIAL

## 2023-03-07 ENCOUNTER — TELEPHONE (OUTPATIENT)
Dept: VASCULAR SURGERY | Facility: CLINIC | Age: 63
End: 2023-03-07
Payer: COMMERCIAL

## 2023-03-07 NOTE — TELEPHONE ENCOUNTER
LVM instructing patient of surgery on 4/3/23 with an anticipated arrival time of 5 AM. Instructed on apt for lab and cardiac clearance. Message sent to Dr. Castorena asking to added surgery order to include pre-op clearance and designate individual to please call patient the day before procedure with arrival time and to remind of NPO status as I will be on vacation.

## 2023-03-08 ENCOUNTER — TELEPHONE (OUTPATIENT)
Dept: CARDIOLOGY | Facility: CLINIC | Age: 63
End: 2023-03-08
Payer: COMMERCIAL

## 2023-03-08 NOTE — LETTER
2023    Giorgi Eden   [address]  [place] [address]             [facility]  [address]  [place] [address]  Phone: [phone]  Fax: [phone] Patient: Giorgi Eden  : 1960  Referring Doctor: Romulo Gupta MD  Telephone:[phone]  Date of Last Office Visit: 2023  Consulting Provider: Ian Castorena MD  Procedure Date: 2023  Procedure: Fem Pop       Current Outpatient Medications   Medication Sig    aspirin (ECOTRIN) 81 MG EC tablet Take 1 tablet (81 mg total) by mouth once daily.    atenoloL (TENORMIN) 50 MG tablet Take 1 tablet (50 mg total) by mouth once daily.    atorvastatin (LIPITOR) 20 MG tablet Take 1 tablet (20 mg total) by mouth once daily.    famotidine (PEPCID) 20 MG tablet Take 1 tablet (20 mg total) by mouth 2 (two) times daily.    INV lisinopril 10 MG Tab Take 1 tablet (10 mg total) by mouth once daily.    tamsulosin (FLOMAX) 0.4 mg Cap Take 1 capsule (0.4 mg total) by mouth once daily.    ticagrelor (BRILINTA) 90 mg tablet Take 1 tablet (90 mg total) by mouth 2 (two) times daily.    tiZANidine (ZANAFLEX) 4 MG tablet Take 1 tablet (4 mg total) by mouth every 6 (six) hours as needed.     No current facility-administered medications for this visit.       This patient has been assessed for risk factors for clearance of surgery with the following stipulations: Moderate Risk     ___ No contraindications  __x_ Recommendations for antiplatelet/anticoagulant medications:Hold ASA and Brilinta for __0_ days prior to procedure.   _x__ Cleared for surgery with the following contraindications/precautions: moderate risk  ___ Not cleared for surgery due to the following reasons:      If you have any questions regarding the above, please contact my office at [phone]    Sincerely,

## 2023-03-08 NOTE — TELEPHONE ENCOUNTER
Patient scheduled with Dr. Castorena at [facility] for a Fem Pop on 04/03/2023

## 2023-03-17 ENCOUNTER — HOSPITAL ENCOUNTER (EMERGENCY)
Facility: HOSPITAL | Age: 63
Discharge: HOME | End: 2023-03-18
Attending: EMERGENCY MEDICINE
Payer: COMMERCIAL

## 2023-03-17 ENCOUNTER — PATIENT MESSAGE (OUTPATIENT)
Dept: FAMILY MEDICINE | Facility: CLINIC | Age: 63
End: 2023-03-17
Payer: COMMERCIAL

## 2023-03-17 DIAGNOSIS — R10.11 RUQ PAIN: ICD-10-CM

## 2023-03-17 DIAGNOSIS — K82.8 GALLBLADDER SLUDGE: Primary | ICD-10-CM

## 2023-03-17 LAB
ALBUMIN SERPL BCP-MCNC: 3.9 G/DL (ref 3.5–5.2)
ALP SERPL-CCNC: 84 U/L (ref 55–135)
ALT SERPL W/O P-5'-P-CCNC: 23 U/L (ref 10–44)
ANION GAP SERPL CALC-SCNC: 10 MMOL/L (ref 8–16)
AST SERPL-CCNC: 23 U/L (ref 10–40)
BASOPHILS # BLD AUTO: 0.05 K/UL (ref 0–0.2)
BASOPHILS NFR BLD: 0.5 % (ref 0–1.9)
BILIRUB SERPL-MCNC: 0.4 MG/DL (ref 0.1–1)
BILIRUB UR QL STRIP: NEGATIVE
BUN SERPL-MCNC: 24 MG/DL (ref 6–20)
CALCIUM SERPL-MCNC: 9.2 MG/DL (ref 8.7–10.5)
CHLORIDE SERPL-SCNC: 108 MMOL/L (ref 95–110)
CLARITY UR REFRACT.AUTO: CLEAR
CO2 SERPL-SCNC: 21 MMOL/L (ref 23–29)
COLOR UR AUTO: YELLOW
CREAT SERPL-MCNC: 1.1 MG/DL (ref 0.5–1.4)
DIFFERENTIAL METHOD: ABNORMAL
EOSINOPHIL # BLD AUTO: 0.2 K/UL (ref 0–0.5)
EOSINOPHIL NFR BLD: 1.6 % (ref 0–8)
ERYTHROCYTE [DISTWIDTH] IN BLOOD BY AUTOMATED COUNT: 14.6 % (ref 11.5–14.5)
EST. GFR  (NO RACE VARIABLE): >60 ML/MIN/1.73 M^2
GLUCOSE SERPL-MCNC: 124 MG/DL (ref 70–110)
GLUCOSE UR QL STRIP: NEGATIVE
HCT VFR BLD AUTO: 47.5 % (ref 40–54)
HCV AB SERPL QL IA: NORMAL
HGB BLD-MCNC: 16.2 G/DL (ref 14–18)
HGB UR QL STRIP: NEGATIVE
HIV 1+2 AB+HIV1 P24 AG SERPL QL IA: NORMAL
IMM GRANULOCYTES # BLD AUTO: 0.04 K/UL (ref 0–0.04)
IMM GRANULOCYTES NFR BLD AUTO: 0.4 % (ref 0–0.5)
KETONES UR QL STRIP: NEGATIVE
LEUKOCYTE ESTERASE UR QL STRIP: NEGATIVE
LIPASE SERPL-CCNC: 17 U/L (ref 4–60)
LYMPHOCYTES # BLD AUTO: 2.5 K/UL (ref 1–4.8)
LYMPHOCYTES NFR BLD: 26.1 % (ref 18–48)
MCH RBC QN AUTO: 28.3 PG (ref 27–31)
MCHC RBC AUTO-ENTMCNC: 34.1 G/DL (ref 32–36)
MCV RBC AUTO: 83 FL (ref 82–98)
MONOCYTES # BLD AUTO: 0.9 K/UL (ref 0.3–1)
MONOCYTES NFR BLD: 9.9 % (ref 4–15)
NEUTROPHILS # BLD AUTO: 5.8 K/UL (ref 1.8–7.7)
NEUTROPHILS NFR BLD: 61.5 % (ref 38–73)
NITRITE UR QL STRIP: NEGATIVE
NRBC BLD-RTO: 0 /100 WBC
PH UR STRIP: 5 [PH] (ref 5–8)
PLATELET # BLD AUTO: 250 K/UL (ref 150–450)
PMV BLD AUTO: 10 FL (ref 9.2–12.9)
POTASSIUM SERPL-SCNC: 4 MMOL/L (ref 3.5–5.1)
PROT SERPL-MCNC: 8.3 G/DL (ref 6–8.4)
PROT UR QL STRIP: NEGATIVE
RBC # BLD AUTO: 5.72 M/UL (ref 4.6–6.2)
SODIUM SERPL-SCNC: 139 MMOL/L (ref 136–145)
SP GR UR STRIP: 1.02 (ref 1–1.03)
URN SPEC COLLECT METH UR: NORMAL
WBC # BLD AUTO: 9.47 K/UL (ref 3.9–12.7)

## 2023-03-17 PROCEDURE — 87389 HIV-1 AG W/HIV-1&-2 AB AG IA: CPT | Performed by: PHYSICIAN ASSISTANT

## 2023-03-17 PROCEDURE — 96375 TX/PRO/DX INJ NEW DRUG ADDON: CPT

## 2023-03-17 PROCEDURE — 80053 COMPREHEN METABOLIC PANEL: CPT | Performed by: PHYSICIAN ASSISTANT

## 2023-03-17 PROCEDURE — 86803 HEPATITIS C AB TEST: CPT | Performed by: PHYSICIAN ASSISTANT

## 2023-03-17 PROCEDURE — 83690 ASSAY OF LIPASE: CPT | Performed by: PHYSICIAN ASSISTANT

## 2023-03-17 PROCEDURE — 85025 COMPLETE CBC W/AUTO DIFF WBC: CPT | Performed by: PHYSICIAN ASSISTANT

## 2023-03-17 PROCEDURE — 96374 THER/PROPH/DIAG INJ IV PUSH: CPT

## 2023-03-17 PROCEDURE — 96376 TX/PRO/DX INJ SAME DRUG ADON: CPT

## 2023-03-17 PROCEDURE — 63600175 PHARM REV CODE 636 W HCPCS: Performed by: PHYSICIAN ASSISTANT

## 2023-03-17 PROCEDURE — 99285 EMERGENCY DEPT VISIT HI MDM: CPT | Mod: ,,, | Performed by: EMERGENCY MEDICINE

## 2023-03-17 PROCEDURE — 99285 PR EMERGENCY DEPT VISIT,LEVEL V: ICD-10-PCS | Mod: ,,, | Performed by: EMERGENCY MEDICINE

## 2023-03-17 PROCEDURE — 99285 EMERGENCY DEPT VISIT HI MDM: CPT | Mod: 25

## 2023-03-17 PROCEDURE — 81003 URINALYSIS AUTO W/O SCOPE: CPT | Performed by: PHYSICIAN ASSISTANT

## 2023-03-17 RX ORDER — MORPHINE SULFATE 4 MG/ML
4 INJECTION, SOLUTION INTRAMUSCULAR; INTRAVENOUS
Status: COMPLETED | OUTPATIENT
Start: 2023-03-17 | End: 2023-03-17

## 2023-03-17 RX ORDER — NALOXONE HCL 0.4 MG/ML
0.4 VIAL (ML) INJECTION
Status: DISCONTINUED | OUTPATIENT
Start: 2023-03-18 | End: 2023-03-18 | Stop reason: HOSPADM

## 2023-03-17 RX ORDER — ONDANSETRON 2 MG/ML
4 INJECTION INTRAMUSCULAR; INTRAVENOUS
Status: COMPLETED | OUTPATIENT
Start: 2023-03-17 | End: 2023-03-17

## 2023-03-17 RX ORDER — LORAZEPAM 2 MG/ML
0.5 INJECTION INTRAMUSCULAR ONCE
Status: COMPLETED | OUTPATIENT
Start: 2023-03-17 | End: 2023-03-18

## 2023-03-17 RX ADMIN — MORPHINE SULFATE 4 MG: 4 INJECTION INTRAVENOUS at 07:03

## 2023-03-17 RX ADMIN — ONDANSETRON 4 MG: 2 INJECTION INTRAMUSCULAR; INTRAVENOUS at 07:03

## 2023-03-17 RX ADMIN — MORPHINE SULFATE 4 MG: 4 INJECTION INTRAVENOUS at 10:03

## 2023-03-18 VITALS
DIASTOLIC BLOOD PRESSURE: 59 MMHG | HEART RATE: 60 BPM | SYSTOLIC BLOOD PRESSURE: 100 MMHG | OXYGEN SATURATION: 98 % | TEMPERATURE: 98 F | RESPIRATION RATE: 16 BRPM

## 2023-03-18 PROCEDURE — 25500020 PHARM REV CODE 255: Performed by: EMERGENCY MEDICINE

## 2023-03-18 PROCEDURE — 63600175 PHARM REV CODE 636 W HCPCS: Performed by: PHYSICIAN ASSISTANT

## 2023-03-18 RX ORDER — ONDANSETRON 4 MG/1
4 TABLET, FILM COATED ORAL EVERY 8 HOURS PRN
Qty: 10 TABLET | Refills: 0 | Status: SHIPPED | OUTPATIENT
Start: 2023-03-18 | End: 2023-03-18 | Stop reason: SDUPTHER

## 2023-03-18 RX ORDER — ONDANSETRON 4 MG/1
4 TABLET, FILM COATED ORAL EVERY 8 HOURS PRN
Qty: 10 TABLET | Refills: 0 | Status: SHIPPED | OUTPATIENT
Start: 2023-03-18 | End: 2024-03-20

## 2023-03-18 RX ORDER — HYDROCODONE BITARTRATE AND ACETAMINOPHEN 5; 325 MG/1; MG/1
1 TABLET ORAL EVERY 6 HOURS PRN
Qty: 8 TABLET | Refills: 0 | Status: SHIPPED | OUTPATIENT
Start: 2023-03-18 | End: 2024-03-20

## 2023-03-18 RX ORDER — HYDROCODONE BITARTRATE AND ACETAMINOPHEN 5; 325 MG/1; MG/1
1 TABLET ORAL EVERY 6 HOURS PRN
Qty: 8 TABLET | Refills: 0 | Status: SHIPPED | OUTPATIENT
Start: 2023-03-18 | End: 2023-03-18 | Stop reason: SDUPTHER

## 2023-03-18 RX ADMIN — LORAZEPAM 0.5 MG: 2 INJECTION INTRAMUSCULAR; INTRAVENOUS at 12:03

## 2023-03-18 RX ADMIN — IOHEXOL 100 ML: 350 INJECTION, SOLUTION INTRAVENOUS at 01:03

## 2023-03-18 NOTE — CARE UPDATE
General Surgery  Care Update    CT scan was reviewed, no acute finding evident within the abdomen or pelvis. We discussed that his pain is probably biliary colic. I offered in patient cholecystectomy vs follow up in clinic.    Patient and wife will like to go home, he states that his pain is better. Things to look for were discussed. I also explained that this will probably happen again. They expressed understanding.     Dispo per ED. Please call surgery with any questions or concerns.     Nick Gee  General Surgery PGY IV

## 2023-03-18 NOTE — CONSULTS
[facility] - Emergency Dept  General Surgery  Consult Note    Inpatient consult to General Surgery  Consult performed by: Nick Gee MD  Consult ordered by: Lexis Webstre PA-C      Subjective:     Chief Complaint/Reason for Admission:   Abdominal pain    History of Present Illness:   62 y.o male with h/o CAD, smoker, HTN, popliteal aneurysm presents to the ED with 24 hours of RLQ and RUQ abdominal pain. Denies any associated symptoms, states that the pain got worse today and he decided to present to the ED. First time he has this type of pain. Pain is constant and is not related to food. Denies any fever, chills, vomiting, diarrhea, constipation, melena, chest pain, dyspnea, cough, palpitations, dizziness.     Was seen in the ED, labs were unremarkable, US of the abdomen showed gallbladder sludge, general surgery was consulted for evaluation.     No previous abdominal surgeries  Scheduled for surgery with Dr. Castorena on 4/03/23 for popliteal bypass     Patient Active Problem List   Diagnosis    Essential hypertension    Status post insertion of drug eluting coronary artery stent    History of myocardial infarction    Mixed hyperlipidemia    Tobacco abuse    Atherosclerotic heart disease of native coronary artery with unstable angina pectoris    Chronic venous insufficiency         No current facility-administered medications on file prior to encounter.     Current Outpatient Medications on File Prior to Encounter   Medication Sig    aspirin (ECOTRIN) 81 MG EC tablet Take 1 tablet (81 mg total) by mouth once daily.    atenoloL (TENORMIN) 50 MG tablet Take 1 tablet (50 mg total) by mouth once daily.    atorvastatin (LIPITOR) 20 MG tablet Take 1 tablet (20 mg total) by mouth once daily.    famotidine (PEPCID) 20 MG tablet Take 1 tablet (20 mg total) by mouth 2 (two) times daily.    INV lisinopril 10 MG Tab Take 1 tablet (10 mg total) by mouth once daily.    tamsulosin (FLOMAX) 0.4 mg Cap Take 1 capsule (0.4  mg total) by mouth once daily.    ticagrelor (BRILINTA) 90 mg tablet Take 1 tablet (90 mg total) by mouth 2 (two) times daily.    tiZANidine (ZANAFLEX) 4 MG tablet Take 1 tablet (4 mg total) by mouth every 6 (six) hours as needed.    [DISCONTINUED] atenoloL-chlorthalidone (TENORETIC) 50-25 mg Tab Take 1 tablet by mouth once daily.       Review of patient's allergies indicates:  No Known Allergies    Past Medical History:   Diagnosis Date    Hyperlipidemia     Hypertension     MI (myocardial infarction)     X2     Past Surgical History:   Procedure Laterality Date    AORTOGRAPHY WITH SERIALOGRAPHY Bilateral 2023    Procedure: AORTOGRAM, WITH BILATERAL LOWER EXTREMITY ANGIOGRAMS;  Surgeon: Ian Castorena MD;  Location: [facility] OR;  Service: Vascular;  Laterality: Bilateral;  RN PREOP 23    CORONARY ANGIOPLASTY WITH STENT PLACEMENT      x2    LEFT HEART CATHETERIZATION Left 2022    Procedure: Left heart cath;  Surgeon: Romulo Gupta MD;  Location: [facility] CATH/EP LAB;  Service: Cardiology;  Laterality: Left;     Family History       Problem Relation (Age of Onset)    COPD Mother    Heart disease Mother    Stroke Mother          Tobacco Use    Smoking status: Former     Packs/day: 1.00     Years: 25.00     Pack years: 25.00     Types: Cigarettes     Quit date: 2021     Years since quittin.6    Smokeless tobacco: Never    Tobacco comments:     I now use a vape pen at 5 mg per day   Substance and Sexual Activity    Alcohol use: Yes     Comment: socially    Drug use: Not Currently    Sexual activity: Yes     Partners: Female     Review of Systems   Constitutional:  Negative for activity change and appetite change.   Respiratory:  Negative for apnea and chest tightness.    Cardiovascular:  Negative for chest pain and leg swelling.   Gastrointestinal:  Positive for abdominal pain. Negative for abdominal distention.   Objective:     Vital Signs (Most Recent):  Temp: 97.9 °F (36.6 °C) (23  2158)  Pulse: (!) 57 (03/17/23 2158)  Resp: 16 (03/17/23 2158)  BP: 110/74 (03/17/23 2158)  SpO2: 99 % (03/17/23 2158)   Vital Signs (24h Range):  Temp:  [97.9 °F (36.6 °C)-98.2 °F (36.8 °C)] 97.9 °F (36.6 °C)  Pulse:  [57-80] 57  Resp:  [16-20] 16  SpO2:  [97 %-99 %] 99 %  BP: (110-177)/(74-97) 110/74        There is no height or weight on file to calculate BMI.    No intake or output data in the 24 hours ending 03/17/23 2247    Physical Exam  Constitutional:       Appearance: Normal appearance.   Cardiovascular:      Rate and Rhythm: Normal rate and regular rhythm.   Pulmonary:      Effort: Pulmonary effort is normal.   Abdominal:      General: Abdomen is flat. There is no distension.      Palpations: Abdomen is soft. There is no mass.      Tenderness: There is abdominal tenderness. There is no guarding or rebound.      Hernia: No hernia is present.      Comments: Tender in the flank.   No rebound   No guarding    Neurological:      Mental Status: He is alert.       Significant Labs:  All pertinent labs from the last 24 hours have been reviewed.    Significant Diagnostics:  I have reviewed all pertinent imaging results/findings within the past 24 hours.    Assessment/Plan:     62 y.o male with h/o CAD, smoker, HTN, popliteal aneurysm presents to the ED with 24 hours of RLQ and RUQ abdominal pain. No associated symptoms, labs WNL, US with biliary sludge, no cholecystitis. General surgery was consulted for evaluation.     - Patient does not have the typical symptoms for biliary colic. He only has abdominal pain and it has been present for 24 hours.   - Patient denies any previous episodes of biliary colic.   - US didn't show any signs of  cholecystitis, just biliary sludge.   - Recommend further imaging to evaluate abdominal pain  - History of kidney stones.   - No surgical intervention indicated at this time  - Please call surgery with any questions or concerns       Nick Gee MD  General Surgery  [facility] Emergency Dept

## 2023-03-18 NOTE — ED PROVIDER NOTES
Encounter Date: 3/17/2023       History     Chief Complaint   Patient presents with    Abdominal Pain     Abdominal pain x 2 days, RLQ. Cramping started a week ago, intermittent but has become worse. Reports still has gallbladder and appendix. Tender to palpation. Denies n/v/d, abdominal surgery or hernias.      7:22 PM  Patient is a 62-year-old male with a history of HTN, HLD, anxiety, CAD status post stent on Brilinta who presents to [facility] ED with right upper and middle quadrant pain.    Patient endorses right lower quadrant pain, but on exam he is pointing to his right upper and middle quadrants.  He states that his pain has been constant for 2 days.  Moving, laughing, palpation, and hitting bumps in the car makes his pain worse.  Nothing improves his pain.  He is not had nausea, vomiting, dysuria, hematuria, diarrhea, or constipation.  His last bowel movement was today and normal without melena or blood.  He has chronic urinary frequency but attributes this to increased hydration.  He denies any penile pain, testicular pain, or testicular swelling.  He denies pain radiating into his testes.  He states yesterday his pain was intermittent, but today it has been constant.  He has a history of kidney stones and last was in 2015 but states that this does not feel similar.  He denies history of abdominal surgeries.      Review of patient's allergies indicates:  No Known Allergies  Past Medical History:   Diagnosis Date    Hyperlipidemia     Hypertension     MI (myocardial infarction)     X2     Past Surgical History:   Procedure Laterality Date    AORTOGRAPHY WITH SERIALOGRAPHY Bilateral 1/26/2023    Procedure: AORTOGRAM, WITH BILATERAL LOWER EXTREMITY ANGIOGRAMS;  Surgeon: Ian Castorena MD;  Location: [facility] OR;  Service: Vascular;  Laterality: Bilateral;  RN PREOP 1/24/23    CORONARY ANGIOPLASTY WITH STENT PLACEMENT      x2    LEFT HEART CATHETERIZATION Left 5/31/2022    Procedure: Left heart cath;  Surgeon:  Romulo Gupta MD;  Location: [facility] CATH/EP LAB;  Service: Cardiology;  Laterality: Left;     Family History   Problem Relation Age of Onset    COPD Mother     Heart disease Mother     Stroke Mother      Social History     Tobacco Use    Smoking status: Former     Packs/day: 1.00     Years: 25.00     Pack years: 25.00     Types: Cigarettes     Quit date: 2021     Years since quittin.6    Smokeless tobacco: Never    Tobacco comments:     I now use a vape pen at 5 mg per day   Substance Use Topics    Alcohol use: Yes     Comment: socially    Drug use: Not Currently     Review of Systems   Constitutional:  Negative for appetite change, chills and fever.   HENT:  Negative for sore throat.    Respiratory:  Negative for shortness of breath.    Cardiovascular:  Negative for chest pain.   Gastrointestinal:  Positive for abdominal pain. Negative for constipation, diarrhea, nausea and vomiting.   Genitourinary:  Positive for frequency. Negative for dysuria, hematuria, penile discharge, penile pain, penile swelling, scrotal swelling and testicular pain.   Musculoskeletal:  Negative for back pain.   Skin:  Negative for rash.   Neurological:  Negative for weakness.   Hematological:  Does not bruise/bleed easily.     Physical Exam     Initial Vitals [23 1819]   BP Pulse Resp Temp SpO2   (!) 177/97 80 20 98.2 °F (36.8 °C) 97 %      MAP       --         Physical Exam    Vitals reviewed.  Constitutional: He appears well-developed and well-nourished. He is not diaphoretic. He is cooperative.  Non-toxic appearance. He does not have a sickly appearance. He does not appear ill. No distress. Face mask in place.   HENT:   Head: Normocephalic and atraumatic.   Nose: Nose normal.   Mouth/Throat: No trismus in the jaw.   Eyes: Conjunctivae and EOM are normal.   Neck:   Normal range of motion.  Pulmonary/Chest: No accessory muscle usage. No tachypnea. No respiratory distress.   Abdominal: Abdomen is soft. He exhibits no  distension. There is abdominal tenderness in the right upper quadrant. A hernia is present. Hernia confirmed positive in the ventral area (easily reducible). There is guarding and positive Wolfe's sign. There is no rebound and no tenderness at McBurney's point.   Musculoskeletal:         General: Normal range of motion.      Cervical back: Normal range of motion.     Neurological: He is alert. He has normal strength.   Skin: Skin is dry. No pallor.       ED Course   Procedures  Labs Reviewed   CBC W/ AUTO DIFFERENTIAL - Abnormal; Notable for the following components:       Result Value    RDW 14.6 (*)     All other components within normal limits   COMPREHENSIVE METABOLIC PANEL - Abnormal; Notable for the following components:    CO2 21 (*)     Glucose 124 (*)     BUN 24 (*)     All other components within normal limits   HIV 1 / 2 ANTIBODY    Narrative:     Release to patient->Immediate   HEPATITIS C ANTIBODY    Narrative:     Release to patient->Immediate   LIPASE   URINALYSIS, REFLEX TO URINE CULTURE    Narrative:     Specimen Source->Urine          Imaging Results              CT Abdomen Pelvis With Contrast (Final result)  Result time 03/18/23 01:28:10      Final result by Robbie Shanks MD (03/18/23 01:28:10)                   Impression:      No acute finding evident within the abdomen or pelvis.    Atherosclerotic disease throughout the coronary arteries and abdominal aorta and iliac vessels.      Electronically signed by: Robbie Shanks  Date:    03/18/2023  Time:    01:28               Narrative:    EXAMINATION:  CT ABDOMEN PELVIS WITH CONTRAST    CLINICAL HISTORY:  Biliary obstruction suspected (Ped 0-18y);    TECHNIQUE:  Low dose axial images, sagittal and coronal reformations were obtained from the lung bases to the pubic symphysis following the IV administration of 100 mL of Omnipaque 350 .  Oral contrast was not administered.    COMPARISON:  None.    FINDINGS:  Abdomen:    - Lower thorax:There  is there are some three-vessel coronary artery calcifications.  Stents are suggested in the circumflex and LAD.    - Lung bases: No infiltrates and no nodules.    - Liver: No focal mass.    - Gallbladder: No calcified gallstones.    - Bile Ducts: No evidence of intra or extra hepatic biliary ductal dilation.    - Spleen: Negative.    - Kidneys: No mass or hydronephrosis.  Two left renal cyst.    - Adrenals: Unremarkable.    - Pancreas: No mass or peripancreatic fat stranding.    - Retroperitoneum:  No significant adenopathy.    - Vascular: No abdominal aortic aneurysm.    - Abdominal wall:  Fat containing inguinal hernias left greater than right.  No bowel involvement.  No inflammation..    Pelvis:    No pelvic mass, adenopathy, or free fluid.    Bowel/Mesentery:    No evidence of bowel obstruction or inflammation.  No features of appendicitis.    Bones:  No acute osseous abnormality and no suspicious lytic or blastic lesion.  Moderate spondylosis throughout the thoracolumbar spine with facet arthropathy and endplate sclerosis throughout.                                       US Abdomen Limited (Gallbladder) (Final result)  Result time 03/17/23 21:42:55      Final result by Isela Martínez MD (03/17/23 21:42:55)                   Impression:      Biliary sludge.  No secondary sonographic evidence to suggest acute cholecystitis at this time.  Further evaluation as warranted clinically.      Electronically signed by: Isela Martínez MD  Date:    03/17/2023  Time:    21:42               Narrative:    EXAMINATION:  US ABDOMEN LIMITED    CLINICAL HISTORY:  Right upper quadrant pain    TECHNIQUE:  Limited ultrasound of the right upper quadrant of the abdomen focused on the biliary system was performed.    COMPARISON:  None    FINDINGS:  Gallbladder: There is no sonographic evidence of discrete cholelithiasis.  There is biliary sludge within the gallbladder lumen.  There is no significant gallbladder wall thickening or  gallbladder wall hyperemia.  Sonographic Wolfe sign is reported to be negative by the ultrasound technologist noting the patient did received pain medication prior to the exam which can limit assessment.    Biliary system: The common duct is not dilated, measuring 3 mm.  No intrahepatic ductal dilatation.    Pancreas: The visualized portions of pancreas appear normal.    Miscellaneous: No upper abdominal ascites and no abnormalities of the visualized liver.  No evidence of right-sided hydronephrosis.                                       Medications   ondansetron injection 4 mg (4 mg Intravenous Given 3/17/23 1937)   morphine injection 4 mg (4 mg Intravenous Given 3/17/23 1937)   morphine injection 4 mg (4 mg Intravenous Given 3/17/23 2253)   LORazepam injection 0.5 mg (0.5 mg Intravenous Given 3/18/23 0057)   iohexoL (OMNIPAQUE 350) injection 100 mL (100 mLs Intravenous Given 3/18/23 0105)     Medical Decision Making:   History:   Old Medical Records: I decided to obtain old medical records.  Old Records Summarized: records from clinic visits and records from previous admission(s).  Initial Assessment:   Patient is a 62-year-old male with a history of HTN, HLD, anxiety, CAD status post stent on Brilinta who presents to [facility] ED with right upper and middle quadrant pain.  Differential Diagnosis:   Includes but is not limited to cholecystitis, cholelithiasis, choledocholithiasis, GERD, gastritis, peptic ulcer disease, constipation, colitis, diverticulitis, UTI. His pain is not in his RLQ. No TTP at mcburney's point. His tenderness is in his RUQ and RMQ. Less likely appendicitis, but will keep on differential.   Clinical Tests:   Lab Tests: Reviewed and Ordered  Radiological Study: Ordered and Reviewed  ED Management:  Will initiate work up, give IV pain medication for acute pain, obtain advance imaging, and continue to monitor.   Other:   I have discussed this case with another health care provider.           ED  Course as of 03/18/23 1641   Fri Mar 17, 2023   1907 BP(!): 177/97 [CL]   1907 Temp: 98.2 °F (36.8 °C) [CL]   1907 Pulse: 80 [CL]   1907 Resp: 20 [CL]   1907 SpO2: 97 % [CL]   1932 WBC: 9.47 [CL]   1932 Hemoglobin: 16.2 [CL]   1932 Platelets: 250 [CL]   2001 Sodium: 139 [CL]   2001 Potassium: 4.0 [CL]   2001 Glucose(!): 124 [CL]   2001 BUN(!): 24 [CL]   2001 Creatinine: 1.1 [CL]   2001 BILIRUBIN TOTAL: 0.4 [CL]   2001 AST: 23 [CL]   2001 ALT: 23 [CL]   2001 Leukocytes, UA: Negative [CL]   2002 NITRITE UA: Negative [CL]   2002 Occult Blood UA: Negative [CL]   2002 Lipase: 17 [CL]   2158 US Abdomen Limited (Gallbladder)  Biliary sludge.  No secondary sonographic evidence to suggest acute cholecystitis at this time.  Further evaluation as warranted clinically. [CL]   2230 Patient still with pain 7/10 in RUQ and RMQ. No TTP at mcBurneys. Given persistent pain and sludge, will discuss case with General Surgery.     Case discussed with General Surgery. They do not feel that this is consistent with biliary colic. We will proceed with CT scan. They will still evaluate and give recommendations.  [CL]   Sat Mar 18, 2023   0101 Patient will be signed out to remaining ED team pending CT scan. Refer to progress note.  [CL]   0134 CT Abdomen Pelvis With Contrast  No acute finding evident within the abdomen or pelvis. Atherosclerotic disease throughout the coronary arteries and abdominal aorta and iliac vessels. [CL]      ED Course User Index  [CL] Lexis Webster PA-C          UPDATE:  General Surgery has re evaluated patient. Refer to their consult note.   They do not feel that this is consistent with biliary colic. Patient had relief in pain while here. They were discharge in stable condition with return precautions for new or worsening symptoms.          I have reviewed patient's chart and discussed this case with my supervising MD.     Lexis Webster PA-C  Emergent Department  [facility] #[id number] or  #[phone]    Clinical Impression:   Final diagnoses:  [R10.11] RUQ pain  [K82.8] Gallbladder sludge (Primary)        ED Disposition Condition    Discharge Stable          ED Prescriptions       Medication Sig Dispense Start Date End Date Auth. Provider         MD Kaleigh Jimenez MD    HYDROcodone-acetaminophen (NORCO) 5-325 mg per tablet Take 1 tablet by mouth every 6 (six) hours as needed for Pain. 8 tablet 3/18/2023 -- Kaleigh Lepe MD    ondansetron (ZOFRAN) 4 MG tablet Take 1 tablet (4 mg total) by mouth every 8 (eight) hours as needed for Nausea. 10 tablet 3/18/2023 -- Kaleigh Lepe MD          Follow-up Information      Future Appointments   Date Time Provider Department Center   3/22/2023  2:00 PM PRE-ADMIT, [facility] S1 PADM [facility]   3/28/2023 12:40 PM Nitin Villareal MD [facility]   3/31/2023 10:00 AM LAB, [facility]   3/31/2023 11:20 AM [facility]        Follow up With Specialties Details Why Contact Info Additional Information    [facility] Surgery Schedule an appointment as soon as possible for a visit   [address] [facility] [address] [phone] Multispecialty Surgery [facility] - Main Building, 2nd Floor Please park in [facility] and use escalator or Clinic elevator               Lexis Webster PA-C  03/18/23 8239

## 2023-03-18 NOTE — DISCHARGE INSTRUCTIONS
You were seen emergency department for pain caused by gallbladder sludge.  Please follow-up with General surgery is indicated.  For nausea, you may take Zofran as prescribed.  For pain, you may take Tylenol or ibuprofen as indicated.  For severe pain you may take Norco as prescribed. Do not drink alcohol,drive, or operate machinery while taking Norco as it can make you drowsy. Do not take Tylenol while taking Norco as they contain the same ingredient (acetaminophen).   Return emergency department for worsening pain, uncontrolled vomiting, fever or chills, or other concerning symptoms.  
leg

## 2023-03-18 NOTE — ED TRIAGE NOTES
Giorgi Eden, a 62 y.o. male presents to the ED w/ complaint of R sided abdominal pain that began two days ago. Denies n/v.     Triage note:  Chief Complaint   Patient presents with    Abdominal Pain     Abdominal pain x 2 days, RLQ. Cramping started a week ago, intermittent but has become worse. Reports still has gallbladder and appendix. Tender to palpation. Denies n/v/d, abdominal surgery or hernias.      Review of patient's allergies indicates:  No Known Allergies  Past Medical History:   Diagnosis Date    Hyperlipidemia     Hypertension     MI (myocardial infarction)     X2

## 2023-03-19 ENCOUNTER — HOSPITAL ENCOUNTER (EMERGENCY)
Facility: HOSPITAL | Age: 63
Discharge: HOME | End: 2023-03-19
Attending: EMERGENCY MEDICINE
Payer: COMMERCIAL

## 2023-03-19 VITALS
BODY MASS INDEX: 28.79 KG/M2 | SYSTOLIC BLOOD PRESSURE: 121 MMHG | DIASTOLIC BLOOD PRESSURE: 75 MMHG | WEIGHT: 190 LBS | OXYGEN SATURATION: 99 % | HEART RATE: 60 BPM | TEMPERATURE: 99 F | RESPIRATION RATE: 18 BRPM | HEIGHT: 68 IN

## 2023-03-19 DIAGNOSIS — K80.50 RECURRENT BILIARY COLIC: Primary | ICD-10-CM

## 2023-03-19 DIAGNOSIS — R10.9 ABDOMINAL PAIN: ICD-10-CM

## 2023-03-19 LAB
ALBUMIN SERPL BCP-MCNC: 3.6 G/DL (ref 3.5–5.2)
ALP SERPL-CCNC: 85 U/L (ref 55–135)
ALT SERPL W/O P-5'-P-CCNC: 21 U/L (ref 10–44)
ANION GAP SERPL CALC-SCNC: 6 MMOL/L (ref 8–16)
AST SERPL-CCNC: 17 U/L (ref 10–40)
BASOPHILS # BLD AUTO: 0.03 K/UL (ref 0–0.2)
BASOPHILS NFR BLD: 0.4 % (ref 0–1.9)
BILIRUB SERPL-MCNC: 1.1 MG/DL (ref 0.1–1)
BUN SERPL-MCNC: 22 MG/DL (ref 6–20)
CALCIUM SERPL-MCNC: 8.9 MG/DL (ref 8.7–10.5)
CHLORIDE SERPL-SCNC: 107 MMOL/L (ref 95–110)
CO2 SERPL-SCNC: 24 MMOL/L (ref 23–29)
CREAT SERPL-MCNC: 0.9 MG/DL (ref 0.5–1.4)
DIFFERENTIAL METHOD: ABNORMAL
EOSINOPHIL # BLD AUTO: 0.2 K/UL (ref 0–0.5)
EOSINOPHIL NFR BLD: 1.9 % (ref 0–8)
ERYTHROCYTE [DISTWIDTH] IN BLOOD BY AUTOMATED COUNT: 14.4 % (ref 11.5–14.5)
EST. GFR  (NO RACE VARIABLE): >60 ML/MIN/1.73 M^2
GLUCOSE SERPL-MCNC: 98 MG/DL (ref 70–110)
HCT VFR BLD AUTO: 47.3 % (ref 40–54)
HGB BLD-MCNC: 15.7 G/DL (ref 14–18)
IMM GRANULOCYTES # BLD AUTO: 0.02 K/UL (ref 0–0.04)
IMM GRANULOCYTES NFR BLD AUTO: 0.2 % (ref 0–0.5)
LYMPHOCYTES # BLD AUTO: 1.8 K/UL (ref 1–4.8)
LYMPHOCYTES NFR BLD: 22 % (ref 18–48)
MCH RBC QN AUTO: 28.1 PG (ref 27–31)
MCHC RBC AUTO-ENTMCNC: 33.2 G/DL (ref 32–36)
MCV RBC AUTO: 85 FL (ref 82–98)
MONOCYTES # BLD AUTO: 1.1 K/UL (ref 0.3–1)
MONOCYTES NFR BLD: 13.1 % (ref 4–15)
NEUTROPHILS # BLD AUTO: 5.1 K/UL (ref 1.8–7.7)
NEUTROPHILS NFR BLD: 62.4 % (ref 38–73)
NRBC BLD-RTO: 0 /100 WBC
PLATELET # BLD AUTO: 229 K/UL (ref 150–450)
PMV BLD AUTO: 10.1 FL (ref 9.2–12.9)
POTASSIUM SERPL-SCNC: 4.3 MMOL/L (ref 3.5–5.1)
PROT SERPL-MCNC: 7.1 G/DL (ref 6–8.4)
RBC # BLD AUTO: 5.59 M/UL (ref 4.6–6.2)
SODIUM SERPL-SCNC: 137 MMOL/L (ref 136–145)
WBC # BLD AUTO: 8.09 K/UL (ref 3.9–12.7)

## 2023-03-19 PROCEDURE — 85025 COMPLETE CBC W/AUTO DIFF WBC: CPT | Performed by: EMERGENCY MEDICINE

## 2023-03-19 PROCEDURE — 80053 COMPREHEN METABOLIC PANEL: CPT | Performed by: EMERGENCY MEDICINE

## 2023-03-19 PROCEDURE — 99285 PR EMERGENCY DEPT VISIT,LEVEL V: ICD-10-PCS | Mod: ,,, | Performed by: EMERGENCY MEDICINE

## 2023-03-19 PROCEDURE — 25000003 PHARM REV CODE 250: Performed by: EMERGENCY MEDICINE

## 2023-03-19 PROCEDURE — 93010 EKG 12-LEAD: ICD-10-PCS | Mod: ,,, | Performed by: INTERNAL MEDICINE

## 2023-03-19 PROCEDURE — 93005 ELECTROCARDIOGRAM TRACING: CPT

## 2023-03-19 PROCEDURE — 93010 ELECTROCARDIOGRAM REPORT: CPT | Mod: ,,, | Performed by: INTERNAL MEDICINE

## 2023-03-19 PROCEDURE — 96374 THER/PROPH/DIAG INJ IV PUSH: CPT

## 2023-03-19 PROCEDURE — 99285 EMERGENCY DEPT VISIT HI MDM: CPT | Mod: ,,, | Performed by: EMERGENCY MEDICINE

## 2023-03-19 PROCEDURE — 96361 HYDRATE IV INFUSION ADD-ON: CPT

## 2023-03-19 PROCEDURE — 63600175 PHARM REV CODE 636 W HCPCS: Performed by: EMERGENCY MEDICINE

## 2023-03-19 PROCEDURE — 99284 EMERGENCY DEPT VISIT MOD MDM: CPT

## 2023-03-19 RX ORDER — MORPHINE SULFATE 2 MG/ML
6 INJECTION, SOLUTION INTRAMUSCULAR; INTRAVENOUS
Status: COMPLETED | OUTPATIENT
Start: 2023-03-19 | End: 2023-03-19

## 2023-03-19 RX ORDER — OXYCODONE HYDROCHLORIDE 5 MG/1
5 TABLET ORAL EVERY 4 HOURS PRN
Qty: 12 TABLET | Refills: 0 | Status: ON HOLD | OUTPATIENT
Start: 2023-03-19 | End: 2023-03-23 | Stop reason: HOSPADM

## 2023-03-19 RX ADMIN — SODIUM CHLORIDE 1000 ML: 9 INJECTION, SOLUTION INTRAVENOUS at 02:03

## 2023-03-19 RX ADMIN — MORPHINE SULFATE 6 MG: 2 INJECTION, SOLUTION INTRAMUSCULAR; INTRAVENOUS at 01:03

## 2023-03-19 NOTE — ASSESSMENT & PLAN NOTE
62M with recurrent biliary colic on brillinta for cardiac stenting 5/2022. Needs cholecystectomy, but needs to hold brillinta for 5 days.    - will schedule for elective lap salvatore on Friday, March 24  - discharge from ED with pain meds and note to stay out of work until at least Friday  - avoid fatty foods/meats  - plan discussed with patient and the ED

## 2023-03-19 NOTE — Clinical Note
"Giorgi Singer"Meek was seen and treated in our emergency department on 3/19/2023.  He may return to work on 03/27/2023.  Patient need sick leave for his schedule surgery on Friday      If you have any questions or concerns, please don't hesitate to call.      David Steele MD"

## 2023-03-19 NOTE — HPI
62M with PAD, CAD s/p MARTITA stent placement in 5/2022 on brilinta (last dose this morning) re-presents to the ED with intractable biliary colic. Was seen Friday in the ED, and declined surgery at that time because he wanted to tie up loose ends at work. Pain was getting better Saturday, but this morning insisted that his wife prepare eggs and turkey sausage, which worsened his pain. Imaging showed sludge without cholecystitis on Friday. Only labs repeated today, and LFTs / Tbili are wnl. His describes pain as cramping, and comes and goes.

## 2023-03-19 NOTE — H&P (VIEW-ONLY)
[facility] Dept  General Surgery  Consult Note    Patient Name: Giorgi Eden  MRN: [id number]  Code Status: Prior  Admission Date: 3/19/2023  Hospital Length of Stay: 0 days  Attending Physician: Poppy Amin MD  Primary Care Provider: Nitin Villareal MD    Patient information was obtained from patient, past medical records and ER records.     Inpatient consult to General surgery  Consult performed by: LIZABETH Martinez MD  Consult ordered by: David Steele MD  Reason for consult: biliary colic        Subjective:     Principal Problem: biliary colic, recurrent    History of Present Illness: 62M with PAD, CAD s/p MARTITA stent placement in 5/2022 on brilinta (last dose this morning) re-presents to the ED with intractable biliary colic. Was seen Friday in the ED, and declined surgery at that time because he wanted to tie up loose ends at work. Pain was getting better Saturday, but this morning insisted that his wife prepare eggs and turkey sausage, which worsened his pain. Imaging showed sludge without cholecystitis on Friday. Only labs repeated today, and LFTs wnl / Tbili is 1.1. His describes pain as cramping, and comes and goes. No fevers. No constant pain.          No current facility-administered medications on file prior to encounter.     Current Outpatient Medications on File Prior to Encounter   Medication Sig    aspirin (ECOTRIN) 81 MG EC tablet Take 1 tablet (81 mg total) by mouth once daily.    atenoloL (TENORMIN) 50 MG tablet Take 1 tablet (50 mg total) by mouth once daily.    atorvastatin (LIPITOR) 20 MG tablet Take 1 tablet (20 mg total) by mouth once daily.    famotidine (PEPCID) 20 MG tablet Take 1 tablet (20 mg total) by mouth 2 (two) times daily.    HYDROcodone-acetaminophen (NORCO) 5-325 mg per tablet Take 1 tablet by mouth every 6 (six) hours as needed for Pain.    INV lisinopril 10 MG Tab Take 1 tablet (10 mg total) by mouth once daily.    ondansetron (ZOFRAN) 4 MG tablet Take 1 tablet (4 mg  total) by mouth every 8 (eight) hours as needed for Nausea.    tamsulosin (FLOMAX) 0.4 mg Cap Take 1 capsule (0.4 mg total) by mouth once daily.    ticagrelor (BRILINTA) 90 mg tablet Take 1 tablet (90 mg total) by mouth 2 (two) times daily.    tiZANidine (ZANAFLEX) 4 MG tablet Take 1 tablet (4 mg total) by mouth every 6 (six) hours as needed.    [DISCONTINUED] atenoloL-chlorthalidone (TENORETIC) 50-25 mg Tab Take 1 tablet by mouth once daily.       Review of patient's allergies indicates:  No Known Allergies    Past Medical History:   Diagnosis Date    Hyperlipidemia     Hypertension     MI (myocardial infarction)     X2     Past Surgical History:   Procedure Laterality Date    AORTOGRAPHY WITH SERIALOGRAPHY Bilateral 2023    Procedure: AORTOGRAM, WITH BILATERAL LOWER EXTREMITY ANGIOGRAMS;  Surgeon: Ian Castorena MD;  Location: [facility] OR;  Service: Vascular;  Laterality: Bilateral;  RN PREOP 23    CORONARY ANGIOPLASTY WITH STENT PLACEMENT      x2    LEFT HEART CATHETERIZATION Left 2022    Procedure: Left heart cath;  Surgeon: Romulo Gupta MD;  Location: [facility] CATH[facility]EP LAB;  Service: Cardiology;  Laterality: Left;     Family History       Problem Relation (Age of Onset)    COPD Mother    Heart disease Mother    Stroke Mother          Tobacco Use    Smoking status: Former     Packs/day: 1.00     Years: 25.00     Pack years: 25.00     Types: Cigarettes     Quit date: 2021     Years since quittin.6    Smokeless tobacco: Never    Tobacco comments:     I now use a vape pen at 5 mg per day   Substance and Sexual Activity    Alcohol use: Yes     Comment: socially    Drug use: Not Currently    Sexual activity: Yes     Partners: Female     Review of Systems   Constitutional: Negative.  Negative for activity change, chills and fever.   HENT: Negative.     Eyes: Negative.    Respiratory: Negative.  Negative for cough and shortness of breath.    Cardiovascular: Negative.  Negative for chest pain  and palpitations.   Gastrointestinal:  Positive for abdominal pain and nausea. Negative for abdominal distention, constipation, diarrhea and vomiting.   Endocrine: Negative.    Genitourinary: Negative.  Negative for dysuria.   Musculoskeletal: Negative.    Skin: Negative.    Allergic/Immunologic: Negative.    Neurological: Negative.    Hematological: Negative.    Psychiatric/Behavioral: Negative.     Objective:     Vital Signs (Most Recent):  Temp: 98.3 °F (36.8 °C) (03/19/23 1154)  Pulse: (!) 54 (03/19/23 1430)  Resp: 14 (03/19/23 1430)  BP: (!) 105/55 (03/19/23 1430)  SpO2: 96 % (03/19/23 1430)   Vital Signs (24h Range):  Temp:  [98.3 °F (36.8 °C)] 98.3 °F (36.8 °C)  Pulse:  [54-65] 54  Resp:  [14-20] 14  SpO2:  [95 %-98 %] 96 %  BP: (102-158)/() 105/55     Weight: 86.2 kg (190 lb)  Body mass index is 28.89 kg/m².    Physical Exam  Constitutional:       Appearance: Normal appearance.   HENT:      Head: Normocephalic.   Cardiovascular:      Rate and Rhythm: Normal rate.   Pulmonary:      Effort: Pulmonary effort is normal. No respiratory distress.   Abdominal:      General: Abdomen is flat. There is no distension.      Tenderness: There is abdominal tenderness.      Hernia: No hernia is present.      Comments: Mild RUQ tenderness   Skin:     General: Skin is warm and dry.      Capillary Refill: Capillary refill takes less than 2 seconds.   Neurological:      General: No focal deficit present.      Mental Status: He is alert.   Psychiatric:         Mood and Affect: Mood normal.       Significant Labs:  I have reviewed all pertinent lab results within the past 24 hours.  CBC:   Recent Labs   Lab 03/19/23  1250   WBC 8.09   RBC 5.59   HGB 15.7   HCT 47.3      MCV 85   MCH 28.1   MCHC 33.2     CMP:   Recent Labs   Lab 03/19/23  1250   GLU 98   CALCIUM 8.9   ALBUMIN 3.6   PROT 7.1      K 4.3   CO2 24      BUN 22*   CREATININE 0.9   ALKPHOS 85   ALT 21   AST 17   BILITOT 1.1*       Significant  Diagnostics:  I have reviewed all pertinent imaging results/findings within the past 24 hours.      Assessment/Plan:     Recurrent biliary colic  62M with recurrent biliary colic on brillinta for cardiac stenting 5/2022. Needs cholecystectomy, but needs to hold brillinta for 5 days.    - will schedule for elective lap salvatore on Friday, March 24  - STOP brilinta, OK to continue aspirin  - discharge from ED with pain meds and note to stay out of work until at least Friday  - avoid fatty foods/meats  - plan discussed with patient and the ED      VTE Risk Mitigation (From admission, onward)      None            Thank you for your consult. I will sign off. Please contact us if you have any additional questions.    MAUREEN Martinez MD  General Surgery  [facility] Dept

## 2023-03-19 NOTE — ED PROVIDER NOTES
"Encounter Date: 3/19/2023       History     Chief Complaint   Patient presents with    Abdominal Pain     Dx'd w/ gallbladder sludge on Friday, 3/17/23. Was to be admitted for surgery but decline at time. Pt endorsing 9/10 RUQ pain stating "I can't take it anymore, so I'm back."     HPI  62 year old male with history hypertension, hyperlipidemia, CAD status post stenting on Brilinta, presented to ED for right upper quadrant pain.  Patient reports that he was seen in the ED for the same pain, CT abdomen pelvis was done with no emergency process, ultrasound of his right upper quadrant shows biliary sludge, but no cholecystitis or choledocholithiasis, however, general surgery consult, recommended cholecystectomy for biliary colic pain.  Patient decided to have a elective surgery later, he was told if the pain comes back he can return anytime for the surgery. Patient states that his RUQ pain continues to worsen, refractory to the pain meds. Reports pain after meal, nausea but no vomiting, denies fever, or jaundice.   Review of patient's allergies indicates:  No Known Allergies  Past Medical History:   Diagnosis Date    Hyperlipidemia     Hypertension     MI (myocardial infarction)     X2     Past Surgical History:   Procedure Laterality Date    AORTOGRAPHY WITH SERIALOGRAPHY Bilateral 1/26/2023    Procedure: AORTOGRAM, WITH BILATERAL LOWER EXTREMITY ANGIOGRAMS;  Surgeon: Ian Castorena MD;  Location: [facility] OR;  Service: Vascular;  Laterality: Bilateral;  RN PREOP 1/24/23    CORONARY ANGIOPLASTY WITH STENT PLACEMENT      x2    LEFT HEART CATHETERIZATION Left 5/31/2022    Procedure: Left heart cath;  Surgeon: Romulo Gupta MD;  Location: [facility] CATH/EP LAB;  Service: Cardiology;  Laterality: Left;     Family History   Problem Relation Age of Onset    COPD Mother     Heart disease Mother     Stroke Mother      Social History     Tobacco Use    Smoking status: Former     Packs/day: 1.00     Years: 25.00     Pack " years: 25.00     Types: Cigarettes     Quit date: 2021     Years since quittin.6    Smokeless tobacco: Never    Tobacco comments:     I now use a vape pen at 5 mg per day   Substance Use Topics    Alcohol use: Yes     Comment: socially    Drug use: Not Currently     Review of Systems   Constitutional:  Negative for chills and fever.   Eyes:  Negative for pain and visual disturbance.   Cardiovascular:  Positive for chest pain. Negative for leg swelling.   Gastrointestinal:  Positive for abdominal pain (RUQ pain) and nausea. Negative for vomiting.   Genitourinary:  Negative for dysuria and flank pain.   Musculoskeletal:  Negative for back pain and neck pain.   Skin:  Negative for rash.   Neurological:  Positive for headaches. Negative for weakness.   Psychiatric/Behavioral:  Negative for confusion and decreased concentration.      Physical Exam     Initial Vitals [23 1154]   BP Pulse Resp Temp SpO2   (!) 158/102 65 18 98.3 °F (36.8 °C) 98 %      MAP       --         Physical Exam    Nursing note and vitals reviewed.  Constitutional: He appears well-developed. No distress.   HENT:   Head: Normocephalic and atraumatic.   Nose: Nose normal.   Eyes: Conjunctivae and EOM are normal. Pupils are equal, round, and reactive to light.   Neck: No JVD present.   Normal range of motion.  Cardiovascular:  Normal rate, regular rhythm and normal heart sounds.           Pulmonary/Chest: Breath sounds normal. No respiratory distress.   Abdominal: Abdomen is soft. He exhibits no distension. There is abdominal tenderness (RQU, positive McMurphy).   Musculoskeletal:         General: No tenderness or edema. Normal range of motion.      Cervical back: Normal range of motion.     Neurological: He is alert and oriented to person, place, and time. He has normal strength. No cranial nerve deficit.   Skin: Skin is warm and dry. Capillary refill takes less than 2 seconds.       ED Course   Procedures  Labs Reviewed   CBC W/ AUTO  DIFFERENTIAL - Abnormal; Notable for the following components:       Result Value    Mono # 1.1 (*)     All other components within normal limits   COMPREHENSIVE METABOLIC PANEL - Abnormal; Notable for the following components:    BUN 22 (*)     Total Bilirubin 1.1 (*)     Anion Gap 6 (*)     All other components within normal limits     EKG Readings: (Independently Interpreted)   Initial Reading: No STEMI. Previous EKG: Compared with most recent EKG Rhythm: Sinus Bradycardia. Heart Rate: 57. Ectopy: No Ectopy. Conduction: Normal. ST Segments: Normal ST Segments. T Waves: Normal. Axis: Normal. Clinical Impression: Sinus Bradycardia   ECG Results              EKG 12-lead (Final result)  Result time 03/20/23 08:54:31      Final result by Interface, Lab In [facility] (03/20/23 08:54:31)                   Narrative:    Test Reason : R10.9,    Vent. Rate : 057 BPM     Atrial Rate : 057 BPM     P-R Int : 182 ms          QRS Dur : 094 ms      QT Int : 382 ms       P-R-T Axes : 052 -06 074 degrees     QTc Int : 371 ms    Sinus bradycardia  Otherwise normal ECG  When compared with ECG of 21-DEC-2022 13:32,  No significant change was found  Confirmed by Andrew JEROME MD ([id number]) on 3/20/2023 8:54:25 AM    Referred By: [email or web address]   SELF           Confirmed By:Adnrew JEROME MD                                  Imaging Results    None          Medications   morphine injection 6 mg (6 mg Intravenous Given 3/19/23 1333)   sodium chloride 0.9% bolus 1,000 mL 1,000 mL (0 mLs Intravenous Stopped 3/19/23 1552)     Medical Decision Making:   History:   Old Medical Records: I decided to obtain old medical records.  Old Records Summarized: records from previous admission(s).       <> Summary of Records: General surgery recommendation on March 17    CT scan was reviewed, no acute finding evident within the abdomen or pelvis. We discussed that his pain is probably biliary colic. I offered in patient cholecystectomy vs follow up in clinic.   Patient and  wife will like to go home, he states that his pain is better. Things to look for were discussed. I also explained that this will probably happen again. They expressed understanding.    Dispo per ED. Please call surgery with any questions or concerns.      Nick Gee  General Surgery PGY IV   Initial Assessment:   62 year old male with history hypertension, hyperlipidemia, CAD status post stenting on Brilinta, presented to ED for right upper quadrant pain.  Patient is well-appearing, afebrile, no jaundice.  Physical exam significant for right upper quadrant tenderness to palpation.  No sign of peritonitis  Differential Diagnosis:   Biliary colic, choledocholithiasis,  MSK, doubt UTI, kidney stone, pneumonia, cholecystitis  Clinical Tests:   Lab Tests: Ordered and Reviewed  Radiological Study: Ordered and Reviewed  Medical Tests: Ordered and Reviewed          Attending Attestation:             Attending ED Notes:   Attending Note:  I have seen the patient, have repeated the key portions of the history and physical, reviewed and agree with the medical documentation, and supervised and managed the medical care of the patient. Additionally, I was present for the critical portion of any procedure(s) performed.      62 M hx above here for recurrent biliary colic  + RUQ tenderness   Labs stable w/o leukocytosis. LFTs nl, bili 1.1  Pain controlled with morphine  Consulted general surgery, who has evaluated patient and has scheduled patient to lap chol on March 24, instructions to hold brilinta, continue asa.  Return precautions given.    KASHMIR Amin MD  Staff ED Physician  03/20/2023 9:09 AM          ED Course as of 03/20/23 0907   [place] Mar 19, 2023   1455 Comprehensive metabolic panel(!)  No electrolyte derangement, mildly elevated bilirubin level. [NC]   1455 CBC auto differential(!)  No leukocytosis, or anemia. [NC]   1455 Discussed the case with General surgery, they will evaluate patient at bedside, pending  recommendation. [NC]   1723 Gen surg. Recommended holding brilinta for 5 days, they will scheduled cholecystectomy on March 24. Can be discharge from their stand point [NC]   1724 On re-examination: pain improve, no nausea or vomiting. Stable to discharge home, prescribed pain medicine for 3 days. Provided discharge instruction and return to the ED precaution. No other questions.  [NC]      ED Course User Index  [NC] David Steele MD                 Clinical Impression:   Final diagnoses:  [R10.9] Abdominal pain  [K80.50] Recurrent biliary colic (Primary)        ED Disposition Condition    Discharge Stable          ED Prescriptions       Medication Sig Dispense Start Date End Date Auth. Provider    oxyCODONE (ROXICODONE) 5 MG immediate release tablet Take 1 tablet (5 mg total) by mouth every 4 (four) hours as needed for Pain. 12 tablet 3/19/2023 3/22/2023 David Steele MD          Follow-up Information       Follow up With Specialties Details Why Contact Info    Nitin Villareal MD Family Medicine In 1 week  [address]  [place] [address]  [phone]      [facility] - Emergency Dept Emergency Medicine  As needed [address]  [facility] [id number] [phone]    [facility] GENERAL SURGERY General Surgery Schedule an appointment as soon as possible for a visit   [address]  [facility] [address]  [phone]             David Steele MD  Resident  03/19/23 1727       Poppy Amin MD  03/20/23 2958

## 2023-03-19 NOTE — DISCHARGE INSTRUCTIONS
Please follow-up with General surgery for your abdominal pain concern.  Follow-up with your primary care provider within 1 week for re-evaluation.  Return to the ED if you develop fever, intractable vomiting, or other concerns.

## 2023-03-19 NOTE — SUBJECTIVE & OBJECTIVE
No current facility-administered medications on file prior to encounter.     Current Outpatient Medications on File Prior to Encounter   Medication Sig    aspirin (ECOTRIN) 81 MG EC tablet Take 1 tablet (81 mg total) by mouth once daily.    atenoloL (TENORMIN) 50 MG tablet Take 1 tablet (50 mg total) by mouth once daily.    atorvastatin (LIPITOR) 20 MG tablet Take 1 tablet (20 mg total) by mouth once daily.    famotidine (PEPCID) 20 MG tablet Take 1 tablet (20 mg total) by mouth 2 (two) times daily.    HYDROcodone-acetaminophen (NORCO) 5-325 mg per tablet Take 1 tablet by mouth every 6 (six) hours as needed for Pain.    INV lisinopril 10 MG Tab Take 1 tablet (10 mg total) by mouth once daily.    ondansetron (ZOFRAN) 4 MG tablet Take 1 tablet (4 mg total) by mouth every 8 (eight) hours as needed for Nausea.    tamsulosin (FLOMAX) 0.4 mg Cap Take 1 capsule (0.4 mg total) by mouth once daily.    ticagrelor (BRILINTA) 90 mg tablet Take 1 tablet (90 mg total) by mouth 2 (two) times daily.    tiZANidine (ZANAFLEX) 4 MG tablet Take 1 tablet (4 mg total) by mouth every 6 (six) hours as needed.    [DISCONTINUED] atenoloL-chlorthalidone (TENORETIC) 50-25 mg Tab Take 1 tablet by mouth once daily.       Review of patient's allergies indicates:  No Known Allergies    Past Medical History:   Diagnosis Date    Hyperlipidemia     Hypertension     MI (myocardial infarction)     X2     Past Surgical History:   Procedure Laterality Date    AORTOGRAPHY WITH SERIALOGRAPHY Bilateral 1/26/2023    Procedure: AORTOGRAM, WITH BILATERAL LOWER EXTREMITY ANGIOGRAMS;  Surgeon: Ian Castorena MD;  Location: [facility] OR;  Service: Vascular;  Laterality: Bilateral;  RN PREOP 1/24/23    CORONARY ANGIOPLASTY WITH STENT PLACEMENT      x2    LEFT HEART CATHETERIZATION Left 5/31/2022    Procedure: Left heart cath;  Surgeon: Romulo Gupta MD;  Location: [facility] CATH/EP LAB;  Service: Cardiology;  Laterality: Left;     Family History       Problem  Relation (Age of Onset)    COPD Mother    Heart disease Mother    Stroke Mother          Tobacco Use    Smoking status: Former     Packs/day: 1.00     Years: 25.00     Pack years: 25.00     Types: Cigarettes     Quit date: 2021     Years since quittin.6    Smokeless tobacco: Never    Tobacco comments:     I now use a vape pen at 5 mg per day   Substance and Sexual Activity    Alcohol use: Yes     Comment: socially    Drug use: Not Currently    Sexual activity: Yes     Partners: Female     Review of Systems   Constitutional: Negative.  Negative for activity change, chills and fever.   HENT: Negative.     Eyes: Negative.    Respiratory: Negative.  Negative for cough and shortness of breath.    Cardiovascular: Negative.  Negative for chest pain and palpitations.   Gastrointestinal:  Positive for abdominal pain and nausea. Negative for abdominal distention, constipation, diarrhea and vomiting.   Endocrine: Negative.    Genitourinary: Negative.  Negative for dysuria.   Musculoskeletal: Negative.    Skin: Negative.    Allergic/Immunologic: Negative.    Neurological: Negative.    Hematological: Negative.    Psychiatric/Behavioral: Negative.     Objective:     Vital Signs (Most Recent):  Temp: 98.3 °F (36.8 °C) (23 1154)  Pulse: (!) 54 (23 1430)  Resp: 14 (23 1430)  BP: (!) 105/55 (23 1430)  SpO2: 96 % (23 1430)   Vital Signs (24h Range):  Temp:  [98.3 °F (36.8 °C)] 98.3 °F (36.8 °C)  Pulse:  [54-65] 54  Resp:  [14-20] 14  SpO2:  [95 %-98 %] 96 %  BP: (102-158)/() 105/55     Weight: 86.2 kg (190 lb)  Body mass index is 28.89 kg/m².    Physical Exam  Constitutional:       Appearance: Normal appearance.   HENT:      Head: Normocephalic.   Cardiovascular:      Rate and Rhythm: Normal rate.   Pulmonary:      Effort: Pulmonary effort is normal. No respiratory distress.   Abdominal:      General: Abdomen is flat. There is no distension.      Tenderness: There is abdominal tenderness.       Hernia: No hernia is present.      Comments: Mild RUQ tenderness   Skin:     General: Skin is warm and dry.      Capillary Refill: Capillary refill takes less than 2 seconds.   Neurological:      General: No focal deficit present.      Mental Status: He is alert.   Psychiatric:         Mood and Affect: Mood normal.       Significant Labs:  I have reviewed all pertinent lab results within the past 24 hours.  CBC:   Recent Labs   Lab 03/19/23  1250   WBC 8.09   RBC 5.59   HGB 15.7   HCT 47.3      MCV 85   MCH 28.1   MCHC 33.2     CMP:   Recent Labs   Lab 03/19/23  1250   GLU 98   CALCIUM 8.9   ALBUMIN 3.6   PROT 7.1      K 4.3   CO2 24      BUN 22*   CREATININE 0.9   ALKPHOS 85   ALT 21   AST 17   BILITOT 1.1*       Significant Diagnostics:  I have reviewed all pertinent imaging results/findings within the past 24 hours.

## 2023-03-19 NOTE — ED TRIAGE NOTES
"Giorgi Eden, a 62 y.o. male presents to the ED w/ complaint of RUQ pain 9/10, Dcd Friday 3/17/23 for gallbladder sludge, surgery consulted and wanted to do surgery but declined, returned because of increasing pain.    Triage note:  Chief Complaint   Patient presents with    Abdominal Pain     Dx'd w/ gallbladder sludge on Friday, 3/17/23. Was to be admitted for surgery but decline at time. Pt endorsing 9/10 RUQ pain stating "I can't take it anymore, so I'm back."     Review of patient's allergies indicates:  No Known Allergies  Past Medical History:   Diagnosis Date    Hyperlipidemia     Hypertension     MI (myocardial infarction)     X2      "

## 2023-03-19 NOTE — CONSULTS
[facility] - Emergency Dept  General Surgery  Consult Note    Patient Name: Giorgi Eden  MRN: [id number]  Code Status: Prior  Admission Date: 3/19/2023  Hospital Length of Stay: 0 days  Attending Physician: Poppy Amin MD  Primary Care Provider: Nitin Villareal MD    Patient information was obtained from patient, past medical records and ER records.     Inpatient consult to General surgery  Consult performed by: LIZABETH Martinez MD  Consult ordered by: David Steele MD  Reason for consult: biliary colic        Subjective:     Principal Problem: biliary colic, recurrent    History of Present Illness: 62M with PAD, CAD s/p MARTITA stent placement in 5/2022 on brilinta (last dose this morning) re-presents to the ED with intractable biliary colic. Was seen Friday in the ED, and declined surgery at that time because he wanted to tie up loose ends at work. Pain was getting better Saturday, but this morning insisted that his wife prepare eggs and turkey sausage, which worsened his pain. Imaging showed sludge without cholecystitis on Friday. Only labs repeated today, and LFTs wnl / Tbili is 1.1. His describes pain as cramping, and comes and goes. No fevers. No constant pain.          No current facility-administered medications on file prior to encounter.     Current Outpatient Medications on File Prior to Encounter   Medication Sig    aspirin (ECOTRIN) 81 MG EC tablet Take 1 tablet (81 mg total) by mouth once daily.    atenoloL (TENORMIN) 50 MG tablet Take 1 tablet (50 mg total) by mouth once daily.    atorvastatin (LIPITOR) 20 MG tablet Take 1 tablet (20 mg total) by mouth once daily.    famotidine (PEPCID) 20 MG tablet Take 1 tablet (20 mg total) by mouth 2 (two) times daily.    HYDROcodone-acetaminophen (NORCO) 5-325 mg per tablet Take 1 tablet by mouth every 6 (six) hours as needed for Pain.    INV lisinopril 10 MG Tab Take 1 tablet (10 mg total) by mouth once daily.    ondansetron (ZOFRAN) 4 MG tablet Take 1 tablet (4 mg  total) by mouth every 8 (eight) hours as needed for Nausea.    tamsulosin (FLOMAX) 0.4 mg Cap Take 1 capsule (0.4 mg total) by mouth once daily.    ticagrelor (BRILINTA) 90 mg tablet Take 1 tablet (90 mg total) by mouth 2 (two) times daily.    tiZANidine (ZANAFLEX) 4 MG tablet Take 1 tablet (4 mg total) by mouth every 6 (six) hours as needed.    [DISCONTINUED] atenoloL-chlorthalidone (TENORETIC) 50-25 mg Tab Take 1 tablet by mouth once daily.       Review of patient's allergies indicates:  No Known Allergies    Past Medical History:   Diagnosis Date    Hyperlipidemia     Hypertension     MI (myocardial infarction)     X2     Past Surgical History:   Procedure Laterality Date    AORTOGRAPHY WITH SERIALOGRAPHY Bilateral 2023    Procedure: AORTOGRAM, WITH BILATERAL LOWER EXTREMITY ANGIOGRAMS;  Surgeon: Ian Castorena MD;  Location: [facility] OR;  Service: Vascular;  Laterality: Bilateral;  RN PREOP 23    CORONARY ANGIOPLASTY WITH STENT PLACEMENT      x2    LEFT HEART CATHETERIZATION Left 2022    Procedure: Left heart cath;  Surgeon: Romulo Gupta MD;  Location: [facility] CATH/EP LAB;  Service: Cardiology;  Laterality: Left;     Family History       Problem Relation (Age of Onset)    COPD Mother    Heart disease Mother    Stroke Mother          Tobacco Use    Smoking status: Former     Packs/day: 1.00     Years: 25.00     Pack years: 25.00     Types: Cigarettes     Quit date: 2021     Years since quittin.6    Smokeless tobacco: Never    Tobacco comments:     I now use a vape pen at 5 mg per day   Substance and Sexual Activity    Alcohol use: Yes     Comment: socially    Drug use: Not Currently    Sexual activity: Yes     Partners: Female     Review of Systems   Constitutional: Negative.  Negative for activity change, chills and fever.   HENT: Negative.     Eyes: Negative.    Respiratory: Negative.  Negative for cough and shortness of breath.    Cardiovascular: Negative.  Negative for chest pain  and palpitations.   Gastrointestinal:  Positive for abdominal pain and nausea. Negative for abdominal distention, constipation, diarrhea and vomiting.   Endocrine: Negative.    Genitourinary: Negative.  Negative for dysuria.   Musculoskeletal: Negative.    Skin: Negative.    Allergic/Immunologic: Negative.    Neurological: Negative.    Hematological: Negative.    Psychiatric/Behavioral: Negative.     Objective:     Vital Signs (Most Recent):  Temp: 98.3 °F (36.8 °C) (03/19/23 1154)  Pulse: (!) 54 (03/19/23 1430)  Resp: 14 (03/19/23 1430)  BP: (!) 105/55 (03/19/23 1430)  SpO2: 96 % (03/19/23 1430)   Vital Signs (24h Range):  Temp:  [98.3 °F (36.8 °C)] 98.3 °F (36.8 °C)  Pulse:  [54-65] 54  Resp:  [14-20] 14  SpO2:  [95 %-98 %] 96 %  BP: (102-158)/() 105/55     Weight: 86.2 kg (190 lb)  Body mass index is 28.89 kg/m².    Physical Exam  Constitutional:       Appearance: Normal appearance.   HENT:      Head: Normocephalic.   Cardiovascular:      Rate and Rhythm: Normal rate.   Pulmonary:      Effort: Pulmonary effort is normal. No respiratory distress.   Abdominal:      General: Abdomen is flat. There is no distension.      Tenderness: There is abdominal tenderness.      Hernia: No hernia is present.      Comments: Mild RUQ tenderness   Skin:     General: Skin is warm and dry.      Capillary Refill: Capillary refill takes less than 2 seconds.   Neurological:      General: No focal deficit present.      Mental Status: He is alert.   Psychiatric:         Mood and Affect: Mood normal.       Significant Labs:  I have reviewed all pertinent lab results within the past 24 hours.  CBC:   Recent Labs   Lab 03/19/23  1250   WBC 8.09   RBC 5.59   HGB 15.7   HCT 47.3      MCV 85   MCH 28.1   MCHC 33.2     CMP:   Recent Labs   Lab 03/19/23  1250   GLU 98   CALCIUM 8.9   ALBUMIN 3.6   PROT 7.1      K 4.3   CO2 24      BUN 22*   CREATININE 0.9   ALKPHOS 85   ALT 21   AST 17   BILITOT 1.1*       Significant  Diagnostics:  I have reviewed all pertinent imaging results/findings within the past 24 hours.      Assessment/Plan:     Recurrent biliary colic  62M with recurrent biliary colic on brillinta for cardiac stenting 5/2022. Needs cholecystectomy, but needs to hold brillinta for 5 days.    - will schedule for elective lap salvatore on Friday, March 24  - STOP brilinta, OK to continue aspirin  - discharge from ED with pain meds and note to stay out of work until at least Friday  - avoid fatty foods/meats  - plan discussed with patient and the ED      VTE Risk Mitigation (From admission, onward)      None            Thank you for your consult. I will sign off. Please contact us if you have any additional questions.    MAUREEN Martinez MD  General Surgery  [facility] - Emergency Dept

## 2023-03-20 ENCOUNTER — TELEPHONE (OUTPATIENT)
Dept: SURGERY | Facility: CLINIC | Age: 63
End: 2023-03-20
Payer: COMMERCIAL

## 2023-03-20 DIAGNOSIS — K80.50 RECURRENT BILIARY COLIC: Primary | ICD-10-CM

## 2023-03-20 NOTE — TELEPHONE ENCOUNTER
Spoke to patient regarding plan for cholecystectomy this week. Patient is on Brilinta twice daily. Last does was Sunday morning. Patient informed that Dr. Ellington would like to perform the procedure Thursday and he will need to continue to hold the Brilinta in order to do so. Patient verbalized understanding and would like to move forward with plan.

## 2023-03-22 ENCOUNTER — ANESTHESIA EVENT (OUTPATIENT)
Dept: SURGERY | Facility: HOSPITAL | Age: 63
DRG: 254 | End: 2023-03-22
Payer: COMMERCIAL

## 2023-03-22 ENCOUNTER — HOSPITAL ENCOUNTER (OUTPATIENT)
Dept: PREADMISSION TESTING | Facility: HOSPITAL | Age: 63
Discharge: HOME | End: 2023-03-22
Attending: FAMILY MEDICINE
Payer: COMMERCIAL

## 2023-03-22 ENCOUNTER — TELEPHONE (OUTPATIENT)
Dept: SURGERY | Facility: CLINIC | Age: 63
End: 2023-03-22
Payer: COMMERCIAL

## 2023-03-22 ENCOUNTER — ANESTHESIA EVENT (OUTPATIENT)
Dept: SURGERY | Facility: HOSPITAL | Age: 63
End: 2023-03-22
Payer: COMMERCIAL

## 2023-03-22 VITALS
HEART RATE: 71 BPM | DIASTOLIC BLOOD PRESSURE: 85 MMHG | BODY MASS INDEX: 28.89 KG/M2 | SYSTOLIC BLOOD PRESSURE: 132 MMHG | RESPIRATION RATE: 16 BRPM | HEIGHT: 68 IN | OXYGEN SATURATION: 98 % | WEIGHT: 190.63 LBS | TEMPERATURE: 98 F

## 2023-03-22 NOTE — TELEPHONE ENCOUNTER
Notified patient to arrive at the [facility] tomorrow 3/23/23 at 5:15am for surgery with Dr. Ellington.  Pre-Op instructions reinforced including no solid foods after 11pm tonight and he may water and clear liquids until 5am tomorrow morning.  He will also continue to hold his Brilinta.  He verbalized understanding and is agreeable to this plan of care.

## 2023-03-22 NOTE — ANESTHESIA PREPROCEDURE EVALUATION
03/22/2023  Giorgi Eden is a 62 y.o., male  with PAD, CAD s/p MARTITA stent placement in 5/2022 on brilinta     Patient reports claustrophobia - and would like to be sedated prior to having monitors and mask placed on him - he would prefer to place/hold the o2 mask himself..  He becomes very anxious with many people around him - placing monitors, blankets etc all at one time.    Patient Active Problem List   Diagnosis    Essential hypertension    Status post insertion of drug eluting coronary artery stent    History of myocardial infarction    Mixed hyperlipidemia    Tobacco abuse    Atherosclerotic heart disease of native coronary artery with unstable angina pectoris    Chronic venous insufficiency    Recurrent biliary colic - scheduled for lap salvatore 3/24/23     Past Surgical History:   Procedure Laterality Date    AORTOGRAPHY WITH SERIALOGRAPHY Bilateral 1/26/2023    Procedure: AORTOGRAM, WITH BILATERAL LOWER EXTREMITY ANGIOGRAMS;  Surgeon: Ian Castorena MD;  Location: [facility] OR;  Service: Vascular;  Laterality: Bilateral;  RN PREOP 1/24/23    CORONARY ANGIOPLASTY WITH STENT PLACEMENT      x2    LEFT HEART CATHETERIZATION Left 5/31/2022    Procedure: Left heart cath;  Surgeon: Romulo Gupta MD;  Location: [facility] CATH/EP LAB;  Service: Cardiology;  Laterality: Left;       2/2022    The left ventricle is normal in size with mild concentric hypertrophy and normal systolic function.   The estimated ejection fraction is 60%.   Normal left ventricular diastolic function.   Normal right ventricular size with normal right ventricular systolic function.      Pre-op Assessment    I have reviewed the Patient Summary Reports.     I have reviewed the Nursing Notes. I have reviewed the NPO Status.      Review of Systems      Physical Exam    Airway:  Mallampati: II   Mouth Opening: Normal  Tongue:  Normal    Chest/Lungs:  Normal Respiratory Rate    Heart:  Rhythm: Regular Rhythm        Anesthesia Plan  Type of Anesthesia, risks & benefits discussed:    Anesthesia Type: Gen ETT  Intra-op Monitoring Plan: Art Line and Standard ASA Monitors  Induction:  IV  Informed Consent: Informed consent signed with the Patient and all parties understand the risks and agree with anesthesia plan.  All questions answered.   ASA Score: 3    Ready For Surgery From Anesthesia Perspective.     .

## 2023-03-23 ENCOUNTER — HOSPITAL ENCOUNTER (OUTPATIENT)
Facility: HOSPITAL | Age: 63
Discharge: HOME | End: 2023-03-23
Attending: STUDENT IN AN ORGANIZED HEALTH CARE EDUCATION/TRAINING PROGRAM | Admitting: STUDENT IN AN ORGANIZED HEALTH CARE EDUCATION/TRAINING PROGRAM
Payer: COMMERCIAL

## 2023-03-23 ENCOUNTER — ANESTHESIA (OUTPATIENT)
Dept: SURGERY | Facility: HOSPITAL | Age: 63
End: 2023-03-23
Payer: COMMERCIAL

## 2023-03-23 ENCOUNTER — PATIENT MESSAGE (OUTPATIENT)
Dept: SURGERY | Facility: HOSPITAL | Age: 63
End: 2023-03-23
Payer: COMMERCIAL

## 2023-03-23 VITALS
HEART RATE: 66 BPM | OXYGEN SATURATION: 95 % | SYSTOLIC BLOOD PRESSURE: 130 MMHG | TEMPERATURE: 98 F | DIASTOLIC BLOOD PRESSURE: 83 MMHG | RESPIRATION RATE: 14 BRPM

## 2023-03-23 DIAGNOSIS — K80.50 RECURRENT BILIARY COLIC: Primary | ICD-10-CM

## 2023-03-23 PROCEDURE — 47562 LAPAROSCOPIC CHOLECYSTECTOMY: CPT | Mod: ,,, | Performed by: STUDENT IN AN ORGANIZED HEALTH CARE EDUCATION/TRAINING PROGRAM

## 2023-03-23 PROCEDURE — 36000708 HC OR TIME LEV III 1ST 15 MIN: Performed by: STUDENT IN AN ORGANIZED HEALTH CARE EDUCATION/TRAINING PROGRAM

## 2023-03-23 PROCEDURE — 25000003 PHARM REV CODE 250: Performed by: NURSE ANESTHETIST, CERTIFIED REGISTERED

## 2023-03-23 PROCEDURE — 71000015 HC POSTOP RECOV 1ST HR: Performed by: STUDENT IN AN ORGANIZED HEALTH CARE EDUCATION/TRAINING PROGRAM

## 2023-03-23 PROCEDURE — 88304 TISSUE EXAM BY PATHOLOGIST: CPT | Mod: 26,,, | Performed by: PATHOLOGY

## 2023-03-23 PROCEDURE — 71000044 HC DOSC ROUTINE RECOVERY FIRST HOUR: Performed by: STUDENT IN AN ORGANIZED HEALTH CARE EDUCATION/TRAINING PROGRAM

## 2023-03-23 PROCEDURE — 27201423 OPTIME MED/SURG SUP & DEVICES STERILE SUPPLY: Performed by: STUDENT IN AN ORGANIZED HEALTH CARE EDUCATION/TRAINING PROGRAM

## 2023-03-23 PROCEDURE — 47562 PR LAP,CHOLECYSTECTOMY: ICD-10-PCS | Mod: ,,, | Performed by: STUDENT IN AN ORGANIZED HEALTH CARE EDUCATION/TRAINING PROGRAM

## 2023-03-23 PROCEDURE — 88304 TISSUE EXAM BY PATHOLOGIST: CPT | Performed by: PATHOLOGY

## 2023-03-23 PROCEDURE — D9220A PRA ANESTHESIA: Mod: ANES,,, | Performed by: ANESTHESIOLOGY

## 2023-03-23 PROCEDURE — 88307 TISSUE EXAM BY PATHOLOGIST: CPT | Performed by: PATHOLOGY

## 2023-03-23 PROCEDURE — 25000003 PHARM REV CODE 250: Performed by: STUDENT IN AN ORGANIZED HEALTH CARE EDUCATION/TRAINING PROGRAM

## 2023-03-23 PROCEDURE — 63600175 PHARM REV CODE 636 W HCPCS: Performed by: ANESTHESIOLOGY

## 2023-03-23 PROCEDURE — 88304 PR  SURG PATH,LEVEL III: ICD-10-PCS | Mod: 26,,, | Performed by: PATHOLOGY

## 2023-03-23 PROCEDURE — 37000009 HC ANESTHESIA EA ADD 15 MINS: Performed by: STUDENT IN AN ORGANIZED HEALTH CARE EDUCATION/TRAINING PROGRAM

## 2023-03-23 PROCEDURE — 71000045 HC DOSC ROUTINE RECOVERY EA ADD'L HR: Performed by: STUDENT IN AN ORGANIZED HEALTH CARE EDUCATION/TRAINING PROGRAM

## 2023-03-23 PROCEDURE — 49321 PR LAP,DX SURGICAL ABD W/BIOPSY: ICD-10-PCS | Mod: 59,,, | Performed by: STUDENT IN AN ORGANIZED HEALTH CARE EDUCATION/TRAINING PROGRAM

## 2023-03-23 PROCEDURE — 37000008 HC ANESTHESIA 1ST 15 MINUTES: Performed by: STUDENT IN AN ORGANIZED HEALTH CARE EDUCATION/TRAINING PROGRAM

## 2023-03-23 PROCEDURE — D9220A PRA ANESTHESIA: ICD-10-PCS | Mod: ANES,,, | Performed by: ANESTHESIOLOGY

## 2023-03-23 PROCEDURE — 36000709 HC OR TIME LEV III EA ADD 15 MIN: Performed by: STUDENT IN AN ORGANIZED HEALTH CARE EDUCATION/TRAINING PROGRAM

## 2023-03-23 PROCEDURE — D9220A PRA ANESTHESIA: Mod: CRNA,,, | Performed by: NURSE ANESTHETIST, CERTIFIED REGISTERED

## 2023-03-23 PROCEDURE — 88307 TISSUE EXAM BY PATHOLOGIST: CPT | Mod: 26,,, | Performed by: PATHOLOGY

## 2023-03-23 PROCEDURE — 63600175 PHARM REV CODE 636 W HCPCS: Performed by: NURSE ANESTHETIST, CERTIFIED REGISTERED

## 2023-03-23 PROCEDURE — 88307 PR  SURG PATH,LEVEL V: ICD-10-PCS | Mod: 26,,, | Performed by: PATHOLOGY

## 2023-03-23 PROCEDURE — 49321 LAPAROSCOPY BIOPSY: CPT | Mod: 59,,, | Performed by: STUDENT IN AN ORGANIZED HEALTH CARE EDUCATION/TRAINING PROGRAM

## 2023-03-23 PROCEDURE — D9220A PRA ANESTHESIA: ICD-10-PCS | Mod: CRNA,,, | Performed by: NURSE ANESTHETIST, CERTIFIED REGISTERED

## 2023-03-23 RX ORDER — BUPIVACAINE HYDROCHLORIDE 5 MG/ML
INJECTION, SOLUTION EPIDURAL; INTRACAUDAL
Status: DISCONTINUED | OUTPATIENT
Start: 2023-03-23 | End: 2023-03-23 | Stop reason: HOSPADM

## 2023-03-23 RX ORDER — ACETAMINOPHEN 10 MG/ML
INJECTION, SOLUTION INTRAVENOUS
Status: DISCONTINUED | OUTPATIENT
Start: 2023-03-23 | End: 2023-03-23

## 2023-03-23 RX ORDER — POLYETHYLENE GLYCOL 3350 17 G/17G
17 POWDER, FOR SOLUTION ORAL DAILY
Refills: 0 | COMMUNITY
Start: 2023-03-23 | End: 2023-04-06

## 2023-03-23 RX ORDER — LIDOCAINE HYDROCHLORIDE 20 MG/ML
INJECTION, SOLUTION EPIDURAL; INFILTRATION; INTRACAUDAL; PERINEURAL
Status: DISCONTINUED | OUTPATIENT
Start: 2023-03-23 | End: 2023-03-23

## 2023-03-23 RX ORDER — KETOROLAC TROMETHAMINE 30 MG/ML
15 INJECTION, SOLUTION INTRAMUSCULAR; INTRAVENOUS ONCE
Status: COMPLETED | OUTPATIENT
Start: 2023-03-23 | End: 2023-03-23

## 2023-03-23 RX ORDER — MIDAZOLAM HYDROCHLORIDE 1 MG/ML
INJECTION, SOLUTION INTRAMUSCULAR; INTRAVENOUS
Status: DISCONTINUED | OUTPATIENT
Start: 2023-03-23 | End: 2023-03-23

## 2023-03-23 RX ORDER — ACETAMINOPHEN 500 MG
1000 TABLET ORAL EVERY 8 HOURS
Refills: 0 | COMMUNITY
Start: 2023-03-23 | End: 2023-03-31

## 2023-03-23 RX ORDER — ONDANSETRON 2 MG/ML
INJECTION INTRAMUSCULAR; INTRAVENOUS
Status: DISCONTINUED | OUTPATIENT
Start: 2023-03-23 | End: 2023-03-23

## 2023-03-23 RX ORDER — PROCHLORPERAZINE EDISYLATE 5 MG/ML
5 INJECTION INTRAMUSCULAR; INTRAVENOUS EVERY 30 MIN PRN
Status: DISCONTINUED | OUTPATIENT
Start: 2023-03-23 | End: 2023-03-23 | Stop reason: HOSPADM

## 2023-03-23 RX ORDER — KETAMINE HCL IN 0.9 % NACL 50 MG/5 ML
SYRINGE (ML) INTRAVENOUS
Status: DISCONTINUED | OUTPATIENT
Start: 2023-03-23 | End: 2023-03-23

## 2023-03-23 RX ORDER — VASOPRESSIN 20 [USP'U]/ML
INJECTION, SOLUTION INTRAMUSCULAR; SUBCUTANEOUS
Status: DISCONTINUED | OUTPATIENT
Start: 2023-03-23 | End: 2023-03-23

## 2023-03-23 RX ORDER — DEXAMETHASONE SODIUM PHOSPHATE 4 MG/ML
INJECTION, SOLUTION INTRA-ARTICULAR; INTRALESIONAL; INTRAMUSCULAR; INTRAVENOUS; SOFT TISSUE
Status: DISCONTINUED | OUTPATIENT
Start: 2023-03-23 | End: 2023-03-23

## 2023-03-23 RX ORDER — PHENYLEPHRINE HCL IN 0.9% NACL 1 MG/10 ML
SYRINGE (ML) INTRAVENOUS
Status: DISCONTINUED | OUTPATIENT
Start: 2023-03-23 | End: 2023-03-23

## 2023-03-23 RX ORDER — KETOROLAC TROMETHAMINE 30 MG/ML
INJECTION, SOLUTION INTRAMUSCULAR; INTRAVENOUS
Status: DISCONTINUED
Start: 2023-03-23 | End: 2023-03-23 | Stop reason: HOSPADM

## 2023-03-23 RX ORDER — DEXMEDETOMIDINE HYDROCHLORIDE 100 UG/ML
INJECTION, SOLUTION INTRAVENOUS
Status: DISCONTINUED | OUTPATIENT
Start: 2023-03-23 | End: 2023-03-23

## 2023-03-23 RX ORDER — SODIUM CHLORIDE 9 MG/ML
INJECTION, SOLUTION INTRAVENOUS CONTINUOUS
Status: DISCONTINUED | OUTPATIENT
Start: 2023-03-23 | End: 2023-03-23 | Stop reason: HOSPADM

## 2023-03-23 RX ORDER — OXYCODONE HYDROCHLORIDE 5 MG/1
5 TABLET ORAL EVERY 4 HOURS PRN
Qty: 15 TABLET | Refills: 0 | Status: SHIPPED | OUTPATIENT
Start: 2023-03-23 | End: 2024-03-20

## 2023-03-23 RX ORDER — KETOROLAC TROMETHAMINE 30 MG/ML
INJECTION, SOLUTION INTRAMUSCULAR; INTRAVENOUS
Status: DISCONTINUED | OUTPATIENT
Start: 2023-03-23 | End: 2023-03-23

## 2023-03-23 RX ORDER — CEFAZOLIN SODIUM 1 G/3ML
INJECTION, POWDER, FOR SOLUTION INTRAMUSCULAR; INTRAVENOUS
Status: DISCONTINUED | OUTPATIENT
Start: 2023-03-23 | End: 2023-03-23

## 2023-03-23 RX ORDER — HYDROMORPHONE HYDROCHLORIDE 1 MG/ML
0.2 INJECTION, SOLUTION INTRAMUSCULAR; INTRAVENOUS; SUBCUTANEOUS EVERY 5 MIN PRN
Status: DISCONTINUED | OUTPATIENT
Start: 2023-03-23 | End: 2023-03-23 | Stop reason: HOSPADM

## 2023-03-23 RX ORDER — ROCURONIUM BROMIDE 10 MG/ML
INJECTION, SOLUTION INTRAVENOUS
Status: DISCONTINUED | OUTPATIENT
Start: 2023-03-23 | End: 2023-03-23

## 2023-03-23 RX ORDER — FENTANYL CITRATE 50 UG/ML
INJECTION, SOLUTION INTRAMUSCULAR; INTRAVENOUS
Status: DISCONTINUED | OUTPATIENT
Start: 2023-03-23 | End: 2023-03-23

## 2023-03-23 RX ORDER — ONDANSETRON 2 MG/ML
4 INJECTION INTRAMUSCULAR; INTRAVENOUS ONCE AS NEEDED
Status: DISCONTINUED | OUTPATIENT
Start: 2023-03-23 | End: 2023-03-23 | Stop reason: HOSPADM

## 2023-03-23 RX ADMIN — SUGAMMADEX 200 MG: 100 INJECTION, SOLUTION INTRAVENOUS at 08:03

## 2023-03-23 RX ADMIN — DEXMEDETOMIDINE 8 MCG: 100 INJECTION, SOLUTION INTRAVENOUS at 07:03

## 2023-03-23 RX ADMIN — GLYCOPYRROLATE 0.2 MG: 0.2 INJECTION INTRAMUSCULAR; INTRAVENOUS at 07:03

## 2023-03-23 RX ADMIN — LIDOCAINE HYDROCHLORIDE 50 MG: 20 INJECTION, SOLUTION EPIDURAL; INFILTRATION; INTRACAUDAL; PERINEURAL at 07:03

## 2023-03-23 RX ADMIN — ONDANSETRON 4 MG: 2 INJECTION INTRAMUSCULAR; INTRAVENOUS at 08:03

## 2023-03-23 RX ADMIN — DEXAMETHASONE SODIUM PHOSPHATE 4 MG: 4 INJECTION INTRA-ARTICULAR; INTRALESIONAL; INTRAMUSCULAR; INTRAVENOUS; SOFT TISSUE at 07:03

## 2023-03-23 RX ADMIN — ROCURONIUM BROMIDE 50 MG: 10 INJECTION INTRAVENOUS at 07:03

## 2023-03-23 RX ADMIN — Medication 25 MG: at 07:03

## 2023-03-23 RX ADMIN — FENTANYL CITRATE 100 MCG: 50 INJECTION INTRAMUSCULAR; INTRAVENOUS at 07:03

## 2023-03-23 RX ADMIN — SODIUM CHLORIDE: 0.9 INJECTION, SOLUTION INTRAVENOUS at 07:03

## 2023-03-23 RX ADMIN — CEFAZOLIN 2 G: 330 INJECTION, POWDER, FOR SOLUTION INTRAMUSCULAR; INTRAVENOUS at 07:03

## 2023-03-23 RX ADMIN — KETOROLAC TROMETHAMINE 15 MG: 30 INJECTION, SOLUTION INTRAMUSCULAR; INTRAVENOUS at 09:03

## 2023-03-23 RX ADMIN — KETOROLAC TROMETHAMINE 15 MG: 30 INJECTION, SOLUTION INTRAMUSCULAR; INTRAVENOUS at 08:03

## 2023-03-23 RX ADMIN — VASOPRESSIN 0.2 UNITS: 20 INJECTION PARENTERAL at 08:03

## 2023-03-23 RX ADMIN — DEXMEDETOMIDINE 8 MCG: 100 INJECTION, SOLUTION INTRAVENOUS at 08:03

## 2023-03-23 RX ADMIN — ACETAMINOPHEN 1000 MG: 10 INJECTION INTRAVENOUS at 07:03

## 2023-03-23 RX ADMIN — GLYCOPYRROLATE 0.1 MG: 0.2 INJECTION INTRAMUSCULAR; INTRAVENOUS at 07:03

## 2023-03-23 RX ADMIN — SODIUM CHLORIDE, SODIUM GLUCONATE, SODIUM ACETATE, POTASSIUM CHLORIDE, MAGNESIUM CHLORIDE, SODIUM PHOSPHATE, DIBASIC, AND POTASSIUM PHOSPHATE: .53; .5; .37; .037; .03; .012; .00082 INJECTION, SOLUTION INTRAVENOUS at 07:03

## 2023-03-23 RX ADMIN — Medication 100 MCG: at 08:03

## 2023-03-23 RX ADMIN — SODIUM CHLORIDE: 0.9 INJECTION, SOLUTION INTRAVENOUS at 06:03

## 2023-03-23 RX ADMIN — MIDAZOLAM 2 MG: 1 INJECTION INTRAMUSCULAR; INTRAVENOUS at 07:03

## 2023-03-23 NOTE — TRANSFER OF CARE
Anesthesia Transfer of Care Note    Patient: Giorgi Eden    Procedure(s) Performed: Procedure(s) (LRB):  CHOLECYSTECTOMY, LAPAROSCOPIC (N/A)    Patient location: PACU    Anesthesia Type: general    Transport from OR: Transported from OR on 6-10 L/min O2 by face mask with adequate spontaneous ventilation    Post pain: adequate analgesia    Post assessment: no apparent anesthetic complications and tolerated procedure well    Post vital signs: stable    Level of consciousness: awake and alert    Nausea/Vomiting: no nausea/vomiting    Complications: none    Transfer of care protocol was followed      Last vitals:   Visit Vitals  BP (!) 131/95 (BP Location: Right arm, Patient Position: Lying)   Pulse 62   Temp 36.8 °C (98.3 °F)   Resp 19   SpO2 98%

## 2023-03-23 NOTE — ANESTHESIA PROCEDURE NOTES
Intubation    Date/Time: 3/23/2023 7:13 AM  Performed by: Alan Culp III, CRNA  Authorized by: Nargis Sims MD     Intubation:     Induction:  Intravenous    Mask Ventilation:  Easy with oral airway    Attempts:  1    Attempted By:  CRNA    Method of Intubation:  Direct    Blade:  Mariana 4    Laryngeal View Grade: Grade IIb - only the arytenoids and epiglottis seen      Difficult Airway Encountered?: No      Complications:  None    Airway Device:  Oral endotracheal tube    Airway Device Size:  7.0    Style/Cuff Inflation:  Cuffed (inflated to minimal occlusive pressure)    Inflation Amount (mL):  5    Tube secured:  24

## 2023-03-23 NOTE — BRIEF OP NOTE
[facility] - Surgery ([facility])  Brief Operative Note    Surgery Date: 3/23/2023     Surgeon(s) and Role:     * Ana María Ellington MD - Primary     * Дмитрий Burch MD - Resident - Assisting    Pre-op Diagnosis:  Recurrent biliary colic [K80.50]    Post-op Diagnosis:  Post-Op Diagnosis Codes:     * Recurrent biliary colic [K80.50]  Omental Mass    Procedure(s) (LRB):  CHOLECYSTECTOMY, LAPAROSCOPIC (N/A)    Anesthesia: General    Operative Findings:   -Gallbladder without significant active inflammation  -Critical view obtained prior to clipping duct and artery  -Omental mass overlying the transverse colon. Excisional biopsy of this performed with hot scissors.    Estimated Blood Loss: Minimal         Specimens:   Specimen (24h ago, onward)       Start     Ordered    03/23/23 0815  Specimen to Pathology, Surgery General Surgery  Once        Comments: Pre-op Diagnosis: Recurrent biliary colic [K80.50]Procedure(s):CHOLECYSTECTOMY, LAPAROSCOPIC Number of specimens: 2Name of specimens: 1. GALLBLADDER2. OMENTUM BIOPSY     References:    Click here for ordering Quick Tip   Question Answer Comment   Procedure Type: General Surgery    Specimen Class: Routine/Screening    Which provider would you like to cc? ANA MARÍA ELLINGTON    Release to patient Immediate        03/23/23 0826    03/23/23 0624  Specimen to Pathology, Surgery General Surgery  Once        Comments: Pre-op Diagnosis: Recurrent biliary colic [K80.50]Procedure(s):CHOLECYSTECTOMY, LAPAROSCOPIC Number of specimens: 1Name of specimens: 1. Gallbladder     References:    Click here for ordering Quick Tip   Question Answer Comment   Procedure Type: General Surgery    Specimen Class: Routine/Screening    Which provider would you like to cc? ANA MARÍA ELLINGTON    Release to patient Immediate        03/23/23 0623                      Discharge Note    OUTCOME: Patient tolerated treatment/procedure well without complication and is now ready for  discharge.    DISPOSITION: Home or Self Care    FINAL DIAGNOSIS:  Recurrent biliary colic    FOLLOWUP: In clinic    DISCHARGE INSTRUCTIONS:    Discharge Procedure Orders   Diet Adult Regular     Lifting restrictions   Order Comments: No lifting greater than 10 pounds for 6 weeks from day of surgery.  No pushing/pulling such as vacuuming or raking.  No straining, avoid constipation and take stool softeners as described and laxatives as needed.  No driving while on narcotics and until you can react quickly without pain.     Notify your health care provider if you experience any of the following:  persistent dizziness, light-headedness, or visual disturbances     Notify your health care provider if you experience any of the following:  worsening rash     Notify your health care provider if you experience any of the following:  severe persistent headache     Notify your health care provider if you experience any of the following:  difficulty breathing or increased cough     Notify your health care provider if you experience any of the following:  redness, tenderness, or signs of infection (pain, swelling, redness, odor or green/yellow discharge around incision site)     Notify your health care provider if you experience any of the following:  severe uncontrolled pain     Notify your health care provider if you experience any of the following:  persistent nausea and vomiting or diarrhea     Notify your health care provider if you experience any of the following:  temperature >100.4     No dressing needed     Дмитрий Burch MD  General Surgery PGY-3  03/23/2023

## 2023-03-23 NOTE — OP NOTE
[facility]  General Surgery  Operative Note    DATE OF PROCEDURE: 03/23/2023     PREOPERATIVE DIAGNOSIS:   Recurrent biliary colic    POSTOPERATIVE DIAGNOSIS:   Recurrent biliary colic   Omental mass    PROCEDURE PERFORMED:   Laparoscopic cholecystectomy  Laparoscopic omental biopsy    ATTENDING SURGEON: Ana María Ellington M.D.     HOUSESTAFF SURGEON: Дмитрий Burch M.D. (PGY-3)     ANESTHESIA: General endotracheal and local using 0.5% bupivacaine.     ESTIMATED BLOOD LOSS: Minimal      FINDINGS: Cholelithiasis and no significant inflammation. Critical view obtained; cystic duct and artery triply clipped and divided. Posterior branch of cystic artery triply clipped and divided as well. Omental mass near transverse colon noted as well; excised and sent to patho.     SPECIMEN: Gallbladder.     DRAINS: None.     COMPLICATIONS: None.     INDICATIONS: Giorgi Eden is a 62 y.o. man with a history of CAD s/p MARTITA on Brilinta and PAD who has presented to the ED several times with recurrent post-prandial RUQ abdominal pain. The history and exam were consistent with recurrent biliary colic, which was confirmed by laboratory studies and ultrasound. We recommended laparoscopic cholecystectomy and the patient agreed to proceed. Since he was on Brilinta, we had him hold it for 5 days and present for surgery in the outpatient setting.The patient signed informed consent and expressed understanding of the risks and benefits of surgery.     OPERATIVE PROCEDURE: The patient was identified in Preoperative Holding and brought back to the Operating Room. He was placed supine on the operating table and padded appropriately. Monitors were applied and there was smooth induction of general endotracheal anesthesia. The patient's abdomen was prepped and draped in the standard sterile surgical fashion. A time-out was performed and all team members present agreed this was the correct procedure on the correct patient. We  also confirmed administration of appropriate preoperative antibiotics.    An infraumbilical incision was made and we dissected down to fascia. The umbilical stalk was grasped with a penetrating towel clamp and a vertical incision was made using the scalpel.  The Ana clamp was used to directly enter the abdomen.  A figure of eight stitch was placed using a 0 Vicryl as a stay stitch. The 12mm Anne trocar was placed and the balloon inflated.  The abdomen was then insufflated with carbon dioxide to a maximum pressure of 15 mmHg. A 10-mm laparoscope was placed and the abdomen was examined. There was no evidence of injury from the initial trocar placement but we did notice and infarcted omental mass near the transverse colon. Three 5-mm trocars were placed under direct vision through separate stab incisions, two  in the right upper quadrant and another one the subxiphoid position. We directed our attention to the right upper quadrant. The gallbladder was identified and noted to have no significant inflammatory change. The fundus was grasped and retracted cranially and the infundibulum was grasped and retracted laterally. We bluntly dissected the peritoneal reflection off the infundibulum and neck of the gallbladder. With careful blunt dissection in this area, we were able to identify the cystic duct. Further careful dissection identified the cystic artery as well as a posterior branch of the artery and we did obtain a critical view of safety. The duct and arteries were triply clipped and divided. The gallbladder was dissected off the gallbladder fossa using Bovie electrocautery from infundibulum to fundus until free.  It was placed into an EndoCatch bag and removed from the umbilical port site with no difficulty. We replaced the trocar at the umbilicus and reexamined the right upper quadrant. The gallbladder fossa was examined and no further bleeding or any bile leak were noted. The clips on the cystic duct and  arteries were examined and no bleeding or bile leak were noted. The right upper quadrant was irrigated with saline briefly until the returning effluent was clear. Attention was turned to where we saw the omental mass. It was grasped carefully and we excised it entirely using the hot laparoscopic scissors. It was placed in an EndoCatch bag and removed from the umbilical port site. Re-examination of the mid-abdomen revealed that hemostasis was adequate where were excised the omental mass. All ports were removed under direct vision and no bleeding from any port site was noted. The insufflation of the abdomen was then evacuated and the laparoscope was removed. The umbilical port site was closed with the pre-existing 0 Vicryl stitch. All port sites were infiltrated with Marcaine and closed in a subcuticular fashion. Sterile dressings were applied. The patient was extubated in the Operating Room and transported to the Recovery Room in stable condition. All sponge, instrument and needle counts were correct at the end of the case. I was present and scrubbed for the entire procedure.

## 2023-03-23 NOTE — INTERVAL H&P NOTE
The patient has been examined and the H&P has been reviewed:    I concur with the findings and no changes have occurred since H&P was written.    Patient's last dose of Brilinta was Sunday evening. Consent obtained this morning, all questions answered.     Surgery risks, benefits and alternative options discussed and understood by patient/family.      Active Hospital Problems    Diagnosis  POA    *Recurrent biliary colic [K80.50]  Yes      Resolved Hospital Problems   No resolved problems to display.       Ana María Ellington MD  General Surgery and Surgical Critical Care  [facility]

## 2023-03-23 NOTE — ANESTHESIA PREPROCEDURE EVALUATION
03/23/2023  Giorgi Eden is a 62 y.o., male  with PAD, CAD s/p MARTITA stent placement in 5/2022 on brilinta     Patient reports claustrophobia - and would like to be sedated prior to having monitors and mask placed on him - he would prefer to place/hold the o2 mask himself..  He becomes very anxious with many people around him - placing monitors, blankets etc all at one time.    Patient Active Problem List   Diagnosis    Essential hypertension    Status post insertion of drug eluting coronary artery stent    History of myocardial infarction    Mixed hyperlipidemia    Tobacco abuse    Atherosclerotic heart disease of native coronary artery with unstable angina pectoris    Chronic venous insufficiency    Recurrent biliary colic - scheduled for lap salvatore 3/24/23     Past Surgical History:   Procedure Laterality Date    AORTOGRAPHY WITH SERIALOGRAPHY Bilateral 1/26/2023    Procedure: AORTOGRAM, WITH BILATERAL LOWER EXTREMITY ANGIOGRAMS;  Surgeon: Ian Castorena MD;  Location: [facility] OR;  Service: Vascular;  Laterality: Bilateral;  RN PREOP 1/24/23    CORONARY ANGIOPLASTY WITH STENT PLACEMENT      x2    LEFT HEART CATHETERIZATION Left 5/31/2022    Procedure: Left heart cath;  Surgeon: Romulo Gupta MD;  Location: [facility] CATH/EP LAB;  Service: Cardiology;  Laterality: Left;       2/2022    The left ventricle is normal in size with mild concentric hypertrophy and normal systolic function.   The estimated ejection fraction is 60%.   Normal left ventricular diastolic function.   Normal right ventricular size with normal right ventricular systolic function.      Pre-op Assessment    I have reviewed the Patient Summary Reports.     I have reviewed the Nursing Notes. I have reviewed the NPO Status.   I have reviewed the Medications.     Review of Systems  Anesthesia Hx:  No problems with previous  Anesthesia  History of prior surgery of interest to airway management or planning:  Denies Personal Hx of Anesthesia complications.   Social:  Former Smoker    Hematology/Oncology:  Hematology Normal   Oncology Normal     EENT/Dental:EENT/Dental Normal   Cardiovascular:   Exercise tolerance: good Hypertension Past MI CAD asymptomatic CABG/stent   Denies Angina. hyperlipidemia  Denies DALTON. ECG has been reviewed.    Pulmonary:  Pulmonary Normal  Denies Shortness of breath.    Hepatic/GI:  Hepatic/GI Normal  Denies GERD.    Musculoskeletal:  Musculoskeletal Normal    Neurological:  Neurology Normal    Endocrine:  Endocrine Normal    Dermatological:  Skin Normal    Psych:  Psychiatric Normal           Physical Exam  General: Well nourished, Cooperative, Alert and Oriented    Airway:  Mallampati: III / II  Mouth Opening: Normal  TM Distance: Normal  Tongue: Normal  Neck ROM: Normal ROM    Dental:  Intact    Chest/Lungs:  Clear to auscultation, Normal Respiratory Rate    Heart:  Rate: Normal  Rhythm: Regular Rhythm  Sounds: Normal        Anesthesia Plan  Type of Anesthesia, risks & benefits discussed:    Anesthesia Type: Gen ETT  Intra-op Monitoring Plan: Standard ASA Monitors  Post Op Pain Control Plan: multimodal analgesia and IV/PO Opioids PRN  Induction:  IV  Airway Plan: Video, Post-Induction  Informed Consent: Informed consent signed with the Patient and all parties understand the risks and agree with anesthesia plan.  All questions answered.   ASA Score: 3  Day of Surgery Review of History & Physical: H&P Update referred to the surgeon/provider.    Ready For Surgery From Anesthesia Perspective.     .

## 2023-03-27 ENCOUNTER — TELEPHONE (OUTPATIENT)
Dept: VASCULAR SURGERY | Facility: CLINIC | Age: 63
End: 2023-03-27
Payer: COMMERCIAL

## 2023-03-27 NOTE — TELEPHONE ENCOUNTER
Informed patient that Dr. Castorena spoke with his general surgeon and that it was safe to proceed with upcoming surgery. Pt. States that is fine and had no questions at this time.

## 2023-03-27 NOTE — ANESTHESIA POSTPROCEDURE EVALUATION
Anesthesia Post Evaluation    Patient: Giorgi Eden    Procedure(s) Performed: Procedure(s) (LRB):  CHOLECYSTECTOMY, LAPAROSCOPIC (N/A)    Final Anesthesia Type: general      Patient location during evaluation: PACU  Patient participation: Yes- Able to Participate  Level of consciousness: awake and alert and oriented  Post-procedure vital signs: reviewed and stable  Pain management: adequate  Airway patency: patent    PONV status at discharge: No PONV  Anesthetic complications: no      Cardiovascular status: blood pressure returned to baseline and hemodynamically stable  Respiratory status: unassisted, spontaneous ventilation and room air  Hydration status: euvolemic  Follow-up not needed.          Vitals Value Taken Time   /83 03/23/23 1031   Temp 36.7 °C (98.1 °F) 03/23/23 1030   Pulse 64 03/23/23 1047   Resp 19 03/23/23 1045   SpO2 97 % 03/23/23 1047   Vitals shown include unvalidated device data.      No case tracking events are documented in the log.      Pain/Joce Score: No data recorded

## 2023-03-29 LAB
FINAL PATHOLOGIC DIAGNOSIS: NORMAL
GROSS: NORMAL
Lab: NORMAL

## 2023-03-31 ENCOUNTER — TELEPHONE (OUTPATIENT)
Dept: VASCULAR SURGERY | Facility: CLINIC | Age: 63
End: 2023-03-31
Payer: COMMERCIAL

## 2023-03-31 ENCOUNTER — PATIENT MESSAGE (OUTPATIENT)
Dept: SURGERY | Facility: HOSPITAL | Age: 63
End: 2023-03-31
Payer: COMMERCIAL

## 2023-03-31 ENCOUNTER — LAB VISIT (OUTPATIENT)
Dept: LAB | Facility: HOSPITAL | Age: 63
End: 2023-03-31
Attending: FAMILY MEDICINE
Payer: COMMERCIAL

## 2023-03-31 DIAGNOSIS — I10 ESSENTIAL HYPERTENSION: ICD-10-CM

## 2023-03-31 DIAGNOSIS — I72.4 POPLITEAL ARTERY ANEURYSM: ICD-10-CM

## 2023-03-31 LAB
ALBUMIN SERPL BCP-MCNC: 4.1 G/DL (ref 3.5–5.2)
ALP SERPL-CCNC: 76 U/L (ref 55–135)
ALT SERPL W/O P-5'-P-CCNC: 32 U/L (ref 10–44)
ANION GAP SERPL CALC-SCNC: 8 MMOL/L (ref 8–16)
ANION GAP SERPL CALC-SCNC: 8 MMOL/L (ref 8–16)
APTT PPP: 26.4 SEC (ref 21–32)
AST SERPL-CCNC: 18 U/L (ref 10–40)
BASOPHILS # BLD AUTO: 0.05 K/UL (ref 0–0.2)
BASOPHILS NFR BLD: 0.5 % (ref 0–1.9)
BILIRUB SERPL-MCNC: 0.5 MG/DL (ref 0.1–1)
BUN SERPL-MCNC: 16 MG/DL (ref 6–20)
BUN SERPL-MCNC: 16 MG/DL (ref 6–20)
CALCIUM SERPL-MCNC: 9.6 MG/DL (ref 8.7–10.5)
CALCIUM SERPL-MCNC: 9.6 MG/DL (ref 8.7–10.5)
CHLORIDE SERPL-SCNC: 103 MMOL/L (ref 95–110)
CHLORIDE SERPL-SCNC: 103 MMOL/L (ref 95–110)
CO2 SERPL-SCNC: 28 MMOL/L (ref 23–29)
CO2 SERPL-SCNC: 28 MMOL/L (ref 23–29)
CREAT SERPL-MCNC: 1 MG/DL (ref 0.5–1.4)
CREAT SERPL-MCNC: 1 MG/DL (ref 0.5–1.4)
DIFFERENTIAL METHOD: NORMAL
EOSINOPHIL # BLD AUTO: 0.2 K/UL (ref 0–0.5)
EOSINOPHIL NFR BLD: 1.8 % (ref 0–8)
ERYTHROCYTE [DISTWIDTH] IN BLOOD BY AUTOMATED COUNT: 14.3 % (ref 11.5–14.5)
EST. GFR  (NO RACE VARIABLE): >60 ML/MIN/1.73 M^2
EST. GFR  (NO RACE VARIABLE): >60 ML/MIN/1.73 M^2
GLUCOSE SERPL-MCNC: 118 MG/DL (ref 70–110)
GLUCOSE SERPL-MCNC: 118 MG/DL (ref 70–110)
HCT VFR BLD AUTO: 50.5 % (ref 40–54)
HGB BLD-MCNC: 16.7 G/DL (ref 14–18)
IMM GRANULOCYTES # BLD AUTO: 0.04 K/UL (ref 0–0.04)
IMM GRANULOCYTES NFR BLD AUTO: 0.4 % (ref 0–0.5)
INR PPP: 1 (ref 0.8–1.2)
LYMPHOCYTES # BLD AUTO: 2.6 K/UL (ref 1–4.8)
LYMPHOCYTES NFR BLD: 27.2 % (ref 18–48)
MAGNESIUM SERPL-MCNC: 1.8 MG/DL (ref 1.6–2.6)
MCH RBC QN AUTO: 28.4 PG (ref 27–31)
MCHC RBC AUTO-ENTMCNC: 33.1 G/DL (ref 32–36)
MCV RBC AUTO: 86 FL (ref 82–98)
MONOCYTES # BLD AUTO: 1 K/UL (ref 0.3–1)
MONOCYTES NFR BLD: 10.8 % (ref 4–15)
NEUTROPHILS # BLD AUTO: 5.6 K/UL (ref 1.8–7.7)
NEUTROPHILS NFR BLD: 59.3 % (ref 38–73)
NRBC BLD-RTO: 0 /100 WBC
PHOSPHATE SERPL-MCNC: 2.4 MG/DL (ref 2.7–4.5)
PLATELET # BLD AUTO: 267 K/UL (ref 150–450)
PMV BLD AUTO: 9.9 FL (ref 9.2–12.9)
POTASSIUM SERPL-SCNC: 4.3 MMOL/L (ref 3.5–5.1)
POTASSIUM SERPL-SCNC: 4.3 MMOL/L (ref 3.5–5.1)
PROT SERPL-MCNC: 8.1 G/DL (ref 6–8.4)
PROTHROMBIN TIME: 10.8 SEC (ref 9–12.5)
RBC # BLD AUTO: 5.88 M/UL (ref 4.6–6.2)
SODIUM SERPL-SCNC: 139 MMOL/L (ref 136–145)
SODIUM SERPL-SCNC: 139 MMOL/L (ref 136–145)
WBC # BLD AUTO: 9.45 K/UL (ref 3.9–12.7)

## 2023-03-31 PROCEDURE — 85025 COMPLETE CBC W/AUTO DIFF WBC: CPT | Performed by: SURGERY

## 2023-03-31 PROCEDURE — 36415 COLL VENOUS BLD VENIPUNCTURE: CPT | Performed by: SURGERY

## 2023-03-31 PROCEDURE — 83735 ASSAY OF MAGNESIUM: CPT | Performed by: SURGERY

## 2023-03-31 PROCEDURE — 80053 COMPREHEN METABOLIC PANEL: CPT | Performed by: FAMILY MEDICINE

## 2023-03-31 PROCEDURE — 85730 THROMBOPLASTIN TIME PARTIAL: CPT | Performed by: SURGERY

## 2023-03-31 PROCEDURE — 84100 ASSAY OF PHOSPHORUS: CPT | Performed by: SURGERY

## 2023-03-31 PROCEDURE — 85610 PROTHROMBIN TIME: CPT | Performed by: SURGERY

## 2023-03-31 NOTE — PRE-PROCEDURE INSTRUCTIONS
CB from Pt - sw pt briefly - informed pt Sx on Monday - 4/3/23 will be performed at [address].  Pt will call back [facility] upon completion of Cardiology Appt.

## 2023-03-31 NOTE — PRE-PROCEDURE INSTRUCTIONS
PreOp Instructions given:   - Verbal medication information (what to hold and what to take)   - NPO guidelines [id number]  - Arrival place directions given; time to be given the day before procedure by the   Surgeon's Office 0500 [facility]  - Bathing with antibacterial soap   - Don't wear any jewelry or bring any valuables AM of surgery   - No makeup or moisturizer to face   - No perfume/cologne, powder, lotions or aftershave   Pt. verbalized understanding.   Pt denies any h/o Anesthesia/Sedation complications or side effects.  Patient reports stopping Brilinta 90 mg BID - last dose taken Wednesday 3/29/23 at 0800.  Pt arrived to Cardiology appt - informed/staff that all documentation has been sent to Dr. Castorena

## 2023-03-31 NOTE — TELEPHONE ENCOUNTER
Spoke with Pt to let him know that someone from Pre Op should be giving him a call, patient verified that someone has called him.      ----- Message from Merline Fernandes sent at 3/31/2023  9:56 AM CDT -----  Regarding: Patient call back  .Type: Patient Call Back    Who called:Salima-Girlfriend     What is the request in detail:would like to know instructions on surgery for Monday 04/03/2023. Pre-op information has not been given to patient     Can the clinic reply by [facility]?no    Would the patient rather a call back or a response via [facility]? call    Best call back number: ..[phone] or .[phone]      Additional Information: Need to know and where to go for procedure. Requested message to be marked as urgent

## 2023-04-03 ENCOUNTER — HOSPITAL ENCOUNTER (INPATIENT)
Facility: HOSPITAL | Age: 63
LOS: 4 days | Discharge: HOME | DRG: 254 | End: 2023-04-07
Attending: SURGERY | Admitting: SURGERY
Payer: COMMERCIAL

## 2023-04-03 ENCOUNTER — ANESTHESIA (OUTPATIENT)
Dept: SURGERY | Facility: HOSPITAL | Age: 63
DRG: 254 | End: 2023-04-03
Payer: COMMERCIAL

## 2023-04-03 DIAGNOSIS — I72.4 POPLITEAL ARTERY ANEURYSM: Primary | ICD-10-CM

## 2023-04-03 DIAGNOSIS — I73.9 PERIPHERAL ARTERY DISEASE: ICD-10-CM

## 2023-04-03 LAB
ABO + RH BLD: NORMAL
ALBUMIN SERPL BCP-MCNC: 3.1 G/DL (ref 3.5–5.2)
ALP SERPL-CCNC: 70 U/L (ref 55–135)
ALT SERPL W/O P-5'-P-CCNC: 22 U/L (ref 10–44)
ANION GAP SERPL CALC-SCNC: 14 MMOL/L (ref 8–16)
APTT PPP: 27.6 SEC (ref 21–32)
AST SERPL-CCNC: 17 U/L (ref 10–40)
BASOPHILS # BLD AUTO: 0.03 K/UL (ref 0–0.2)
BASOPHILS NFR BLD: 0.2 % (ref 0–1.9)
BILIRUB SERPL-MCNC: 0.8 MG/DL (ref 0.1–1)
BLD GP AB SCN CELLS X3 SERPL QL: NORMAL
BUN SERPL-MCNC: 14 MG/DL (ref 6–20)
CALCIUM SERPL-MCNC: 8.2 MG/DL (ref 8.7–10.5)
CHLORIDE SERPL-SCNC: 106 MMOL/L (ref 95–110)
CO2 SERPL-SCNC: 19 MMOL/L (ref 23–29)
CREAT SERPL-MCNC: 0.9 MG/DL (ref 0.5–1.4)
DIFFERENTIAL METHOD: ABNORMAL
EOSINOPHIL # BLD AUTO: 0 K/UL (ref 0–0.5)
EOSINOPHIL NFR BLD: 0.2 % (ref 0–8)
ERYTHROCYTE [DISTWIDTH] IN BLOOD BY AUTOMATED COUNT: 14.2 % (ref 11.5–14.5)
EST. GFR  (NO RACE VARIABLE): >60 ML/MIN/1.73 M^2
GLUCOSE SERPL-MCNC: 121 MG/DL (ref 70–110)
HCT VFR BLD AUTO: 42.3 % (ref 40–54)
HGB BLD-MCNC: 13.7 G/DL (ref 14–18)
IMM GRANULOCYTES # BLD AUTO: 0.11 K/UL (ref 0–0.04)
IMM GRANULOCYTES NFR BLD AUTO: 0.6 % (ref 0–0.5)
INR PPP: 1.1 (ref 0.8–1.2)
LYMPHOCYTES # BLD AUTO: 1.3 K/UL (ref 1–4.8)
LYMPHOCYTES NFR BLD: 6.8 % (ref 18–48)
MAGNESIUM SERPL-MCNC: 1.6 MG/DL (ref 1.6–2.6)
MCH RBC QN AUTO: 27.5 PG (ref 27–31)
MCHC RBC AUTO-ENTMCNC: 32.4 G/DL (ref 32–36)
MCV RBC AUTO: 85 FL (ref 82–98)
MONOCYTES # BLD AUTO: 1.4 K/UL (ref 0.3–1)
MONOCYTES NFR BLD: 7.6 % (ref 4–15)
NEUTROPHILS # BLD AUTO: 15.7 K/UL (ref 1.8–7.7)
NEUTROPHILS NFR BLD: 84.6 % (ref 38–73)
NRBC BLD-RTO: 0 /100 WBC
PHOSPHATE SERPL-MCNC: 3.6 MG/DL (ref 2.7–4.5)
PLATELET # BLD AUTO: 251 K/UL (ref 150–450)
PMV BLD AUTO: 10 FL (ref 9.2–12.9)
POC ACTIVATED CLOTTING TIME K: 131 SEC (ref 74–137)
POC ACTIVATED CLOTTING TIME K: 143 SEC (ref 74–137)
POC ACTIVATED CLOTTING TIME K: 209 SEC (ref 74–137)
POC ACTIVATED CLOTTING TIME K: 209 SEC (ref 74–137)
POC ACTIVATED CLOTTING TIME K: 239 SEC (ref 74–137)
POC ACTIVATED CLOTTING TIME K: 245 SEC (ref 74–137)
POC ACTIVATED CLOTTING TIME K: 257 SEC (ref 74–137)
POC ACTIVATED CLOTTING TIME K: 263 SEC (ref 74–137)
POCT GLUCOSE: 106 MG/DL (ref 70–110)
POTASSIUM SERPL-SCNC: 4.4 MMOL/L (ref 3.5–5.1)
PROT SERPL-MCNC: 6.2 G/DL (ref 6–8.4)
PROTHROMBIN TIME: 11.3 SEC (ref 9–12.5)
RBC # BLD AUTO: 4.98 M/UL (ref 4.6–6.2)
SAMPLE: ABNORMAL
SAMPLE: NORMAL
SODIUM SERPL-SCNC: 139 MMOL/L (ref 136–145)
SPECIMEN OUTDATE: NORMAL
WBC # BLD AUTO: 18.48 K/UL (ref 3.9–12.7)

## 2023-04-03 PROCEDURE — 25000003 PHARM REV CODE 250

## 2023-04-03 PROCEDURE — 84100 ASSAY OF PHOSPHORUS: CPT

## 2023-04-03 PROCEDURE — 36000709 HC OR TIME LEV III EA ADD 15 MIN: Performed by: SURGERY

## 2023-04-03 PROCEDURE — D9220A PRA ANESTHESIA: ICD-10-PCS | Mod: ,,, | Performed by: ANESTHESIOLOGY

## 2023-04-03 PROCEDURE — 27201037 HC PRESSURE MONITORING SET UP

## 2023-04-03 PROCEDURE — 37000008 HC ANESTHESIA 1ST 15 MINUTES: Performed by: SURGERY

## 2023-04-03 PROCEDURE — 36415 COLL VENOUS BLD VENIPUNCTURE: CPT

## 2023-04-03 PROCEDURE — 27201423 OPTIME MED/SURG SUP & DEVICES STERILE SUPPLY: Performed by: SURGERY

## 2023-04-03 PROCEDURE — 20000000 HC ICU ROOM

## 2023-04-03 PROCEDURE — 63600175 PHARM REV CODE 636 W HCPCS: Performed by: STUDENT IN AN ORGANIZED HEALTH CARE EDUCATION/TRAINING PROGRAM

## 2023-04-03 PROCEDURE — 36620 INSERTION CATHETER ARTERY: CPT | Mod: 59,,, | Performed by: ANESTHESIOLOGY

## 2023-04-03 PROCEDURE — 63600175 PHARM REV CODE 636 W HCPCS: Performed by: SURGERY

## 2023-04-03 PROCEDURE — 25000003 PHARM REV CODE 250: Performed by: STUDENT IN AN ORGANIZED HEALTH CARE EDUCATION/TRAINING PROGRAM

## 2023-04-03 PROCEDURE — 83735 ASSAY OF MAGNESIUM: CPT

## 2023-04-03 PROCEDURE — 35556 PR VEIN BYPASS GRAFT,FEM-POP: ICD-10-PCS | Mod: RT,,, | Performed by: SURGERY

## 2023-04-03 PROCEDURE — 37000009 HC ANESTHESIA EA ADD 15 MINS: Performed by: SURGERY

## 2023-04-03 PROCEDURE — 36620 ARTERIAL: ICD-10-PCS | Mod: 59,,, | Performed by: ANESTHESIOLOGY

## 2023-04-03 PROCEDURE — 27000221 HC OXYGEN, UP TO 24 HOURS

## 2023-04-03 PROCEDURE — 63600175 PHARM REV CODE 636 W HCPCS

## 2023-04-03 PROCEDURE — 71000039 HC RECOVERY, EACH ADD'L HOUR: Performed by: SURGERY

## 2023-04-03 PROCEDURE — 35556 ART BYP GRFT FEM-POPLITEAL: CPT | Mod: RT,,, | Performed by: SURGERY

## 2023-04-03 PROCEDURE — 99499 UNLISTED E&M SERVICE: CPT | Mod: ,,, | Performed by: SURGERY

## 2023-04-03 PROCEDURE — 85730 THROMBOPLASTIN TIME PARTIAL: CPT

## 2023-04-03 PROCEDURE — 86900 BLOOD TYPING SEROLOGIC ABO: CPT

## 2023-04-03 PROCEDURE — 99499 NO LOS: ICD-10-PCS | Mod: ,,, | Performed by: SURGERY

## 2023-04-03 PROCEDURE — 85610 PROTHROMBIN TIME: CPT

## 2023-04-03 PROCEDURE — 71000033 HC RECOVERY, INTIAL HOUR: Performed by: SURGERY

## 2023-04-03 PROCEDURE — 36000708 HC OR TIME LEV III 1ST 15 MIN: Performed by: SURGERY

## 2023-04-03 PROCEDURE — 85025 COMPLETE CBC W/AUTO DIFF WBC: CPT

## 2023-04-03 PROCEDURE — 25000003 PHARM REV CODE 250: Performed by: SURGERY

## 2023-04-03 PROCEDURE — 94761 N-INVAS EAR/PLS OXIMETRY MLT: CPT

## 2023-04-03 PROCEDURE — D9220A PRA ANESTHESIA: Mod: ,,, | Performed by: ANESTHESIOLOGY

## 2023-04-03 PROCEDURE — 80053 COMPREHEN METABOLIC PANEL: CPT

## 2023-04-03 RX ORDER — FENTANYL CITRATE 50 UG/ML
INJECTION, SOLUTION INTRAMUSCULAR; INTRAVENOUS
Status: DISCONTINUED | OUTPATIENT
Start: 2023-04-03 | End: 2023-04-03

## 2023-04-03 RX ORDER — ACETAMINOPHEN 10 MG/ML
INJECTION, SOLUTION INTRAVENOUS
Status: DISCONTINUED | OUTPATIENT
Start: 2023-04-03 | End: 2023-04-03

## 2023-04-03 RX ORDER — SODIUM CHLORIDE, SODIUM LACTATE, POTASSIUM CHLORIDE, CALCIUM CHLORIDE 600; 310; 30; 20 MG/100ML; MG/100ML; MG/100ML; MG/100ML
INJECTION, SOLUTION INTRAVENOUS CONTINUOUS
Status: DISCONTINUED | OUTPATIENT
Start: 2023-04-04 | End: 2023-04-04

## 2023-04-03 RX ORDER — ENOXAPARIN SODIUM 100 MG/ML
40 INJECTION SUBCUTANEOUS EVERY 24 HOURS
Status: DISCONTINUED | OUTPATIENT
Start: 2023-04-03 | End: 2023-04-07 | Stop reason: HOSPADM

## 2023-04-03 RX ORDER — LIDOCAINE HYDROCHLORIDE 20 MG/ML
INJECTION INTRAVENOUS
Status: DISCONTINUED | OUTPATIENT
Start: 2023-04-03 | End: 2023-04-03

## 2023-04-03 RX ORDER — HALOPERIDOL 5 MG/ML
0.5 INJECTION INTRAMUSCULAR EVERY 10 MIN PRN
Status: DISCONTINUED | OUTPATIENT
Start: 2023-04-03 | End: 2023-04-03 | Stop reason: HOSPADM

## 2023-04-03 RX ORDER — ONDANSETRON 2 MG/ML
4 INJECTION INTRAMUSCULAR; INTRAVENOUS EVERY 12 HOURS PRN
Status: DISCONTINUED | OUTPATIENT
Start: 2023-04-03 | End: 2023-04-07 | Stop reason: HOSPADM

## 2023-04-03 RX ORDER — FENTANYL CITRATE 50 UG/ML
25 INJECTION, SOLUTION INTRAMUSCULAR; INTRAVENOUS EVERY 5 MIN PRN
Status: DISCONTINUED | OUTPATIENT
Start: 2023-04-03 | End: 2023-04-03 | Stop reason: HOSPADM

## 2023-04-03 RX ORDER — DEXAMETHASONE SODIUM PHOSPHATE 4 MG/ML
INJECTION, SOLUTION INTRA-ARTICULAR; INTRALESIONAL; INTRAMUSCULAR; INTRAVENOUS; SOFT TISSUE
Status: DISCONTINUED | OUTPATIENT
Start: 2023-04-03 | End: 2023-04-03

## 2023-04-03 RX ORDER — HYDROMORPHONE HYDROCHLORIDE 1 MG/ML
INJECTION, SOLUTION INTRAMUSCULAR; INTRAVENOUS; SUBCUTANEOUS
Status: DISCONTINUED | OUTPATIENT
Start: 2023-04-03 | End: 2023-04-03

## 2023-04-03 RX ORDER — SODIUM CHLORIDE 9 MG/ML
INJECTION, SOLUTION INTRAVENOUS CONTINUOUS PRN
Status: DISCONTINUED | OUTPATIENT
Start: 2023-04-03 | End: 2023-04-03

## 2023-04-03 RX ORDER — MIDAZOLAM HYDROCHLORIDE 1 MG/ML
INJECTION INTRAMUSCULAR; INTRAVENOUS
Status: DISCONTINUED | OUTPATIENT
Start: 2023-04-03 | End: 2023-04-03

## 2023-04-03 RX ORDER — ROCURONIUM BROMIDE 10 MG/ML
INJECTION, SOLUTION INTRAVENOUS
Status: DISCONTINUED | OUTPATIENT
Start: 2023-04-03 | End: 2023-04-03

## 2023-04-03 RX ORDER — TAMSULOSIN HYDROCHLORIDE 0.4 MG/1
0.4 CAPSULE ORAL DAILY
Status: DISCONTINUED | OUTPATIENT
Start: 2023-04-04 | End: 2023-04-07 | Stop reason: HOSPADM

## 2023-04-03 RX ORDER — PROPOFOL 10 MG/ML
VIAL (ML) INTRAVENOUS
Status: DISCONTINUED | OUTPATIENT
Start: 2023-04-03 | End: 2023-04-03

## 2023-04-03 RX ORDER — MAGNESIUM SULFATE HEPTAHYDRATE 40 MG/ML
4 INJECTION, SOLUTION INTRAVENOUS
Status: DISCONTINUED | OUTPATIENT
Start: 2023-04-03 | End: 2023-04-06

## 2023-04-03 RX ORDER — ONDANSETRON 2 MG/ML
INJECTION INTRAMUSCULAR; INTRAVENOUS
Status: DISCONTINUED | OUTPATIENT
Start: 2023-04-03 | End: 2023-04-03

## 2023-04-03 RX ORDER — ATENOLOL 50 MG/1
50 TABLET ORAL DAILY
Status: DISCONTINUED | OUTPATIENT
Start: 2023-04-04 | End: 2023-04-07 | Stop reason: HOSPADM

## 2023-04-03 RX ORDER — PHENYLEPHRINE HYDROCHLORIDE 10 MG/ML
INJECTION INTRAVENOUS
Status: DISCONTINUED | OUTPATIENT
Start: 2023-04-03 | End: 2023-04-03

## 2023-04-03 RX ORDER — CALCIUM GLUCONATE 20 MG/ML
1 INJECTION, SOLUTION INTRAVENOUS
Status: DISCONTINUED | OUTPATIENT
Start: 2023-04-03 | End: 2023-04-06

## 2023-04-03 RX ORDER — FAMOTIDINE 20 MG/1
20 TABLET, FILM COATED ORAL 2 TIMES DAILY
Status: DISCONTINUED | OUTPATIENT
Start: 2023-04-03 | End: 2023-04-07 | Stop reason: HOSPADM

## 2023-04-03 RX ORDER — SODIUM CHLORIDE 0.9 % (FLUSH) 0.9 %
10 SYRINGE (ML) INJECTION
Status: DISCONTINUED | OUTPATIENT
Start: 2023-04-03 | End: 2023-04-03

## 2023-04-03 RX ORDER — CALCIUM GLUCONATE 20 MG/ML
3 INJECTION, SOLUTION INTRAVENOUS
Status: DISCONTINUED | OUTPATIENT
Start: 2023-04-03 | End: 2023-04-06

## 2023-04-03 RX ORDER — OXYCODONE HYDROCHLORIDE 10 MG/1
10 TABLET ORAL EVERY 4 HOURS PRN
Status: DISCONTINUED | OUTPATIENT
Start: 2023-04-03 | End: 2023-04-07 | Stop reason: HOSPADM

## 2023-04-03 RX ORDER — KETAMINE HCL IN 0.9 % NACL 50 MG/5 ML
SYRINGE (ML) INTRAVENOUS
Status: DISCONTINUED | OUTPATIENT
Start: 2023-04-03 | End: 2023-04-03

## 2023-04-03 RX ORDER — LIDOCAINE HYDROCHLORIDE 10 MG/ML
1 INJECTION, SOLUTION EPIDURAL; INFILTRATION; INTRACAUDAL; PERINEURAL ONCE
Status: COMPLETED | OUTPATIENT
Start: 2023-04-03 | End: 2023-04-03

## 2023-04-03 RX ORDER — CALCIUM GLUCONATE 20 MG/ML
2 INJECTION, SOLUTION INTRAVENOUS
Status: DISCONTINUED | OUTPATIENT
Start: 2023-04-03 | End: 2023-04-06

## 2023-04-03 RX ORDER — MAGNESIUM SULFATE HEPTAHYDRATE 40 MG/ML
INJECTION, SOLUTION INTRAVENOUS
Status: COMPLETED
Start: 2023-04-03 | End: 2023-04-04

## 2023-04-03 RX ORDER — HYDROMORPHONE HYDROCHLORIDE 1 MG/ML
0.2 INJECTION, SOLUTION INTRAMUSCULAR; INTRAVENOUS; SUBCUTANEOUS EVERY 5 MIN PRN
Status: DISCONTINUED | OUTPATIENT
Start: 2023-04-03 | End: 2023-04-03 | Stop reason: HOSPADM

## 2023-04-03 RX ORDER — ATORVASTATIN CALCIUM 20 MG/1
20 TABLET, FILM COATED ORAL DAILY
Status: DISCONTINUED | OUTPATIENT
Start: 2023-04-04 | End: 2023-04-07 | Stop reason: HOSPADM

## 2023-04-03 RX ORDER — ASPIRIN 81 MG/1
81 TABLET ORAL DAILY
Status: DISCONTINUED | OUTPATIENT
Start: 2023-04-04 | End: 2023-04-03

## 2023-04-03 RX ORDER — HEPARIN SODIUM 1000 [USP'U]/ML
INJECTION, SOLUTION INTRAVENOUS; SUBCUTANEOUS
Status: DISCONTINUED | OUTPATIENT
Start: 2023-04-03 | End: 2023-04-03

## 2023-04-03 RX ORDER — ASPIRIN 81 MG/1
81 TABLET ORAL DAILY
Status: DISCONTINUED | OUTPATIENT
Start: 2023-04-03 | End: 2023-04-07 | Stop reason: HOSPADM

## 2023-04-03 RX ORDER — SODIUM CHLORIDE 9 MG/ML
INJECTION, SOLUTION INTRAVENOUS CONTINUOUS
Status: DISCONTINUED | OUTPATIENT
Start: 2023-04-03 | End: 2023-04-03

## 2023-04-03 RX ORDER — OXYCODONE HYDROCHLORIDE 5 MG/1
5 TABLET ORAL EVERY 4 HOURS PRN
Status: DISCONTINUED | OUTPATIENT
Start: 2023-04-03 | End: 2023-04-07 | Stop reason: HOSPADM

## 2023-04-03 RX ORDER — CEFAZOLIN SODIUM 1 G/3ML
INJECTION, POWDER, FOR SOLUTION INTRAMUSCULAR; INTRAVENOUS
Status: DISCONTINUED | OUTPATIENT
Start: 2023-04-03 | End: 2023-04-03

## 2023-04-03 RX ORDER — PROTAMINE SULFATE 10 MG/ML
INJECTION, SOLUTION INTRAVENOUS
Status: DISCONTINUED | OUTPATIENT
Start: 2023-04-03 | End: 2023-04-03

## 2023-04-03 RX ORDER — MAGNESIUM SULFATE HEPTAHYDRATE 40 MG/ML
2 INJECTION, SOLUTION INTRAVENOUS
Status: DISCONTINUED | OUTPATIENT
Start: 2023-04-03 | End: 2023-04-06

## 2023-04-03 RX ADMIN — SODIUM CHLORIDE, SODIUM LACTATE, POTASSIUM CHLORIDE, AND CALCIUM CHLORIDE 500 ML: .6; .31; .03; .02 INJECTION, SOLUTION INTRAVENOUS at 11:04

## 2023-04-03 RX ADMIN — HEPARIN SODIUM 2000 UNITS: 1000 INJECTION, SOLUTION INTRAVENOUS; SUBCUTANEOUS at 12:04

## 2023-04-03 RX ADMIN — PHENYLEPHRINE HYDROCHLORIDE 100 MCG: 10 INJECTION INTRAVENOUS at 07:04

## 2023-04-03 RX ADMIN — SUGAMMADEX 200 MG: 100 INJECTION, SOLUTION INTRAVENOUS at 04:04

## 2023-04-03 RX ADMIN — SODIUM CHLORIDE, SODIUM GLUCONATE, SODIUM ACETATE, POTASSIUM CHLORIDE, MAGNESIUM CHLORIDE, SODIUM PHOSPHATE, DIBASIC, AND POTASSIUM PHOSPHATE: .53; .5; .37; .037; .03; .012; .00082 INJECTION, SOLUTION INTRAVENOUS at 07:04

## 2023-04-03 RX ADMIN — PROPOFOL 50 MG: 10 INJECTION, EMULSION INTRAVENOUS at 11:04

## 2023-04-03 RX ADMIN — PROPOFOL 30 MG: 10 INJECTION, EMULSION INTRAVENOUS at 12:04

## 2023-04-03 RX ADMIN — HYDROMORPHONE HYDROCHLORIDE 0.4 MG: 1 INJECTION, SOLUTION INTRAMUSCULAR; INTRAVENOUS; SUBCUTANEOUS at 08:04

## 2023-04-03 RX ADMIN — PHENYLEPHRINE HYDROCHLORIDE 80 MCG: 10 INJECTION INTRAVENOUS at 02:04

## 2023-04-03 RX ADMIN — ROCURONIUM BROMIDE 20 MG: 10 INJECTION INTRAVENOUS at 02:04

## 2023-04-03 RX ADMIN — SODIUM CHLORIDE, SODIUM LACTATE, POTASSIUM CHLORIDE, AND CALCIUM CHLORIDE 500 ML: .6; .31; .03; .02 INJECTION, SOLUTION INTRAVENOUS at 10:04

## 2023-04-03 RX ADMIN — DEXAMETHASONE SODIUM PHOSPHATE 4 MG: 4 INJECTION, SOLUTION INTRAMUSCULAR; INTRAVENOUS at 07:04

## 2023-04-03 RX ADMIN — SODIUM CHLORIDE: 9 INJECTION, SOLUTION INTRAVENOUS at 06:04

## 2023-04-03 RX ADMIN — ROCURONIUM BROMIDE 10 MG: 10 INJECTION INTRAVENOUS at 09:04

## 2023-04-03 RX ADMIN — PROPOFOL 20 MG: 10 INJECTION, EMULSION INTRAVENOUS at 12:04

## 2023-04-03 RX ADMIN — PHENYLEPHRINE HYDROCHLORIDE 80 MCG: 10 INJECTION INTRAVENOUS at 10:04

## 2023-04-03 RX ADMIN — ROCURONIUM BROMIDE 50 MG: 10 INJECTION INTRAVENOUS at 07:04

## 2023-04-03 RX ADMIN — MAGNESIUM SULFATE 2 G: 2 INJECTION INTRAVENOUS at 10:04

## 2023-04-03 RX ADMIN — FAMOTIDINE 20 MG: 20 TABLET ORAL at 09:04

## 2023-04-03 RX ADMIN — LIDOCAINE HYDROCHLORIDE 1 MG: 10 INJECTION, SOLUTION EPIDURAL; INFILTRATION; INTRACAUDAL; PERINEURAL at 06:04

## 2023-04-03 RX ADMIN — Medication 10 MG: at 09:04

## 2023-04-03 RX ADMIN — HYDROMORPHONE HYDROCHLORIDE 0.2 MG: 1 INJECTION, SOLUTION INTRAMUSCULAR; INTRAVENOUS; SUBCUTANEOUS at 06:04

## 2023-04-03 RX ADMIN — ROCURONIUM BROMIDE 10 MG: 10 INJECTION INTRAVENOUS at 08:04

## 2023-04-03 RX ADMIN — MIDAZOLAM HYDROCHLORIDE 2 MG: 1 INJECTION, SOLUTION INTRAMUSCULAR; INTRAVENOUS at 07:04

## 2023-04-03 RX ADMIN — PHENYLEPHRINE HYDROCHLORIDE 80 MCG: 10 INJECTION INTRAVENOUS at 01:04

## 2023-04-03 RX ADMIN — FENTANYL CITRATE 100 MCG: 50 INJECTION, SOLUTION INTRAMUSCULAR; INTRAVENOUS at 07:04

## 2023-04-03 RX ADMIN — FENTANYL CITRATE 50 MCG: 50 INJECTION, SOLUTION INTRAMUSCULAR; INTRAVENOUS at 08:04

## 2023-04-03 RX ADMIN — PHENYLEPHRINE HYDROCHLORIDE 150 MCG: 10 INJECTION INTRAVENOUS at 07:04

## 2023-04-03 RX ADMIN — PROTAMINE SULFATE 20 MG: 10 INJECTION, SOLUTION INTRAVENOUS at 02:04

## 2023-04-03 RX ADMIN — HEPARIN SODIUM 8000 UNITS: 1000 INJECTION, SOLUTION INTRAVENOUS; SUBCUTANEOUS at 11:04

## 2023-04-03 RX ADMIN — HYDROMORPHONE HYDROCHLORIDE 0.2 MG: 1 INJECTION, SOLUTION INTRAMUSCULAR; INTRAVENOUS; SUBCUTANEOUS at 12:04

## 2023-04-03 RX ADMIN — PHENYLEPHRINE HYDROCHLORIDE 80 MCG: 10 INJECTION INTRAVENOUS at 03:04

## 2023-04-03 RX ADMIN — LIDOCAINE HYDROCHLORIDE 80 MG: 20 INJECTION INTRAVENOUS at 07:04

## 2023-04-03 RX ADMIN — HYDROMORPHONE HYDROCHLORIDE 0.2 MG: 1 INJECTION, SOLUTION INTRAMUSCULAR; INTRAVENOUS; SUBCUTANEOUS at 01:04

## 2023-04-03 RX ADMIN — ROCURONIUM BROMIDE 50 MG: 10 INJECTION INTRAVENOUS at 08:04

## 2023-04-03 RX ADMIN — PROTAMINE SULFATE 5 MG: 10 INJECTION, SOLUTION INTRAVENOUS at 02:04

## 2023-04-03 RX ADMIN — ROCURONIUM BROMIDE 10 MG: 10 INJECTION INTRAVENOUS at 01:04

## 2023-04-03 RX ADMIN — PHENYLEPHRINE HYDROCHLORIDE 100 MCG: 10 INJECTION INTRAVENOUS at 09:04

## 2023-04-03 RX ADMIN — CEFAZOLIN 2 G: 330 INJECTION, POWDER, FOR SOLUTION INTRAMUSCULAR; INTRAVENOUS at 11:04

## 2023-04-03 RX ADMIN — ENOXAPARIN SODIUM 40 MG: 40 INJECTION SUBCUTANEOUS at 05:04

## 2023-04-03 RX ADMIN — ACETAMINOPHEN 1000 MG: 10 INJECTION INTRAVENOUS at 02:04

## 2023-04-03 RX ADMIN — PROPOFOL 40 MG: 10 INJECTION, EMULSION INTRAVENOUS at 08:04

## 2023-04-03 RX ADMIN — ROCURONIUM BROMIDE 20 MG: 10 INJECTION INTRAVENOUS at 10:04

## 2023-04-03 RX ADMIN — PROPOFOL 20 MG: 10 INJECTION, EMULSION INTRAVENOUS at 11:04

## 2023-04-03 RX ADMIN — ROCURONIUM BROMIDE 20 MG: 10 INJECTION INTRAVENOUS at 12:04

## 2023-04-03 RX ADMIN — ROCURONIUM BROMIDE 10 MG: 10 INJECTION INTRAVENOUS at 03:04

## 2023-04-03 RX ADMIN — SODIUM CHLORIDE 0.5 MCG/KG/MIN: 9 INJECTION, SOLUTION INTRAVENOUS at 07:04

## 2023-04-03 RX ADMIN — ONDANSETRON 4 MG: 2 INJECTION INTRAMUSCULAR; INTRAVENOUS at 03:04

## 2023-04-03 RX ADMIN — PHENYLEPHRINE HYDROCHLORIDE 80 MCG: 10 INJECTION INTRAVENOUS at 12:04

## 2023-04-03 RX ADMIN — CEFAZOLIN 2 G: 330 INJECTION, POWDER, FOR SOLUTION INTRAMUSCULAR; INTRAVENOUS at 07:04

## 2023-04-03 RX ADMIN — Medication 10 MG: at 10:04

## 2023-04-03 RX ADMIN — Medication 20 MG: at 07:04

## 2023-04-03 RX ADMIN — SODIUM CHLORIDE: 0.9 INJECTION, SOLUTION INTRAVENOUS at 07:04

## 2023-04-03 RX ADMIN — PHENYLEPHRINE HYDROCHLORIDE 200 MCG: 10 INJECTION INTRAVENOUS at 07:04

## 2023-04-03 RX ADMIN — OXYCODONE HYDROCHLORIDE 5 MG: 5 TABLET ORAL at 05:04

## 2023-04-03 RX ADMIN — CEFAZOLIN 2 G: 330 INJECTION, POWDER, FOR SOLUTION INTRAMUSCULAR; INTRAVENOUS at 03:04

## 2023-04-03 RX ADMIN — HYDROMORPHONE HYDROCHLORIDE 0.2 MG: 1 INJECTION, SOLUTION INTRAMUSCULAR; INTRAVENOUS; SUBCUTANEOUS at 07:04

## 2023-04-03 RX ADMIN — PROPOFOL 30 MG: 10 INJECTION, EMULSION INTRAVENOUS at 11:04

## 2023-04-03 RX ADMIN — ROCURONIUM BROMIDE 20 MG: 10 INJECTION INTRAVENOUS at 11:04

## 2023-04-03 RX ADMIN — HYDROMORPHONE HYDROCHLORIDE 0.2 MG: 1 INJECTION, SOLUTION INTRAMUSCULAR; INTRAVENOUS; SUBCUTANEOUS at 10:04

## 2023-04-03 RX ADMIN — FENTANYL CITRATE 50 MCG: 50 INJECTION, SOLUTION INTRAMUSCULAR; INTRAVENOUS at 03:04

## 2023-04-03 RX ADMIN — Medication 10 MG: at 08:04

## 2023-04-03 RX ADMIN — PROPOFOL 160 MG: 10 INJECTION, EMULSION INTRAVENOUS at 07:04

## 2023-04-03 RX ADMIN — PHENYLEPHRINE HYDROCHLORIDE 100 MCG: 10 INJECTION INTRAVENOUS at 12:04

## 2023-04-03 RX ADMIN — ASPIRIN 81 MG: 81 TABLET, COATED ORAL at 10:04

## 2023-04-03 NOTE — BRIEF OP NOTE
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         Specimens:   Specimen (24h ago, onward)      None            [id number]

## 2023-04-03 NOTE — BRIEF OP NOTE
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 Intra-operative angiogram performed. Palpable R DP and PT post-operatively.     Estimated Blood Loss: 470 mL    Estimated Blood Loss has been documented.         Specimens:   Specimen (24h ago, onward)      None            [id number]

## 2023-04-03 NOTE — HPI
63 yo male with pmh of HTN, MI s/p stent x3 in 5/2022 (on ASA and brilinta at home),  HLD, Tobacco abuse, lap salvatore on 3/23 admitted s/p right fem pop bypass by Dr Castorena for management of Popliteal aneurysm. Patient seen in PACU HDS on RA. Complains of mild pain in his low back and buttocks from lying flat in bed. Otherwise doing well. Admitted to SICU for vascular checks. To begin ASA POD0.

## 2023-04-03 NOTE — ANESTHESIA PROCEDURE NOTES
Intubation    Date/Time: 4/3/2023 7:22 AM  Performed by: Oscar Waldron MD  Authorized by: Nils Agarwal MD     Intubation:     Induction:  Intravenous    Intubated:  Postinduction    Mask Ventilation:  Easy with oral airway    Attempts:  1    Attempted By:  Resident anesthesiologist    Method of Intubation:  Direct    Blade:  Isaacs 2    Laryngeal View Grade: Grade I - full view of cords      Difficult Airway Encountered?: No      Complications:  None    Airway Device:  Oral endotracheal tube    Airway Device Size:  7.5    Style/Cuff Inflation:  Cuffed (inflated to minimal occlusive pressure)    Inflation Amount (mL):  9    Tube secured:  22    Secured at:  The teeth    Placement Verified By:  Capnometry    Complicating Factors:  None    Findings Post-Intubation:  BS equal bilateral and atraumatic/condition of teeth unchanged

## 2023-04-03 NOTE — ANESTHESIA PROCEDURE NOTES
Arterial    Diagnosis: popliteal artery aneurysm    Patient location during procedure: done in OR    Staffing  Authorizing Provider: Nils Agarwal MD  Performing Provider: Oscar Waldron MD    Anesthesiologist was present at the time of the procedure.    Preanesthetic Checklist  Completed: patient identified, IV checked, site marked, risks and benefits discussed, surgical consent, monitors and equipment checked, pre-op evaluation, timeout performed and anesthesia consent givenArterial  Skin Prep: chlorhexidine gluconate  Local anesthetic: GA.  Orientation: left  Location: radial    Catheter Size: 20 G  Catheter placement by Anatomical landmarks. Heme positive aspiration all ports. Insertion Attempts: 1  Assessment  Dressing: secured with tape and tegaderm  Patient: Tolerated well

## 2023-04-03 NOTE — H&P
Interval H&P     Patient seen in pre-op. Here for planned Right fem-pop bypass.  No new complaints, symptoms, or changes to history since last seen. Brilinta has been held since Thursday.    Written and signed consent on file. Questions answered. Marked     April Armstrong MD  General Surgery, PGY-1  [facility]  Pager# [phone]         HPI:  Giorgi Eden is a 62 y.o. male with       Patient Active Problem List   Diagnosis    Essential hypertension    Status post insertion of drug eluting coronary artery stent    History of myocardial infarction    Mixed hyperlipidemia    Tobacco abuse    Atherosclerotic heart disease of native coronary artery with unstable angina pectoris    Chronic venous insufficiency    being managed by PCP and specialists who is here today for evaluation of BLE edema and RLE pain.  Patient states location is R calf occurring for months.  Associated signs and symptoms include edema.  Quality is pain and severity is 3/10.  Symptoms began yrs ago.  Alleviating factors include elevation.  Worsening factors include dependency.  Patient has not been wearing compression stockings for greater than 3 months.  No FH of venous disease.  Symptoms do limit patient's functional status and daily activities.  no DVT history.  no venous interventions.  no low sodium diet.  no excessive water intake.     Migraine with aura: no  PFO/ASD/right to left shunt: no  Pregnant: no  Breastfeeding: no     no MI  no Stroke  Tobacco use: no     1/2023:  c/o persistent BLE edema and pain despite compression and elevation > 3 mo.  No claudication.          Past Medical History:   Diagnosis Date    Hyperlipidemia      Hypertension      MI (myocardial infarction)       X2            Past Surgical History:   Procedure Laterality Date    CORONARY ANGIOPLASTY WITH STENT PLACEMENT         x2    LEFT HEART CATHETERIZATION Left 5/31/2022     Procedure: Left heart cath;  Surgeon: Romulo Gupta MD;  Location: [facility]  CATH/EP LAB;  Service: Cardiology;  Laterality: Left;            Family History   Problem Relation Age of Onset    COPD Mother      Heart disease Mother      Stroke Mother        Social History               Socioeconomic History    Marital status: Single   Tobacco Use    Smoking status: Former       Packs/day: 1.00       Years: 25.00       Pack years: 25.00       Types: Cigarettes       Quit date: 2021       Years since quittin.4    Smokeless tobacco: Never    Tobacco comments:       I now use a vape pen at 5 mg per day   Substance and Sexual Activity    Alcohol use: Yes       Comment: socially    Sexual activity: Yes       Partners: Female            Current Outpatient Medications:     aspirin (ECOTRIN) 81 MG EC tablet, Take 1 tablet (81 mg total) by mouth once daily., Disp: 360 tablet, Rfl: 0    atenoloL (TENORMIN) 50 MG tablet, Take 1 tablet (50 mg total) by mouth once daily., Disp: 30 tablet, Rfl: 11    atorvastatin (LIPITOR) 20 MG tablet, Take 1 tablet (20 mg total) by mouth once daily., Disp: 90 tablet, Rfl: 0    famotidine (PEPCID) 20 MG tablet, Take 1 tablet (20 mg total) by mouth 2 (two) times daily., Disp: 180 tablet, Rfl: 3    lisinopriL 10 MG Tab, Take 1 tablet (10 mg total) by mouth once daily., Disp: 30 tablet, Rfl: 2    nitroGLYCERIN (NITROSTAT) 0.4 MG SL tablet, Place 1 tablet (0.4 mg total) under the tongue every 5 (five) minutes as needed for Chest pain., Disp: 100 tablet, Rfl: 3    tamsulosin (FLOMAX) 0.4 mg Cap, Take 1 capsule (0.4 mg total) by mouth once daily., Disp: 90 capsule, Rfl: 0    ticagrelor (BRILINTA) 90 mg tablet, Take 1 tablet (90 mg total) by mouth 2 (two) times daily., Disp: 180 tablet, Rfl: 3    tiZANidine (ZANAFLEX) 4 MG tablet, Take 1 tablet (4 mg total) by mouth every 6 (six) hours as needed., Disp: 30 tablet, Rfl: 11     REVIEW OF SYSTEMS:  General: No fevers or chills; ENT: No sore throat; Allergy and Immunology: no persistent infections; Hematological and  Lymphatic: No history of bleeding or easy bruising; Endocrine: negative; Respiratory: no cough, shortness of breath, or wheezing; Cardiovascular: no chest pain or dyspnea on exertion; Gastrointestinal: no abdominal pain/back, change in bowel habits, or bloody stools; Genito-Urinary: no dysuria, trouble voiding, or hematuria; Musculoskeletal: edema; Neurological: no TIA or stroke symptoms; Psychiatric: no nervousness, anxiety or depression.     PHYSICAL EXAM:        General appearance:          Alert, well-appearing, and in no distress.  Oriented to person, place, and time                    Neurological:          Normal speech, no focal findings noted; CN II - XII grossly intact. RLE with sensation to light touch, LLE with sensation to light touch.            Musculoskeletal:          Digits/nail without cyanosis/clubbing.  Strength 5/5 BLE.                               Neck:         Supple, no significant adenopathy                             Chest:          No wheezes, symmetric air entry. No use of accessory muscles                          Cardiac:         Normal rate and regular rhythm                       Abdomen:         Soft, nontender, nondistended                 Extremities:            2+ R DP pulse, 2+ L DP pulse                                                  1+ RLE edema, 1+ LLE edema                          Skin:               RLE no ulcer; LLE no ulcer                                                  RLE no spider veins, LLE no spider veins                                                  RLE + varicose veins, LLE + varicose veins                          CEAP 3/3                          VCSS 10     LAB RESULTS:  No results found for: CBC        Lab Results   Component Value Date     INR 1.2 05/30/2022            Lab Results   Component Value Date      12/22/2022     K 4.2 12/22/2022      12/22/2022     CO2 23 12/22/2022     GLU 83 12/22/2022     BUN 14 12/22/2022     CREATININE 1.0  12/22/2022     CALCIUM 9.3 12/22/2022     ANIONGAP 10 12/22/2022     EGFRNONAA >60.0 05/31/2022            Lab Results   Component Value Date     WBC 7.85 09/24/2022     RBC 6.23 (H) 09/24/2022     HGB 17.7 09/24/2022     HCT 52.9 09/24/2022     MCV 85 09/24/2022     MCH 28.4 09/24/2022     MCHC 33.5 09/24/2022     RDW 14.9 (H) 09/24/2022      09/24/2022     MPV 10.2 09/24/2022     GRAN 4.3 09/24/2022     GRAN 54.8 09/24/2022     LYMPH 2.3 09/24/2022     LYMPH 29.0 09/24/2022     MONO 1.0 09/24/2022     MONO 13.0 09/24/2022     EOS 0.2 09/24/2022     BASO 0.04 09/24/2022     EOSINOPHIL 2.3 09/24/2022     BASOPHIL 0.5 09/24/2022     DIFFMETHOD Automated 09/24/2022      .        Lab Results   Component Value Date     HGBA1C 5.4 09/24/2022         IMAGING:  All pertinent imaging has been reviewed and interpreted independently.     Venous US 11/2022 Impression:  Details     Narrative    Indication   ========     Venous Insufficiency     Lower Extremity Veins   =================     Rt CFV:    complete compression, complete color fill   Rt prox femoral V: complete compression, complete color fill   Rt mid femoral V:  complete compression, complete color fill   Rt distal femoral V:   complete compression, complete color fill   Rt prox popliteal V:   complete compression, complete color fill   Rt mid popliteal V:    complete compression, complete color fill   Rt distal popliteal V: complete compression, complete color fill   Rt post tibial V:  complete compression, complete color fill,   Rt peroneal V: complete compression, complete color fill   Rt GSV:    complete compression, complete color fill, significant reflux   Rt SSV:    complete compression, complete color fill, no reflux   Add rt lower V details:    complete compression, complete color fill, Reflux Present   Lt CFV:    complete compression, complete color fill   Lt prox femoral V: complete compression, complete color fill   Lt mid femoral V:  complete  compression, complete color fill   Lt distal femoral V:   complete compression, complete color fill   Lt prox popliteal V:   complete compression, complete color fill   Lt mid popliteal V:    complete compression, complete color fill   Lt distal popliteal V: complete compression, complete color fill   Lt post tibial V:  complete compression, complete color fill   Lt peroneal V: complete compression, complete color fill   Lt GSV:    complete compression, complete color fill, significant reflux   Lt SSV:    complete compression, complete color fill, no reflux   Add lt lower V details:    complete compression, complete color fill, Reflux Present   Additional lower vein: Saphenofemoral Junction   Other: No reflux noted in the Accessory Vein     Lower Extremity Vein Mapping   =======================     Rt prox GSV thigh AP diam  6.2 mm   Rt mid GSV thigh AP diam   5.8 mm   Rt distal GSV thigh AP diam    8.3 mm   Rt GSV knee AP diam    6.5 mm   Rt prox GSV calf AP diam   4.0 mm   Rt mid GSV calf AP diam    2.4 mm   Rt distal GSV calf AP diam 1.9 mm   Lt prox GSV thigh AP diam  3.7 mm   Lt mid GSV thigh AP diam   3.4 mm   Lt distal GSV thigh AP diam    3.4 mm   Lt GSV knee AP diam    4.1 mm   Lt prox GSV calf AP diam   4.3 mm   Lt mid GSV calf AP diam    3.0 mm   Lt distal GSV calf AP diam 2.6 mm     Comment   ========     The diagnostic criteria used in our lab: Significant reflux is present when retrograde flow is noted both in the vein and at the   saphenofemoral junction (SFJ) or saphenopopliteal junction (SPJ)for 500 milliseconds (.5 second) following provocative maneuvers   when the patient is in the reverse trendelenburg position.     Impression   =========     Right Leg: Duplex imaging of the right lower extremity veins demonstrates patent and compressible veins.   There is no evidence of a venous thrombosis in the deep or superficial veins.   Significant reflux noted in the right GSV including the Saphenofemoral  Junction (SFJ). No significant reflux noted in the SSV or   accessory vein.   Incidental finding of dilated distal SFA measuring 2.1 cm x 2.3 cm and proximal popliteal artery measuring 1.8 cm x 1.6 cm.     Left Leg: Duplex imaging of the left lower extremity veins demonstrates patent and compressible veins.   There is no evidence of a venous thrombosis in the deep or superficial veins.   Significant reflux noted in the left GSV including the Saphenofemoral Junction (SFJ). No significant reflux noted in the SSV or   accessory vein.   Incidental finding of dilated distal SFA measuring 2.1 cm x 2.1 cm, proximal popliteal artery measuring 1.9 cm x 1.9 cm and mid   popliteal artery measuring 2.3 cm x 2.0 cm.       DATE OF SERVICE: 11/14/2022                                                        Sonographer: AG HASTINGS RVS   Electronically Signed by: Shantanu Gu II at 11/15/2022-13:34             Specimen Collected: 11/14/22 13:48 Last Resulted: 11/15/22 13:37           CTA BLE runoff 2023 reviewed.        IMP/PLAN:  62 y.o. male with       Patient Active Problem List   Diagnosis    Essential hypertension    Status post insertion of drug eluting coronary artery stent    History of myocardial infarction    Mixed hyperlipidemia    Tobacco abuse    Atherosclerotic heart disease of native coronary artery with unstable angina pectoris    Chronic venous insufficiency    being managed by PCP and specialists who is here today for evaluation of venous insufficiency and BLE asymptomatic SFA and popliteal aneurysms.     -recommend compression with Rx stockings, elevation, dietary changes associated with water and sodium intake discussed at length with patient  -Exercise   -CTA AP runoff to shows bilateral >2cm femoropopliteal aneurysms, asymptomatic - rec staged repairs R first.  Rec BLE angiogram prior to vein bypass  -Scheduled 1/26/23

## 2023-04-03 NOTE — TRANSFER OF CARE
"Anesthesia Transfer of Care Note    Patient: Giorgi Eden    Procedure(s) Performed: Procedure(s) (LRB):  CREATION, BYPASS, ARTERIAL, FEMORAL TO POPLITEAL, USING VEIN (Right)    Patient location: PACU    Anesthesia Type: general    Transport from OR: Transported from OR on 6-10 L/min O2 by face mask with adequate spontaneous ventilation    Post pain: adequate analgesia    Post assessment: no apparent anesthetic complications    Post vital signs: stable    Level of consciousness: responds to stimulation    Nausea/Vomiting: no nausea/vomiting    Complications: none    Transfer of care protocol was followed      Last vitals:   Visit Vitals  /71 (BP Location: Right arm, Patient Position: Lying)   Pulse 69   Temp 36.8 °C (98.2 °F) (Temporal)   Resp 13   Ht 5' 8" (1.727 m)   Wt 86.5 kg (190 lb 9.6 oz)   SpO2 95%   BMI 28.98 kg/m²     "

## 2023-04-04 LAB
ANION GAP SERPL CALC-SCNC: 10 MMOL/L (ref 8–16)
BASOPHILS # BLD AUTO: 0.02 K/UL (ref 0–0.2)
BASOPHILS NFR BLD: 0.1 % (ref 0–1.9)
BUN SERPL-MCNC: 13 MG/DL (ref 6–20)
CALCIUM SERPL-MCNC: 8 MG/DL (ref 8.7–10.5)
CHLORIDE SERPL-SCNC: 109 MMOL/L (ref 95–110)
CO2 SERPL-SCNC: 20 MMOL/L (ref 23–29)
CREAT SERPL-MCNC: 0.8 MG/DL (ref 0.5–1.4)
DIFFERENTIAL METHOD: ABNORMAL
EOSINOPHIL # BLD AUTO: 0 K/UL (ref 0–0.5)
EOSINOPHIL NFR BLD: 0 % (ref 0–8)
ERYTHROCYTE [DISTWIDTH] IN BLOOD BY AUTOMATED COUNT: 14.2 % (ref 11.5–14.5)
EST. GFR  (NO RACE VARIABLE): >60 ML/MIN/1.73 M^2
GLUCOSE SERPL-MCNC: 110 MG/DL (ref 70–110)
HCT VFR BLD AUTO: 38.1 % (ref 40–54)
HGB BLD-MCNC: 12.1 G/DL (ref 14–18)
IMM GRANULOCYTES # BLD AUTO: 0.08 K/UL (ref 0–0.04)
IMM GRANULOCYTES NFR BLD AUTO: 0.6 % (ref 0–0.5)
LYMPHOCYTES # BLD AUTO: 1.6 K/UL (ref 1–4.8)
LYMPHOCYTES NFR BLD: 11.7 % (ref 18–48)
MAGNESIUM SERPL-MCNC: 1.6 MG/DL (ref 1.6–2.6)
MCH RBC QN AUTO: 27.5 PG (ref 27–31)
MCHC RBC AUTO-ENTMCNC: 31.8 G/DL (ref 32–36)
MCV RBC AUTO: 87 FL (ref 82–98)
MONOCYTES # BLD AUTO: 1.6 K/UL (ref 0.3–1)
MONOCYTES NFR BLD: 11.3 % (ref 4–15)
NEUTROPHILS # BLD AUTO: 10.5 K/UL (ref 1.8–7.7)
NEUTROPHILS NFR BLD: 76.3 % (ref 38–73)
NRBC BLD-RTO: 0 /100 WBC
PHOSPHATE SERPL-MCNC: 2.6 MG/DL (ref 2.7–4.5)
PLATELET # BLD AUTO: 249 K/UL (ref 150–450)
PMV BLD AUTO: 10.1 FL (ref 9.2–12.9)
POTASSIUM SERPL-SCNC: 4.1 MMOL/L (ref 3.5–5.1)
RBC # BLD AUTO: 4.4 M/UL (ref 4.6–6.2)
SODIUM SERPL-SCNC: 139 MMOL/L (ref 136–145)
WBC # BLD AUTO: 13.7 K/UL (ref 3.9–12.7)

## 2023-04-04 PROCEDURE — 99024 PR POST-OP FOLLOW-UP VISIT: ICD-10-PCS | Mod: ,,, | Performed by: SURGERY

## 2023-04-04 PROCEDURE — 99499 NO LOS: ICD-10-PCS | Mod: ,,, | Performed by: ANESTHESIOLOGY

## 2023-04-04 PROCEDURE — 25000003 PHARM REV CODE 250

## 2023-04-04 PROCEDURE — 94761 N-INVAS EAR/PLS OXIMETRY MLT: CPT

## 2023-04-04 PROCEDURE — 25000003 PHARM REV CODE 250: Performed by: SURGERY

## 2023-04-04 PROCEDURE — 25000003 PHARM REV CODE 250: Performed by: STUDENT IN AN ORGANIZED HEALTH CARE EDUCATION/TRAINING PROGRAM

## 2023-04-04 PROCEDURE — 83735 ASSAY OF MAGNESIUM: CPT

## 2023-04-04 PROCEDURE — 63600175 PHARM REV CODE 636 W HCPCS

## 2023-04-04 PROCEDURE — 11000001 HC ACUTE MED/SURG PRIVATE ROOM

## 2023-04-04 PROCEDURE — 27000491 HC MATTRESS, SOFT CARE OVERLAY

## 2023-04-04 PROCEDURE — 85025 COMPLETE CBC W/AUTO DIFF WBC: CPT | Performed by: STUDENT IN AN ORGANIZED HEALTH CARE EDUCATION/TRAINING PROGRAM

## 2023-04-04 PROCEDURE — 25000003 PHARM REV CODE 250: Performed by: ANESTHESIOLOGY

## 2023-04-04 PROCEDURE — 27000492 HC SLEEVE, SCD T/L

## 2023-04-04 PROCEDURE — 84100 ASSAY OF PHOSPHORUS: CPT

## 2023-04-04 PROCEDURE — 99024 POSTOP FOLLOW-UP VISIT: CPT | Mod: ,,, | Performed by: SURGERY

## 2023-04-04 PROCEDURE — 63600175 PHARM REV CODE 636 W HCPCS: Performed by: STUDENT IN AN ORGANIZED HEALTH CARE EDUCATION/TRAINING PROGRAM

## 2023-04-04 PROCEDURE — 99499 UNLISTED E&M SERVICE: CPT | Mod: ,,, | Performed by: ANESTHESIOLOGY

## 2023-04-04 PROCEDURE — 80048 BASIC METABOLIC PNL TOTAL CA: CPT | Performed by: STUDENT IN AN ORGANIZED HEALTH CARE EDUCATION/TRAINING PROGRAM

## 2023-04-04 RX ORDER — ACETAMINOPHEN 500 MG
1000 TABLET ORAL EVERY 8 HOURS
Status: DISPENSED | OUTPATIENT
Start: 2023-04-04 | End: 2023-04-05

## 2023-04-04 RX ORDER — MUPIROCIN 20 MG/G
OINTMENT TOPICAL 2 TIMES DAILY
Status: DISCONTINUED | OUTPATIENT
Start: 2023-04-04 | End: 2023-04-07 | Stop reason: HOSPADM

## 2023-04-04 RX ORDER — BISACODYL 10 MG
10 SUPPOSITORY, RECTAL RECTAL DAILY
Status: DISCONTINUED | OUTPATIENT
Start: 2023-04-05 | End: 2023-04-05

## 2023-04-04 RX ORDER — ACETAMINOPHEN 325 MG/1
650 TABLET ORAL EVERY 6 HOURS PRN
Status: DISCONTINUED | OUTPATIENT
Start: 2023-04-04 | End: 2023-04-04

## 2023-04-04 RX ORDER — POLYETHYLENE GLYCOL 3350 17 G/17G
17 POWDER, FOR SOLUTION ORAL DAILY
Status: DISCONTINUED | OUTPATIENT
Start: 2023-04-04 | End: 2023-04-07 | Stop reason: HOSPADM

## 2023-04-04 RX ORDER — POLYETHYLENE GLYCOL 3350 17 G/17G
17 POWDER, FOR SOLUTION ORAL DAILY
Status: CANCELLED | OUTPATIENT
Start: 2023-04-04

## 2023-04-04 RX ORDER — ACETAMINOPHEN 325 MG/1
TABLET ORAL
Status: COMPLETED
Start: 2023-04-04 | End: 2023-04-04

## 2023-04-04 RX ORDER — ACETAMINOPHEN 325 MG/1
650 TABLET ORAL EVERY 6 HOURS PRN
Status: DISCONTINUED | OUTPATIENT
Start: 2023-04-05 | End: 2023-04-07 | Stop reason: HOSPADM

## 2023-04-04 RX ADMIN — ACETAMINOPHEN 650 MG: 325 TABLET ORAL at 09:04

## 2023-04-04 RX ADMIN — ENOXAPARIN SODIUM 40 MG: 40 INJECTION SUBCUTANEOUS at 05:04

## 2023-04-04 RX ADMIN — ATORVASTATIN CALCIUM 20 MG: 20 TABLET, FILM COATED ORAL at 09:04

## 2023-04-04 RX ADMIN — SODIUM CHLORIDE, POTASSIUM CHLORIDE, SODIUM LACTATE AND CALCIUM CHLORIDE: 600; 310; 30; 20 INJECTION, SOLUTION INTRAVENOUS at 12:04

## 2023-04-04 RX ADMIN — ACETAMINOPHEN 1000 MG: 500 TABLET ORAL at 09:04

## 2023-04-04 RX ADMIN — SODIUM PHOSPHATE, MONOBASIC, MONOHYDRATE AND SODIUM PHOSPHATE, DIBASIC, ANHYDROUS 15 MMOL: 142; 276 INJECTION, SOLUTION INTRAVENOUS at 06:04

## 2023-04-04 RX ADMIN — CEFAZOLIN 2 G: 2 INJECTION, POWDER, FOR SOLUTION INTRAMUSCULAR; INTRAVENOUS at 09:04

## 2023-04-04 RX ADMIN — CEFAZOLIN 2 G: 2 INJECTION, POWDER, FOR SOLUTION INTRAMUSCULAR; INTRAVENOUS at 05:04

## 2023-04-04 RX ADMIN — OXYCODONE HYDROCHLORIDE 5 MG: 5 TABLET ORAL at 12:04

## 2023-04-04 RX ADMIN — MAGNESIUM SULFATE 2 G: 2 INJECTION INTRAVENOUS at 05:04

## 2023-04-04 RX ADMIN — DOCUSATE SODIUM 50 MG: 50 CAPSULE, LIQUID FILLED ORAL at 09:04

## 2023-04-04 RX ADMIN — ACETAMINOPHEN 1000 MG: 500 TABLET ORAL at 02:04

## 2023-04-04 RX ADMIN — FAMOTIDINE 20 MG: 20 TABLET ORAL at 09:04

## 2023-04-04 RX ADMIN — TAMSULOSIN HYDROCHLORIDE 0.4 MG: 0.4 CAPSULE ORAL at 09:04

## 2023-04-04 RX ADMIN — OXYCODONE HYDROCHLORIDE 5 MG: 5 TABLET ORAL at 10:04

## 2023-04-04 RX ADMIN — TICAGRELOR 90 MG: 90 TABLET ORAL at 12:04

## 2023-04-04 RX ADMIN — POLYETHYLENE GLYCOL 3350 17 G: 17 POWDER, FOR SOLUTION ORAL at 01:04

## 2023-04-04 RX ADMIN — ASPIRIN 81 MG: 81 TABLET, COATED ORAL at 09:04

## 2023-04-04 RX ADMIN — ATENOLOL 50 MG: 25 TABLET ORAL at 09:04

## 2023-04-04 RX ADMIN — CEFAZOLIN 2 G: 2 INJECTION, POWDER, FOR SOLUTION INTRAMUSCULAR; INTRAVENOUS at 12:04

## 2023-04-04 RX ADMIN — MUPIROCIN: 20 OINTMENT TOPICAL at 09:04

## 2023-04-04 NOTE — PLAN OF CARE
"      SICU PLAN OF CARE NOTE    Dx: Popliteal artery aneurysm    Shift Events: 1.5L LR given for MAP < 65 and 75 cc/hr MIVF started. UO responding but MAP persisting in the low 60s, DM aware. Pt asymptomatic and sleeping most of shift. Q 1 NV checks in place. Pedal pulses palpable, pt reporting no numbness/tingling and moving both extremities. Order to d/c lozano and daly.    Goals of Care: MAP > 65, SBP < 160    Neuro: AAO x4, Follows Commands, and Moves All Extremities    Vital Signs: /64 (BP Location: Left arm, Patient Position: Lying)   Pulse 84   Temp 98.2 °F (36.8 °C) (Oral)   Resp (!) 22   Ht 5' 8" (1.727 m)   Wt 86.5 kg (190 lb 9.6 oz)   SpO2 98%   BMI 28.98 kg/m²     Respiratory: Room Air    Diet: Clear Liquid    Gtts: MIVF    Urine Output: Urinary Catheter 1810 cc/shift     Labs/Accuchecks: AM labs.    Skin: No new breakdown noted. Sacral foam in place, pt refusing heel foams. Pt weight shifting.       "

## 2023-04-04 NOTE — SUBJECTIVE & OBJECTIVE
Medications:  Continuous Infusions:   lactated ringers 75 mL/hr at 04/04/23 0600     Scheduled Meds:   aspirin  81 mg Oral Daily    atenoloL  50 mg Oral Daily    atorvastatin  20 mg Oral Daily    ceFAZolin (ANCEF) IVPB  2 g Intravenous Q8H    docusate sodium  50 mg Oral Daily    enoxaparin  40 mg Subcutaneous Daily    famotidine  20 mg Oral BID    tamsulosin  0.4 mg Oral Daily     PRN Meds:calcium gluconate IVPB, calcium gluconate IVPB, calcium gluconate IVPB, magnesium sulfate IVPB, magnesium sulfate IVPB, ondansetron, oxyCODONE, oxyCODONE, sodium phosphate IVPB, sodium phosphate IVPB, sodium phosphate IVPB     Objective:     Vital Signs (Most Recent):  Temp: 98.2 °F (36.8 °C) (04/04/23 0300)  Pulse: 70 (04/04/23 0711)  Resp: 19 (04/04/23 0711)  BP: 107/65 (04/04/23 0600)  SpO2: 96 % (04/04/23 0711) Vital Signs (24h Range):  Temp:  [98.1 °F (36.7 °C)-98.2 °F (36.8 °C)] 98.2 °F (36.8 °C)  Pulse:  [60-85] 70  Resp:  [10-27] 19  SpO2:  [91 %-100 %] 96 %  BP: ()/(58-86) 107/65  Arterial Line BP: ()/(43-73) 109/62         Physical Exam  Vitals and nursing note reviewed.   HENT:      Head: Normocephalic and atraumatic.      Mouth/Throat:      Mouth: Mucous membranes are dry.   Cardiovascular:      Rate and Rhythm: Normal rate and regular rhythm.      Pulses: Normal pulses.           Dorsalis pedis pulses are 2+ on the right side and 2+ on the left side.      Heart sounds: Normal heart sounds.   Pulmonary:      Effort: Pulmonary effort is normal.   Abdominal:      General: Abdomen is flat.      Palpations: Abdomen is soft.   Musculoskeletal:      Cervical back: Normal range of motion.   Skin:     General: Skin is warm and dry.      Capillary Refill: Capillary refill takes less than 2 seconds.      Comments: Bilateral groin puncture site  RLE incision site covered with dressing    Neurological:      General: No focal deficit present.      Mental Status: He is alert and oriented to person, place, and time.    Psychiatric:         Mood and Affect: Mood normal.       Significant Labs:  CBC:   Recent Labs   Lab 04/04/23  0330   WBC 13.70*   RBC 4.40*   HGB 12.1*   HCT 38.1*      MCV 87   MCH 27.5   MCHC 31.8*     CMP:   Recent Labs   Lab 04/03/23 2032 04/04/23 0330   * 110   CALCIUM 8.2* 8.0*   ALBUMIN 3.1*  --    PROT 6.2  --     139   K 4.4 4.1   CO2 19* 20*    109   BUN 14 13   CREATININE 0.9 0.8   ALKPHOS 70  --    ALT 22  --    AST 17  --    BILITOT 0.8  --        Significant Diagnostics:  I have reviewed all pertinent imaging results/findings within the past 24 hours.

## 2023-04-04 NOTE — ANESTHESIA POSTPROCEDURE EVALUATION
Anesthesia Post Evaluation    Patient: Giorgi Eden    Procedure(s) Performed: Procedure(s) (LRB):  CREATION, BYPASS, ARTERIAL, FEMORAL TO POPLITEAL, USING VEIN (Right)    Final Anesthesia Type: general      Patient location during evaluation: PACU  Patient participation: Yes- Able to Participate  Level of consciousness: awake and alert  Post-procedure vital signs: reviewed and stable  Pain management: adequate  Airway patency: patent    PONV status at discharge: No PONV  Anesthetic complications: no      Cardiovascular status: blood pressure returned to baseline  Respiratory status: unassisted  Follow-up not needed.            Event Time   Out of Recovery 19:46:00         Pain/Joce Score: Pain Rating Prior to Med Admin: 7 (4/3/2023  7:20 PM)  Pain Rating Post Med Admin: 4 (4/3/2023  6:30 PM)  Joce Score: 10 (4/3/2023  4:30 PM)

## 2023-04-04 NOTE — PLAN OF CARE
[facility] Int[facility]  Initial Discharge Assessment       Primary Care Provider: Nitin Villareal MD    Admission Diagnosis: Popliteal artery aneurysm [I72.4]  Peripheral artery disease [I73.9]    Admission Date: 4/3/2023  Expected Discharge Date:     Discharge Barriers Identified: None    Payor: [employer] [facility] / Plan: BCBS ALL OUT OF STATE / Product Type: PPO /     Extended Emergency Contact Information  Primary Emergency Contact: Salima De Leon  Mobile Phone: [phone]  Relation: Significant other  Preferred language: English   needed? No    Discharge Plan A:  (TBD)  Discharge Plan B:  (TBD)      [id number] - [place], [place] - [address]  [address] [place] [address]  Phone: [phone] Fax: [phone]    [facility] [address], [address]  [address], [address]  [facility] [address]  Phone: [phone] Fax: [phone]      Initial Assessment (most recent)       Adult Discharge Assessment - 04/04/23 0824          Discharge Assessment    Assessment Type Discharge Planning Assessment     Confirmed/corrected address, phone number and insurance Yes     Confirmed Demographics Correct on Facesheet     Source of Information patient     When was your last doctors appointment? 11/15/22     Communicated LISA with patient/caregiver Date not available/Unable to determine     People in Home significant other     Do you expect to return to your current living situation? Yes     Do you have help at home or someone to help you manage your care at home? Yes     Prior to hospitilization cognitive status: Alert/Oriented     Current cognitive status: Alert/Oriented     Home Layout Able to live on 1st floor     Equipment Currently Used at Home none     Readmission within 30 days? No     Patient currently being followed by outpatient case management? No     Do you currently have service(s) that help you manage your care at home?  No     Do you take prescription medications? Yes     Do you have prescription coverage? Yes     Do you have any problems affording any of your prescribed medications? No     Who is going to help you get home at discharge? S.OT[employer] Velazco [phone]     How do you get to doctors appointments? car, drives self;family or friend will provide     Are you on dialysis? No     Do you take coumadin? No     Discharge Plan A --   TBD    Discharge Plan B --   TBD    DME Needed Upon Discharge  none     Discharge Plan discussed with: Patient     Discharge Barriers Identified None        Financial Resource Strain    How hard is it for you to pay for the very basics like food, housing, medical care, and heating? Not hard at all        Housing Stability    In the last 12 months, was there a time when you were not able to pay the mortgage or rent on time? Patient refused        Transportation Needs    In the past 12 months, has lack of transportation kept you from medical appointments or from getting medications? No     In the past 12 months, has lack of transportation kept you from meetings, work, or from getting things needed for daily living? No        Food Insecurity    Within the past 12 months, you worried that your food would run out before you got the money to buy more. Never true     Within the past 12 months, the food you bought just didn't last and you didn't have money to get more. Never true        Social Connections    Are you , , , , never , or living with a partner? Living with partner        Alcohol Use    Q1: How often do you have a drink containing alcohol? 2-4 times a month                   Patient lives with his S.O.in a 1 story house he is not on dialysis and does not take coumadin he has transportation home

## 2023-04-04 NOTE — SUBJECTIVE & OBJECTIVE
Follow-up For: Procedure(s) (LRB):  CREATION, BYPASS, ARTERIAL, FEMORAL TO POPLITEAL, USING VEIN (Right)    Post-Operative Day: Day of Surgery    Objective:     Vital Signs (Most Recent):  Temp: 98.2 °F (36.8 °C) (04/03/23 2300)  Pulse: 74 (04/03/23 2315)  Resp: 12 (04/03/23 2315)  BP: 103/63 (04/03/23 2300)  SpO2: (!) 93 % (04/03/23 2315)   Vital Signs (24h Range):  Temp:  [98.1 °F (36.7 °C)-98.2 °F (36.8 °C)] 98.2 °F (36.8 °C)  Pulse:  [60-79] 74  Resp:  [10-27] 12  SpO2:  [91 %-100 %] 93 %  BP: ()/(58-86) 103/63  Arterial Line BP: ()/(43-57) 96/48     Weight: 86.5 kg (190 lb 9.6 oz)  Body mass index is 28.98 kg/m².      Intake/Output Summary (Last 24 hours) at 4/3/2023 2352  Last data filed at 4/3/2023 2300  Gross per 24 hour   Intake 5108.13 ml   Output 2005 ml   Net 3103.13 ml       Physical Exam  Vitals and nursing note reviewed.   HENT:      Head: Normocephalic and atraumatic.      Mouth/Throat:      Mouth: Mucous membranes are dry.   Cardiovascular:      Rate and Rhythm: Normal rate and regular rhythm.      Pulses: Normal pulses.      Heart sounds: Normal heart sounds.   Pulmonary:      Effort: Pulmonary effort is normal.   Abdominal:      General: Abdomen is flat.      Palpations: Abdomen is soft.   Musculoskeletal:      Cervical back: Normal range of motion.   Skin:     General: Skin is warm and dry.      Comments: Bilateral groin puncture site   Neurological:      General: No focal deficit present.      Mental Status: He is alert and oriented to person, place, and time.   Psychiatric:         Mood and Affect: Mood normal.       Vents:       Lines/Drains/Airways       Drain  Duration                  Urethral Catheter 04/03/23 0745 Non-latex 16 Fr. <1 day              Arterial Line  Duration             Arterial Line 04/03/23 0740 Left Radial <1 day              Peripheral Intravenous Line  Duration                  Peripheral IV - Single Lumen 04/03/23 0610 18 G Anterior;Distal;Right Wrist  <1 day         Peripheral IV - Single Lumen 04/03/23 1600 16 G Anterior;Left Forearm <1 day                    Significant Labs:    CBC/Anemia Profile:  Recent Labs   Lab 04/03/23 2032   WBC 18.48*   HGB 13.7*   HCT 42.3      MCV 85   RDW 14.2        Chemistries:  Recent Labs   Lab 04/03/23 2032      K 4.4      CO2 19*   BUN 14   CREATININE 0.9   CALCIUM 8.2*   ALBUMIN 3.1*   PROT 6.2   BILITOT 0.8   ALKPHOS 70   ALT 22   AST 17   MG 1.6   PHOS 3.6       All pertinent labs within the past 24 hours have been reviewed.    Significant Imaging:  I have reviewed all pertinent imaging results/findings within the past 24 hours.

## 2023-04-04 NOTE — ASSESSMENT & PLAN NOTE
  Neuro/Psych:   -- Sedation: awake, alert   -- Pain: PRN oxy 5 &10             Cards:   -- ASA, statin, ticagrelor  -- atenolol      Pulm:   -- Goal O2 sat > 90%  -- on RA      Renal:  -- strict I/O  -- BUN/Cr 14/0.9      FEN / GI:   -- Net -1.1L  -- Replace lytes as needed  -- Nutrition: cardiac diet      ID:   -- Tm: afebrile; WBC 18.48 --> 15.8  -- Abx: none      Heme/Onc:   -- H/H 13.7/42.3 --> 12.6/40  -- Daily CBC      Endo:   -- Gluc goal 140-180        PPx:   Feeding: Cardiac diet  Analgesia/Sedation: adequate / awake   Thromboembolic prevention: lovenox  HOB >30: yes  Stress Ulcer ppx: famotidine  Glucose control: Critical care goal 140-180 g/dl, ISS     Lines/Drains/Airway: PIV x2      Dispo/Code Status/Palliative:   -- step down/ Full Code

## 2023-04-04 NOTE — NURSING TRANSFER
Nursing Transfer Note      4/3/2023     Reason patient is being transferred: post op    Transfer To: SICU [id number]    Transfer via stretcher    Transfer with cardiac monitoring, SpO2, NIBP, RN    Transported by RN x1    Medicines sent: n/a    Any special needs or follow-up needed: routine, Q1H neurovascular checks    Chart send with patient: Yes    Notified: omar    Patient reassessed at: 04/03/2023 at 1930

## 2023-04-04 NOTE — ASSESSMENT & PLAN NOTE
  Neuro/Psych:   -- Sedation: awake, alert   -- Pain: PRN oxy 5 &10             Cards:   -- Mild hypotension overnight  -- Given 500 mL bolus x3, 75 mL/hr maintenance      Pulm:   -- Goal O2 sat > 90%  -- Wean as able      Renal:  -- Keep lozano for strict I/O  -- BUN/Cr 14/0.9      FEN / GI:   -- Net +3L  -- Replace lytes as needed  -- Nutrition: clear liquid      ID:   -- Tm: afebrile; WBC 18.48  -- Abx ancef      Heme/Onc:   -- H/H stable 13.7/42.3  -- Daily CBC      Endo:   -- Gluc goal 140-180  --       PPx:   Feeding: Clear liquid  Analgesia/Sedation: adequate / awake   Thromboembolic prevention: asa  HOB >30: yes  Stress Ulcer ppx: famotidine  Glucose control: Critical care goal 140-180 g/dl, ISS    Lines/Drains/Airway: PIV x2, A-line,lozano      Dispo/Code Status/Palliative:   -- SICU / Full Code

## 2023-04-04 NOTE — ASSESSMENT & PLAN NOTE
61 yo male with pmh of HTN, MI s/p stent x3 in 5/2022 (on ASA and brilinta at home),  HLD, Tobacco abuse, lap salvatore on 3/23 admitted s/p right fem pop bypass by Dr Castorena for management of Popliteal aneurysm.    -Advance diet as tolerated  -Urine output ok, dc lozano  -DC daly  -Aspirin only for now  -Resume home meds  -PT/OT  -Pulses palpable this am    Dispo:  Patient can be stepped down when able to ambulate

## 2023-04-04 NOTE — H&P
[facility]  Critical Care - Surgery  History & Physical    Patient Name: Giorgi Eden  MRN: [id number]  Admission Date: 4/3/2023  Code Status: Full Code  Attending Physician: Ian Castorena MD   Primary Care Provider: Nitin Villareal MD   Principal Problem: Popliteal artery aneurysm    Subjective:     HPI:  63 yo male with pmh of HTN, MI s/p stent x3 in 5/2022 (on ASA and brilinta at home),  HLD, Tobacco abuse, lap salvatore on 3/23 admitted s/p right fem pop bypass by Dr Castorena for management of Popliteal aneurysm. Patient seen in PACU HDS on RA. Complains of mild pain in his low back and buttocks from lying flat in bed. Otherwise doing well. Admitted to SICU for vascular checks. To begin ASA POD0.             Follow-up For: Procedure(s) (LRB):  CREATION, BYPASS, ARTERIAL, FEMORAL TO POPLITEAL, USING VEIN (Right)    Post-Operative Day: Day of Surgery    Objective:     Vital Signs (Most Recent):  Temp: 98.2 °F (36.8 °C) (04/03/23 2300)  Pulse: 74 (04/03/23 2315)  Resp: 12 (04/03/23 2315)  BP: 103/63 (04/03/23 2300)  SpO2: (!) 93 % (04/03/23 2315)   Vital Signs (24h Range):  Temp:  [98.1 °F (36.7 °C)-98.2 °F (36.8 °C)] 98.2 °F (36.8 °C)  Pulse:  [60-79] 74  Resp:  [10-27] 12  SpO2:  [91 %-100 %] 93 %  BP: ()/(58-86) 103/63  Arterial Line BP: ()/(43-57) 96/48     Weight: 86.5 kg (190 lb 9.6 oz)  Body mass index is 28.98 kg/m².      Intake/Output Summary (Last 24 hours) at 4/3/2023 2352  Last data filed at 4/3/2023 2300  Gross per 24 hour   Intake 5108.13 ml   Output 2005 ml   Net 3103.13 ml       Physical Exam  Vitals and nursing note reviewed.   HENT:      Head: Normocephalic and atraumatic.      Mouth/Throat:      Mouth: Mucous membranes are dry.   Cardiovascular:      Rate and Rhythm: Normal rate and regular rhythm.      Pulses: Normal pulses.      Heart sounds: Normal heart sounds.   Pulmonary:      Effort: Pulmonary effort is normal.   Abdominal:      General: Abdomen is flat.       Palpations: Abdomen is soft.   Musculoskeletal:      Cervical back: Normal range of motion.   Skin:     General: Skin is warm and dry.      Comments: Bilateral groin puncture site   Neurological:      General: No focal deficit present.      Mental Status: He is alert and oriented to person, place, and time.   Psychiatric:         Mood and Affect: Mood normal.       Vents:       Lines/Drains/Airways       Drain  Duration                  Urethral Catheter 04/03/23 0745 Non-latex 16 Fr. <1 day              Arterial Line  Duration             Arterial Line 04/03/23 0740 Left Radial <1 day              Peripheral Intravenous Line  Duration                  Peripheral IV - Single Lumen 04/03/23 0610 18 G Anterior;Distal;Right Wrist <1 day         Peripheral IV - Single Lumen 04/03/23 1600 16 G Anterior;Left Forearm <1 day                    Significant Labs:    CBC/Anemia Profile:  Recent Labs   Lab 04/03/23 2032   WBC 18.48*   HGB 13.7*   HCT 42.3      MCV 85   RDW 14.2        Chemistries:  Recent Labs   Lab 04/03/23 2032      K 4.4      CO2 19*   BUN 14   CREATININE 0.9   CALCIUM 8.2*   ALBUMIN 3.1*   PROT 6.2   BILITOT 0.8   ALKPHOS 70   ALT 22   AST 17   MG 1.6   PHOS 3.6       All pertinent labs within the past 24 hours have been reviewed.    Significant Imaging:  I have reviewed all pertinent imaging results/findings within the past 24 hours.    Assessment/Plan:     Cardiac/Vascular  * Popliteal artery aneurysm    Neuro/Psych:   -- Sedation: awake, alert   -- Pain: PRN oxy 5 &10             Cards:   -- Mild hypotension overnight  -- Given 500 mL bolus x3, 75 mL/hr maintenance      Pulm:   -- Goal O2 sat > 90%  -- Wean as able      Renal:  -- Keep lozano for strict I/O  -- BUN/Cr 14/0.9      FEN / GI:   -- Net +3L  -- Replace lytes as needed  -- Nutrition: clear liquid      ID:   -- Tm: afebrile; WBC 18.48  -- Abx ancef      Heme/Onc:   -- H/H stable 13.7/42.3  -- Daily CBC       Endo:   -- Gluc goal 140-180  --       PPx:   Feeding: Clear liquid  Analgesia/Sedation: adequate / awake   Thromboembolic prevention: asa  HOB >30: yes  Stress Ulcer ppx: famotidine  Glucose control: Critical care goal 140-180 g/dl, ISS    Lines/Drains/Airway: PIV x2, A-line,lozano      Dispo/Code Status/Palliative:   -- SICU / Full Code           Critical care was time spent personally by me on the following activities: development of treatment plan with patient or surrogate and bedside caregivers, discussions with consultants, evaluation of patient's response to treatment, examination of patient, ordering and performing treatments and interventions, ordering and review of laboratory studies, ordering and review of radiographic studies, pulse oximetry, re-evaluation of patient's condition.  This critical care time did not overlap with that of any other provider or involve time for any procedures.     Angelo Saeed MD  Critical Care - Surgery  [facility]

## 2023-04-04 NOTE — PROGRESS NOTES
[facility]  Vascular Surgery  Progress Note    Patient Name: Giorgi Eden  MRN: [id number]  Admission Date: 4/3/2023  Primary Care Provider: Nitin Villareal MD    Subjective:     Interval History: Patient did well overnight, VSS, no complaints on rounds this am.     Post-Op Info:  Procedure(s) (LRB):  CREATION, BYPASS, ARTERIAL, FEMORAL TO POPLITEAL, USING VEIN (Right)   1 Day Post-Op       Medications:  Continuous Infusions:   lactated ringers 75 mL/hr at 04/04/23 0600     Scheduled Meds:   aspirin  81 mg Oral Daily    atenoloL  50 mg Oral Daily    atorvastatin  20 mg Oral Daily    ceFAZolin (ANCEF) IVPB  2 g Intravenous Q8H    docusate sodium  50 mg Oral Daily    enoxaparin  40 mg Subcutaneous Daily    famotidine  20 mg Oral BID    tamsulosin  0.4 mg Oral Daily     PRN Meds:calcium gluconate IVPB, calcium gluconate IVPB, calcium gluconate IVPB, magnesium sulfate IVPB, magnesium sulfate IVPB, ondansetron, oxyCODONE, oxyCODONE, sodium phosphate IVPB, sodium phosphate IVPB, sodium phosphate IVPB     Objective:     Vital Signs (Most Recent):  Temp: 98.2 °F (36.8 °C) (04/04/23 0300)  Pulse: 70 (04/04/23 0711)  Resp: 19 (04/04/23 0711)  BP: 107/65 (04/04/23 0600)  SpO2: 96 % (04/04/23 0711) Vital Signs (24h Range):  Temp:  [98.1 °F (36.7 °C)-98.2 °F (36.8 °C)] 98.2 °F (36.8 °C)  Pulse:  [60-85] 70  Resp:  [10-27] 19  SpO2:  [91 %-100 %] 96 %  BP: ()/(58-86) 107/65  Arterial Line BP: ()/(43-73) 109/62         Physical Exam  Vitals and nursing note reviewed.   HENT:      Head: Normocephalic and atraumatic.      Mouth/Throat:      Mouth: Mucous membranes are dry.   Cardiovascular:      Rate and Rhythm: Normal rate and regular rhythm.      Pulses: Normal pulses.           Dorsalis pedis pulses are 2+ on the right side and 2+ on the left side.      Heart sounds: Normal heart sounds.   Pulmonary:      Effort: Pulmonary effort is normal.   Abdominal:      General: Abdomen is flat.       Palpations: Abdomen is soft.   Musculoskeletal:      Cervical back: Normal range of motion.   Skin:     General: Skin is warm and dry.      Capillary Refill: Capillary refill takes less than 2 seconds.      Comments: Bilateral groin puncture site  RLE incision site covered with dressing    Neurological:      General: No focal deficit present.      Mental Status: He is alert and oriented to person, place, and time.   Psychiatric:         Mood and Affect: Mood normal.       Significant Labs:  CBC:   Recent Labs   Lab 04/04/23  0330   WBC 13.70*   RBC 4.40*   HGB 12.1*   HCT 38.1*      MCV 87   MCH 27.5   MCHC 31.8*     CMP:   Recent Labs   Lab 04/03/23 2032 04/04/23  0330   * 110   CALCIUM 8.2* 8.0*   ALBUMIN 3.1*  --    PROT 6.2  --     139   K 4.4 4.1   CO2 19* 20*    109   BUN 14 13   CREATININE 0.9 0.8   ALKPHOS 70  --    ALT 22  --    AST 17  --    BILITOT 0.8  --        Significant Diagnostics:  I have reviewed all pertinent imaging results/findings within the past 24 hours.    Assessment/Plan:     * Popliteal artery aneurysm  61 yo male with pmh of HTN, MI s/p stent x3 in 5/2022 (on ASA and brilinta at home),  HLD, Tobacco abuse, lap salvatore on 3/23 admitted s/p right fem pop bypass by Dr Castorena for management of Popliteal aneurysm.    -Advance diet as tolerated  -Urine output ok, dc lozano  -DC daly  -Aspirin only for now  -Resume home meds  -PT/OT  -Pulses palpable this am    Dispo:  Patient can be stepped down when able to ambulate        Ursula Smallwood NP  Vascular Surgery  [facility]

## 2023-04-05 LAB
ANION GAP SERPL CALC-SCNC: 11 MMOL/L (ref 8–16)
ANISOCYTOSIS BLD QL SMEAR: SLIGHT
BASO STIPL BLD QL SMEAR: ABNORMAL
BASOPHILS # BLD AUTO: 0.03 K/UL (ref 0–0.2)
BASOPHILS NFR BLD: 0.2 % (ref 0–1.9)
BUN SERPL-MCNC: 14 MG/DL (ref 6–20)
CALCIUM SERPL-MCNC: 8.7 MG/DL (ref 8.7–10.5)
CHLORIDE SERPL-SCNC: 107 MMOL/L (ref 95–110)
CO2 SERPL-SCNC: 21 MMOL/L (ref 23–29)
CREAT SERPL-MCNC: 0.9 MG/DL (ref 0.5–1.4)
DIFFERENTIAL METHOD: ABNORMAL
EOSINOPHIL # BLD AUTO: 0.1 K/UL (ref 0–0.5)
EOSINOPHIL NFR BLD: 0.8 % (ref 0–8)
ERYTHROCYTE [DISTWIDTH] IN BLOOD BY AUTOMATED COUNT: 14.2 % (ref 11.5–14.5)
EST. GFR  (NO RACE VARIABLE): >60 ML/MIN/1.73 M^2
GIANT PLATELETS BLD QL SMEAR: PRESENT
GLUCOSE SERPL-MCNC: 93 MG/DL (ref 70–110)
HCT VFR BLD AUTO: 40 % (ref 40–54)
HGB BLD-MCNC: 12.6 G/DL (ref 14–18)
IMM GRANULOCYTES # BLD AUTO: 0.11 K/UL (ref 0–0.04)
IMM GRANULOCYTES NFR BLD AUTO: 0.7 % (ref 0–0.5)
LYMPHOCYTES # BLD AUTO: 4.1 K/UL (ref 1–4.8)
LYMPHOCYTES NFR BLD: 25.6 % (ref 18–48)
MAGNESIUM SERPL-MCNC: 1.8 MG/DL (ref 1.6–2.6)
MCH RBC QN AUTO: 27.6 PG (ref 27–31)
MCHC RBC AUTO-ENTMCNC: 31.5 G/DL (ref 32–36)
MCV RBC AUTO: 88 FL (ref 82–98)
MONOCYTES # BLD AUTO: 1.9 K/UL (ref 0.3–1)
MONOCYTES NFR BLD: 11.9 % (ref 4–15)
NEUTROPHILS # BLD AUTO: 9.6 K/UL (ref 1.8–7.7)
NEUTROPHILS NFR BLD: 60.8 % (ref 38–73)
NRBC BLD-RTO: 0 /100 WBC
PHOSPHATE SERPL-MCNC: 2 MG/DL (ref 2.7–4.5)
PLATELET # BLD AUTO: 232 K/UL (ref 150–450)
PLATELET BLD QL SMEAR: ABNORMAL
PMV BLD AUTO: 10.1 FL (ref 9.2–12.9)
POIKILOCYTOSIS BLD QL SMEAR: SLIGHT
POTASSIUM SERPL-SCNC: 4.1 MMOL/L (ref 3.5–5.1)
RBC # BLD AUTO: 4.56 M/UL (ref 4.6–6.2)
SODIUM SERPL-SCNC: 139 MMOL/L (ref 136–145)
SPHEROCYTES BLD QL SMEAR: ABNORMAL
WBC # BLD AUTO: 15.84 K/UL (ref 3.9–12.7)

## 2023-04-05 PROCEDURE — 94761 N-INVAS EAR/PLS OXIMETRY MLT: CPT

## 2023-04-05 PROCEDURE — 84100 ASSAY OF PHOSPHORUS: CPT | Performed by: STUDENT IN AN ORGANIZED HEALTH CARE EDUCATION/TRAINING PROGRAM

## 2023-04-05 PROCEDURE — 97116 GAIT TRAINING THERAPY: CPT

## 2023-04-05 PROCEDURE — 80048 BASIC METABOLIC PNL TOTAL CA: CPT | Performed by: STUDENT IN AN ORGANIZED HEALTH CARE EDUCATION/TRAINING PROGRAM

## 2023-04-05 PROCEDURE — 25000003 PHARM REV CODE 250: Performed by: ANESTHESIOLOGY

## 2023-04-05 PROCEDURE — 25000003 PHARM REV CODE 250

## 2023-04-05 PROCEDURE — 97161 PT EVAL LOW COMPLEX 20 MIN: CPT

## 2023-04-05 PROCEDURE — 11000001 HC ACUTE MED/SURG PRIVATE ROOM

## 2023-04-05 PROCEDURE — 63600175 PHARM REV CODE 636 W HCPCS

## 2023-04-05 PROCEDURE — 85025 COMPLETE CBC W/AUTO DIFF WBC: CPT | Performed by: STUDENT IN AN ORGANIZED HEALTH CARE EDUCATION/TRAINING PROGRAM

## 2023-04-05 PROCEDURE — 99233 SBSQ HOSP IP/OBS HIGH 50: CPT | Mod: ,,, | Performed by: ANESTHESIOLOGY

## 2023-04-05 PROCEDURE — 83735 ASSAY OF MAGNESIUM: CPT | Performed by: STUDENT IN AN ORGANIZED HEALTH CARE EDUCATION/TRAINING PROGRAM

## 2023-04-05 PROCEDURE — 94799 UNLISTED PULMONARY SVC/PX: CPT

## 2023-04-05 PROCEDURE — 25000003 PHARM REV CODE 250: Performed by: STUDENT IN AN ORGANIZED HEALTH CARE EDUCATION/TRAINING PROGRAM

## 2023-04-05 PROCEDURE — 99024 PR POST-OP FOLLOW-UP VISIT: ICD-10-PCS | Mod: ,,, | Performed by: SURGERY

## 2023-04-05 PROCEDURE — 25000003 PHARM REV CODE 250: Performed by: SURGERY

## 2023-04-05 PROCEDURE — 99024 POSTOP FOLLOW-UP VISIT: CPT | Mod: ,,, | Performed by: SURGERY

## 2023-04-05 PROCEDURE — 99233 PR SUBSEQUENT HOSPITAL CARE,LEVL III: ICD-10-PCS | Mod: ,,, | Performed by: ANESTHESIOLOGY

## 2023-04-05 RX ORDER — LACTULOSE 10 G/15ML
20 SOLUTION ORAL DAILY
Status: DISCONTINUED | OUTPATIENT
Start: 2023-04-05 | End: 2023-04-07 | Stop reason: HOSPADM

## 2023-04-05 RX ADMIN — ASPIRIN 81 MG: 81 TABLET, COATED ORAL at 09:04

## 2023-04-05 RX ADMIN — OXYCODONE HYDROCHLORIDE 5 MG: 5 TABLET ORAL at 09:04

## 2023-04-05 RX ADMIN — SODIUM PHOSPHATE, MONOBASIC, MONOHYDRATE AND SODIUM PHOSPHATE, DIBASIC, ANHYDROUS 20.01 MMOL: 142; 276 INJECTION, SOLUTION INTRAVENOUS at 05:04

## 2023-04-05 RX ADMIN — ACETAMINOPHEN 650 MG: 325 TABLET ORAL at 07:04

## 2023-04-05 RX ADMIN — TAMSULOSIN HYDROCHLORIDE 0.4 MG: 0.4 CAPSULE ORAL at 09:04

## 2023-04-05 RX ADMIN — MUPIROCIN: 20 OINTMENT TOPICAL at 09:04

## 2023-04-05 RX ADMIN — POLYETHYLENE GLYCOL 3350 17 G: 17 POWDER, FOR SOLUTION ORAL at 09:04

## 2023-04-05 RX ADMIN — ATORVASTATIN CALCIUM 20 MG: 20 TABLET, FILM COATED ORAL at 09:04

## 2023-04-05 RX ADMIN — DOCUSATE SODIUM 50 MG: 50 CAPSULE, LIQUID FILLED ORAL at 09:04

## 2023-04-05 RX ADMIN — TICAGRELOR 90 MG: 90 TABLET ORAL at 08:04

## 2023-04-05 RX ADMIN — FAMOTIDINE 20 MG: 20 TABLET ORAL at 09:04

## 2023-04-05 RX ADMIN — MUPIROCIN: 20 OINTMENT TOPICAL at 08:04

## 2023-04-05 RX ADMIN — OXYCODONE HYDROCHLORIDE 5 MG: 5 TABLET ORAL at 05:04

## 2023-04-05 RX ADMIN — MAGNESIUM SULFATE 2 G: 2 INJECTION INTRAVENOUS at 05:04

## 2023-04-05 RX ADMIN — TICAGRELOR 90 MG: 90 TABLET ORAL at 09:04

## 2023-04-05 RX ADMIN — FAMOTIDINE 20 MG: 20 TABLET ORAL at 08:04

## 2023-04-05 RX ADMIN — ATENOLOL 50 MG: 25 TABLET ORAL at 09:04

## 2023-04-05 NOTE — SUBJECTIVE & OBJECTIVE
Interval History/Significant Events: NAEON. AF,  SBP. Restarted ticagrelor, sd orders in    Follow-up For: Procedure(s) (LRB):  CREATION, BYPASS, ARTERIAL, FEMORAL TO POPLITEAL, USING VEIN (Right)    Post-Operative Day: 2 Days Post-Op    Objective:     Vital Signs (Most Recent):  Temp: 98.2 °F (36.8 °C) (04/05/23 0300)  Pulse: 79 (04/05/23 0600)  Resp: 17 (04/05/23 0600)  BP: (!) 116/57 (04/05/23 0600)  SpO2: 95 % (04/05/23 0600)   Vital Signs (24h Range):  Temp:  [98.2 °F (36.8 °C)-99.2 °F (37.3 °C)] 98.2 °F (36.8 °C)  Pulse:  [] 79  Resp:  [10-27] 17  SpO2:  [93 %-99 %] 95 %  BP: ()/(50-88) 116/57  Arterial Line BP: (109)/(62) 109/62     Weight: 86.5 kg (190 lb 9.6 oz)  Body mass index is 28.98 kg/m².      Intake/Output Summary (Last 24 hours) at 4/5/2023 0612  Last data filed at 4/5/2023 0600  Gross per 24 hour   Intake 1876.8 ml   Output 3005 ml   Net -1128.2 ml       Physical Exam  Vitals and nursing note reviewed.   HENT:      Head: Normocephalic and atraumatic.      Mouth/Throat:      Mouth: Mucous membranes are dry.   Cardiovascular:      Rate and Rhythm: Normal rate and regular rhythm.      Pulses: Normal pulses.      Heart sounds: Normal heart sounds.   Pulmonary:      Effort: Pulmonary effort is normal.   Abdominal:      General: Abdomen is flat.      Palpations: Abdomen is soft.   Musculoskeletal:      Cervical back: Normal range of motion.   Skin:     General: Skin is warm and dry.      Comments: Bilateral groin puncture site soft  Neurological:      General: No focal deficit present.      Mental Status: He is alert and oriented to person, place, and time.   Psychiatric:         Mood and Affect: Mood normal  Vents:       Lines/Drains/Airways       Peripheral Intravenous Line  Duration                  Peripheral IV - Single Lumen 04/03/23 0610 18 G Anterior;Distal;Right Wrist 2 days         Peripheral IV - Single Lumen 04/03/23 1600 16 G Anterior;Left Forearm 1 day                     Significant Labs:    CBC/Anemia Profile:  Recent Labs   Lab 04/03/23 2032 04/04/23 0330 04/05/23  0258   WBC 18.48* 13.70* 15.84*   HGB 13.7* 12.1* 12.6*   HCT 42.3 38.1* 40.0    249 232   MCV 85 87 88   RDW 14.2 14.2 14.2        Chemistries:  Recent Labs   Lab 04/03/23 2032 04/04/23 0330 04/05/23 0258    139 139   K 4.4 4.1 4.1    109 107   CO2 19* 20* 21*   BUN 14 13 14   CREATININE 0.9 0.8 0.9   CALCIUM 8.2* 8.0* 8.7   ALBUMIN 3.1*  --   --    PROT 6.2  --   --    BILITOT 0.8  --   --    ALKPHOS 70  --   --    ALT 22  --   --    AST 17  --   --    MG 1.6 1.6 1.8   PHOS 3.6 2.6* 2.0*       All pertinent labs within the past 24 hours have been reviewed.    Significant Imaging:  I have reviewed all pertinent imaging results/findings within the past 24 hours.  No results found in the last 24 hours.

## 2023-04-05 NOTE — PT/OT/SLP EVAL
Physical Therapy Evaluation and Treatment    Patient Name:  Giorgi Eden   MRN:  [id number]  Admit Date: 4/3/2023  Admitting Diagnosis:  Popliteal artery aneurysm   Length of Stay: 2 days  Recent Surgery: Procedure(s) (LRB):  CREATION, BYPASS, ARTERIAL, FEMORAL TO POPLITEAL, USING VEIN (Right) 2 Days Post-Op    Recommendations:     Discharge Recommendations:  other (see comments)   Discharge Equipment Recommendations: walker, rolling   Barriers to discharge: None    Plan:     During this hospitalization, patient to be seen 4 x/week to address the identified rehab impairments via gait training, therapeutic activities, therapeutic exercises, neuromuscular re-education and progress towards the established goals.  Plan of Care Expires:  05/05/23  Plan of Care Reviewed with: patient    Assessment:     Giorgi Eden is a 62 y.o. male admitted with a medical diagnosis of Popliteal artery aneurysm. Pt tolerated initial evaluation well today. Patient is s/p Rt fem/pop bypass on 4/3/2023. Pt demonstrated good functional strength and tolerance to upright activity on this date, requiring no physical assist for transfers and ambulation. VS remained stable throughout session. Pt did have generalized unsteadiness when standing requiring CGA, progressing to SBA throughout ambulation trial for patient safety. Pt with increased post-op pain as well as decreased tolerance to functional activity limiting mobility upon evaluation this date. Pt will continue to benefit from skilled PT services during this hospital admit to continue to improve transfer ability and efficiency as well as continue to progress pt's ambulation distance and cardiopulmonary endurance to maximize pt's functional independence and return to PLOF.       Problem List: impaired endurance, impaired functional mobility, impaired self care skills, gait instability, impaired balance, decreased lower extremity function, pain, impaired fine motor.  Rehab Prognosis: Good; patient  "would benefit from acute skilled PT services to address these deficits and reach maximum level of function.      Goals:   Multidisciplinary Problems       Physical Therapy Goals          Problem: Physical Therapy    Goal Priority Disciplines Outcome Goal Variances Interventions   Physical Therapy Goal     PT, PT/OT Ongoing, Progressing     Description: Goals to be met by: 2023     Patient will increase functional independence with mobility by performin. Sit to stand transfer with Modified [place]  2. Gait  x 250 feet with Modified [place] using RW .   3. Lower extremity exercise program x30 reps per handout, with independence                         Subjective     RN notified prior to session. No family/friends present upon PT entrance into room. Patient agreeable to PT evaluation.    Chief Complaint: No chief complaint on file.  Patient/Family Comments/goals: "It didn't hurt like this yesterday"  Pain/Comfort:  Pain Rating 1: 9/10  Location - Side 1: Right  Location - Orientation 1: generalized  Location 1: leg  Pain Addressed 1: Distraction, Pre-medicate for activity  Pain Rating Post-Intervention 1: other (see comments) (pt reported pain improved as ambulation distance progressed)    Social History:  Residence: lives with their partner 1-story house with threshold to enter. Pt's bathroom has a tub/shower.  Support available: family  Equipment Used: none  Equipment Owned (not using): None  Prior level of function: independent  Hand Dominance: right.   Work: Employed.   Drive: yes.   Managing Medicines/Managing Home: yes.    Patient reports they will have assistance from [facility] upon discharge.    Objective:     Additional staff present: N/A    Patient found HOB elevated with: telemetry, blood pressure cuff, pulse ox (continuous)     General Precautions: Standard, Cardiac fall   Orthopedic Precautions:RLE weight bearing as tolerated   Braces: N/A   Body mass index is 28.98 kg/m².  Oxygen " "Device: Room Air  Vitals: BP (!) 150/69   Pulse 77   Temp 98.4 °F (36.9 °C) (Oral)   Resp (!) 32   Ht 5' 8" (1.727 m)   Wt 86.4 kg (190 lb 9.4 oz)   SpO2 98%   BMI 28.98 kg/m²     Exams:  Cognition:   Alert and Cooperative  AxOx4  Command following: Follows multistep verbal commands  Fluency: clear/fluent  Hearing: Intact  Vision:  Intact  Skin Integrity: Visible skin intact  Postural Assessment: slouched posture and rounded shoulders  Physical Exam:    Left UE Left LE Right UE Right LE   Edema absent absent absent absent   ROM AROM WFL AROM WFL AROM WFL AROM WFL   Strength adequate ROM, adequate strength 5/5 adequate ROM, adequate strength 4/5 - pain limited   Sensation intact to light touch intact to light touch intact to light touch intact to light touch   Coordination normal normal normal normal     Outcome Measures:  AM-PAC 6 CLICK MOBILITY  Turning over in bed (including adjusting bedclothes, sheets and blankets)?: 4  Sitting down on and standing up from a chair with arms (e.g., wheelchair, bedside commode, etc.): 3  Moving from lying on back to sitting on the side of the bed?: 4  Moving to and from a bed to a chair (including a wheelchair)?: 3  Need to walk in hospital room?: 3  Climbing 3-5 steps with a railing?: 2  Basic Mobility Total Score: 19     Functional Mobility:    Bed Mobility:   Supine to Sit: stand by assistance; from Rt side of bed  Scooting anteriorly to EOB to have both feet planted on floor: stand by assistance    Sitting Balance at Edge of Bed:  Static Sitting Balance: Good- : able to accept minimal resistance  Dynamic Sitting Balance: Good- : able to sit unsupported and weight shift across midline minimally  Assistance Level Required: Stand-by Assistance  Time: ~ 6 minutes    Transfers:   Sit <> Stand Transfer: stand by assistance with no assistive device   Stand <> Sit Transfer: stand by assistance with no assistive device   [id number] from EOB and [id number] from bedside " chair    Standing Balance:  Static Standing Balance: Good- : able to maintain standing balance against minimal resistance  Dynamic Standing Balance: Good- : able to stand independently unsupported and weight shift across midline minimally  Assistance Level Required: Stand-by Assistance  Patient used: no assistive device       Gait:   Patient ambulated: 32 ft in room; 124 ft in hallway   Patient required: contact guard progressing to SBA  Patient used:  rolling walker   Gait Pattern observed: step-to gait pattern  Gait Deviation(s): decreased step length, wide base of support, shuffle gait, decreased marily, and increased time in stance phase on unaffected leg  Impairments due to: pain  Portable monitor utilized  all lines remained intact throughout ambulation trial  Comments: Pt with step-to pattern with RLE leading to assist with pain control. Able to progress from CGA to SBA as gait trial progressed due to improved step length, improved stance time on RLE, and reports of improved pain    Education:  Time provided for education, counseling and discussion of health disposition in regards to patient's current status  All questions answered within PT scope of practice and to patient's satisfaction  PT role in POC to address current functional deficits  Pt educated on proper body mechanics, safety techniques, and energy conservation with PT facilitation and cueing throughout session  Call nursing/pct to transfer to chair/use bathroom. Pt stated understanding.  RW ordered for in room use with nursing staff  Safe to perform step transfer to/from chair/bedside commode SBA and RW w/ nursing/PCT present  Pt to ambulate 2x/day RW and SBA with nsg/PCT in order to maintain functional mobility  Importance of OOB tolerance prn hrs/day to improve lung ventilation and expansion as well as strengthen postural musculature    Patient left up in chair with all lines intact, call button in reach, and RN notified.      History:      Past Medical History:   Diagnosis Date    Hyperlipidemia     Hypertension     MI (myocardial infarction)     X2       Past Surgical History:   Procedure Laterality Date    AORTOGRAPHY WITH SERIALOGRAPHY Bilateral 2023    Procedure: AORTOGRAM, WITH BILATERAL LOWER EXTREMITY ANGIOGRAMS;  Surgeon: Ian Castorena MD;  Location: [facility];  Service: Vascular;  Laterality: Bilateral;  RN PREOP 23    CORONARY ANGIOPLASTY WITH STENT PLACEMENT      x2    CREATION OF FEMOROPOPLITEAL ARTERIAL BYPASS USING GRAFT Right 4/3/2023    Procedure: CREATION, BYPASS, ARTERIAL, FEMORAL TO POPLITEAL, USING VEIN;  Surgeon: Ian Castorena MD;  Location: [address];  Service: Vascular;  Laterality: Right;  Vein bypass  fluro time  kvp  ma     LAPAROSCOPIC CHOLECYSTECTOMY N/A 3/23/2023    Procedure: CHOLECYSTECTOMY, LAPAROSCOPIC;  Surgeon: Ana María Ellington MD;  Location: [facility] OR [address] [place] [address];  Service: General;  Laterality: N/A;    LEFT HEART CATHETERIZATION Left 2022    Procedure: Left heart cath;  Surgeon: Romulo Gupta MD;  [facility]: [facility] CATH[facility]EP LAB;  Service: Cardiology;  Laterality: Left;       Family History   Problem Relation Age of Onset    COPD Mother     Heart disease Mother     Stroke Mother        Social History     Socioeconomic History    Marital status: Single   Tobacco Use    Smoking status: Former     Packs/day: 1.00     Years: 25.00     Pack years: 25.00     Types: Cigarettes     Quit date: 2021     Years since quittin.6    Smokeless tobacco: Never    Tobacco comments:     I now use a vape pen at 5 mg per day   Substance and Sexual Activity    Alcohol use: Yes     Comment: socially    Drug use: Not Currently    Sexual activity: Yes     Partners: Female     Social Determinants of Health     Financial Resource Strain: Low Risk     Difficulty of Paying Living Expenses: Not hard at all   Food Insecurity: No Food Insecurity    Worried About Running Out of Food in the Last Year: Never  true    Ran Out of Food in the Last Year: Never true   Transportation Needs: No Transportation Needs    Lack of Transportation (Medical): No    Lack of Transportation (Non-Medical): No   Social Connections: Unknown    Marital Status: Living with partner   Housing Stability: Unknown    Unable to Pay for Housing in the Last Year: Patient refused       Time Tracking:     PT Received On: 04/05/23  PT Start Time: 1014     PT Stop Time: 1040  PT Total Time (min): 26 min     Billable Minutes: Evaluation 8 minutes and Therapeutic Activity 18 minutes    April Dickinson PT, DPT  4/5/2023  Pager: [phone]

## 2023-04-05 NOTE — SUBJECTIVE & OBJECTIVE
Medications:  Continuous Infusions:  Scheduled Meds:   acetaminophen  1,000 mg Oral Q8H    aspirin  81 mg Oral Daily    atenoloL  50 mg Oral Daily    atorvastatin  20 mg Oral Daily    bisacodyL  10 mg Rectal Daily    docusate sodium  50 mg Oral Daily    enoxaparin  40 mg Subcutaneous Daily    famotidine  20 mg Oral BID    mupirocin   Nasal BID    polyethylene glycol  17 g Oral Daily    tamsulosin  0.4 mg Oral Daily    ticagrelor  90 mg Oral BID     PRN Meds:acetaminophen, calcium gluconate IVPB, calcium gluconate IVPB, calcium gluconate IVPB, magnesium sulfate IVPB, magnesium sulfate IVPB, ondansetron, oxyCODONE, oxyCODONE, sodium phosphate IVPB, sodium phosphate IVPB, sodium phosphate IVPB     Objective:     Vital Signs (Most Recent):  Temp: 98.2 °F (36.8 °C) (04/05/23 0300)  Pulse: 81 (04/05/23 0732)  Resp: 13 (04/05/23 0732)  BP: (!) 116/57 (04/05/23 0600)  SpO2: 97 % (04/05/23 0732)   Vital Signs (24h Range):  Temp:  [98.2 °F (36.8 °C)-99.2 °F (37.3 °C)] 98.2 °F (36.8 °C)  Pulse:  [] 81  Resp:  [10-27] 13  SpO2:  [93 %-99 %] 97 %  BP: ()/(50-88) 116/57         Physical Exam  Vitals and nursing note reviewed.   Constitutional:       General: He is not in acute distress.     Appearance: Normal appearance.   HENT:      Head: Normocephalic and atraumatic.      Nose: Nose normal.      Mouth/Throat:      Mouth: Mucous membranes are moist.   Cardiovascular:      Rate and Rhythm: Normal rate and regular rhythm.      Comments: 2+ palpable R DP, PT    Pulmonary:      Effort: Pulmonary effort is normal. No respiratory distress.   Abdominal:      General: Abdomen is flat. There is no distension.      Palpations: Abdomen is soft.   Musculoskeletal:         General: Normal range of motion.   Skin:     General: Skin is warm.      Comments: Right groin and medial leg incision dressings removed at bedside today  No notable erythema or drainage  Dressing replaced to the right groin   Neurological:      General: No  focal deficit present.      Mental Status: He is alert.       Significant Labs:  CBC:   Recent Labs   Lab 04/05/23 0258   WBC 15.84*   RBC 4.56*   HGB 12.6*   HCT 40.0      MCV 88   MCH 27.6   MCHC 31.5*     CMP:   Recent Labs   Lab 04/03/23 2032 04/04/23 0330 04/05/23 0258   *   < > 93   CALCIUM 8.2*   < > 8.7   ALBUMIN 3.1*  --   --    PROT 6.2  --   --       < > 139   K 4.4   < > 4.1   CO2 19*   < > 21*      < > 107   BUN 14   < > 14   CREATININE 0.9   < > 0.9   ALKPHOS 70  --   --    ALT 22  --   --    AST 17  --   --    BILITOT 0.8  --   --     < > = values in this interval not displayed.       Significant Diagnostics:  I have reviewed all pertinent imaging results/findings within the past 24 hours.

## 2023-04-05 NOTE — ASSESSMENT & PLAN NOTE
63 yo male with pmh of HTN, MI s/p stent x3 in 5/2022 (on ASA and brilinta at home),  HLD, Tobacco abuse, lap salvatore on 3/23 admitted s/p right fem pop bypass by Dr Castorena for management of Popliteal aneurysm.    - patient seen and examined this morning  - ok to step down after physical therapy  - tolerating regular diet  - aspirin, brilinta   - Resume home meds  - PT/OT  - Pulses palpable this am; dressings changed    Dispo:  Step down following physical therapy

## 2023-04-05 NOTE — PROGRESS NOTES
[facility]  Critical Care - Surgery  Progress Note    Patient Name: Giorgi Eden  MRN: [id number]  Admission Date: 4/3/2023  Hospital Length of Stay: 2 days  Code Status: Full Code  Attending Provider: Ian Castorena MD  Primary Care Provider: Nitin Villareal MD   Principal Problem: Popliteal artery aneurysm    Subjective:     Hospital/ICU Course:  No notes on file    Interval History/Significant Events: NAEON. AF,  SBP. Restarted ticagrelor, sd orders in    Follow-up For: Procedure(s) (LRB):  CREATION, BYPASS, ARTERIAL, FEMORAL TO POPLITEAL, USING VEIN (Right)    Post-Operative Day: 2 Days Post-Op    Objective:     Vital Signs (Most Recent):  Temp: 98.2 °F (36.8 °C) (04/05/23 0300)  Pulse: 79 (04/05/23 0600)  Resp: 17 (04/05/23 0600)  BP: (!) 116/57 (04/05/23 0600)  SpO2: 95 % (04/05/23 0600)   Vital Signs (24h Range):  Temp:  [98.2 °F (36.8 °C)-99.2 °F (37.3 °C)] 98.2 °F (36.8 °C)  Pulse:  [] 79  Resp:  [10-27] 17  SpO2:  [93 %-99 %] 95 %  BP: ()/(50-88) 116/57  Arterial Line BP: (109)/(62) 109/62     Weight: 86.5 kg (190 lb 9.6 oz)  Body mass index is 28.98 kg/m².      Intake/Output Summary (Last 24 hours) at 4/5/2023 0612  Last data filed at 4/5/2023 0600  Gross per 24 hour   Intake 1876.8 ml   Output 3005 ml   Net -1128.2 ml       Physical Exam  Vitals and nursing note reviewed.   HENT:      Head: Normocephalic and atraumatic.      Mouth/Throat:      Mouth: Mucous membranes are dry.   Cardiovascular:      Rate and Rhythm: Normal rate and regular rhythm.      Pulses: Normal pulses.      Heart sounds: Normal heart sounds.   Pulmonary:      Effort: Pulmonary effort is normal.   Abdominal:      General: Abdomen is flat.      Palpations: Abdomen is soft.   Musculoskeletal:      Cervical back: Normal range of motion.   Skin:     General: Skin is warm and dry.      Comments: Bilateral groin puncture site soft  Neurological:      General: No focal deficit present.       Mental Status: He is alert and oriented to person, place, and time.   Psychiatric:         Mood and Affect: Mood normal  Vents:       Lines/Drains/Airways       Peripheral Intravenous Line  Duration                  Peripheral IV - Single Lumen 04/03/23 0610 18 G Anterior;Distal;Right Wrist 2 days         Peripheral IV - Single Lumen 04/03/23 1600 16 G Anterior;Left Forearm 1 day                    Significant Labs:    CBC/Anemia Profile:  Recent Labs   Lab 04/03/23 2032 04/04/23 0330 04/05/23 0258   WBC 18.48* 13.70* 15.84*   HGB 13.7* 12.1* 12.6*   HCT 42.3 38.1* 40.0    249 232   MCV 85 87 88   RDW 14.2 14.2 14.2        Chemistries:  Recent Labs   Lab 04/03/23 2032 04/04/23 0330 04/05/23 0258    139 139   K 4.4 4.1 4.1    109 107   CO2 19* 20* 21*   BUN 14 13 14   CREATININE 0.9 0.8 0.9   CALCIUM 8.2* 8.0* 8.7   ALBUMIN 3.1*  --   --    PROT 6.2  --   --    BILITOT 0.8  --   --    ALKPHOS 70  --   --    ALT 22  --   --    AST 17  --   --    MG 1.6 1.6 1.8   PHOS 3.6 2.6* 2.0*       All pertinent labs within the past 24 hours have been reviewed.    Significant Imaging:  I have reviewed all pertinent imaging results/findings within the past 24 hours.  No results found in the last 24 hours.      Assessment/Plan:     Cardiac/Vascular  * Popliteal artery aneurysm    Neuro/Psych:   -- Sedation: awake, alert   -- Pain: PRN oxy 5 &10             Cards:   -- ASA, statin, ticagrelor  -- atenolol      Pulm:   -- Goal O2 sat > 90%  -- on RA      Renal:  -- strict I/O  -- BUN/Cr 14/0.9      FEN / GI:   -- Net -1.1L  -- Replace lytes as needed  -- Nutrition: cardiac diet      ID:   -- Tm: afebrile; WBC 18.48 --> 15.8  -- Abx: none      Heme/Onc:   -- H/H 13.7/42.3 --> 12.6/40  -- Daily CBC      Endo:   -- Gluc goal 140-180        PPx:   Feeding: Cardiac diet  Analgesia/Sedation: adequate / awake   Thromboembolic prevention: lovenox  HOB >30: yes  Stress Ulcer ppx: famotidine  Glucose control:  Critical care goal 140-180 g/dl, ISS     Lines/Drains/Airway: PIV x2      Dispo/Code Status/Palliative:   -- step down/ Full Code        Critical care was time spent personally by me on the following activities: development of treatment plan with patient or surrogate and bedside caregivers, discussions with consultants, evaluation of patient's response to treatment, examination of patient, ordering and performing treatments and interventions, ordering and review of laboratory studies, ordering and review of radiographic studies, pulse oximetry, re-evaluation of patient's condition.  This critical care time did not overlap with that of any other provider or involve time for any procedures.     Memo Rush MD  Critical Care - Surgery  [facility]

## 2023-04-05 NOTE — PROGRESS NOTES
[facility]  Vascular Surgery  Progress Note    Patient Name: Giorgi Eden  MRN: [id number]  Admission Date: 4/3/2023  Primary Care Provider: Nitin Villareal MD    Subjective:     Interval History: Patient seen and examined this morning. No acute events. Doing well. Ok to step down after physical therapy session today.     Post-Op Info:  Procedure(s) (LRB):  CREATION, BYPASS, ARTERIAL, FEMORAL TO POPLITEAL, USING VEIN (Right)   2 Days Post-Op       Medications:  Continuous Infusions:  Scheduled Meds:   acetaminophen  1,000 mg Oral Q8H    aspirin  81 mg Oral Daily    atenoloL  50 mg Oral Daily    atorvastatin  20 mg Oral Daily    bisacodyL  10 mg Rectal Daily    docusate sodium  50 mg Oral Daily    enoxaparin  40 mg Subcutaneous Daily    famotidine  20 mg Oral BID    mupirocin   Nasal BID    polyethylene glycol  17 g Oral Daily    tamsulosin  0.4 mg Oral Daily    ticagrelor  90 mg Oral BID     PRN Meds:acetaminophen, calcium gluconate IVPB, calcium gluconate IVPB, calcium gluconate IVPB, magnesium sulfate IVPB, magnesium sulfate IVPB, ondansetron, oxyCODONE, oxyCODONE, sodium phosphate IVPB, sodium phosphate IVPB, sodium phosphate IVPB     Objective:     Vital Signs (Most Recent):  Temp: 98.2 °F (36.8 °C) (04/05/23 0300)  Pulse: 81 (04/05/23 0732)  Resp: 13 (04/05/23 0732)  BP: (!) 116/57 (04/05/23 0600)  SpO2: 97 % (04/05/23 0732)   Vital Signs (24h Range):  Temp:  [98.2 °F (36.8 °C)-99.2 °F (37.3 °C)] 98.2 °F (36.8 °C)  Pulse:  [] 81  Resp:  [10-27] 13  SpO2:  [93 %-99 %] 97 %  BP: ()/(50-88) 116/57         Physical Exam  Vitals and nursing note reviewed.   Constitutional:       General: He is not in acute distress.     Appearance: Normal appearance.   HENT:      Head: Normocephalic and atraumatic.      Nose: Nose normal.      Mouth/Throat:      Mouth: Mucous membranes are moist.   Cardiovascular:      Rate and Rhythm: Normal rate and regular rhythm.      Comments: 2+  palpable R DP, PT    Pulmonary:      Effort: Pulmonary effort is normal. No respiratory distress.   Abdominal:      General: Abdomen is flat. There is no distension.      Palpations: Abdomen is soft.   Musculoskeletal:         General: Normal range of motion.   Skin:     General: Skin is warm.      Comments: Right groin and medial leg incision dressings removed at bedside today  No notable erythema or drainage  Dressing replaced to the right groin   Neurological:      General: No focal deficit present.      Mental Status: He is alert.       Significant Labs:  CBC:   Recent Labs   Lab 04/05/23  0258   WBC 15.84*   RBC 4.56*   HGB 12.6*   HCT 40.0      MCV 88   MCH 27.6   MCHC 31.5*     CMP:   Recent Labs   Lab 04/03/23 2032 04/04/23  0330 04/05/23  0258   *   < > 93   CALCIUM 8.2*   < > 8.7   ALBUMIN 3.1*  --   --    PROT 6.2  --   --       < > 139   K 4.4   < > 4.1   CO2 19*   < > 21*      < > 107   BUN 14   < > 14   CREATININE 0.9   < > 0.9   ALKPHOS 70  --   --    ALT 22  --   --    AST 17  --   --    BILITOT 0.8  --   --     < > = values in this interval not displayed.       Significant Diagnostics:  I have reviewed all pertinent imaging results/findings within the past 24 hours.    Assessment/Plan:     * Popliteal artery aneurysm  63 yo male with pmh of HTN, MI s/p stent x3 in 5/2022 (on ASA and brilinta at home),  HLD, Tobacco abuse, lap salvatore on 3/23 admitted s/p right fem pop bypass by Dr Castorena for management of Popliteal aneurysm.    - patient seen and examined this morning  - ok to step down after physical therapy  - tolerating regular diet  - aspirin, brilinta   - Resume home meds  - PT/OT  - Pulses palpable this am; dressings changed    Dispo:  Step down following physical therapy        Nitin Guidry MD  Vascular Surgery  [facility]

## 2023-04-05 NOTE — PLAN OF CARE
Evaluation completed today. PT goals appropriate.    Patient is safe to perform ambulation with RW and CGA with nursing staff.    Please continue Progressive Mobility Protocol as appropriate.    April Dickinson, PT, DPT  2023  Pager: [phone]      Problem: Physical Therapy  Goal: Physical Therapy Goal  Description: Goals to be met by: 2023     Patient will increase functional independence with mobility by performin. Sit to stand transfer with Modified [place]  2. Gait  x 250 feet with Modified [place] using RW .   3. Lower extremity exercise program x30 reps per handout, with independence    Outcome: Ongoing, Progressing

## 2023-04-06 DIAGNOSIS — I72.4 POPLITEAL ARTERY ANEURYSM: Primary | ICD-10-CM

## 2023-04-06 LAB
ANION GAP SERPL CALC-SCNC: 8 MMOL/L (ref 8–16)
BASOPHILS # BLD AUTO: 0.03 K/UL (ref 0–0.2)
BASOPHILS # BLD AUTO: 0.04 K/UL (ref 0–0.2)
BASOPHILS NFR BLD: 0.2 % (ref 0–1.9)
BASOPHILS NFR BLD: 0.3 % (ref 0–1.9)
BUN SERPL-MCNC: 17 MG/DL (ref 6–20)
CALCIUM SERPL-MCNC: 8.6 MG/DL (ref 8.7–10.5)
CHLORIDE SERPL-SCNC: 107 MMOL/L (ref 95–110)
CO2 SERPL-SCNC: 23 MMOL/L (ref 23–29)
CREAT SERPL-MCNC: 0.8 MG/DL (ref 0.5–1.4)
DIFFERENTIAL METHOD: ABNORMAL
DIFFERENTIAL METHOD: ABNORMAL
EOSINOPHIL # BLD AUTO: 0.2 K/UL (ref 0–0.5)
EOSINOPHIL # BLD AUTO: 0.2 K/UL (ref 0–0.5)
EOSINOPHIL NFR BLD: 1.3 % (ref 0–8)
EOSINOPHIL NFR BLD: 1.4 % (ref 0–8)
ERYTHROCYTE [DISTWIDTH] IN BLOOD BY AUTOMATED COUNT: 14.1 % (ref 11.5–14.5)
ERYTHROCYTE [DISTWIDTH] IN BLOOD BY AUTOMATED COUNT: 14.2 % (ref 11.5–14.5)
EST. GFR  (NO RACE VARIABLE): >60 ML/MIN/1.73 M^2
GLUCOSE SERPL-MCNC: 97 MG/DL (ref 70–110)
HCT VFR BLD AUTO: 36.3 % (ref 40–54)
HCT VFR BLD AUTO: 38.4 % (ref 40–54)
HGB BLD-MCNC: 11.9 G/DL (ref 14–18)
HGB BLD-MCNC: 12.3 G/DL (ref 14–18)
IMM GRANULOCYTES # BLD AUTO: 0.12 K/UL (ref 0–0.04)
IMM GRANULOCYTES # BLD AUTO: 0.13 K/UL (ref 0–0.04)
IMM GRANULOCYTES NFR BLD AUTO: 0.9 % (ref 0–0.5)
IMM GRANULOCYTES NFR BLD AUTO: 1 % (ref 0–0.5)
LYMPHOCYTES # BLD AUTO: 2 K/UL (ref 1–4.8)
LYMPHOCYTES # BLD AUTO: 2.7 K/UL (ref 1–4.8)
LYMPHOCYTES NFR BLD: 14.9 % (ref 18–48)
LYMPHOCYTES NFR BLD: 19.7 % (ref 18–48)
MCH RBC QN AUTO: 27.5 PG (ref 27–31)
MCH RBC QN AUTO: 28.3 PG (ref 27–31)
MCHC RBC AUTO-ENTMCNC: 32 G/DL (ref 32–36)
MCHC RBC AUTO-ENTMCNC: 32.8 G/DL (ref 32–36)
MCV RBC AUTO: 86 FL (ref 82–98)
MCV RBC AUTO: 86 FL (ref 82–98)
MONOCYTES # BLD AUTO: 1.4 K/UL (ref 0.3–1)
MONOCYTES # BLD AUTO: 1.8 K/UL (ref 0.3–1)
MONOCYTES NFR BLD: 10.6 % (ref 4–15)
MONOCYTES NFR BLD: 13.6 % (ref 4–15)
NEUTROPHILS # BLD AUTO: 9 K/UL (ref 1.8–7.7)
NEUTROPHILS # BLD AUTO: 9.1 K/UL (ref 1.8–7.7)
NEUTROPHILS NFR BLD: 67.1 % (ref 38–73)
NEUTROPHILS NFR BLD: 69 % (ref 38–73)
NRBC BLD-RTO: 0 /100 WBC
NRBC BLD-RTO: 0 /100 WBC
PLATELET # BLD AUTO: 228 K/UL (ref 150–450)
PLATELET # BLD AUTO: 242 K/UL (ref 150–450)
PMV BLD AUTO: 10.3 FL (ref 9.2–12.9)
PMV BLD AUTO: 10.3 FL (ref 9.2–12.9)
POTASSIUM SERPL-SCNC: 3.9 MMOL/L (ref 3.5–5.1)
RBC # BLD AUTO: 4.2 M/UL (ref 4.6–6.2)
RBC # BLD AUTO: 4.47 M/UL (ref 4.6–6.2)
SODIUM SERPL-SCNC: 138 MMOL/L (ref 136–145)
WBC # BLD AUTO: 13.06 K/UL (ref 3.9–12.7)
WBC # BLD AUTO: 13.57 K/UL (ref 3.9–12.7)

## 2023-04-06 PROCEDURE — 36415 COLL VENOUS BLD VENIPUNCTURE: CPT

## 2023-04-06 PROCEDURE — 99900035 HC TECH TIME PER 15 MIN (STAT)

## 2023-04-06 PROCEDURE — 25000003 PHARM REV CODE 250: Performed by: STUDENT IN AN ORGANIZED HEALTH CARE EDUCATION/TRAINING PROGRAM

## 2023-04-06 PROCEDURE — 25000003 PHARM REV CODE 250

## 2023-04-06 PROCEDURE — 25000003 PHARM REV CODE 250: Performed by: SURGERY

## 2023-04-06 PROCEDURE — 85025 COMPLETE CBC W/AUTO DIFF WBC: CPT | Performed by: STUDENT IN AN ORGANIZED HEALTH CARE EDUCATION/TRAINING PROGRAM

## 2023-04-06 PROCEDURE — 97535 SELF CARE MNGMENT TRAINING: CPT

## 2023-04-06 PROCEDURE — 36415 COLL VENOUS BLD VENIPUNCTURE: CPT | Performed by: STUDENT IN AN ORGANIZED HEALTH CARE EDUCATION/TRAINING PROGRAM

## 2023-04-06 PROCEDURE — 97116 GAIT TRAINING THERAPY: CPT | Mod: CQ

## 2023-04-06 PROCEDURE — 97165 OT EVAL LOW COMPLEX 30 MIN: CPT

## 2023-04-06 PROCEDURE — 25000003 PHARM REV CODE 250: Performed by: ANESTHESIOLOGY

## 2023-04-06 PROCEDURE — 63600175 PHARM REV CODE 636 W HCPCS: Performed by: STUDENT IN AN ORGANIZED HEALTH CARE EDUCATION/TRAINING PROGRAM

## 2023-04-06 PROCEDURE — 20600001 HC STEP DOWN PRIVATE ROOM

## 2023-04-06 PROCEDURE — 85025 COMPLETE CBC W/AUTO DIFF WBC: CPT | Mod: 91

## 2023-04-06 PROCEDURE — 80048 BASIC METABOLIC PNL TOTAL CA: CPT | Performed by: STUDENT IN AN ORGANIZED HEALTH CARE EDUCATION/TRAINING PROGRAM

## 2023-04-06 RX ORDER — ACETAMINOPHEN 325 MG/1
650 TABLET ORAL EVERY 6 HOURS PRN
Qty: 30 TABLET | Refills: 0 | Status: SHIPPED | OUTPATIENT
Start: 2023-04-06

## 2023-04-06 RX ORDER — SULFAMETHOXAZOLE AND TRIMETHOPRIM 800; 160 MG/1; MG/1
1 TABLET ORAL 2 TIMES DAILY
Status: DISCONTINUED | OUTPATIENT
Start: 2023-04-06 | End: 2023-04-07 | Stop reason: HOSPADM

## 2023-04-06 RX ADMIN — TICAGRELOR 90 MG: 90 TABLET ORAL at 08:04

## 2023-04-06 RX ADMIN — FAMOTIDINE 20 MG: 20 TABLET ORAL at 07:04

## 2023-04-06 RX ADMIN — OXYCODONE HYDROCHLORIDE 5 MG: 5 TABLET ORAL at 06:04

## 2023-04-06 RX ADMIN — FAMOTIDINE 20 MG: 20 TABLET ORAL at 09:04

## 2023-04-06 RX ADMIN — ATORVASTATIN CALCIUM 20 MG: 20 TABLET, FILM COATED ORAL at 08:04

## 2023-04-06 RX ADMIN — POLYETHYLENE GLYCOL 3350 17 G: 17 POWDER, FOR SOLUTION ORAL at 08:04

## 2023-04-06 RX ADMIN — OXYCODONE HYDROCHLORIDE 5 MG: 5 TABLET ORAL at 12:04

## 2023-04-06 RX ADMIN — FAMOTIDINE 20 MG: 20 TABLET ORAL at 08:04

## 2023-04-06 RX ADMIN — MUPIROCIN: 20 OINTMENT TOPICAL at 08:04

## 2023-04-06 RX ADMIN — ASPIRIN 81 MG: 81 TABLET, COATED ORAL at 08:04

## 2023-04-06 RX ADMIN — ATENOLOL 50 MG: 25 TABLET ORAL at 08:04

## 2023-04-06 RX ADMIN — SULFAMETHOXAZOLE AND TRIMETHOPRIM 1 TABLET: 800; 160 TABLET ORAL at 03:04

## 2023-04-06 RX ADMIN — ENOXAPARIN SODIUM 40 MG: 40 INJECTION SUBCUTANEOUS at 05:04

## 2023-04-06 RX ADMIN — ACETAMINOPHEN 650 MG: 325 TABLET ORAL at 07:04

## 2023-04-06 RX ADMIN — TAMSULOSIN HYDROCHLORIDE 0.4 MG: 0.4 CAPSULE ORAL at 08:04

## 2023-04-06 NOTE — PLAN OF CARE
[facility]  Discharge Final Note    Primary Care Provider: Nitin Villareal MD    Expected Discharge Date: 4/6/2023    Final Discharge Note (most recent)       Final Note - 04/06/23 1145          Final Note    Assessment Type Final Discharge Note     Anticipated Discharge Disposition Home or Self Care        Post-Acute Status    Post-Acute Authorization Other     Other Status No Post-Acute Service Needs                     CM sent [email or web address] message to the provider's office to schedule the patient's post op appointment.     Iris Pisano RN, CM   Ext: [phone]

## 2023-04-06 NOTE — PROGRESS NOTES
Report received. SBAR & POC discussed. Assumed care of pt. Safety check completed. All safety equipment and alarms checked and audible.        04/05/23 1900   Handoff Report   Received From Sabina Beckett RN

## 2023-04-06 NOTE — PLAN OF CARE
Problem: Occupational Therapy  Goal: Occupational Therapy Goal  Description: Goals to be met by:  4/27/2023    Patient will increase functional independence with ADLs by performing:    LE Dressing with Supervision.  Grooming while standing at sink with Modified [place].  Toileting from toilet with Modified [place] for hygiene and clothing management.   Supine to sit with Modified [place].  Stand pivot transfers with Modified [place].  Toilet transfer to toilet with Modified [place].    Outcome: Ongoing, Progressing   Patient's goals are set.

## 2023-04-06 NOTE — PROGRESS NOTES
[facility]  Vascular Surgery  Progress Note    Patient Name: Giorgi Eden  MRN: [id number]  Admission Date: 4/3/2023  Primary Care Provider: Nitin Villareal MD    Subjective:     Interval History: No acute events. Tolerating diet, pain controlled. Working with PT     Post-Op Info:  Procedure(s) (LRB):  CREATION, BYPASS, ARTERIAL, FEMORAL TO POPLITEAL, USING VEIN (Right)   3 Days Post-Op       Medications:  Continuous Infusions:  Scheduled Meds:   aspirin  81 mg Oral Daily    atenoloL  50 mg Oral Daily    atorvastatin  20 mg Oral Daily    docusate sodium  50 mg Oral Daily    enoxaparin  40 mg Subcutaneous Daily    famotidine  20 mg Oral BID    lactulose  20 g Oral Daily    mupirocin   Nasal BID    polyethylene glycol  17 g Oral Daily    sulfamethoxazole-trimethoprim 800-160mg  1 tablet Oral BID    tamsulosin  0.4 mg Oral Daily    ticagrelor  90 mg Oral BID     PRN Meds:acetaminophen, calcium gluconate IVPB, calcium gluconate IVPB, calcium gluconate IVPB, magnesium sulfate IVPB, magnesium sulfate IVPB, ondansetron, oxyCODONE, oxyCODONE, sodium phosphate IVPB, sodium phosphate IVPB, sodium phosphate IVPB     Objective:     Vital Signs (Most Recent):  Temp: 98.5 °F (36.9 °C) (04/06/23 1221)  Pulse: 73 (04/06/23 1221)  Resp: 20 (04/06/23 1221)  BP: 129/80 (04/06/23 1221)  SpO2: 100 % (04/06/23 1221)   Vital Signs (24h Range):  Temp:  [97.9 °F (36.6 °C)-98.7 °F (37.1 °C)] 98.5 °F (36.9 °C)  Pulse:  [] 73  Resp:  [12-23] 20  SpO2:  [94 %-100 %] 100 %  BP: ()/() 129/80         Physical Exam  Vitals and nursing note reviewed.   Constitutional:       General: He is not in acute distress.     Appearance: Normal appearance.   HENT:      Head: Normocephalic and atraumatic.      Nose: Nose normal.      Mouth/Throat:      Mouth: Mucous membranes are moist.   Cardiovascular:      Rate and Rhythm: Normal rate.      Comments: 2+ palpable R DP, PT    Pulmonary:      Effort: Pulmonary effort is normal.  No respiratory distress.   Abdominal:      General: Abdomen is flat. There is no distension.      Palpations: Abdomen is soft.   Musculoskeletal:         General: Normal range of motion.   Skin:     General: Skin is warm.      Comments: Right groin and medial leg incision dressings removed at bedside today  No notable erythema or drainage  Dressing replaced to the right groin   Neurological:      General: No focal deficit present.      Mental Status: He is alert.       Significant Labs:  CBC:   Recent Labs   Lab 04/06/23  1154   WBC 13.57*   RBC 4.47*   HGB 12.3*   HCT 38.4*      MCV 86   MCH 27.5   MCHC 32.0       CMP:   Recent Labs   Lab 04/06/23  0325   GLU 97   CALCIUM 8.6*      K 3.9   CO2 23      BUN 17   CREATININE 0.8         Significant Diagnostics:  I have reviewed all pertinent imaging results/findings within the past 24 hours.    Assessment/Plan:     * Popliteal artery aneurysm  61 yo male with pmh of HTN, MI s/p stent x3 in 5/2022 (on ASA and brilinta at home),  HLD, Tobacco abuse, lap salvatore on 3/23 admitted s/p right fem pop bypass by Dr Castorena for management of Popliteal aneurysm.    - regular diet  - aspirin, brilinta   - trend WBC  - Resumed home meds  - PT/OT  - Pulses palpable this am; dressings changed    Dispo: likely d/c tomorrow        April Armstrong MD  Vascular Surgery  [facility]

## 2023-04-06 NOTE — PLAN OF CARE
Problem: Adult Inpatient Plan of Care  Goal: Plan of Care Review  Outcome: Ongoing, Progressing  Flowsheets (Taken 4/5/2023 2350)  Plan of Care Reviewed With:   patient   spouse  Goal: Patient-Specific Goal (Individualized)  Outcome: Ongoing, Progressing     Problem: Fall Injury Risk  Goal: Absence of Fall and Fall-Related Injury  Outcome: Ongoing, Progressing  Intervention: Identify and Manage Contributors  Flowsheets (Taken 4/5/2023 2330)  Self-Care Promotion:   independence encouraged   BADL personal objects within reach   BADL personal routines maintained   safe use of adaptive equipment encouraged     Problem: Infection  Goal: Absence of Infection Signs and Symptoms  Outcome: Ongoing, Progressing  Intervention: Prevent or Manage Infection  Flowsheets (Taken 4/5/2023 2350)  Fever Reduction/Comfort Measures:   lightweight bedding   lightweight clothing  Infection Management: aseptic technique maintained     Problem: Pain Acute  Goal: Acceptable Pain Control and Functional Ability  Outcome: Ongoing, Progressing  Intervention: Develop Pain Management Plan  Flowsheets (Taken 4/5/2023 2330)  Pain Management Interventions:   quiet environment facilitated   relaxation techniques promoted   pain management plan reviewed with patient/caregiver   care clustered  Intervention: Prevent or Manage Pain  Flowsheets (Taken 4/5/2023 2330)  Sleep/Rest Enhancement:   noise level reduced   regular sleep/rest pattern promoted   relaxation techniques promoted  Sensory Stimulation Regulation:   quiet environment promoted   lighting decreased   care clustered

## 2023-04-06 NOTE — HOSPITAL COURSE
For details of hospital stay, please refer to daily progress notes. Briefly, this is a 62 y.o. male presented on 4/03 admitted for for planned surgical intervention for right femoral aneurysm . Patient was taken to OR and underwent right femoral popliteal bypass . Post-operatively patient was admitted to ICU and stepped down to the floor.  His postoperative course was uncomplicated.     On the day of discharge, the patient was ambulating without difficulty, voiding spontaneously, was tolerating a diet without nausea or vomiting, and pain was well controlled on PO pain medications. Discharge instructions were explained to the patient and appropriate follow-up was arranged.

## 2023-04-06 NOTE — DISCHARGE SUMMARY
[facility]  Vascular Surgery  Discharge Summary      Patient Name: Giorgi Eden  MRN: [id number]  Admission Date: 4/3/2023  Hospital Length of Stay: 4 days  Discharge Date and Time:  04/07/2023 9:10 AM  Attending Physician: Ian Castorena MD   Discharging Provider: April Armstrong MD  Primary Care Provider: Nitin Villareal MD    HPI:   No notes on file    Procedure(s) (LRB):  CREATION, BYPASS, ARTERIAL, FEMORAL TO POPLITEAL, USING VEIN (Right)     Hospital Course: For details of hospital stay, please refer to daily progress notes. Briefly, this is a 62 y.o. male presented on 4/03 admitted for for planned surgical intervention for right femoral aneurysm . Patient was taken to OR and underwent right femoral popliteal bypass . Post-operatively patient was admitted to ICU and stepped down to the floor.  His postoperative course was uncomplicated.     On the day of discharge, the patient was ambulating without difficulty, voiding spontaneously, was tolerating a diet without nausea or vomiting, and pain was well controlled on PO pain medications. Discharge instructions were explained to the patient and appropriate follow-up was arranged.       Goals of Care Treatment Preferences:  Code Status: Full Code      Consults:     Significant Diagnostic Studies: Labs:   CMP   Recent Labs   Lab 04/06/23  0325 04/07/23  0352    138   K 3.9 4.1    106   CO2 23 24   GLU 97 99   BUN 17 16   CREATININE 0.8 0.8   CALCIUM 8.6* 9.1   ANIONGAP 8 8    and CBC   Recent Labs   Lab 04/06/23  0325 04/06/23  1154 04/07/23  0352   WBC 13.06* 13.57* 11.71   HGB 11.9* 12.3* 11.9*   HCT 36.3* 38.4* 36.8*    242 261       Pending Diagnostic Studies:       None          Final Active Diagnoses:    Diagnosis Date Noted POA    PRINCIPAL PROBLEM:  Popliteal artery aneurysm [I72.4] 04/03/2023 Yes    Atherosclerotic heart disease of native coronary artery with unstable angina pectoris [I25.110] 05/31/2022 Yes    Tobacco abuse  "[Z72.0] 03/11/2022 Yes    Essential hypertension [I10] 02/01/2022 Yes    Status post insertion of drug eluting coronary artery stent [Z95.5] 02/01/2022 Not Applicable      Problems Resolved During this Admission:    Physical Exam  Vitals and nursing note reviewed.   Constitutional:       General: He is not in acute distress.     Appearance: Normal appearance.   HENT:      Head: Normocephalic and atraumatic.      Nose: Nose normal.      Mouth/Throat:      Mouth: Mucous membranes are moist.   Cardiovascular:      Rate and Rhythm: Normal rate and regular rhythm.      Comments: 2+ palpable R DP, PT     Pulmonary:      Effort: Pulmonary effort is normal. No respiratory distress.   Abdominal:      General: Abdomen is flat. There is no distension.      Palpations: Abdomen is soft.   Musculoskeletal:         General: Normal range of motion.   Skin:     General: Skin is warm.      Comments: Right groin and medial leg incision dressings removed at bedside today  No notable erythema or drainage  Dressing replaced to the right groin   Neurological:      General: No focal deficit present.      Mental Status: He is alert.   Discharged Condition: good    Disposition: Home-Health Care [facility] [place]    Follow Up:   Follow-up Information       Ian Castorena MD Follow up in 3 week(s).    Specialties: Vascular Surgery, Surgery  Contact information:  [address]  [address] [place], [place] [id number] [phone]                             Patient Instructions:      WALKER FOR HOME USE     Order Specific Question Answer Comments   Type of Walker: Adult (5'4"-6'6")    With wheels? Yes    Height: 5' 8" (1.727 m)    Weight: 86.4 kg (190 lb 9.4 oz)    Length of need (1-99 months): 99    Does patient have medical equipment at home? none    Please check all that apply: Patient's condition impairs ambulation.      Diet Cardiac     Diet Adult Regular     Notify your health care provider if you experience any of the following:     Notify your health " care provider if you experience any of the following:  persistent dizziness, light-headedness, or visual disturbances     Notify your health care provider if you experience any of the following:  worsening rash     Notify your health care provider if you experience any of the following:  increased confusion or weakness     Notify your health care provider if you experience any of the following:  severe persistent headache     Notify your health care provider if you experience any of the following:  difficulty breathing or increased cough     Notify your health care provider if you experience any of the following:  redness, tenderness, or signs of infection (pain, swelling, redness, odor or green/yellow discharge around incision site)     Notify your health care provider if you experience any of the following:  severe uncontrolled pain     Notify your health care provider if you experience any of the following:  persistent nausea and vomiting or diarrhea     Notify your health care provider if you experience any of the following:  temperature >100.4     No dressing needed     Change dressing (specify)   Order Comments: Keep dressings clean and dry   Keep groin clean and dry at all times  Shower every day  Do not submerge incisions in water     Notify your health care provider if you experience any of the following:  temperature >100.4     Notify your health care provider if you experience any of the following:  persistent nausea and vomiting or diarrhea     Notify your health care provider if you experience any of the following:  severe uncontrolled pain     Notify your health care provider if you experience any of the following:  redness, tenderness, or signs of infection (pain, swelling, redness, odor or green/yellow discharge around incision site)     Notify your health care provider if you experience any of the following:  difficulty breathing or increased cough     Notify your health care provider if you  experience any of the following:  severe persistent headache     Notify your health care provider if you experience any of the following:  worsening rash     Notify your health care provider if you experience any of the following:  persistent dizziness, light-headedness, or visual disturbances     Notify your health care provider if you experience any of the following:  increased confusion or weakness     Leave dressing on - Keep it clean, dry, and intact until clinic visit   Order Comments: Keep gauze on right groin at all times except for showering. Do not take baths or soak in any water.     Activity as tolerated     Activity as tolerated   Order Comments: Please alternate tylenol and ibuprofen for pain as directed by the bottle. You have also been prescribed a narcotic pain medication to help with post-operative pain.   Please make sure to take 1 capful of Miralax per day to help with narcotic associated constipation.     Please do not drive while on narcotic pain medication.    You may shower and let soapy water run over your incision, do not scrub. Please no baths or soaking for 2 weeks. Keep gauze on right groin at all times except for showering. Do not take baths or soak in any water.     You have no diet restrictions.    Please follow up in clinic with Dr. Castorena in 3 weeks.     Medications:  Reconciled Home Medications:      Medication List        START taking these medications      acetaminophen 325 MG tablet  Commonly known as: TYLENOL  Take 2 tablets (650 mg total) by mouth every 6 (six) hours as needed for Pain.     sulfamethoxazole-trimethoprim 800-160mg 800-160 mg Tab  Commonly known as: BACTRIM DS  Take 1 tablet by mouth 2 (two) times daily. for 7 days            CHANGE how you take these medications      * oxyCODONE 5 MG immediate release tablet  Commonly known as: ROXICODONE  Take 1 tablet (5 mg total) by mouth every 4 (four) hours as needed for Pain (For pain not releived by scheduled  tylenol).  What changed: Another medication with the same name was added. Make sure you understand how and when to take each.     * oxyCODONE 5 MG immediate release tablet  Commonly known as: ROXICODONE  Take 1 tablet (5 mg total) by mouth every 4 (four) hours as needed for Pain.  What changed: You were already taking a medication with the same name, and this prescription was added. Make sure you understand how and when to take each.           * This list has 2 medication(s) that are the same as other medications prescribed for you. Read the directions carefully, and ask your doctor or other care provider to review them with you.                CONTINUE taking these medications      aspirin 81 MG EC tablet  Commonly known as: ECOTRIN  Take 1 tablet (81 mg total) by mouth once daily.     atenoloL 50 MG tablet  Commonly known as: TENORMIN  Take 1 tablet (50 mg total) by mouth once daily.     atorvastatin 20 MG tablet  Commonly known as: LIPITOR  Take 1 tablet (20 mg total) by mouth once daily.     famotidine 20 MG tablet  Commonly known as: PEPCID  Take 1 tablet (20 mg total) by mouth 2 (two) times daily.     HYDROcodone-acetaminophen 5-325 mg per tablet  Commonly known as: NORCO  Take 1 tablet by mouth every 6 (six) hours as needed for Pain.     INV lisinopril 10 MG Tab  Take 1 tablet (10 mg total) by mouth once daily.     ondansetron 4 MG tablet  Commonly known as: ZOFRAN  Take 1 tablet (4 mg total) by mouth every 8 (eight) hours as needed for Nausea.     tamsulosin 0.4 mg Cap  Commonly known as: FLOMAX  Take 1 capsule (0.4 mg total) by mouth once daily.     ticagrelor 90 mg tablet  Commonly known as: BRILINTA  Take 1 tablet (90 mg total) by mouth 2 (two) times daily.     tiZANidine 4 MG tablet  Commonly known as: ZANAFLEX  Take 1 tablet (4 mg total) by mouth every 6 (six) hours as needed.            ASK your doctor about these medications      polyethylene glycol 17 gram Pwpk  Commonly known as: GLYCOLAX  Take 17 g  by mouth once daily. for 14 days  Ask about: Should I take this medication?              April Armstrong MD  Vascular Surgery  [facility]

## 2023-04-06 NOTE — SUBJECTIVE & OBJECTIVE
Medications:  Continuous Infusions:  Scheduled Meds:   aspirin  81 mg Oral Daily    atenoloL  50 mg Oral Daily    atorvastatin  20 mg Oral Daily    docusate sodium  50 mg Oral Daily    enoxaparin  40 mg Subcutaneous Daily    famotidine  20 mg Oral BID    lactulose  20 g Oral Daily    mupirocin   Nasal BID    polyethylene glycol  17 g Oral Daily    sulfamethoxazole-trimethoprim 800-160mg  1 tablet Oral BID    tamsulosin  0.4 mg Oral Daily    ticagrelor  90 mg Oral BID     PRN Meds:acetaminophen, calcium gluconate IVPB, calcium gluconate IVPB, calcium gluconate IVPB, magnesium sulfate IVPB, magnesium sulfate IVPB, ondansetron, oxyCODONE, oxyCODONE, sodium phosphate IVPB, sodium phosphate IVPB, sodium phosphate IVPB     Objective:     Vital Signs (Most Recent):  Temp: 98.5 °F (36.9 °C) (04/06/23 1221)  Pulse: 73 (04/06/23 1221)  Resp: 20 (04/06/23 1221)  BP: 129/80 (04/06/23 1221)  SpO2: 100 % (04/06/23 1221)   Vital Signs (24h Range):  Temp:  [97.9 °F (36.6 °C)-98.7 °F (37.1 °C)] 98.5 °F (36.9 °C)  Pulse:  [] 73  Resp:  [12-23] 20  SpO2:  [94 %-100 %] 100 %  BP: ()/() 129/80         Physical Exam  Vitals and nursing note reviewed.   Constitutional:       General: He is not in acute distress.     Appearance: Normal appearance.   HENT:      Head: Normocephalic and atraumatic.      Nose: Nose normal.      Mouth/Throat:      Mouth: Mucous membranes are moist.   Cardiovascular:      Rate and Rhythm: Normal rate.      Comments: 2+ palpable R DP, PT    Pulmonary:      Effort: Pulmonary effort is normal. No respiratory distress.   Abdominal:      General: Abdomen is flat. There is no distension.      Palpations: Abdomen is soft.   Musculoskeletal:         General: Normal range of motion.   Skin:     General: Skin is warm.      Comments: Right groin and medial leg incision dressings removed at bedside today  No notable erythema or drainage  Dressing replaced to the right groin   Neurological:      General:  No focal deficit present.      Mental Status: He is alert.       Significant Labs:  CBC:   Recent Labs   Lab 04/06/23  1154   WBC 13.57*   RBC 4.47*   HGB 12.3*   HCT 38.4*      MCV 86   MCH 27.5   MCHC 32.0       CMP:   Recent Labs   Lab 04/06/23  0325   GLU 97   CALCIUM 8.6*      K 3.9   CO2 23      BUN 17   CREATININE 0.8         Significant Diagnostics:  I have reviewed all pertinent imaging results/findings within the past 24 hours.

## 2023-04-06 NOTE — PT/OT/SLP EVAL
Occupational Therapy   Evaluation/Treatment    Name: Giorgi Eden  MRN: [id number]  Admitting Diagnosis: Popliteal artery aneurysm  Recent Surgery: Procedure(s) (LRB):  CREATION, BYPASS, ARTERIAL, FEMORAL TO POPLITEAL, USING VEIN (Right) 3 Days Post-Op    Recommendations:     Discharge Recommendations: other (see comments)  Discharge Equipment Recommendations:  walker, rolling, bedside commode, bath bench  Barriers to discharge:  None    Assessment:     Giorgi Eden is a 62 y.o. male with a medical diagnosis of Popliteal artery aneurysm. Performance deficits affecting function: weakness, impaired endurance, impaired self care skills, impaired functional mobility, gait instability, impaired balance, decreased lower extremity function, pain.      Rehab Prognosis: Good; patient would benefit from acute skilled OT services to address these deficits and reach maximum level of function.       Plan:     Patient to be seen 3 x/week to address the above listed problems via self-care/home management, therapeutic activities, therapeutic exercises  Plan of Care Expires: 05/06/23  Plan of Care Reviewed with: patient, spouse    Subjective     Chief Complaint: No complaint noted  Patient/Family Comments/goals: to go home    Occupational Profile:  Living Environment: Patient lives with their partner 1-story house with threshold to enter. Pt's bathroom has a tub/shower with no bench or grab bar. Patient has a low toilet with no grab bar. Patent was independent with ADLs and functional mobility PTA. Patient drives and works.     Pain/Comfort:  Pain Rating 1:  (patient did not rate)  Location - Side 1: Right  Location - Orientation 1: generalized  Location 1: leg  Pain Addressed 1: Reposition, Distraction  Pain Rating Post-Intervention 1:  (patient did not rate)    Patients cultural, spiritual, Gnosticist conflicts given the current situation: no    Objective:     Communicated with: NSG prior to session.  Patient found HOB elevated upon  OT entry to room.    General Precautions: Standard, fall  Orthopedic Precautions: RLE weight bearing as tolerated  Braces: N/A  Respiratory Status: Room air    Occupational Performance:    Functional Mobility/Transfers:  Patient completed Sit <> Stand Transfer with stand by assistance  with  no assistive device   Patient completed Toilet Transfer bedside chair<>toilet with functional ambulation technique with stand by assistance with  no AD. Patient ambulated in hallway prior to sitting on toilet with SBA with no AD.     Activities of Daily Living:  Lower Body Dressing: total assistance Patient unable to doff and don socks    Cognitive/Visual Perceptual:  Cognitive/Psychosocial Skills:     -       Oriented to: Person, Place, Time, and Situation   -       Follows Commands/attention:Follows multistep  commands  -       Communication: clear/fluent  -       Memory: No Deficits noted  -       Safety awareness/insight to disability: intact   -       Mood/Affect/Coping skills/emotional control: Appropriate to situation  Visual/Perceptual:      -Intact      Physical Exam:  Upper Extremity Range of Motion:     -       Right Upper Extremity: WFL  -       Left Upper Extremity: WFL  Upper Extremity Strength:    -       Right Upper Extremity: WFL  -       Left Upper Extremity: WFL   Strength:    -       Right Upper Extremity: WFL  -       Left Upper Extremity: WFL  Fine Motor Coordination:    -       Intact  Gross motor coordination:   WFL    AMPAC 6 Click ADL:  AMPAC Total Score: 21    Treatment & Education:  Role of OT and POC  ADL retraining  Functional mobility training  Safety  Discharge planning  Importance EOB/OOB activity    Patient educated on DME: use of RW; BSC over toilet or obtaining a riser for over toilet for patient to be able to get on and off toilet; bath bench for tub.     Partial co-treatment performed due to patient's multiple deficits requiring two skilled therapists to appropriately and safely assess  patient's strength and endurance while facilitating functional tasks in addition to accommodating for patient's activity tolerance.     Patient left HOB elevated with call button in reach and all needs met.     GOALS:   Multidisciplinary Problems       Occupational Therapy Goals          Problem: Occupational Therapy    Goal Priority Disciplines Outcome Interventions   Occupational Therapy Goal     OT, PT/OT Ongoing, Progressing    Description: Goals to be met by:  4/27/2023    Patient will increase functional independence with ADLs by performing:    LE Dressing with Supervision.  Grooming while standing at sink with Modified Caroline.  Toileting from toilet with Modified Caroline for hygiene and clothing management.   Supine to sit with Modified Caroline.  Stand pivot transfers with Modified Caroline.  Toilet transfer to toilet with Modified Caroline.                         History:     Past Medical History:   Diagnosis Date    Hyperlipidemia     Hypertension     MI (myocardial infarction)     X2         Past Surgical History:   Procedure Laterality Date    AORTOGRAPHY WITH SERIALOGRAPHY Bilateral 1/26/2023    Procedure: AORTOGRAM, WITH BILATERAL LOWER EXTREMITY ANGIOGRAMS;  Surgeon: Ian Castorena MD;  Location[facility] OR;  Service: Vascular;  Laterality: Bilateral;  RN PREOP 1/24/23    CORONARY ANGIOPLASTY WITH STENT PLACEMENT      x2    CREATION OF FEMOROPOPLITEAL ARTERIAL BYPASS USING GRAFT Right 4/3/2023    Procedure: CREATION, BYPASS, ARTERIAL, FEMORAL TO POPLITEAL, USING VEIN;  Surgeon: Ian Castorena MD;  Location: [facility] OR [address] [place] [address];  Service: Vascular;  Laterality: Right;  Vein bypass  fluro time  kvp  ma     LAPAROSCOPIC CHOLECYSTECTOMY N/A 3/23/2023    Procedure: CHOLECYSTECTOMY, LAPAROSCOPIC;  Surgeon: Ana María Ellington MD;  Location: [facility] OR [address] [place] [address];  Service: General;  Laterality: N/A;    LEFT HEART CATHETERIZATION Left 5/31/2022    Procedure: Left heart cath;  Surgeon:  Romulo Gupta MD;  Location: [facility] CATH/EP LAB;  Service: Cardiology;  Laterality: Left;       Time Tracking:     OT Date of Treatment: 04/06/23  OT Start Time: 0923  OT Stop Time: 0946  OT Total Time (min): 23 min    Billable Minutes:Evaluation 10  Self Care/Home Management 13    4/6/2023

## 2023-04-06 NOTE — PROGRESS NOTES
Report called. SBAR & POC discussed with receiving RN. Preparing pt for transfer to bed 1028.        04/06/23 0017   Handoff Report   Given To CHRISTEL Avalos  (10th floor RN [facility])

## 2023-04-06 NOTE — ASSESSMENT & PLAN NOTE
63 yo male with pmh of HTN, MI s/p stent x3 in 5/2022 (on ASA and brilinta at home),  HLD, Tobacco abuse, lap salvatore on 3/23 admitted s/p right fem pop bypass by Dr Castorena for management of Popliteal aneurysm.    - patient seen and examined this morning  - tolerating regular diet  - aspirin, brilinta   - Resume home meds  - PT/OT  - Pulses palpable this am; dressings changed    Dispo:  DC today

## 2023-04-06 NOTE — ASSESSMENT & PLAN NOTE
61 yo male with pmh of HTN, MI s/p stent x3 in 5/2022 (on ASA and brilinta at home),  HLD, Tobacco abuse, lap salvatore on 3/23 admitted s/p right fem pop bypass by Dr Castorena for management of Popliteal aneurysm.    - regular diet  - aspirin, brilinta   - trend WBC  - Resumed home meds  - PT/OT  - Pulses palpable this am; dressings changed    Dispo: likely d/c tomorrow

## 2023-04-06 NOTE — PT/OT/SLP PROGRESS
Physical Therapy Treatment    Patient Name:  Giorgi Eden   MRN:  [id number]    Recommendations:     Discharge Recommendations: other (see comments)  Discharge Equipment Recommendations: walker, rolling  Barriers to discharge: None    Assessment:     Giorgi Eden is a 62 y.o. male admitted with a medical diagnosis of Popliteal artery aneurysm.  He presents with the following impairments/functional limitations: impaired endurance, impaired functional mobility, impaired self care skills, impaired balance, gait instability, decreased lower extremity function, pain, impaired fine motor. Pt participated, and tolerated treatment well. Pt t/fs with RW SBA, ambulated 150ft SBA with RW, and ascended/descended 1 step SBA with use of HR. Resume PT POC as indicated.     Rehab Prognosis: Good; patient would benefit from acute skilled PT services to address these deficits and reach maximum level of function.    Recent Surgery: Procedure(s) (LRB):  CREATION, BYPASS, ARTERIAL, FEMORAL TO POPLITEAL, USING VEIN (Right) 3 Days Post-Op    Plan:     During this hospitalization, patient to be seen 4 x/week to address the identified rehab impairments via gait training, therapeutic activities, therapeutic exercises, neuromuscular re-education and progress toward the following goals:    Plan of Care Expires:  05/05/23    Subjective     Chief Complaint: none stated  Patient/Family Comments/goals: none stated  Pain/Comfort:  Pain Rating 1:  (not rated)  Location - Side 1: Right  Location - Orientation 1: generalized  Location 1: leg  Pain Addressed 1: Pre-medicate for activity  Pain Rating Post-Intervention 1:  (not rated)      Objective:     Communicated with nursing prior to session.  Patient found up in chair with  (no active lines) upon PT entry to room.     General Precautions: Standard, fall  Orthopedic Precautions: RLE weight bearing as tolerated  Braces: N/A  Respiratory Status: Room air     Functional Mobility:  Transfers:  Sit to Stand:   stand by assistance with rolling walker  Gait: 150ft SBA with RW. No LOB noted.   Stairs:  Pt ascended/descended 1 stair(s) with No Assistive Device with left handrail with Stand-by Assistance.       AM-PAC 6 CLICK MOBILITY  Turning over in bed (including adjusting bedclothes, sheets and blankets)?: 4  Sitting down on and standing up from a chair with arms (e.g., wheelchair, bedside commode, etc.): 3  Moving from lying on back to sitting on the side of the bed?: 4  Moving to and from a bed to a chair (including a wheelchair)?: 3  Need to walk in hospital room?: 3  Climbing 3-5 steps with a railing?: 2  Basic Mobility Total Score: 19       Treatment & Education:  -All questions/concerns answered within PTA scope of practice.     Patient left  standing in room SBA with RW in the care of OT .  GOALS:   Multidisciplinary Problems       Physical Therapy Goals          Problem: Physical Therapy    Goal Priority Disciplines Outcome Goal Variances Interventions   Physical Therapy Goal     PT, PT/OT Ongoing, Progressing     Description: Goals to be met by: 2023     Patient will increase functional independence with mobility by performin. Sit to stand transfer with Modified [place]  2. Gait  x 250 feet with Modified [place] using RW .   3. Lower extremity exercise program x30 reps per handout, with independence                         Time Tracking:     PT Received On: 23  PT Start Time: 920     PT Stop Time: 931  PT Total Time (min): 11 min     Billable Minutes: Gait Training 11    Treatment Type: Treatment  PT/PTA: PTA     Number of PTA visits since last PT visit: 2023

## 2023-04-06 NOTE — DISCHARGE INSTRUCTIONS
VASCULAR SURGERY DISCHARGE INSTRUCTIONS    Woundcare:  - Shower daily and pad your incision site dry  - Leave sutures in place  - It is normal to notice some bruising at your incision site in the first 1-3 days after surgery  -Keep groin clean and dry at all times     Activity:  - Activity is good for you. Try to walk frequently and increase walking distance every day  - No heavy lifting for 2 weeks  - No baths, swimming in pools, lakes, jacuzzi etc. for 2 weeks     Diet:  -Resume your pre-operative home diet    Follow up:  -Follow up for ultrasound and vascular surgery clinic appointment in ~2 weeks    Call Vascular Surgery Office at [phone] if you experience:  -Increased redness, warmth, tenderness, or draining pus from your incision  -Increased pain/swelling at your incision  -Worsening fevers, chills, nausea/vomiting  -Pain, weakness, coldness, or numbness in your legs  -Uncontrolled pain  -Your call will be returned within 24 hours and further instructions will be provided    Go to ER/Urgent Care if you experience:  -Worsening shortness of breath or chest pain  -Sudden severe pain and swelling at your incision site

## 2023-04-07 VITALS
DIASTOLIC BLOOD PRESSURE: 62 MMHG | RESPIRATION RATE: 18 BRPM | BODY MASS INDEX: 28.88 KG/M2 | OXYGEN SATURATION: 99 % | WEIGHT: 190.56 LBS | HEIGHT: 68 IN | TEMPERATURE: 98 F | SYSTOLIC BLOOD PRESSURE: 114 MMHG | HEART RATE: 73 BPM

## 2023-04-07 LAB
ANION GAP SERPL CALC-SCNC: 8 MMOL/L (ref 8–16)
BASOPHILS # BLD AUTO: 0.04 K/UL (ref 0–0.2)
BASOPHILS NFR BLD: 0.3 % (ref 0–1.9)
BUN SERPL-MCNC: 16 MG/DL (ref 6–20)
CALCIUM SERPL-MCNC: 9.1 MG/DL (ref 8.7–10.5)
CHLORIDE SERPL-SCNC: 106 MMOL/L (ref 95–110)
CO2 SERPL-SCNC: 24 MMOL/L (ref 23–29)
CREAT SERPL-MCNC: 0.8 MG/DL (ref 0.5–1.4)
DIFFERENTIAL METHOD: ABNORMAL
EOSINOPHIL # BLD AUTO: 0.3 K/UL (ref 0–0.5)
EOSINOPHIL NFR BLD: 2.6 % (ref 0–8)
ERYTHROCYTE [DISTWIDTH] IN BLOOD BY AUTOMATED COUNT: 13.9 % (ref 11.5–14.5)
EST. GFR  (NO RACE VARIABLE): >60 ML/MIN/1.73 M^2
GLUCOSE SERPL-MCNC: 99 MG/DL (ref 70–110)
HCT VFR BLD AUTO: 36.8 % (ref 40–54)
HGB BLD-MCNC: 11.9 G/DL (ref 14–18)
IMM GRANULOCYTES # BLD AUTO: 0.14 K/UL (ref 0–0.04)
IMM GRANULOCYTES NFR BLD AUTO: 1.2 % (ref 0–0.5)
LYMPHOCYTES # BLD AUTO: 2.3 K/UL (ref 1–4.8)
LYMPHOCYTES NFR BLD: 20 % (ref 18–48)
MCH RBC QN AUTO: 27.6 PG (ref 27–31)
MCHC RBC AUTO-ENTMCNC: 32.3 G/DL (ref 32–36)
MCV RBC AUTO: 85 FL (ref 82–98)
MONOCYTES # BLD AUTO: 1.3 K/UL (ref 0.3–1)
MONOCYTES NFR BLD: 10.8 % (ref 4–15)
NEUTROPHILS # BLD AUTO: 7.6 K/UL (ref 1.8–7.7)
NEUTROPHILS NFR BLD: 65.1 % (ref 38–73)
NRBC BLD-RTO: 0 /100 WBC
PLATELET # BLD AUTO: 261 K/UL (ref 150–450)
PMV BLD AUTO: 10.6 FL (ref 9.2–12.9)
POTASSIUM SERPL-SCNC: 4.1 MMOL/L (ref 3.5–5.1)
RBC # BLD AUTO: 4.31 M/UL (ref 4.6–6.2)
SODIUM SERPL-SCNC: 138 MMOL/L (ref 136–145)
WBC # BLD AUTO: 11.71 K/UL (ref 3.9–12.7)

## 2023-04-07 PROCEDURE — 25000003 PHARM REV CODE 250: Performed by: ANESTHESIOLOGY

## 2023-04-07 PROCEDURE — 80048 BASIC METABOLIC PNL TOTAL CA: CPT | Performed by: STUDENT IN AN ORGANIZED HEALTH CARE EDUCATION/TRAINING PROGRAM

## 2023-04-07 PROCEDURE — 25000003 PHARM REV CODE 250

## 2023-04-07 PROCEDURE — 25000003 PHARM REV CODE 250: Performed by: STUDENT IN AN ORGANIZED HEALTH CARE EDUCATION/TRAINING PROGRAM

## 2023-04-07 PROCEDURE — 36415 COLL VENOUS BLD VENIPUNCTURE: CPT | Performed by: STUDENT IN AN ORGANIZED HEALTH CARE EDUCATION/TRAINING PROGRAM

## 2023-04-07 PROCEDURE — 85025 COMPLETE CBC W/AUTO DIFF WBC: CPT | Performed by: STUDENT IN AN ORGANIZED HEALTH CARE EDUCATION/TRAINING PROGRAM

## 2023-04-07 PROCEDURE — 25000003 PHARM REV CODE 250: Performed by: SURGERY

## 2023-04-07 RX ORDER — OXYCODONE HYDROCHLORIDE 5 MG/1
5 TABLET ORAL EVERY 4 HOURS PRN
Qty: 15 TABLET | Refills: 0 | Status: SHIPPED | OUTPATIENT
Start: 2023-04-07 | End: 2024-03-20

## 2023-04-07 RX ORDER — SULFAMETHOXAZOLE AND TRIMETHOPRIM 800; 160 MG/1; MG/1
1 TABLET ORAL 2 TIMES DAILY
Qty: 14 TABLET | Refills: 0 | Status: SHIPPED | OUTPATIENT
Start: 2023-04-07 | End: 2023-04-14

## 2023-04-07 RX ADMIN — ACETAMINOPHEN 650 MG: 325 TABLET ORAL at 03:04

## 2023-04-07 RX ADMIN — SULFAMETHOXAZOLE AND TRIMETHOPRIM 1 TABLET: 800; 160 TABLET ORAL at 08:04

## 2023-04-07 RX ADMIN — DOCUSATE SODIUM 50 MG: 50 CAPSULE, LIQUID FILLED ORAL at 08:04

## 2023-04-07 RX ADMIN — ASPIRIN 81 MG: 81 TABLET, COATED ORAL at 08:04

## 2023-04-07 RX ADMIN — ATENOLOL 50 MG: 25 TABLET ORAL at 08:04

## 2023-04-07 RX ADMIN — TICAGRELOR 90 MG: 90 TABLET ORAL at 08:04

## 2023-04-07 RX ADMIN — MUPIROCIN: 20 OINTMENT TOPICAL at 08:04

## 2023-04-07 RX ADMIN — ATORVASTATIN CALCIUM 20 MG: 20 TABLET, FILM COATED ORAL at 08:04

## 2023-04-07 RX ADMIN — POLYETHYLENE GLYCOL 3350 17 G: 17 POWDER, FOR SOLUTION ORAL at 08:04

## 2023-04-07 RX ADMIN — TAMSULOSIN HYDROCHLORIDE 0.4 MG: 0.4 CAPSULE ORAL at 08:04

## 2023-04-07 RX ADMIN — FAMOTIDINE 20 MG: 20 TABLET ORAL at 08:04

## 2023-04-07 NOTE — NURSING
"      SICU PLAN OF CARE NOTE    Dx: Popliteal artery aneurysm    Shift Events: SIMON ERNESTINA this AM    Goals of Care: MAPs >65, SBP <160    Neuro: AAO x4, Follows Commands, and Moves All Extremities    Vital Signs: /70 (BP Location: Right arm, Patient Position: Lying)   Pulse 82   Temp 98.2 °F (36.8 °C) (Oral)   Resp 18   Ht 5' 8" (1.727 m)   Wt 86.5 kg (190 lb 9.6 oz)   SpO2 98%   BMI 28.98 kg/m²     Respiratory: Room Air    Diet: Cardiac Diet    Urine Output: Voids Spontaneously 1675 cc/shift     Labs/Accuchecks: daily labs    Skin: Skin CDI. Waffle mattress inflated. Pt able to reposition self independently although providing weight shift assistance as needed. Adhesive use limited. Foams in place. No new skin breakdown noted on shift.       "
0045-Tele box applied to pt. Monitoring in progress. Pt continues A&O x 4. Pt medicated for c/o pain associated with movement/activity of extremity/walking; see MAR for details. Pt placed in wheelchair. All belongings & chart taken off unit with pt for transfer to [id number].     Arrived to new unit. Receiving RN, Robbie, at bedside. Updates discussed. Bed locked in lowest position, call light placed in immediate reach. Non-skid socks on. Pt with no c/o distress. Pt's wife continues at bedside. Care relinquished.       
Paged intern on call with surgical at the patients request to look at donor site on right lower extremity. There os a spot that looks to be inflamed. IT is red and raised. Pt is wanting intern to come look at the site.Time of page 08:20. 2130 Surgery intern assessed the redness along side the donor site of right lower extremity. Extremity appeared more red than before. Border has been documented with skin pen to note. Patient has been afebrile. Also noted and discussed with family was tremor like movements to lower extremities. Both patient and spouse stated that that has only began happening since this hospitalization. Will continue to monitor throughout the night.,  
Problem: Adult Inpatient Plan of Care  Goal: Plan of Care Review  Outcome: Ongoing, Progressing  Flowsheets (Taken 4/5/2023 2350)  Plan of Care Reviewed With:   patient   spouse  Goal: Patient-Specific Goal (Individualized)  Outcome: Ongoing, Progressing     Problem: Fall Injury Risk  Goal: Absence of Fall and Fall-Related Injury  Outcome: Ongoing, Progressing  Intervention: Identify and Manage Contributors  Flowsheets (Taken 4/5/2023 2330)  Self-Care Promotion:   independence encouraged   BADL personal objects within reach   BADL personal routines maintained   safe use of adaptive equipment encouraged     Problem: Infection  Goal: Absence of Infection Signs and Symptoms  Outcome: Ongoing, Progressing  Intervention: Prevent or Manage Infection  Flowsheets (Taken 4/5/2023 2350)  Fever Reduction/Comfort Measures:   lightweight bedding   lightweight clothing  Infection Management: aseptic technique maintained     Problem: Pain Acute  Goal: Acceptable Pain Control and Functional Ability  Outcome: Ongoing, Progressing  Intervention: Develop Pain Management Plan  Flowsheets (Taken 4/5/2023 2330)  Pain Management Interventions:   quiet environment facilitated   relaxation techniques promoted   pain management plan reviewed with patient/caregiver   care clustered  Intervention: Prevent or Manage Pain  Flowsheets (Taken 4/5/2023 2330)  Sleep/Rest Enhancement:   noise level reduced   regular sleep/rest pattern promoted   relaxation techniques promoted  
Pt stated that he has not had a BM since 04/02/2023. Pt getting miralax and lactulose at day shift.    Redness to inner aspect of lower left extremity has decreased quit a bit. No new complain ts  
Report received from Tatum KEARNEY. Pt arrived to unit via wheelchair. Upon assessment pt denies any c/o chest pain, SOB or acute injuries. Pt AOX4, RA, tele (#[id number]) reading NSR. Surgical incisions clean dry and intact. POC discussed with pt and wife at bedside. SRX3 call bell phone and personal belongings within reach.   
This RN reviewed discharge instructions and medications with patient. All questions answered. Patient verbalized understanding. No PIV in place. Medications delivered to bedside. Sent via internal transport with all belongings.   
numerical 0-10
Information: Selecting Yes will display possible errors in your note based on the variables you have selected. This validation is only offered as a suggestion for you. PLEASE NOTE THAT THE VALIDATION TEXT WILL BE REMOVED WHEN YOU FINALIZE YOUR NOTE. IF YOU WANT TO FAX A PRELIMINARY NOTE YOU WILL NEED TO TOGGLE THIS TO 'NO' IF YOU DO NOT WANT IT IN YOUR FAXED NOTE.

## 2023-04-10 ENCOUNTER — TELEPHONE (OUTPATIENT)
Dept: VASCULAR SURGERY | Facility: CLINIC | Age: 63
End: 2023-04-10
Payer: COMMERCIAL

## 2023-04-10 NOTE — TELEPHONE ENCOUNTER
Post op appt scheduled.    ----- Message from Diane Villareal sent at 4/10/2023  9:36 AM CDT -----  .Type: Patient Call Back    Who called: Salima - Girlfriend    What is the request in detail: Would like to know is it ok for barbie to be in Mr. Eden's leg for 5 weeks     Can the clinic reply by [facility]? No     Would the patient rather a call back or a response via [facility]? Call back     Best call back number:[phone]    Additional Information:

## 2023-04-11 NOTE — OP NOTE
Surgery Date: 4/3/2023      Surgeon(s) and Role:     * Ian Castorena MD - Primary    Assistant:     * Olaf Hernandez MD - Assisting     * Nitin Guidry MD - Resident - Assisting     Pre-op Diagnosis:  Popliteal artery aneurysm [I72.4]     Post-op Diagnosis:  Post-Op Diagnosis Codes:     * Popliteal artery aneurysm [I72.4]     Procedure(s) (LRB):  CREATION, BYPASS, ARTERIAL, SUPERFICIAL FEMORAL TO BELOW KNEE POPLITEAL ARTERY, USING REVERSED GREATER SAPHENOUS VEIN (Right)  Right greater saphenous vein harvest  3.   Right lower extremity angiogram    Procedure in detail:  Patient was seen preoperatively by anesthesia was deemed stable for surgery.  He is brought to the operating room placed under general anesthesia.  Arterial line, appropriate IVs and Morales catheter were placed.  His greater saphenous vein was marked on the right side.  He was prepped and draped in sterile fashion.  A time-out performed.  Scalpel were used to make longitudinal incisions overlying the right SFA that was marked on ultrasound preoperatively as well as the below-knee popliteal artery.  Electrocautery was used to deepened the incisions and sharp dissection was used to expose the right superficial femoral artery and the right below-knee popliteal artery.  Vessel loops were placed around these vessels.  We then harvested the right greater saphenous vein making skip incisions from the saphenofemoral junction down to the level of the calf.  Branches were clipped and tied with silk ties as appropriate.  The vein was then placed in heparinized saline and checked for leaks with repair ties and sutures 7 0 Prolene as needed.  The vein was noted to be appropriate.  Tunnel was performed subcutaneously and the patient was systemically heparinized with the appropriate re-dosing based upon ACT levels.  The bypass were performed in a reversed fashion.  We performed a longitudinal arteriotomy with 11 blade scalpel and extended with Xuan  scissors After our proximal anastomosis was completed with a 5 0 Prolene repair sutures were placed and ties as needed.  We ligated the SFA distal to the anastomosis with 2 0 silk ties.  We then passed our vein graft through the tunnel and straighten the leg.  We cut our vein graft to appropriate length and spatulated appropriately after making a longitudinal arteriotomy in the below-knee popliteal artery with 11 blade scalpel and extended with Govea scissors. .  Prior to our final not being secured we flushed our vein graft and our proximal vasculature followed by distal vasculature.  We used a Doppler to confirm appropriate signals in the vessels and vein graft.  There was a 2+ pulse in the vein graft.  We used 2-0 silk ties to ligate the popliteal artery proximal to the distal anastomosis.  The aneurysm was without pulsatility at this time.  We then accessed the right SFA retrograde with a Jelco needle and performed right lower extremity angiogram showing no flow-limiting stenosis in the bypass graft and appropriate filling of the foot without filling of the aneurysm.  Our wounds were irrigated and patient was reversed with protamine protamine.  We then controlled bleeding with electrocautery.  We closed our saphenous fascia with 2-0 running Vicryl sutures and subdermal layer with 3-0 running Vicryl sutures.  We closed our subdermal below-knee incision with 3-0 running Vicryl suture and our thigh incision dermis with 3-0 running Vicryl suture to the subdermal layer.  We closed the skin with staples and placed sterile dressings overlying the incisions.  The patient had a palpable pulse at the conclusion of the case, posterior tibial pulse.  The patient was woken from anesthesia and transferred to the recovery room in stable condition.     Anesthesia: General     Operative Findings: 2+ R PT at conclusion of case     Estimated Blood Loss: 200 mL     Estimated Blood Loss has been documented.         Specimens:    Specimen (24h ago, onward)        None

## 2023-04-12 ENCOUNTER — OFFICE VISIT (OUTPATIENT)
Dept: VASCULAR SURGERY | Facility: CLINIC | Age: 63
End: 2023-04-12
Payer: COMMERCIAL

## 2023-04-12 VITALS
SYSTOLIC BLOOD PRESSURE: 120 MMHG | DIASTOLIC BLOOD PRESSURE: 79 MMHG | HEIGHT: 68 IN | BODY MASS INDEX: 28.94 KG/M2 | HEART RATE: 72 BPM | WEIGHT: 190.94 LBS | OXYGEN SATURATION: 98 %

## 2023-04-12 DIAGNOSIS — Z95.828 S/P FEMORAL-POPLITEAL BYPASS SURGERY: Primary | ICD-10-CM

## 2023-04-12 PROCEDURE — 99024 PR POST-OP FOLLOW-UP VISIT: ICD-10-PCS | Mod: S$GLB,,, | Performed by: SURGERY

## 2023-04-12 PROCEDURE — 3078F DIAST BP <80 MM HG: CPT | Mod: CPTII,S$GLB,, | Performed by: SURGERY

## 2023-04-12 PROCEDURE — 99999 PR PBB SHADOW E&M-EST. PATIENT-LVL III: ICD-10-PCS | Mod: PBBFAC,,, | Performed by: SURGERY

## 2023-04-12 PROCEDURE — 3074F PR MOST RECENT SYSTOLIC BLOOD PRESSURE < 130 MM HG: ICD-10-PCS | Mod: CPTII,S$GLB,, | Performed by: SURGERY

## 2023-04-12 PROCEDURE — 3074F SYST BP LT 130 MM HG: CPT | Mod: CPTII,S$GLB,, | Performed by: SURGERY

## 2023-04-12 PROCEDURE — 4010F ACE/ARB THERAPY RXD/TAKEN: CPT | Mod: CPTII,S$GLB,, | Performed by: SURGERY

## 2023-04-12 PROCEDURE — 3078F PR MOST RECENT DIASTOLIC BLOOD PRESSURE < 80 MM HG: ICD-10-PCS | Mod: CPTII,S$GLB,, | Performed by: SURGERY

## 2023-04-12 PROCEDURE — 3008F PR BODY MASS INDEX (BMI) DOCUMENTED: ICD-10-PCS | Mod: CPTII,S$GLB,, | Performed by: SURGERY

## 2023-04-12 PROCEDURE — 1159F MED LIST DOCD IN RCRD: CPT | Mod: CPTII,S$GLB,, | Performed by: SURGERY

## 2023-04-12 PROCEDURE — 4010F PR ACE/ARB THEARPY RXD/TAKEN: ICD-10-PCS | Mod: CPTII,S$GLB,, | Performed by: SURGERY

## 2023-04-12 PROCEDURE — 99024 POSTOP FOLLOW-UP VISIT: CPT | Mod: S$GLB,,, | Performed by: SURGERY

## 2023-04-12 PROCEDURE — 3008F BODY MASS INDEX DOCD: CPT | Mod: CPTII,S$GLB,, | Performed by: SURGERY

## 2023-04-12 PROCEDURE — 99999 PR PBB SHADOW E&M-EST. PATIENT-LVL III: CPT | Mod: PBBFAC,,, | Performed by: SURGERY

## 2023-04-12 PROCEDURE — 1159F PR MEDICATION LIST DOCUMENTED IN MEDICAL RECORD: ICD-10-PCS | Mod: CPTII,S$GLB,, | Performed by: SURGERY

## 2023-04-12 NOTE — PROGRESS NOTES
Ian Castorena MD, RPVI                                 [facility]                           [facility]                             04/12/2023    HPI:  Giorgi Eden is a 62 y.o. male with   Patient Active Problem List   Diagnosis    Essential hypertension    Status post insertion of drug eluting coronary artery stent    History of myocardial infarction    Mixed hyperlipidemia    Tobacco abuse    Atherosclerotic heart disease of native coronary artery with unstable angina pectoris    Chronic venous insufficiency    Recurrent biliary colic    Popliteal artery aneurysm    being managed by PCP and specialists who is here today for evaluation of BLE edema and RLE pain.  Patient states location is R calf occurring for months.  Associated signs and symptoms include edema.  Quality is pain and severity is 3/10.  Symptoms began yrs ago.  Alleviating factors include elevation.  Worsening factors include dependency.  Patient has not been wearing compression stockings for greater than 3 months.  No FH of venous disease.  Symptoms do limit patient's functional status and daily activities.  no DVT history.  no venous interventions.  no low sodium diet.  no excessive water intake.    Migraine with aura: no  PFO/ASD/right to left shunt: no  Pregnant: no  Breastfeeding: no    no MI  no Stroke  Tobacco use: no    1/2023:  c/o persistent BLE edema and pain despite compression and elevation > 3 mo.  No claudication.    4/2023:  s/p R SFA-BK popliteal vein bypass 4/3/23 with ligation of femoropopliteal aneurysm.  Presents due to concern for staple removal.    Past Medical History:   Diagnosis Date    Hyperlipidemia     Hypertension     MI (myocardial infarction)     X2     Past Surgical History:   Procedure Laterality Date    AORTOGRAPHY WITH SERIALOGRAPHY Bilateral 1/26/2023    Procedure: AORTOGRAM, WITH BILATERAL LOWER EXTREMITY ANGIOGRAMS;  Surgeon: Ian Castorena MD;  Location: [facility] OR;   Service: Vascular;  Laterality: Bilateral;  RN PREOP 23    CORONARY ANGIOPLASTY WITH STENT PLACEMENT      x2    CREATION OF FEMOROPOPLITEAL ARTERIAL BYPASS USING GRAFT Right 4/3/2023    Procedure: CREATION, BYPASS, ARTERIAL, FEMORAL TO POPLITEAL, USING VEIN;  Surgeon: Ian Castorena MD;  Location: [address];  Service: Vascular;  Laterality: Right;  Vein bypass  fluro time  kvp  ma     LAPAROSCOPIC CHOLECYSTECTOMY N/A 3/23/2023    Procedure: CHOLECYSTECTOMY, LAPAROSCOPIC;  Surgeon: Ana María Ellington MD;  Location: [address];  Service: General;  Laterality: N/A;    LEFT HEART CATHETERIZATION Left 2022    Procedure: Left heart cath;  Surgeon: Romulo Gupta MD;  Location: [facility] CATH/EP LAB;  Service: Cardiology;  Laterality: Left;     Family History   Problem Relation Age of Onset    COPD Mother     Heart disease Mother     Stroke Mother      Social History     Socioeconomic History    Marital status: Single   Tobacco Use    Smoking status: Former     Packs/day: 1.00     Years: 25.00     Pack years: 25.00     Types: Cigarettes     Quit date: 2021     Years since quittin.7    Smokeless tobacco: Never    Tobacco comments:     I now use a vape pen at 5 mg per day   Substance and Sexual Activity    Alcohol use: Yes     Comment: socially    Drug use: Not Currently    Sexual activity: Yes     Partners: Female     Social Determinants of Health     Financial Resource Strain: Low Risk     Difficulty of Paying Living Expenses: Not hard at all   Food Insecurity: No Food Insecurity    Worried About Running Out of Food in the Last Year: Never true    Ran Out of Food in the Last Year: Never true   Transportation Needs: No Transportation Needs    Lack of Transportation (Medical): No    Lack of Transportation (Non-Medical): No   Social Connections: Unknown    Marital Status: Living with partner   Housing Stability: Unknown    Unable to Pay for Housing in the Last Year: Patient refused        Current Outpatient Medications:     acetaminophen (TYLENOL) 325 MG tablet, Take 2 tablets (650 mg total) by mouth every 6 (six) hours as needed for Pain., Disp: 30 tablet, Rfl: 0    aspirin (ECOTRIN) 81 MG EC tablet, Take 1 tablet (81 mg total) by mouth once daily., Disp: 360 tablet, Rfl: 0    atenoloL (TENORMIN) 50 MG tablet, Take 1 tablet (50 mg total) by mouth once daily., Disp: 30 tablet, Rfl: 11    atorvastatin (LIPITOR) 20 MG tablet, Take 1 tablet (20 mg total) by mouth once daily., Disp: 90 tablet, Rfl: 0    famotidine (PEPCID) 20 MG tablet, Take 1 tablet (20 mg total) by mouth 2 (two) times daily., Disp: 180 tablet, Rfl: 3    HYDROcodone-acetaminophen (NORCO) 5-325 mg per tablet, Take 1 tablet by mouth every 6 (six) hours as needed for Pain., Disp: 8 tablet, Rfl: 0    INV lisinopril 10 MG Tab, Take 1 tablet (10 mg total) by mouth once daily., Disp: 90 tablet, Rfl: 3    ondansetron (ZOFRAN) 4 MG tablet, Take 1 tablet (4 mg total) by mouth every 8 (eight) hours as needed for Nausea., Disp: 10 tablet, Rfl: 0    oxyCODONE (ROXICODONE) 5 MG immediate release tablet, Take 1 tablet (5 mg total) by mouth every 4 (four) hours as needed for Pain (For pain not releived by scheduled tylenol)., Disp: 15 tablet, Rfl: 0    oxyCODONE (ROXICODONE) 5 MG immediate release tablet, Take 1 tablet (5 mg total) by mouth every 4 (four) hours as needed for Pain., Disp: 15 tablet, Rfl: 0    sulfamethoxazole-trimethoprim 800-160mg (BACTRIM DS) 800-160 mg Tab, Take 1 tablet by mouth 2 (two) times daily. for 7 days, Disp: 14 tablet, Rfl: 0    tamsulosin (FLOMAX) 0.4 mg Cap, Take 1 capsule (0.4 mg total) by mouth once daily., Disp: 90 capsule, Rfl: 0    tiZANidine (ZANAFLEX) 4 MG tablet, Take 1 tablet (4 mg total) by mouth every 6 (six) hours as needed., Disp: 30 tablet, Rfl: 11    ticagrelor (BRILINTA) 90 mg tablet, Take 1 tablet (90 mg total) by mouth 2 (two) times daily., Disp: 180 tablet, Rfl: 3    REVIEW OF SYSTEMS:  General:  No fevers or chills; ENT: No sore throat; Allergy and Immunology: no persistent infections; Hematological and Lymphatic: No history of bleeding or easy bruising; Endocrine: negative; Respiratory: no cough, shortness of breath, or wheezing; Cardiovascular: no chest pain or dyspnea on exertion; Gastrointestinal: no abdominal pain/back, change in bowel habits, or bloody stools; Genito-Urinary: no dysuria, trouble voiding, or hematuria; Musculoskeletal: edema; Neurological: no TIA or stroke symptoms; Psychiatric: no nervousness, anxiety or depression.    PHYSICAL EXAM:      Pulse: 72         General appearance:  Alert, well-appearing, and in no distress.  Oriented to person, place, and time                    Neurological: Normal speech, no focal findings noted; CN II - XII grossly intact. RLE with sensation to light touch, LLE with sensation to light touch.            Musculoskeletal: Digits/nail without cyanosis/clubbing.  Strength 5/5 BLE.                    Neck: Supple, no significant adenopathy                  Chest:  No wheezes, symmetric air entry. No use of accessory muscles               Cardiac: Normal rate and regular rhythm            Abdomen: Soft, nontender, nondistended      Extremities:   2+ R DP pulse, 2+ L DP pulse; RLE aneurysm non palpable, +staples with min s-s drainage and inflammation to staples, no infection      1+ RLE edema, 1+ LLE edema    Skin:  RLE no ulcer; LLE no ulcer      RLE no spider veins, LLE no spider veins      RLE + varicose veins, LLE + varicose veins    CEAP 3/3    VCSS 10    LAB RESULTS:  No results found for: CBC  Lab Results   Component Value Date    LABPROT 11.3 04/03/2023    INR 1.1 04/03/2023     Lab Results   Component Value Date     04/07/2023    K 4.1 04/07/2023     04/07/2023    CO2 24 04/07/2023    GLU 99 04/07/2023    BUN 16 04/07/2023    CREATININE 0.8 04/07/2023    CALCIUM 9.1 04/07/2023    ANIONGAP 8 04/07/2023    EGFRNONAA >60.0 05/31/2022     Lab  Results   Component Value Date    WBC 11.71 04/07/2023    RBC 4.31 (L) 04/07/2023    HGB 11.9 (L) 04/07/2023    HCT 36.8 (L) 04/07/2023    MCV 85 04/07/2023    MCH 27.6 04/07/2023    MCHC 32.3 04/07/2023    RDW 13.9 04/07/2023     04/07/2023    MPV 10.6 04/07/2023    GRAN 7.6 04/07/2023    GRAN 65.1 04/07/2023    LYMPH 2.3 04/07/2023    LYMPH 20.0 04/07/2023    MONO 1.3 (H) 04/07/2023    MONO 10.8 04/07/2023    EOS 0.3 04/07/2023    BASO 0.04 04/07/2023    EOSINOPHIL 2.6 04/07/2023    BASOPHIL 0.3 04/07/2023    DIFFMETHOD Automated 04/07/2023     .  Lab Results   Component Value Date    HGBA1C 5.4 09/24/2022       IMAGING:  All pertinent imaging has been reviewed and interpreted independently.    Venous US 11/2022 Impression:  Details    Narrative    Indication   ========     Venous Insufficiency     Lower Extremity Veins   =================     Rt CFV:    complete compression, complete color fill   Rt prox femoral V: complete compression, complete color fill   Rt mid femoral V:  complete compression, complete color fill   Rt distal femoral V:   complete compression, complete color fill   Rt prox popliteal V:   complete compression, complete color fill   Rt mid popliteal V:    complete compression, complete color fill   Rt distal popliteal V: complete compression, complete color fill   Rt post tibial V:  complete compression, complete color fill,   Rt peroneal V: complete compression, complete color fill   Rt GSV:    complete compression, complete color fill, significant reflux   Rt SSV:    complete compression, complete color fill, no reflux   Add rt lower V details:    complete compression, complete color fill, Reflux Present   Lt CFV:    complete compression, complete color fill   Lt prox femoral V: complete compression, complete color fill   Lt mid femoral V:  complete compression, complete color fill   Lt distal femoral V:   complete compression, complete color fill   Lt prox popliteal V:   complete  compression, complete color fill   Lt mid popliteal V:    complete compression, complete color fill   Lt distal popliteal V: complete compression, complete color fill   Lt post tibial V:  complete compression, complete color fill   Lt peroneal V: complete compression, complete color fill   Lt GSV:    complete compression, complete color fill, significant reflux   Lt SSV:    complete compression, complete color fill, no reflux   Add lt lower V details:    complete compression, complete color fill, Reflux Present   Additional lower vein: Saphenofemoral Junction   Other: No reflux noted in the Accessory Vein     Lower Extremity Vein Mapping   =======================     Rt prox GSV thigh AP diam  6.2 mm   Rt mid GSV thigh AP diam   5.8 mm   Rt distal GSV thigh AP diam    8.3 mm   Rt GSV knee AP diam    6.5 mm   Rt prox GSV calf AP diam   4.0 mm   Rt mid GSV calf AP diam    2.4 mm   Rt distal GSV calf AP diam 1.9 mm   Lt prox GSV thigh AP diam  3.7 mm   Lt mid GSV thigh AP diam   3.4 mm   Lt distal GSV thigh AP diam    3.4 mm   Lt GSV knee AP diam    4.1 mm   Lt prox GSV calf AP diam   4.3 mm   Lt mid GSV calf AP diam    3.0 mm   Lt distal GSV calf AP diam 2.6 mm     Comment   ========     The diagnostic criteria used in our lab: Significant reflux is present when retrograde flow is noted both in the vein and at the   saphenofemoral junction (SFJ) or saphenopopliteal junction (SPJ)for 500 milliseconds (.5 second) following provocative maneuvers   when the patient is in the reverse trendelenburg position.     Impression   =========     Right Leg: Duplex imaging of the right lower extremity veins demonstrates patent and compressible veins.   There is no evidence of a venous thrombosis in the deep or superficial veins.   Significant reflux noted in the right GSV including the Saphenofemoral Junction (SFJ). No significant reflux noted in the SSV or   accessory vein.   Incidental finding of dilated distal SFA measuring 2.1  cm x 2.3 cm and proximal popliteal artery measuring 1.8 cm x 1.6 cm.     Left Leg: Duplex imaging of the left lower extremity veins demonstrates patent and compressible veins.   There is no evidence of a venous thrombosis in the deep or superficial veins.   Significant reflux noted in the left GSV including the Saphenofemoral Junction (SFJ). No significant reflux noted in the SSV or   accessory vein.   Incidental finding of dilated distal SFA measuring 2.1 cm x 2.1 cm, proximal popliteal artery measuring 1.9 cm x 1.9 cm and mid   popliteal artery measuring 2.3 cm x 2.0 cm.       DATE OF SERVICE: 11/14/2022                                                        Sonographer: AG HASTINGS RVS   Electronically Signed by: Shantanu Gu II at 11/15/2022-13:34           Specimen Collected: 11/14/22 13:48 Last Resulted: 11/15/22 13:37           CTA BLE runoff 2023 reviewed.      IMP/PLAN:  62 y.o. male with   Patient Active Problem List   Diagnosis    Essential hypertension    Status post insertion of drug eluting coronary artery stent    History of myocardial infarction    Mixed hyperlipidemia    Tobacco abuse    Atherosclerotic heart disease of native coronary artery with unstable angina pectoris    Chronic venous insufficiency    Recurrent biliary colic    Popliteal artery aneurysm    being managed by PCP and specialists who is here today for evaluation of venous insufficiency and BLE asymptomatic SFA and popliteal aneurysms.    -recommend, elevation, dietary changes associated with water and sodium intake discussed at length with patient  -Exercise   -s/p R SFA-popliteal bypass - recovering well.  Keep inc clean and dry  -RTC 1mo postop with arterial US and VIVIAN    I spent 12 minutes evaluating this patient and greater than 50% of the time was spent counseling, coordinator care and discussing the plan of care.  All questions were answered and patient stated understanding with agreement with the above treatment  plan.    Ian Castorena MD [facility]  Vascular and Endovascular Surgery

## 2023-04-13 NOTE — PHYSICIAN QUERY
PT Name: Giorgi Eden  MR #: [id number]     DOCUMENTATION CLARIFICATION     CDS/: Bettye Henry RN              Contact information:[email or web address]  This form is a permanent document in the medical record.    Query Date: April 13, 2023    By submitting this query, we are merely seeking further clarification of documentation to reflect the severity of illness of your patient. Please utilize your independent clinical judgment when addressing the question(s) below.    The Medical Record reflects the following:     Indicators   Supporting Clinical Findings Location in Medical Record   x Lab Value(s) Phos=3.6->2.6->2.0     Lab 4-3 to 4-5   x Treatment/Medication IVPB Sodium phosphate  Mar 4-4 to 4-5    Other       Provider, please specify the diagnosis or diagnoses that correspond(s) to the above indicators. [place] all that apply:    [  x ] Hypophosphatemia   [   ] Other electrolyte disturbance (please specify): __________     Please document in your progress notes daily for the duration of treatment until resolved, and include in your discharge summary.

## 2023-04-14 ENCOUNTER — PATIENT MESSAGE (OUTPATIENT)
Dept: VASCULAR SURGERY | Facility: CLINIC | Age: 63
End: 2023-04-14
Payer: COMMERCIAL

## 2023-05-01 ENCOUNTER — HOSPITAL ENCOUNTER (OUTPATIENT)
Dept: CARDIOLOGY | Facility: HOSPITAL | Age: 63
Discharge: HOME | End: 2023-05-01
Attending: SURGERY
Payer: COMMERCIAL

## 2023-05-01 DIAGNOSIS — I72.4 POPLITEAL ARTERY ANEURYSM: ICD-10-CM

## 2023-05-01 PROCEDURE — 93926 LOWER EXTREMITY STUDY: CPT | Mod: RT

## 2023-05-01 PROCEDURE — 93922 ANKLE BRACHIAL INDICES (ABI): ICD-10-PCS | Mod: 26,,, | Performed by: SURGERY

## 2023-05-01 PROCEDURE — 93922 UPR/L XTREMITY ART 2 LEVELS: CPT | Mod: 26,,, | Performed by: SURGERY

## 2023-05-01 PROCEDURE — 93926 CV US DOPPLER ARTERIAL LEG RIGHT (CUPID ONLY): ICD-10-PCS | Mod: 26,RT,, | Performed by: SURGERY

## 2023-05-01 PROCEDURE — 93926 LOWER EXTREMITY STUDY: CPT | Mod: 26,RT,, | Performed by: SURGERY

## 2023-05-01 PROCEDURE — 93922 UPR/L XTREMITY ART 2 LEVELS: CPT

## 2023-05-02 ENCOUNTER — TELEPHONE (OUTPATIENT)
Dept: INTERVENTIONAL RADIOLOGY/VASCULAR | Facility: HOSPITAL | Age: 63
End: 2023-05-02
Payer: COMMERCIAL

## 2023-05-02 ENCOUNTER — OFFICE VISIT (OUTPATIENT)
Dept: VASCULAR SURGERY | Facility: CLINIC | Age: 63
End: 2023-05-02
Payer: COMMERCIAL

## 2023-05-02 VITALS
BODY MASS INDEX: 29.75 KG/M2 | SYSTOLIC BLOOD PRESSURE: 141 MMHG | DIASTOLIC BLOOD PRESSURE: 97 MMHG | HEART RATE: 70 BPM | WEIGHT: 195.69 LBS

## 2023-05-02 DIAGNOSIS — Z95.828 S/P FEMORAL-POPLITEAL BYPASS SURGERY: Primary | ICD-10-CM

## 2023-05-02 DIAGNOSIS — I72.4 POPLITEAL ARTERY ANEURYSM: ICD-10-CM

## 2023-05-02 PROCEDURE — 3077F PR MOST RECENT SYSTOLIC BLOOD PRESSURE >= 140 MM HG: ICD-10-PCS | Mod: CPTII,S$GLB,, | Performed by: SURGERY

## 2023-05-02 PROCEDURE — 1159F PR MEDICATION LIST DOCUMENTED IN MEDICAL RECORD: ICD-10-PCS | Mod: CPTII,S$GLB,, | Performed by: SURGERY

## 2023-05-02 PROCEDURE — 3080F PR MOST RECENT DIASTOLIC BLOOD PRESSURE >= 90 MM HG: ICD-10-PCS | Mod: CPTII,S$GLB,, | Performed by: SURGERY

## 2023-05-02 PROCEDURE — 99999 PR PBB SHADOW E&M-EST. PATIENT-LVL IV: CPT | Mod: PBBFAC,,, | Performed by: SURGERY

## 2023-05-02 PROCEDURE — 99024 PR POST-OP FOLLOW-UP VISIT: ICD-10-PCS | Mod: S$GLB,,, | Performed by: SURGERY

## 2023-05-02 PROCEDURE — 3008F BODY MASS INDEX DOCD: CPT | Mod: CPTII,S$GLB,, | Performed by: SURGERY

## 2023-05-02 PROCEDURE — 99024 POSTOP FOLLOW-UP VISIT: CPT | Mod: S$GLB,,, | Performed by: SURGERY

## 2023-05-02 PROCEDURE — 3008F PR BODY MASS INDEX (BMI) DOCUMENTED: ICD-10-PCS | Mod: CPTII,S$GLB,, | Performed by: SURGERY

## 2023-05-02 PROCEDURE — 1159F MED LIST DOCD IN RCRD: CPT | Mod: CPTII,S$GLB,, | Performed by: SURGERY

## 2023-05-02 PROCEDURE — 3080F DIAST BP >= 90 MM HG: CPT | Mod: CPTII,S$GLB,, | Performed by: SURGERY

## 2023-05-02 PROCEDURE — 3077F SYST BP >= 140 MM HG: CPT | Mod: CPTII,S$GLB,, | Performed by: SURGERY

## 2023-05-02 PROCEDURE — 99999 PR PBB SHADOW E&M-EST. PATIENT-LVL IV: ICD-10-PCS | Mod: PBBFAC,,, | Performed by: SURGERY

## 2023-05-02 RX ORDER — SODIUM CHLORIDE 9 MG/ML
INJECTION, SOLUTION INTRAVENOUS CONTINUOUS
Status: DISCONTINUED | OUTPATIENT
Start: 2023-05-02 | End: 2024-03-20

## 2023-05-02 RX ORDER — SODIUM CHLORIDE 9 MG/ML
INJECTION, SOLUTION INTRAVENOUS CONTINUOUS
Status: CANCELLED | OUTPATIENT
Start: 2023-05-02

## 2023-05-02 NOTE — TELEPHONE ENCOUNTER
Attempted to call patient to verify IR procedure and schedule Pre op. Unable to reach patient, a voicemail was left with our contact information ([phone]).

## 2023-05-02 NOTE — PROGRESS NOTES
Ian Castorena MD, RPVI                                 [facility]                           [facility]                             05/02/2023    HPI:  Giorgi Eden is a 62 y.o. male with   Patient Active Problem List   Diagnosis    Essential hypertension    Status post insertion of drug eluting coronary artery stent    History of myocardial infarction    Mixed hyperlipidemia    Tobacco abuse    Atherosclerotic heart disease of native coronary artery with unstable angina pectoris    Chronic venous insufficiency    Recurrent biliary colic    Popliteal artery aneurysm    being managed by PCP and specialists who is here today for evaluation of BLE edema and RLE pain.  Patient states location is R calf occurring for months.  Associated signs and symptoms include edema.  Quality is pain and severity is 3/10.  Symptoms began yrs ago.  Alleviating factors include elevation.  Worsening factors include dependency.  Patient has not been wearing compression stockings for greater than 3 months.  No FH of venous disease.  Symptoms do limit patient's functional status and daily activities.  no DVT history.  no venous interventions.  no low sodium diet.  no excessive water intake.    Migraine with aura: no  PFO/ASD/right to left shunt: no  Pregnant: no  Breastfeeding: no    no MI  no Stroke  Tobacco use: no    1/2023:  c/o persistent BLE edema and pain despite compression and elevation > 3 mo.  No claudication.    4/2023:  s/p R SFA-BK popliteal vein bypass 4/3/23 with ligation of femoropopliteal aneurysm.  Presents due to concern for staple removal.    5/3/23:  Doing well, c/o RLE neuropathic pain.    Past Medical History:   Diagnosis Date    Hyperlipidemia     Hypertension     MI (myocardial infarction)     X2     Past Surgical History:   Procedure Laterality Date    AORTOGRAPHY WITH SERIALOGRAPHY Bilateral 1/26/2023    Procedure: AORTOGRAM, WITH BILATERAL LOWER EXTREMITY ANGIOGRAMS;  Surgeon:  Ian Castorena MD;  Location: [facility];  Service: Vascular;  Laterality: Bilateral;  RN PREOP 23    CORONARY ANGIOPLASTY WITH STENT PLACEMENT      x2    CREATION OF FEMOROPOPLITEAL ARTERIAL BYPASS USING GRAFT Right 4/3/2023    Procedure: CREATION, BYPASS, ARTERIAL, FEMORAL TO POPLITEAL, USING VEIN;  Surgeon: Ian Castorena MD;  Location: [address];  Service: Vascular;  Laterality: Right;  Vein bypass  fluro time  kvp  ma     LAPAROSCOPIC CHOLECYSTECTOMY N/A 3/23/2023    Procedure: CHOLECYSTECTOMY, LAPAROSCOPIC;  Surgeon: Ana María Ellington MD;  Location: [facility] OR [address] [place] [address];  Service: General;  Laterality: N/A;    LEFT HEART CATHETERIZATION Left 2022    Procedure: Left heart cath;  Surgeon: Romulo Gupta MD;  Location: [facility] CATH[facility]EP LAB;  Service: Cardiology;  Laterality: Left;     Family History   Problem Relation Age of Onset    COPD Mother     Heart disease Mother     Stroke Mother      Social History     Socioeconomic History    Marital status: Single   Tobacco Use    Smoking status: Former     Packs/day: 1.00     Years: 25.00     Pack years: 25.00     Types: Cigarettes     Quit date: 2021     Years since quittin.7    Smokeless tobacco: Never    Tobacco comments:     I now use a vape pen at 5 mg per day   Substance and Sexual Activity    Alcohol use: Yes     Comment: socially    Drug use: Not Currently    Sexual activity: Yes     Partners: Female     Social Determinants of Health     Financial Resource Strain: Low Risk     Difficulty of Paying Living Expenses: Not hard at all   Food Insecurity: No Food Insecurity    Worried About Running Out of Food in the Last Year: Never true    Ran Out of Food in the Last Year: Never true   Transportation Needs: No Transportation Needs    Lack of Transportation (Medical): No    Lack of Transportation (Non-Medical): No   Social Connections: Unknown    Marital Status: Living with partner   Housing Stability: Unknown    Unable to Pay for  Housing in the Last Year: Patient refused       Current Outpatient Medications:     acetaminophen (TYLENOL) 325 MG tablet, Take 2 tablets (650 mg total) by mouth every 6 (six) hours as needed for Pain., Disp: 30 tablet, Rfl: 0    aspirin (ECOTRIN) 81 MG EC tablet, Take 1 tablet (81 mg total) by mouth once daily., Disp: 360 tablet, Rfl: 0    atenoloL (TENORMIN) 50 MG tablet, Take 1 tablet (50 mg total) by mouth once daily., Disp: 30 tablet, Rfl: 11    atorvastatin (LIPITOR) 20 MG tablet, Take 1 tablet (20 mg total) by mouth once daily., Disp: 90 tablet, Rfl: 0    famotidine (PEPCID) 20 MG tablet, Take 1 tablet (20 mg total) by mouth 2 (two) times daily., Disp: 180 tablet, Rfl: 3    HYDROcodone-acetaminophen (NORCO) 5-325 mg per tablet, Take 1 tablet by mouth every 6 (six) hours as needed for Pain., Disp: 8 tablet, Rfl: 0    ondansetron (ZOFRAN) 4 MG tablet, Take 1 tablet (4 mg total) by mouth every 8 (eight) hours as needed for Nausea., Disp: 10 tablet, Rfl: 0    oxyCODONE (ROXICODONE) 5 MG immediate release tablet, Take 1 tablet (5 mg total) by mouth every 4 (four) hours as needed for Pain (For pain not releived by scheduled tylenol)., Disp: 15 tablet, Rfl: 0    oxyCODONE (ROXICODONE) 5 MG immediate release tablet, Take 1 tablet (5 mg total) by mouth every 4 (four) hours as needed for Pain., Disp: 15 tablet, Rfl: 0    tamsulosin (FLOMAX) 0.4 mg Cap, Take 1 capsule (0.4 mg total) by mouth once daily., Disp: 90 capsule, Rfl: 0    tiZANidine (ZANAFLEX) 4 MG tablet, Take 1 tablet (4 mg total) by mouth every 6 (six) hours as needed., Disp: 30 tablet, Rfl: 11    INV lisinopril 10 MG Tab, Take 1 tablet (10 mg total) by mouth once daily., Disp: 90 tablet, Rfl: 3    ticagrelor (BRILINTA) 90 mg tablet, Take 1 tablet (90 mg total) by mouth 2 (two) times daily., Disp: 180 tablet, Rfl: 3    REVIEW OF SYSTEMS:  General: No fevers or chills; ENT: No sore throat; Allergy and Immunology: no persistent infections; Hematological and  Lymphatic: No history of bleeding or easy bruising; Endocrine: negative; Respiratory: no cough, shortness of breath, or wheezing; Cardiovascular: no chest pain or dyspnea on exertion; Gastrointestinal: no abdominal pain/back, change in bowel habits, or bloody stools; Genito-Urinary: no dysuria, trouble voiding, or hematuria; Musculoskeletal: edema; Neurological: no TIA or stroke symptoms; Psychiatric: no nervousness, anxiety or depression.    PHYSICAL EXAM:      Pulse: 70         General appearance:  Alert, well-appearing, and in no distress.  Oriented to person, place, and time                    Neurological: Normal speech, no focal findings noted; CN II - XII grossly intact. RLE with sensation to light touch, LLE with sensation to light touch.            Musculoskeletal: Digits/nail without cyanosis/clubbing.  Strength 5/5 BLE.                    Neck: Supple, no significant adenopathy                  Chest:  No wheezes, symmetric air entry. No use of accessory muscles               Cardiac: Normal rate and regular rhythm            Abdomen: Soft, nontender, nondistended      Extremities:   2+ R DP pulse, 2+ L DP pulse; RLE aneurysm non palpable, +staples dry, no infection      1+ RLE edema, 1+ LLE edema    Skin:  RLE no ulcer; LLE no ulcer      RLE no spider veins, LLE no spider veins      RLE + varicose veins, LLE + varicose veins    CEAP 3/3    VCSS 10    LAB RESULTS:  No results found for: CBC  Lab Results   Component Value Date    LABPROT 11.3 04/03/2023    INR 1.1 04/03/2023     Lab Results   Component Value Date     04/07/2023    K 4.1 04/07/2023     04/07/2023    CO2 24 04/07/2023    GLU 99 04/07/2023    BUN 16 04/07/2023    CREATININE 0.8 04/07/2023    CALCIUM 9.1 04/07/2023    ANIONGAP 8 04/07/2023    EGFRNONAA >60.0 05/31/2022     Lab Results   Component Value Date    WBC 11.71 04/07/2023    RBC 4.31 (L) 04/07/2023    HGB 11.9 (L) 04/07/2023    HCT 36.8 (L) 04/07/2023    MCV 85 04/07/2023     MCH 27.6 04/07/2023    MCHC 32.3 04/07/2023    RDW 13.9 04/07/2023     04/07/2023    MPV 10.6 04/07/2023    GRAN 7.6 04/07/2023    GRAN 65.1 04/07/2023    LYMPH 2.3 04/07/2023    LYMPH 20.0 04/07/2023    MONO 1.3 (H) 04/07/2023    MONO 10.8 04/07/2023    EOS 0.3 04/07/2023    BASO 0.04 04/07/2023    EOSINOPHIL 2.6 04/07/2023    BASOPHIL 0.3 04/07/2023    DIFFMETHOD Automated 04/07/2023     .  Lab Results   Component Value Date    BA1C 5.4 09/24/2022       IMAGING:  All pertinent imaging has been reviewed and interpreted independently.    Venous US 11/2022 Impression:  Details    Narrative    Indication   ========     Venous Insufficiency     Lower Extremity Veins   =================     Rt CFV:    complete compression, complete color fill   Rt prox femoral V: complete compression, complete color fill   Rt mid femoral V:  complete compression, complete color fill   Rt distal femoral V:   complete compression, complete color fill   Rt prox popliteal V:   complete compression, complete color fill   Rt mid popliteal V:    complete compression, complete color fill   Rt distal popliteal V: complete compression, complete color fill   Rt post tibial V:  complete compression, complete color fill,   Rt peroneal V: complete compression, complete color fill   Rt GSV:    complete compression, complete color fill, significant reflux   Rt SSV:    complete compression, complete color fill, no reflux   Add rt lower V details:    complete compression, complete color fill, Reflux Present   Lt CFV:    complete compression, complete color fill   Lt prox femoral V: complete compression, complete color fill   Lt mid femoral V:  complete compression, complete color fill   Lt distal femoral V:   complete compression, complete color fill   Lt prox popliteal V:   complete compression, complete color fill   Lt mid popliteal V:    complete compression, complete color fill   Lt distal popliteal V: complete compression, complete color  fill   Lt post tibial V:  complete compression, complete color fill   Lt peroneal V: complete compression, complete color fill   Lt GSV:    complete compression, complete color fill, significant reflux   Lt SSV:    complete compression, complete color fill, no reflux   Add lt lower V details:    complete compression, complete color fill, Reflux Present   Additional lower vein: Saphenofemoral Junction   Other: No reflux noted in the Accessory Vein     Lower Extremity Vein Mapping   =======================     Rt prox GSV thigh AP diam  6.2 mm   Rt mid GSV thigh AP diam   5.8 mm   Rt distal GSV thigh AP diam    8.3 mm   Rt GSV knee AP diam    6.5 mm   Rt prox GSV calf AP diam   4.0 mm   Rt mid GSV calf AP diam    2.4 mm   Rt distal GSV calf AP diam 1.9 mm   Lt prox GSV thigh AP diam  3.7 mm   Lt mid GSV thigh AP diam   3.4 mm   Lt distal GSV thigh AP diam    3.4 mm   Lt GSV knee AP diam    4.1 mm   Lt prox GSV calf AP diam   4.3 mm   Lt mid GSV calf AP diam    3.0 mm   Lt distal GSV calf AP diam 2.6 mm     Comment   ========     The diagnostic criteria used in our lab: Significant reflux is present when retrograde flow is noted both in the vein and at the   saphenofemoral junction (SFJ) or saphenopopliteal junction (SPJ)for 500 milliseconds (.5 second) following provocative maneuvers   when the patient is in the reverse trendelenburg position.     Impression   =========     Right Leg: Duplex imaging of the right lower extremity veins demonstrates patent and compressible veins.   There is no evidence of a venous thrombosis in the deep or superficial veins.   Significant reflux noted in the right GSV including the Saphenofemoral Junction (SFJ). No significant reflux noted in the SSV or   accessory vein.   Incidental finding of dilated distal SFA measuring 2.1 cm x 2.3 cm and proximal popliteal artery measuring 1.8 cm x 1.6 cm.     Left Leg: Duplex imaging of the left lower extremity veins demonstrates patent and  compressible veins.   There is no evidence of a venous thrombosis in the deep or superficial veins.   Significant reflux noted in the left GSV including the Saphenofemoral Junction (SFJ). No significant reflux noted in the SSV or   accessory vein.   Incidental finding of dilated distal SFA measuring 2.1 cm x 2.1 cm, proximal popliteal artery measuring 1.9 cm x 1.9 cm and mid   popliteal artery measuring 2.3 cm x 2.0 cm.       DATE OF SERVICE: 11/14/2022                                                        Sonographer: AG HASTINGS RVS   Electronically Signed by: Shantanu Gu II at 11/15/2022-13:34           Specimen Collected: 11/14/22 13:48 Last Resulted: 11/15/22 13:37           CTA BLE runoff 2023 reviewed.    5/2023  No HD significant stenosis in RLE bypass graft, normal VIVIAN    IMP/PLAN:  62 y.o. male with   Patient Active Problem List   Diagnosis    Essential hypertension    Status post insertion of drug eluting coronary artery stent    History of myocardial infarction    Mixed hyperlipidemia    Tobacco abuse    Atherosclerotic heart disease of native coronary artery with unstable angina pectoris    Chronic venous insufficiency    Recurrent biliary colic    Popliteal artery aneurysm    being managed by PCP and specialists who is here today for evaluation of venous insufficiency and BLE asymptomatic SFA and popliteal aneurysms.    -recommend, elevation, dietary changes associated with water and sodium intake discussed at length with patient  -Exercise   -s/p R SFA-popliteal vein bypass - recovering well.  Keep inc clean and dry. Pain mgmt referral for neuropathic pain  -Plan for LLE angiogram 6/1/23  -RTC 5 mo postop with arterial US and VIVIAN    I spent 12 minutes evaluating this patient and greater than 50% of the time was spent counseling, coordinator care and discussing the plan of care.  All questions were answered and patient stated understanding with agreement with the above treatment plan.    Ian  MD Kell RPVI  Vascular and Endovascular Surgery

## 2023-05-04 LAB
LEFT ABI: 1.01
LEFT ARM BP: 153 MMHG
LEFT DORSALIS PEDIS: 164 MMHG
LEFT POSTERIOR TIBIAL: 152 MMHG
LEFT TBI: 0.83
LEFT TOE PRESSURE: 135 MMHG
OHS CV LOWER EXTREMITY GRAFT MEASUREMENTS - RIGHT - G1 - D: 57
OHS CV LOWER EXTREMITY GRAFT MEASUREMENTS - RIGHT - G1 - DA: 81
OHS CV LOWER EXTREMITY GRAFT MEASUREMENTS - RIGHT - G1 - M: 46
OHS CV LOWER EXTREMITY GRAFT MEASUREMENTS - RIGHT - G1 - P: 51
OHS CV LOWER EXTREMITY GRAFT MEASUREMENTS - RIGHT - G1 - PA: 90
OHS CV RIGHT ABI LOWER EXTREMITY (NO CALC): 1.19
RIGHT ABI: 1.19
RIGHT ANT TIBIAL SYS PSV: 69 CM/S
RIGHT ARM BP: 163 MMHG
RIGHT CFA PSV: 67 CM/S
RIGHT DORSALIS PEDIS: 194 MMHG
RIGHT EXTERNAL ILLIAC PSV: 128 CM/S
RIGHT PERONEAL SYS PSV: 33 CM/S
RIGHT POST TIBIAL SYS PSV: 33 CM/S
RIGHT POSTERIOR TIBIAL: 179 MMHG
RIGHT PROFUNDA SYS PSV: 62 CM/S
RIGHT SUPER FEMORAL MID SYS PSV: 76 CM/S
RIGHT SUPER FEMORAL OSTIAL SYS PSV: 79 CM/S
RIGHT SUPER FEMORAL PROX SYS PSV: 78 CM/S
RIGHT TBI: 0.76
RIGHT TIB/PER TRUNK SYS PSV: 74 CM/S
RIGHT TOE PRESSURE: 124 MMHG

## 2023-05-05 DIAGNOSIS — M94.0 COSTOCHONDRITIS: ICD-10-CM

## 2023-05-05 DIAGNOSIS — N20.0 NEPHROLITHIASIS: ICD-10-CM

## 2023-05-06 RX ORDER — ATORVASTATIN CALCIUM 20 MG/1
20 TABLET, FILM COATED ORAL DAILY
Qty: 90 TABLET | Refills: 11 | Status: SHIPPED | OUTPATIENT
Start: 2023-05-06 | End: 2024-03-20 | Stop reason: SDUPTHER

## 2023-05-06 RX ORDER — TIZANIDINE 4 MG/1
4 TABLET ORAL EVERY 6 HOURS PRN
Qty: 30 TABLET | Refills: 11 | Status: SHIPPED | OUTPATIENT
Start: 2023-05-06 | End: 2024-03-20

## 2023-05-06 RX ORDER — TAMSULOSIN HYDROCHLORIDE 0.4 MG/1
0.4 CAPSULE ORAL DAILY
Qty: 90 CAPSULE | Refills: 11 | Status: SHIPPED | OUTPATIENT
Start: 2023-05-06 | End: 2024-03-20 | Stop reason: SDUPTHER

## 2023-05-27 ENCOUNTER — PATIENT MESSAGE (OUTPATIENT)
Dept: VASCULAR SURGERY | Facility: CLINIC | Age: 63
End: 2023-05-27
Payer: COMMERCIAL

## 2023-06-05 ENCOUNTER — OFFICE VISIT (OUTPATIENT)
Dept: VASCULAR SURGERY | Facility: CLINIC | Age: 63
End: 2023-06-05
Payer: COMMERCIAL

## 2023-06-05 VITALS
WEIGHT: 197.63 LBS | SYSTOLIC BLOOD PRESSURE: 137 MMHG | BODY MASS INDEX: 30.05 KG/M2 | DIASTOLIC BLOOD PRESSURE: 99 MMHG | HEART RATE: 74 BPM

## 2023-06-05 DIAGNOSIS — Z95.828 S/P FEMORAL-POPLITEAL BYPASS SURGERY: Primary | ICD-10-CM

## 2023-06-05 DIAGNOSIS — I72.4 POPLITEAL ARTERY ANEURYSM: ICD-10-CM

## 2023-06-05 PROCEDURE — 1159F MED LIST DOCD IN RCRD: CPT | Mod: CPTII,S$GLB,, | Performed by: SURGERY

## 2023-06-05 PROCEDURE — 3080F DIAST BP >= 90 MM HG: CPT | Mod: CPTII,S$GLB,, | Performed by: SURGERY

## 2023-06-05 PROCEDURE — 99999 PR PBB SHADOW E&M-EST. PATIENT-LVL III: ICD-10-PCS | Mod: PBBFAC,,, | Performed by: SURGERY

## 2023-06-05 PROCEDURE — 99024 POSTOP FOLLOW-UP VISIT: CPT | Mod: S$GLB,,, | Performed by: SURGERY

## 2023-06-05 PROCEDURE — 1159F PR MEDICATION LIST DOCUMENTED IN MEDICAL RECORD: ICD-10-PCS | Mod: CPTII,S$GLB,, | Performed by: SURGERY

## 2023-06-05 PROCEDURE — 3080F PR MOST RECENT DIASTOLIC BLOOD PRESSURE >= 90 MM HG: ICD-10-PCS | Mod: CPTII,S$GLB,, | Performed by: SURGERY

## 2023-06-05 PROCEDURE — 3075F PR MOST RECENT SYSTOLIC BLOOD PRESS GE 130-139MM HG: ICD-10-PCS | Mod: CPTII,S$GLB,, | Performed by: SURGERY

## 2023-06-05 PROCEDURE — 3008F PR BODY MASS INDEX (BMI) DOCUMENTED: ICD-10-PCS | Mod: CPTII,S$GLB,, | Performed by: SURGERY

## 2023-06-05 PROCEDURE — 3008F BODY MASS INDEX DOCD: CPT | Mod: CPTII,S$GLB,, | Performed by: SURGERY

## 2023-06-05 PROCEDURE — 99999 PR PBB SHADOW E&M-EST. PATIENT-LVL III: CPT | Mod: PBBFAC,,, | Performed by: SURGERY

## 2023-06-05 PROCEDURE — 3075F SYST BP GE 130 - 139MM HG: CPT | Mod: CPTII,S$GLB,, | Performed by: SURGERY

## 2023-06-05 PROCEDURE — 99024 PR POST-OP FOLLOW-UP VISIT: ICD-10-PCS | Mod: S$GLB,,, | Performed by: SURGERY

## 2023-06-05 NOTE — PROGRESS NOTES
Ian Castorena MD, RPVI                                 [facility]                           [facility]                             06/05/2023    HPI:  Giorgi Eden is a 62 y.o. male with   Patient Active Problem List   Diagnosis    Essential hypertension    Status post insertion of drug eluting coronary artery stent    History of myocardial infarction    Mixed hyperlipidemia    Tobacco abuse    Atherosclerotic heart disease of native coronary artery with unstable angina pectoris    Chronic venous insufficiency    Recurrent biliary colic    Popliteal artery aneurysm    being managed by PCP and specialists who is here today for evaluation of BLE edema and RLE pain.  Patient states location is R calf occurring for months.  Associated signs and symptoms include edema.  Quality is pain and severity is 3/10.  Symptoms began yrs ago.  Alleviating factors include elevation.  Worsening factors include dependency.  Patient has not been wearing compression stockings for greater than 3 months.  No FH of venous disease.  Symptoms do limit patient's functional status and daily activities.  no DVT history.  no venous interventions.  no low sodium diet.  no excessive water intake.    Migraine with aura: no  PFO/ASD/right to left shunt: no  Pregnant: no  Breastfeeding: no    no MI  no Stroke  Tobacco use: no    1/2023:  c/o persistent BLE edema and pain despite compression and elevation > 3 mo.  No claudication.    4/2023:  s/p R SFA-BK popliteal vein bypass 4/3/23 with ligation of femoropopliteal aneurysm.  Presents due to concern for staple removal.    5/3/23:  Doing well, c/o RLE neuropathic pain.    6/5/23:  c/o posterior thigh pain to touch and bruising behind right knee after going back to work.    Past Medical History:   Diagnosis Date    Hyperlipidemia     Hypertension     MI (myocardial infarction)     X2     Past Surgical History:   Procedure Laterality Date    AORTOGRAPHY WITH  SERIALOGRAPHY Bilateral 2023    Procedure: AORTOGRAM, WITH BILATERAL LOWER EXTREMITY ANGIOGRAMS;  Surgeon: Ian Castorena MD;  Location: [facility];  Service: Vascular;  Laterality: Bilateral;  RN PREOP 23    CORONARY ANGIOPLASTY WITH STENT PLACEMENT      x2    CREATION OF FEMOROPOPLITEAL ARTERIAL BYPASS USING GRAFT Right 4/3/2023    Procedure: CREATION, BYPASS, ARTERIAL, FEMORAL TO POPLITEAL, USING VEIN;  Surgeon: Ian Castorena MD;  Location: [facility] OR [address] [place] [address];  Service: Vascular;  Laterality: Right;  Vein bypass  fluro time  kvp  ma     LAPAROSCOPIC CHOLECYSTECTOMY N/A 3/23/2023    Procedure: CHOLECYSTECTOMY, LAPAROSCOPIC;  Surgeon: Ana María Ellington MD;  Location: [facility] OR [address] [place] [address];  Service: General;  Laterality: N/A;    LEFT HEART CATHETERIZATION Left 2022    Procedure: Left heart cath;  Surgeon: Romulo Gupta MD;  Location: [facility] CATH/EP LAB;  Service: Cardiology;  Laterality: Left;     Family History   Problem Relation Age of Onset    COPD Mother     Heart disease Mother     Stroke Mother      Social History     Socioeconomic History    Marital status: Single   Tobacco Use    Smoking status: Former     Packs/day: 1.00     Years: 25.00     Pack years: 25.00     Types: Cigarettes     Quit date: 2021     Years since quittin.8    Smokeless tobacco: Never    Tobacco comments:     I now use a vape pen at 5 mg per day   Substance and Sexual Activity    Alcohol use: Yes     Comment: socially    Drug use: Not Currently    Sexual activity: Yes     Partners: Female     Social Determinants of Health     Financial Resource Strain: Low Risk     Difficulty of Paying Living Expenses: Not hard at all   Food Insecurity: No Food Insecurity    Worried About Running Out of Food in the Last Year: Never true    Ran Out of Food in the Last Year: Never true   Transportation Needs: No Transportation Needs    Lack of Transportation (Medical): No    Lack of Transportation (Non-Medical): No   Social  Connections: Unknown    Marital Status: Living with partner   Housing Stability: Unknown    Unable to Pay for Housing in the Last Year: Patient refused       Current Outpatient Medications:     acetaminophen (TYLENOL) 325 MG tablet, Take 2 tablets (650 mg total) by mouth every 6 (six) hours as needed for Pain., Disp: 30 tablet, Rfl: 0    aspirin (ECOTRIN) 81 MG EC tablet, Take 1 tablet (81 mg total) by mouth once daily., Disp: 360 tablet, Rfl: 0    atenoloL (TENORMIN) 50 MG tablet, Take 1 tablet (50 mg total) by mouth once daily., Disp: 30 tablet, Rfl: 11    atorvastatin (LIPITOR) 20 MG tablet, Take 1 tablet (20 mg total) by mouth once daily., Disp: 90 tablet, Rfl: 11    famotidine (PEPCID) 20 MG tablet, Take 1 tablet (20 mg total) by mouth 2 (two) times daily., Disp: 180 tablet, Rfl: 3    HYDROcodone-acetaminophen (NORCO) 5-325 mg per tablet, Take 1 tablet by mouth every 6 (six) hours as needed for Pain., Disp: 8 tablet, Rfl: 0    ondansetron (ZOFRAN) 4 MG tablet, Take 1 tablet (4 mg total) by mouth every 8 (eight) hours as needed for Nausea., Disp: 10 tablet, Rfl: 0    oxyCODONE (ROXICODONE) 5 MG immediate release tablet, Take 1 tablet (5 mg total) by mouth every 4 (four) hours as needed for Pain (For pain not releived by scheduled tylenol)., Disp: 15 tablet, Rfl: 0    oxyCODONE (ROXICODONE) 5 MG immediate release tablet, Take 1 tablet (5 mg total) by mouth every 4 (four) hours as needed for Pain., Disp: 15 tablet, Rfl: 0    tamsulosin (FLOMAX) 0.4 mg Cap, Take 1 capsule (0.4 mg total) by mouth once daily., Disp: 90 capsule, Rfl: 11    tiZANidine (ZANAFLEX) 4 MG tablet, Take 1 tablet (4 mg total) by mouth every 6 (six) hours as needed., Disp: 30 tablet, Rfl: 11    INV lisinopril 10 MG Tab, Take 1 tablet (10 mg total) by mouth once daily., Disp: 90 tablet, Rfl: 3    ticagrelor (BRILINTA) 90 mg tablet, Take 1 tablet (90 mg total) by mouth 2 (two) times daily., Disp: 180 tablet, Rfl: 3    Current  Facility-Administered Medications:     0.9%  NaCl infusion, , Intravenous, Continuous, Ian Castorena MD    ceFAZolin (ANCEF) 2 g in dextrose 5 % (D5W) 50 mL IVPB, 2 g, Intravenous, Once, Ian Castorena MD    REVIEW OF SYSTEMS:  General: No fevers or chills; ENT: No sore throat; Allergy and Immunology: no persistent infections; Hematological and Lymphatic: No history of bleeding or easy bruising; Endocrine: negative; Respiratory: no cough, shortness of breath, or wheezing; Cardiovascular: no chest pain or dyspnea on exertion; Gastrointestinal: no abdominal pain/back, change in bowel habits, or bloody stools; Genito-Urinary: no dysuria, trouble voiding, or hematuria; Musculoskeletal: edema; Neurological: no TIA or stroke symptoms; Psychiatric: no nervousness, anxiety or depression.    PHYSICAL EXAM:      Pulse: 74         General appearance:  Alert, well-appearing, and in no distress.  Oriented to person, place, and time                    Neurological: Normal speech, no focal findings noted; CN II - XII grossly intact. RLE with sensation to light touch, LLE with sensation to light touch.            Musculoskeletal: Digits/nail without cyanosis/clubbing.  Strength 5/5 BLE.                    Neck: Supple, no significant adenopathy                  Chest:  No wheezes, symmetric air entry. No use of accessory muscles               Cardiac: Normal rate and regular rhythm            Abdomen: Soft, nontender, nondistended      Extremities:   2+ R DP pulse, 2+ L DP pulse; RLE aneurysm non palpable, +staples dry, no infection      1+ RLE edema, 1+ LLE edema    Skin:  RLE no ulcer; LLE no ulcer      RLE no spider veins, LLE no spider veins      RLE + varicose veins, LLE + varicose veins    CEAP 3/3    VCSS 10    LAB RESULTS:  No results found for: CBC  Lab Results   Component Value Date    LABPROT 11.3 04/03/2023    INR 1.1 04/03/2023     Lab Results   Component Value Date     04/07/2023    K 4.1 04/07/2023      04/07/2023    CO2 24 04/07/2023    GLU 99 04/07/2023    BUN 16 04/07/2023    CREATININE 0.8 04/07/2023    CALCIUM 9.1 04/07/2023    ANIONGAP 8 04/07/2023    EGFRNONAA >60.0 05/31/2022     Lab Results   Component Value Date    WBC 11.71 04/07/2023    RBC 4.31 (L) 04/07/2023    HGB 11.9 (L) 04/07/2023    HCT 36.8 (L) 04/07/2023    MCV 85 04/07/2023    MCH 27.6 04/07/2023    MCHC 32.3 04/07/2023    RDW 13.9 04/07/2023     04/07/2023    MPV 10.6 04/07/2023    GRAN 7.6 04/07/2023    GRAN 65.1 04/07/2023    LYMPH 2.3 04/07/2023    LYMPH 20.0 04/07/2023    MONO 1.3 (H) 04/07/2023    MONO 10.8 04/07/2023    EOS 0.3 04/07/2023    BASO 0.04 04/07/2023    EOSINOPHIL 2.6 04/07/2023    BASOPHIL 0.3 04/07/2023    DIFFMETHOD Automated 04/07/2023     .  Lab Results   Component Value Date    HGBA1C 5.4 09/24/2022       IMAGING:  All pertinent imaging has been reviewed and interpreted independently.    Venous US 11/2022 Impression:  Details    Narrative    Indication   ========     Venous Insufficiency     Lower Extremity Veins   =================     Rt CFV:    complete compression, complete color fill   Rt prox femoral V: complete compression, complete color fill   Rt mid femoral V:  complete compression, complete color fill   Rt distal femoral V:   complete compression, complete color fill   Rt prox popliteal V:   complete compression, complete color fill   Rt mid popliteal V:    complete compression, complete color fill   Rt distal popliteal V: complete compression, complete color fill   Rt post tibial V:  complete compression, complete color fill,   Rt peroneal V: complete compression, complete color fill   Rt GSV:    complete compression, complete color fill, significant reflux   Rt SSV:    complete compression, complete color fill, no reflux   Add rt lower V details:    complete compression, complete color fill, Reflux Present   Lt CFV:    complete compression, complete color fill   Lt prox femoral V: complete  compression, complete color fill   Lt mid femoral V:  complete compression, complete color fill   Lt distal femoral V:   complete compression, complete color fill   Lt prox popliteal V:   complete compression, complete color fill   Lt mid popliteal V:    complete compression, complete color fill   Lt distal popliteal V: complete compression, complete color fill   Lt post tibial V:  complete compression, complete color fill   Lt peroneal V: complete compression, complete color fill   Lt GSV:    complete compression, complete color fill, significant reflux   Lt SSV:    complete compression, complete color fill, no reflux   Add lt lower V details:    complete compression, complete color fill, Reflux Present   Additional lower vein: Saphenofemoral Junction   Other: No reflux noted in the Accessory Vein     Lower Extremity Vein Mapping   =======================     Rt prox GSV thigh AP diam  6.2 mm   Rt mid GSV thigh AP diam   5.8 mm   Rt distal GSV thigh AP diam    8.3 mm   Rt GSV knee AP diam    6.5 mm   Rt prox GSV calf AP diam   4.0 mm   Rt mid GSV calf AP diam    2.4 mm   Rt distal GSV calf AP diam 1.9 mm   Lt prox GSV thigh AP diam  3.7 mm   Lt mid GSV thigh AP diam   3.4 mm   Lt distal GSV thigh AP diam    3.4 mm   Lt GSV knee AP diam    4.1 mm   Lt prox GSV calf AP diam   4.3 mm   Lt mid GSV calf AP diam    3.0 mm   Lt distal GSV calf AP diam 2.6 mm     Comment   ========     The diagnostic criteria used in our lab: Significant reflux is present when retrograde flow is noted both in the vein and at the   saphenofemoral junction (SFJ) or saphenopopliteal junction (SPJ)for 500 milliseconds (.5 second) following provocative maneuvers   when the patient is in the reverse trendelenburg position.     Impression   =========     Right Leg: Duplex imaging of the right lower extremity veins demonstrates patent and compressible veins.   There is no evidence of a venous thrombosis in the deep or superficial veins.    Significant reflux noted in the right GSV including the Saphenofemoral Junction (SFJ). No significant reflux noted in the SSV or   accessory vein.   Incidental finding of dilated distal SFA measuring 2.1 cm x 2.3 cm and proximal popliteal artery measuring 1.8 cm x 1.6 cm.     Left Leg: Duplex imaging of the left lower extremity veins demonstrates patent and compressible veins.   There is no evidence of a venous thrombosis in the deep or superficial veins.   Significant reflux noted in the left GSV including the Saphenofemoral Junction (SFJ). No significant reflux noted in the SSV or   accessory vein.   Incidental finding of dilated distal SFA measuring 2.1 cm x 2.1 cm, proximal popliteal artery measuring 1.9 cm x 1.9 cm and mid   popliteal artery measuring 2.3 cm x 2.0 cm.       DATE OF SERVICE: 11/14/2022                                                        Sonographer: AG HASTINGS RVS   Electronically Signed by: Shantanu Gu II at 11/15/2022-13:34           Specimen Collected: 11/14/22 13:48 Last Resulted: 11/15/22 13:37           CTA BLE runoff 2023 reviewed.    5/2023  No HD significant stenosis in RLE bypass graft, normal VIVIAN    IMP/PLAN:  62 y.o. male with   Patient Active Problem List   Diagnosis    Essential hypertension    Status post insertion of drug eluting coronary artery stent    History of myocardial infarction    Mixed hyperlipidemia    Tobacco abuse    Atherosclerotic heart disease of native coronary artery with unstable angina pectoris    Chronic venous insufficiency    Recurrent biliary colic    Popliteal artery aneurysm    being managed by PCP and specialists who is here today for evaluation of venous insufficiency and BLE asymptomatic SFA and popliteal aneurysms.    -recommend, elevation, dietary changes associated with water and sodium intake discussed at length with patient  -Exercise   -s/p R SFA-popliteal vein bypass - recovering well.  Keep inc clean and dry. Pain mgmt referral for  neuropathic pain  -Plan for LLE angiogram once pain improved RLE  -RTC 3 mo postop with arterial US and VIVIAN    I spent 12 minutes evaluating this patient and greater than 50% of the time was spent counseling, coordinator care and discussing the plan of care.  All questions were answered and patient stated understanding with agreement with the above treatment plan.    Ian Castorena MD [facility]  Vascular and Endovascular Surgery

## 2023-06-07 ENCOUNTER — PATIENT MESSAGE (OUTPATIENT)
Dept: CARDIOLOGY | Facility: CLINIC | Age: 63
End: 2023-06-07
Payer: COMMERCIAL

## 2023-08-24 RX ORDER — CLOPIDOGREL BISULFATE 75 MG/1
75 TABLET ORAL DAILY
Qty: 30 TABLET | Refills: 11 | Status: SHIPPED | OUTPATIENT
Start: 2023-08-24 | End: 2024-03-20 | Stop reason: SDUPTHER

## 2023-08-24 NOTE — ADDENDUM NOTE
Addended by: DAYRON SWENSON on: 8/24/2023 02:40 PM     Modules accepted: Orders     Dapsone Pregnancy And Lactation Text: This medication is Pregnancy Category C and is not considered safe during pregnancy or breast feeding.

## 2023-10-03 ENCOUNTER — PATIENT MESSAGE (OUTPATIENT)
Dept: ADMINISTRATIVE | Facility: HOSPITAL | Age: 63
End: 2023-10-03

## 2023-10-17 ENCOUNTER — PATIENT MESSAGE (OUTPATIENT)
Dept: ADMINISTRATIVE | Facility: HOSPITAL | Age: 63
End: 2023-10-17

## 2023-11-06 ENCOUNTER — TELEPHONE (OUTPATIENT)
Dept: CARDIOLOGY | Facility: CLINIC | Age: 63
End: 2023-11-06

## 2023-11-06 DIAGNOSIS — Z95.5 H/O HEART ARTERY STENT: ICD-10-CM

## 2023-11-06 DIAGNOSIS — Z95.5 HISTORY OF HEART ARTERY STENT: ICD-10-CM

## 2023-11-06 DIAGNOSIS — I25.110 ATHEROSCLEROSIS OF NATIVE CORONARY ARTERY OF NATIVE HEART WITH UNSTABLE ANGINA PECTORIS: Primary | ICD-10-CM

## 2023-11-06 RX ORDER — ATENOLOL 50 MG/1
50 TABLET ORAL DAILY
Qty: 90 TABLET | Refills: 0 | Status: SHIPPED | OUTPATIENT
Start: 2023-11-06 | End: 2024-02-04

## 2023-11-06 NOTE — TELEPHONE ENCOUNTER
----- Message from Radha Day sent at 11/6/2023 12:04 PM CST -----  Contact: Salima  Patient is in between insurances due to starting a job and will not have his new insurance 11/2/23 but he does not have enough atenoloL (TENORMIN) 50 MG tablet 1XDay and send over to the following pharm.  Call back if needed at [phone], he was a former pt of Dr Nitin Villareal so needs you to follow him for this med.  thanks    [facility] - [place], [place] - [address]  [address]  [place] [address]  Phone: [phone] Fax: [phone]    Patient is requesting a call back to advise at [phone] and he will make a checkup appt and thanks

## 2023-11-06 NOTE — TELEPHONE ENCOUNTER
I explained to the patient that we have not seen him in some time he stated he has started a new job recently and has no coverage for the next 90 days. He needs his atenolol and will schedule as soon as he has insurance coverage. Also his PCP Dr. Villareal office shut down . Patient is desperate for his medication

## 2023-11-06 NOTE — TELEPHONE ENCOUNTER
LVM with patient regarding a request for atenolol which was prescribed by his PCP also patient has not been seen since 12/2022. I let patient know both that he needs an appointment or contact his PCP for refill.

## 2023-11-06 NOTE — TELEPHONE ENCOUNTER
----- Message from Ayana Smallwood sent at 11/6/2023  8:19 AM CST -----  Contact: [phone]  Pt is requesting a refill on his atenoloL (TENORMIN) 50 MG tablet 30 tablet. Pt is using   [facility] - [place] [address] [facility]  [address] [facility] [facility] [address]  [facility] [address]  Phone: [phone] Fax: [phone]            Thank you

## 2024-02-16 DIAGNOSIS — Z95.5 H/O HEART ARTERY STENT: ICD-10-CM

## 2024-02-16 DIAGNOSIS — I25.110 ATHEROSCLEROSIS OF NATIVE CORONARY ARTERY OF NATIVE HEART WITH UNSTABLE ANGINA PECTORIS: ICD-10-CM

## 2024-02-16 RX ORDER — ATENOLOL 50 MG/1
50 TABLET ORAL DAILY
Qty: 90 TABLET | Refills: 0 | OUTPATIENT
Start: 2024-02-16 | End: 2024-05-16

## 2024-02-19 ENCOUNTER — OFFICE VISIT (OUTPATIENT)
Dept: URGENT CARE | Facility: CLINIC | Age: 64
End: 2024-02-19
Payer: COMMERCIAL

## 2024-02-19 VITALS
TEMPERATURE: 98 F | RESPIRATION RATE: 16 BRPM | SYSTOLIC BLOOD PRESSURE: 154 MMHG | HEART RATE: 63 BPM | HEIGHT: 68 IN | BODY MASS INDEX: 29.86 KG/M2 | WEIGHT: 197 LBS | OXYGEN SATURATION: 98 % | DIASTOLIC BLOOD PRESSURE: 91 MMHG

## 2024-02-19 DIAGNOSIS — Z76.0 ENCOUNTER FOR MEDICATION REFILL: Primary | ICD-10-CM

## 2024-02-19 DIAGNOSIS — I10 HYPERTENSION, UNSPECIFIED TYPE: ICD-10-CM

## 2024-02-19 PROCEDURE — 99203 OFFICE O/P NEW LOW 30 MIN: CPT | Mod: S$GLB,,,

## 2024-02-19 RX ORDER — ATENOLOL 50 MG/1
50 TABLET ORAL DAILY
Qty: 30 TABLET | Refills: 11 | Status: SHIPPED | OUTPATIENT
Start: 2024-02-19 | End: 2024-03-20 | Stop reason: SDUPTHER

## 2024-02-19 NOTE — PROGRESS NOTES
"Subjective:      Patient ID: Giorgi Eden is a 63 y.o. male.    Vitals:  height is 5' 8" (1.727 m) and weight is 89.4 kg (197 lb). His oral temperature is 98.3 °F (36.8 °C). His blood pressure is 154/91 (abnormal) and his pulse is 63. His respiration is 16 and oxygen saturation is 98%.     Chief Complaint: Medication Refill    This is a 63 y.o. male who presents today with a chief complaint of needing a refill on the tenormin medication. He has 3 days left on his current prescription. Patient states he is between insurance and PCP's as he just moved to the area.  He denies any CP, SOB, dizziness, blurred vision, or other symptoms of hypertension.       Medication Refill  This is a new problem. The current episode started today. The problem has been unchanged. Pertinent negatives include no abdominal pain, anorexia, arthralgias, change in bowel habit, chest pain, chills, congestion, coughing, diaphoresis, fatigue, fever, headaches, joint swelling, myalgias, nausea, neck pain, numbness, rash, sore throat, swollen glands, urinary symptoms, vertigo, visual change, vomiting or weakness. Nothing aggravates the symptoms. He has tried nothing for the symptoms.       Constitution: Negative for chills, sweating, fatigue, fever and generalized weakness.   HENT:  Negative for ear pain, congestion, sinus pain and sore throat.    Neck: Negative for neck pain.   Cardiovascular:  Negative for chest pain.   Respiratory:  Negative for cough and shortness of breath.    Gastrointestinal:  Negative for abdominal pain, nausea and vomiting.   Musculoskeletal:  Negative for joint pain, joint swelling and muscle ache.   Skin:  Negative for rash.   Neurological:  Negative for history of vertigo, headaches and numbness.      Objective:     Physical Exam   Constitutional: He is oriented to person, place, and time. He appears well-developed. He is cooperative.   HENT:   Head: Normocephalic and atraumatic.   Ears:   Right Ear: Hearing, tympanic " membrane, external ear and ear canal normal.   Left Ear: Hearing, tympanic membrane, external ear and ear canal normal.   Nose: Nose normal. No mucosal edema or nasal deformity. No epistaxis. Right sinus exhibits no maxillary sinus tenderness and no frontal sinus tenderness. Left sinus exhibits no maxillary sinus tenderness and no frontal sinus tenderness.   Mouth/Throat: Uvula is midline, oropharynx is clear and moist and mucous membranes are normal. No trismus in the jaw. Normal dentition. No uvula swelling.   Eyes: Conjunctivae and lids are normal.   Neck: Trachea normal and phonation normal. Neck supple.   Cardiovascular: Normal rate, regular rhythm, normal heart sounds and normal pulses.   Pulses:       Carotid pulses are 2+ on the right side and 2+ on the left side.  Pulmonary/Chest: Effort normal and breath sounds normal.   Abdominal: Normal appearance and bowel sounds are normal. Soft.   Musculoskeletal: Normal range of motion.         General: Normal range of motion.   Neurological: He is alert and oriented to person, place, and time. He exhibits normal muscle tone.   Skin: Skin is warm, dry and intact.   Psychiatric: His speech is normal and behavior is normal. Judgment and thought content normal.   Nursing note and vitals reviewed.      Assessment:     1. Encounter for medication refill    2. Hypertension, unspecified type        Plan:       Patient denies any symptoms of complications of hypertension, has been compliant with currently prescription medication regime.  No CP or SOB.  Will refill 30 days of Atenolol for patient, he has follow up appointment with new PCP in 28 days.    Encounter for medication refill    Hypertension, unspecified type    Other orders  -     atenoloL (TENORMIN) 50 MG tablet; Take 1 tablet (50 mg total) by mouth once daily.  Dispense: 30 tablet; Refill: 11        Patient Instructions   Return to urgent care or the ED for any chest pain, SOB, dizziness, blurred vision, or high  blood pressure.    Please follow up with your primary care doctor or specialist as needed.    If you  smoke, please stop smoking.

## 2024-02-20 NOTE — PATIENT INSTRUCTIONS
Return to urgent care or the ED for any chest pain, SOB, dizziness, blurred vision, or high blood pressure.    Please follow up with your primary care doctor or specialist as needed.    If you  smoke, please stop smoking.

## 2024-03-20 ENCOUNTER — OFFICE VISIT (OUTPATIENT)
Dept: INTERNAL MEDICINE | Facility: CLINIC | Age: 64
End: 2024-03-20
Payer: COMMERCIAL

## 2024-03-20 VITALS
WEIGHT: 200.81 LBS | OXYGEN SATURATION: 98 % | HEIGHT: 68 IN | DIASTOLIC BLOOD PRESSURE: 80 MMHG | HEART RATE: 70 BPM | BODY MASS INDEX: 30.44 KG/M2 | SYSTOLIC BLOOD PRESSURE: 110 MMHG

## 2024-03-20 DIAGNOSIS — Z76.89 ENCOUNTER TO ESTABLISH CARE WITH NEW DOCTOR: ICD-10-CM

## 2024-03-20 DIAGNOSIS — I25.2 HISTORY OF MYOCARDIAL INFARCTION: ICD-10-CM

## 2024-03-20 DIAGNOSIS — Z00.00 ENCOUNTER FOR ANNUAL GENERAL MEDICAL EXAMINATION WITHOUT ABNORMAL FINDINGS IN ADULT: Primary | ICD-10-CM

## 2024-03-20 DIAGNOSIS — N20.0 NEPHROLITHIASIS: ICD-10-CM

## 2024-03-20 DIAGNOSIS — I10 ESSENTIAL HYPERTENSION: ICD-10-CM

## 2024-03-20 DIAGNOSIS — F17.200 TOBACCO DEPENDENCE: ICD-10-CM

## 2024-03-20 DIAGNOSIS — Z23 NEED FOR VACCINATION: ICD-10-CM

## 2024-03-20 DIAGNOSIS — E78.2 MIXED HYPERLIPIDEMIA: ICD-10-CM

## 2024-03-20 DIAGNOSIS — Z12.5 ENCOUNTER FOR SCREENING FOR MALIGNANT NEOPLASM OF PROSTATE: ICD-10-CM

## 2024-03-20 PROCEDURE — 1160F RVW MEDS BY RX/DR IN RCRD: CPT | Mod: CPTII,S$GLB,, | Performed by: FAMILY MEDICINE

## 2024-03-20 PROCEDURE — 3008F BODY MASS INDEX DOCD: CPT | Mod: CPTII,S$GLB,, | Performed by: FAMILY MEDICINE

## 2024-03-20 PROCEDURE — 3079F DIAST BP 80-89 MM HG: CPT | Mod: CPTII,S$GLB,, | Performed by: FAMILY MEDICINE

## 2024-03-20 PROCEDURE — 99999 PR PBB SHADOW E&M-EST. PATIENT-LVL IV: CPT | Mod: PBBFAC,,, | Performed by: FAMILY MEDICINE

## 2024-03-20 PROCEDURE — 99386 PREV VISIT NEW AGE 40-64: CPT | Mod: S$GLB,,, | Performed by: FAMILY MEDICINE

## 2024-03-20 PROCEDURE — 1159F MED LIST DOCD IN RCRD: CPT | Mod: CPTII,S$GLB,, | Performed by: FAMILY MEDICINE

## 2024-03-20 PROCEDURE — 4010F ACE/ARB THERAPY RXD/TAKEN: CPT | Mod: CPTII,S$GLB,, | Performed by: FAMILY MEDICINE

## 2024-03-20 PROCEDURE — 3074F SYST BP LT 130 MM HG: CPT | Mod: CPTII,S$GLB,, | Performed by: FAMILY MEDICINE

## 2024-03-20 RX ORDER — CLOPIDOGREL BISULFATE 75 MG/1
75 TABLET ORAL DAILY
Qty: 90 TABLET | Refills: 3 | Status: SHIPPED | OUTPATIENT
Start: 2024-03-20 | End: 2025-03-20

## 2024-03-20 RX ORDER — ATORVASTATIN CALCIUM 20 MG/1
20 TABLET, FILM COATED ORAL DAILY
Qty: 90 TABLET | Refills: 3 | Status: SHIPPED | OUTPATIENT
Start: 2024-03-20 | End: 2025-03-15

## 2024-03-20 RX ORDER — ATENOLOL 50 MG/1
50 TABLET ORAL DAILY
Qty: 90 TABLET | Refills: 3 | Status: SHIPPED | OUTPATIENT
Start: 2024-03-20 | End: 2025-03-20

## 2024-03-20 RX ORDER — TAMSULOSIN HYDROCHLORIDE 0.4 MG/1
0.4 CAPSULE ORAL DAILY
Qty: 90 CAPSULE | Refills: 3 | Status: SHIPPED | OUTPATIENT
Start: 2024-03-20 | End: 2025-03-15

## 2024-03-20 NOTE — PROGRESS NOTES
Subjective:     Patient ID: Giorgi Eden is a 63 y.o. male.   Chief Complaint: Annual Exam, Establish Care, and Medication Refill    HPI:  Patient presents to establish care.  Patient is due for annual exam and labs.    Needs refills of atenolol, plavix, lipitor, flomax, lisinopril.     Hx MI s/p stent. Not established with local cardiology currently.    No acute issues.    Review of Problems & History:  Patient Active Problem List   Diagnosis    Essential hypertension    Status post insertion of drug eluting coronary artery stent    History of myocardial infarction    Mixed hyperlipidemia    Tobacco abuse    Atherosclerotic heart disease of native coronary artery with unstable angina pectoris    Chronic venous insufficiency    Recurrent biliary colic    Popliteal artery aneurysm    Cardiomyopathy, ischemic      Past Medical History:   Diagnosis Date    Hyperlipidemia     Hypertension     MI (myocardial infarction)     X2    S/P PTCA (percutaneous transluminal coronary angioplasty) 11/07/2015    ST elevation myocardial infarction (STEMI) 08/07/2014      Past Surgical History:   Procedure Laterality Date    AORTOGRAPHY WITH SERIALOGRAPHY Bilateral 1/26/2023    Procedure: AORTOGRAM, WITH BILATERAL LOWER EXTREMITY ANGIOGRAMS;  Surgeon: Ian Castorena MD;  Location: [facility];  Service: Vascular;  Laterality: Bilateral;  RN PREOP 1/24/23    CORONARY ANGIOPLASTY WITH STENT PLACEMENT      x2    CREATION OF FEMOROPOPLITEAL ARTERIAL BYPASS USING GRAFT Right 4/3/2023    Procedure: CREATION, BYPASS, ARTERIAL, FEMORAL TO POPLITEAL, USING VEIN;  Surgeon: Ian Castorena MD;  Location: [facility] OR [address] [place] [address];  Service: Vascular;  Laterality: Right;  Vein bypass  fluro time  kvp  ma     LAPAROSCOPIC CHOLECYSTECTOMY N/A 3/23/2023    Procedure: CHOLECYSTECTOMY, LAPAROSCOPIC;  Surgeon: Ana María Ellington MD;  Location: [facility] OR [address] [place] [address];  Service: General;  Laterality: N/A;    LEFT HEART CATHETERIZATION Left 5/31/2022    Procedure:  Left heart cath;  Surgeon: Romulo Gupta MD;  Location: [facility] CATH/EP LAB;  Service: Cardiology;  Laterality: Left;      Social History     Socioeconomic History    Marital status: Single   Tobacco Use    Smoking status: Former     Current packs/day: 0.00     Average packs/day: 1 pack/day for 25.0 years (25.0 ttl pk-yrs)     Types: Cigarettes     Start date: 1996     Quit date: 2021     Years since quittin.6    Smokeless tobacco: Never    Tobacco comments:     I now use a vape pen at 5 mg per day   Substance and Sexual Activity    Alcohol use: Yes     Comment: socially    Drug use: Not Currently    Sexual activity: Yes     Partners: Female     Social Determinants of Health     Financial Resource Strain: Low Risk  (2023)    Overall Financial Resource Strain (CARDIA)     Difficulty of Paying Living Expenses: Not hard at all   Food Insecurity: No Food Insecurity (2023)    Hunger Vital Sign     Worried About Running Out of Food in the Last Year: Never true     Ran Out of Food in the Last Year: Never true   Transportation Needs: No Transportation Needs (2023)    PRAPARE - Transportation     Lack of Transportation (Medical): No     Lack of Transportation (Non-Medical): No   Social Connections: Unknown (2023)    Social Connection and Isolation Panel [NHANES]     Marital Status: Living with partner   Housing Stability: Unknown (2023)    Housing Stability Vital Sign     Unable to Pay for Housing in the Last Year: Patient refused      Health Maintenance:  Colon Cancer Screening  Previous study completed on 2021  Type of Study: Cologuard/DNA Test  Clearance: 3 years  Lung Cancer Screening  Former smoker - quit date:   Pack-Years: Pack Years: 25  Prostate Cancer Screening  Due for screening  ASCVD Risk  The ASCVD Risk score (Michelle MATSON, et al., 2019) failed to calculate for the following reasons:    The valid total cholesterol range is 130 to 320 mg/dL   Lab Results   Component Value  Date    CHOL 111 (L) 09/24/2022    HDL 45 09/24/2022    TRIG 78 09/24/2022     Statin rx: yes    Vaccines  Seasonal Influenza: Due for booster  COVID-19: Due for booster  RSV: Due for primary series  Tetanus: UTD  Shingles: Due for primary series  Pneumococcal: Due for primary series    Medication Review:    Current Outpatient Medications:     acetaminophen (TYLENOL) 325 MG tablet, Take 2 tablets (650 mg total) by mouth every 6 (six) hours as needed for Pain. (Patient not taking: Reported on 3/20/2024), Disp: 30 tablet, Rfl: 0    aspirin (ECOTRIN) 81 MG EC tablet, Take 1 tablet (81 mg total) by mouth once daily., Disp: 360 tablet, Rfl: 0    atenoloL (TENORMIN) 50 MG tablet, Take 1 tablet (50 mg total) by mouth once daily., Disp: 90 tablet, Rfl: 3    atorvastatin (LIPITOR) 20 MG tablet, Take 1 tablet (20 mg total) by mouth once daily., Disp: 90 tablet, Rfl: 3    clopidogreL (PLAVIX) 75 mg tablet, Take 1 tablet (75 mg total) by mouth once daily., Disp: 90 tablet, Rfl: 3    INV lisinopril 10 MG Tab, Take 1 tablet (10 mg total) by mouth once daily., Disp: 90 tablet, Rfl: 3    tamsulosin (FLOMAX) 0.4 mg Cap, Take 1 capsule (0.4 mg total) by mouth once daily., Disp: 90 capsule, Rfl: 3  No current facility-administered medications for this visit.     Review of Systems   Constitutional:  Negative for chills, fatigue and fever.   HENT:  Negative for nasal congestion, ear pain, postnasal drip and sore throat.    Eyes:  Negative for visual disturbance.   Respiratory:  Negative for cough, shortness of breath and wheezing.    Cardiovascular:  Negative for chest pain and palpitations.   Gastrointestinal:  Negative for abdominal pain, change in bowel habit, constipation, diarrhea, nausea and vomiting.   Genitourinary:  Negative for dysuria and hematuria.   Musculoskeletal:  Negative for back pain, leg pain and neck pain.   Neurological:  Negative for dizziness, numbness and headaches.   Hematological:  Negative for adenopathy.  "  Psychiatric/Behavioral:  Negative for dysphoric mood, sleep disturbance and suicidal ideas. The patient is not nervous/anxious.           Objective:      Vitals:    03/20/24 1437   BP: 110/80   BP Location: Left arm   Patient Position: Sitting   BP Method: Medium (Manual)   Pulse: 70   SpO2: 98%   Weight: 91.1 kg (200 lb 13.4 oz)   Height: 5' 8" (1.727 m)      Physical Exam  Vitals and nursing note reviewed.   Constitutional:       General: He is not in acute distress.     Appearance: Normal appearance. He is not ill-appearing.   HENT:      Head: Normocephalic and atraumatic.      Right Ear: Tympanic membrane, ear canal and external ear normal. There is no impacted cerumen.      Left Ear: Tympanic membrane, ear canal and external ear normal. There is no impacted cerumen.      Nose: Nose normal. No congestion or rhinorrhea.      Mouth/Throat:      Mouth: Mucous membranes are moist.      Pharynx: Oropharynx is clear. No oropharyngeal exudate or posterior oropharyngeal erythema.   Eyes:      General: No scleral icterus.        Right eye: No discharge.         Left eye: No discharge.      Conjunctiva/sclera: Conjunctivae normal.   Neck:      Thyroid: No thyroid mass, thyromegaly or thyroid tenderness.   Cardiovascular:      Rate and Rhythm: Normal rate and regular rhythm.      Pulses: Normal pulses.      Heart sounds: Normal heart sounds. No murmur heard.     No friction rub. No gallop.   Pulmonary:      Effort: Pulmonary effort is normal. No respiratory distress.      Breath sounds: Normal breath sounds. No wheezing, rhonchi or rales.   Abdominal:      General: Abdomen is flat. Bowel sounds are normal. There is no distension.      Palpations: Abdomen is soft. There is no mass.      Tenderness: There is no abdominal tenderness. There is no guarding.   Musculoskeletal:         General: No swelling or deformity. Normal range of motion.      Cervical back: Normal range of motion and neck supple. No tenderness. "   Lymphadenopathy:      Cervical: No cervical adenopathy.   Skin:     General: Skin is warm and dry.   Neurological:      General: No focal deficit present.      Mental Status: He is alert and oriented to person, place, and time. Mental status is at baseline.      Gait: Gait normal.      Deep Tendon Reflexes: Reflexes normal.   Psychiatric:         Mood and Affect: Mood normal.         Behavior: Behavior normal.         Thought Content: Thought content normal.         Judgment: Judgment normal.           Assessment:       Problem List Items Addressed This Visit          Cardiac/Vascular    Essential hypertension    Relevant Medications    INV lisinopril 10 MG Tab    History of myocardial infarction    Relevant Medications    atenoloL (TENORMIN) 50 MG tablet    atorvastatin (LIPITOR) 20 MG tablet    clopidogreL (PLAVIX) 75 mg tablet    Other Relevant Orders    Ambulatory referral/consult to Cardiology    Mixed hyperlipidemia    Relevant Medications    atorvastatin (LIPITOR) 20 MG tablet     Other Visit Diagnoses       Encounter for annual general medical examination without abnormal findings in adult    -  Primary    Relevant Orders    Comprehensive Metabolic Panel    CBC Auto Differential    Lipid Panel    Hemoglobin A1C    TSH    T4, Free    PSA, Screening    Encounter to establish care with new doctor        Need for vaccination        Tobacco dependence        Relevant Orders    CT Chest Lung Screening Low Dose    Encounter for screening for malignant neoplasm of prostate        Relevant Orders    PSA, Screening    Nephrolithiasis        Relevant Medications    tamsulosin (FLOMAX) 0.4 mg Cap              Plan:       1. Encounter for annual general medical examination without abnormal findings in adult  Health maintenance updated  Chronic issues reviewed  Fasting labs ordered  - Comprehensive Metabolic Panel; Future  - CBC Auto Differential; Future  - Lipid Panel; Future  - Hemoglobin A1C; Future  - TSH; Future  -  T4, Free; Future  - PSA, Screening; Future    2. Encounter to establish care with new doctor  Reviewed chronic medical conditions, updated problem list and medication list    3. Need for vaccination  Influenza declined  COVID declined  RSV declined  Shingles declined  Pneumococcal declined    4. Tobacco dependence  Baseline study ordered  - CT Chest Lung Screening Low Dose; Future    5. Encounter for screening for malignant neoplasm of prostate  - PSA, Screening; Future    6. Essential hypertension  Stable, well controlled, no changes to meds  - INV lisinopril 10 MG Tab; Take 1 tablet (10 mg total) by mouth once daily.  Dispense: 90 tablet; Refill: 3    7. Nephrolithiasis  No acute issues, stable  - tamsulosin (FLOMAX) 0.4 mg Cap; Take 1 capsule (0.4 mg total) by mouth once daily.  Dispense: 90 capsule; Refill: 3    8. History of myocardial infarction  Stable, continue current mgmt  Ref to cardiology for local surveillance  - atenoloL (TENORMIN) 50 MG tablet; Take 1 tablet (50 mg total) by mouth once daily.  Dispense: 90 tablet; Refill: 3  - atorvastatin (LIPITOR) 20 MG tablet; Take 1 tablet (20 mg total) by mouth once daily.  Dispense: 90 tablet; Refill: 3  - clopidogreL (PLAVIX) 75 mg tablet; Take 1 tablet (75 mg total) by mouth once daily.  Dispense: 90 tablet; Refill: 3  - Ambulatory referral/consult to Cardiology; Future    9. Mixed hyperlipidemia  Stable, well controlled, continue current mgmt  - atorvastatin (LIPITOR) 20 MG tablet; Take 1 tablet (20 mg total) by mouth once daily.  Dispense: 90 tablet; Refill: 3          Follow up in about 1 year (around 3/20/2025) for Annual Visit, Fasting labs.     Nigel Merida MD, FAAFP  Family Medicine Physician  [facility]  03/20/2024

## 2024-03-22 ENCOUNTER — LAB VISIT (OUTPATIENT)
Dept: LAB | Facility: HOSPITAL | Age: 64
End: 2024-03-22
Payer: COMMERCIAL

## 2024-03-22 ENCOUNTER — PATIENT MESSAGE (OUTPATIENT)
Dept: INTERNAL MEDICINE | Facility: CLINIC | Age: 64
End: 2024-03-22
Payer: COMMERCIAL

## 2024-03-22 DIAGNOSIS — Z12.5 ENCOUNTER FOR SCREENING FOR MALIGNANT NEOPLASM OF PROSTATE: ICD-10-CM

## 2024-03-22 DIAGNOSIS — D58.2 ELEVATED HEMOGLOBIN: Primary | ICD-10-CM

## 2024-03-22 DIAGNOSIS — E87.5 HYPERKALEMIA: ICD-10-CM

## 2024-03-22 DIAGNOSIS — Z00.00 ENCOUNTER FOR ANNUAL GENERAL MEDICAL EXAMINATION WITHOUT ABNORMAL FINDINGS IN ADULT: ICD-10-CM

## 2024-03-22 LAB
ALBUMIN SERPL BCP-MCNC: 4.4 G/DL (ref 3.5–5.2)
ALP SERPL-CCNC: 88 U/L (ref 55–135)
ALT SERPL W/O P-5'-P-CCNC: 43 U/L (ref 10–44)
ANION GAP SERPL CALC-SCNC: 10 MMOL/L (ref 8–16)
AST SERPL-CCNC: 24 U/L (ref 10–40)
BASOPHILS # BLD AUTO: 0.05 K/UL (ref 0–0.2)
BASOPHILS NFR BLD: 0.7 % (ref 0–1.9)
BILIRUB SERPL-MCNC: 1 MG/DL (ref 0.1–1)
BUN SERPL-MCNC: 24 MG/DL (ref 8–23)
CALCIUM SERPL-MCNC: 10.5 MG/DL (ref 8.7–10.5)
CHLORIDE SERPL-SCNC: 102 MMOL/L (ref 95–110)
CHOLEST SERPL-MCNC: 119 MG/DL (ref 120–199)
CHOLEST/HDLC SERPL: 3.3 {RATIO} (ref 2–5)
CO2 SERPL-SCNC: 26 MMOL/L (ref 23–29)
COMPLEXED PSA SERPL-MCNC: 0.75 NG/ML (ref 0–4)
CREAT SERPL-MCNC: 1.2 MG/DL (ref 0.5–1.4)
DIFFERENTIAL METHOD BLD: ABNORMAL
EOSINOPHIL # BLD AUTO: 0.1 K/UL (ref 0–0.5)
EOSINOPHIL NFR BLD: 2.1 % (ref 0–8)
ERYTHROCYTE [DISTWIDTH] IN BLOOD BY AUTOMATED COUNT: 15.9 % (ref 11.5–14.5)
EST. GFR  (NO RACE VARIABLE): >60 ML/MIN/1.73 M^2
ESTIMATED AVG GLUCOSE: 111 MG/DL (ref 68–131)
GLUCOSE SERPL-MCNC: 103 MG/DL (ref 70–110)
HBA1C MFR BLD: 5.5 % (ref 4–5.6)
HCT VFR BLD AUTO: 57.3 % (ref 40–54)
HDLC SERPL-MCNC: 36 MG/DL (ref 40–75)
HDLC SERPL: 30.3 % (ref 20–50)
HGB BLD-MCNC: 18.7 G/DL (ref 14–18)
IMM GRANULOCYTES # BLD AUTO: 0.03 K/UL (ref 0–0.04)
IMM GRANULOCYTES NFR BLD AUTO: 0.4 % (ref 0–0.5)
LDLC SERPL CALC-MCNC: 55.4 MG/DL (ref 63–159)
LYMPHOCYTES # BLD AUTO: 1.6 K/UL (ref 1–4.8)
LYMPHOCYTES NFR BLD: 24 % (ref 18–48)
MCH RBC QN AUTO: 27.7 PG (ref 27–31)
MCHC RBC AUTO-ENTMCNC: 32.6 G/DL (ref 32–36)
MCV RBC AUTO: 85 FL (ref 82–98)
MONOCYTES # BLD AUTO: 0.8 K/UL (ref 0.3–1)
MONOCYTES NFR BLD: 12 % (ref 4–15)
NEUTROPHILS # BLD AUTO: 4.1 K/UL (ref 1.8–7.7)
NEUTROPHILS NFR BLD: 60.8 % (ref 38–73)
NONHDLC SERPL-MCNC: 83 MG/DL
NRBC BLD-RTO: 0 /100 WBC
PLATELET # BLD AUTO: 262 K/UL (ref 150–450)
PMV BLD AUTO: 10.5 FL (ref 9.2–12.9)
POTASSIUM SERPL-SCNC: 5.5 MMOL/L (ref 3.5–5.1)
PROT SERPL-MCNC: 8 G/DL (ref 6–8.4)
RBC # BLD AUTO: 6.75 M/UL (ref 4.6–6.2)
SODIUM SERPL-SCNC: 138 MMOL/L (ref 136–145)
T4 FREE SERPL-MCNC: 0.79 NG/DL (ref 0.71–1.51)
TRIGL SERPL-MCNC: 138 MG/DL (ref 30–150)
TSH SERPL DL<=0.005 MIU/L-ACNC: 1.69 UIU/ML (ref 0.4–4)
WBC # BLD AUTO: 6.68 K/UL (ref 3.9–12.7)

## 2024-03-22 PROCEDURE — 84153 ASSAY OF PSA TOTAL: CPT | Performed by: FAMILY MEDICINE

## 2024-03-22 PROCEDURE — 36415 COLL VENOUS BLD VENIPUNCTURE: CPT | Performed by: FAMILY MEDICINE

## 2024-03-22 PROCEDURE — 83036 HEMOGLOBIN GLYCOSYLATED A1C: CPT | Performed by: FAMILY MEDICINE

## 2024-03-22 PROCEDURE — 80053 COMPREHEN METABOLIC PANEL: CPT | Performed by: FAMILY MEDICINE

## 2024-03-22 PROCEDURE — 84443 ASSAY THYROID STIM HORMONE: CPT | Performed by: FAMILY MEDICINE

## 2024-03-22 PROCEDURE — 85025 COMPLETE CBC W/AUTO DIFF WBC: CPT | Performed by: FAMILY MEDICINE

## 2024-03-22 PROCEDURE — 80061 LIPID PANEL: CPT | Performed by: FAMILY MEDICINE

## 2024-03-22 PROCEDURE — 84439 ASSAY OF FREE THYROXINE: CPT | Performed by: FAMILY MEDICINE

## 2024-04-01 ENCOUNTER — LAB VISIT (OUTPATIENT)
Dept: LAB | Facility: HOSPITAL | Age: 64
End: 2024-04-01
Attending: FAMILY MEDICINE
Payer: COMMERCIAL

## 2024-04-01 DIAGNOSIS — D58.2 ELEVATED HEMOGLOBIN: ICD-10-CM

## 2024-04-01 DIAGNOSIS — E87.5 HYPERKALEMIA: ICD-10-CM

## 2024-04-01 LAB
ALBUMIN SERPL BCP-MCNC: 3.9 G/DL (ref 3.5–5.2)
ALP SERPL-CCNC: 75 U/L (ref 55–135)
ALT SERPL W/O P-5'-P-CCNC: 36 U/L (ref 10–44)
ANION GAP SERPL CALC-SCNC: 8 MMOL/L (ref 8–16)
AST SERPL-CCNC: 22 U/L (ref 10–40)
BASOPHILS # BLD AUTO: 0.04 K/UL (ref 0–0.2)
BASOPHILS NFR BLD: 0.5 % (ref 0–1.9)
BILIRUB SERPL-MCNC: 0.6 MG/DL (ref 0.1–1)
BUN SERPL-MCNC: 21 MG/DL (ref 8–23)
CALCIUM SERPL-MCNC: 9.6 MG/DL (ref 8.7–10.5)
CHLORIDE SERPL-SCNC: 103 MMOL/L (ref 95–110)
CO2 SERPL-SCNC: 26 MMOL/L (ref 23–29)
CREAT SERPL-MCNC: 1.2 MG/DL (ref 0.5–1.4)
DIFFERENTIAL METHOD BLD: ABNORMAL
EOSINOPHIL # BLD AUTO: 0.1 K/UL (ref 0–0.5)
EOSINOPHIL NFR BLD: 1.7 % (ref 0–8)
ERYTHROCYTE [DISTWIDTH] IN BLOOD BY AUTOMATED COUNT: 15.5 % (ref 11.5–14.5)
EST. GFR  (NO RACE VARIABLE): >60 ML/MIN/1.73 M^2
GLUCOSE SERPL-MCNC: 104 MG/DL (ref 70–110)
HCT VFR BLD AUTO: 53.2 % (ref 40–54)
HGB BLD-MCNC: 17.4 G/DL (ref 14–18)
IMM GRANULOCYTES # BLD AUTO: 0.03 K/UL (ref 0–0.04)
IMM GRANULOCYTES NFR BLD AUTO: 0.4 % (ref 0–0.5)
LYMPHOCYTES # BLD AUTO: 1.9 K/UL (ref 1–4.8)
LYMPHOCYTES NFR BLD: 23.3 % (ref 18–48)
MCH RBC QN AUTO: 27.7 PG (ref 27–31)
MCHC RBC AUTO-ENTMCNC: 32.7 G/DL (ref 32–36)
MCV RBC AUTO: 85 FL (ref 82–98)
MONOCYTES # BLD AUTO: 1 K/UL (ref 0.3–1)
MONOCYTES NFR BLD: 12.6 % (ref 4–15)
NEUTROPHILS # BLD AUTO: 5.1 K/UL (ref 1.8–7.7)
NEUTROPHILS NFR BLD: 61.5 % (ref 38–73)
NRBC BLD-RTO: 0 /100 WBC
PLATELET # BLD AUTO: 251 K/UL (ref 150–450)
PMV BLD AUTO: 10.4 FL (ref 9.2–12.9)
POTASSIUM SERPL-SCNC: 4.9 MMOL/L (ref 3.5–5.1)
PROT SERPL-MCNC: 7.2 G/DL (ref 6–8.4)
RBC # BLD AUTO: 6.28 M/UL (ref 4.6–6.2)
SODIUM SERPL-SCNC: 137 MMOL/L (ref 136–145)
WBC # BLD AUTO: 8.2 K/UL (ref 3.9–12.7)

## 2024-04-01 PROCEDURE — 80053 COMPREHEN METABOLIC PANEL: CPT | Performed by: FAMILY MEDICINE

## 2024-04-01 PROCEDURE — 85025 COMPLETE CBC W/AUTO DIFF WBC: CPT | Performed by: FAMILY MEDICINE

## 2024-04-01 PROCEDURE — 36415 COLL VENOUS BLD VENIPUNCTURE: CPT | Performed by: FAMILY MEDICINE

## 2024-04-05 ENCOUNTER — OFFICE VISIT (OUTPATIENT)
Dept: VASCULAR SURGERY | Facility: CLINIC | Age: 64
End: 2024-04-05
Payer: COMMERCIAL

## 2024-04-05 DIAGNOSIS — I71.43 INFRARENAL ABDOMINAL AORTIC ANEURYSM (AAA) WITHOUT RUPTURE: ICD-10-CM

## 2024-04-05 DIAGNOSIS — I72.4 FEMORAL ARTERY ANEURYSM: ICD-10-CM

## 2024-04-05 DIAGNOSIS — I72.4 POPLITEAL ARTERY ANEURYSM: ICD-10-CM

## 2024-04-05 DIAGNOSIS — Z95.828 S/P FEMORAL-POPLITEAL BYPASS SURGERY: Primary | ICD-10-CM

## 2024-04-05 PROCEDURE — 3044F HG A1C LEVEL LT 7.0%: CPT | Mod: CPTII,S$GLB,, | Performed by: SURGERY

## 2024-04-05 PROCEDURE — 99214 OFFICE O/P EST MOD 30 MIN: CPT | Mod: S$GLB,,, | Performed by: SURGERY

## 2024-04-05 PROCEDURE — 4010F ACE/ARB THERAPY RXD/TAKEN: CPT | Mod: CPTII,S$GLB,, | Performed by: SURGERY

## 2024-04-05 NOTE — PROGRESS NOTES
Ian Castorena MD, RPVI                                 [facility]                           [facility]                             04/05/2024    HPI:  Giorgi Eden is a 63 y.o. male with   Patient Active Problem List   Diagnosis    Essential hypertension    Status post insertion of drug eluting coronary artery stent    History of myocardial infarction    Mixed hyperlipidemia    Tobacco abuse    Atherosclerotic heart disease of native coronary artery with unstable angina pectoris    Chronic venous insufficiency    Recurrent biliary colic    Popliteal artery aneurysm    Cardiomyopathy, ischemic    being managed by PCP and specialists who is here today for evaluation of BLE edema and RLE pain.  Patient states location is R calf occurring for months.  Associated signs and symptoms include edema.  Quality is pain and severity is 3/10.  Symptoms began yrs ago.  Alleviating factors include elevation.  Worsening factors include dependency.  Patient has not been wearing compression stockings for greater than 3 months.  No FH of venous disease.  Symptoms do limit patient's functional status and daily activities.  no DVT history.  no venous interventions.  no low sodium diet.  no excessive water intake.    Migraine with aura: no  PFO/ASD/right to left shunt: no  Pregnant: no  Breastfeeding: no    no MI  no Stroke  Tobacco use: no    1/2023:  c/o persistent BLE edema and pain despite compression and elevation > 3 mo.  No claudication.    4/2023:  s/p R SFA-BK popliteal vein bypass 4/3/23 with ligation of femoropopliteal aneurysm.  Presents due to concern for staple removal.    5/3/23:  Doing well, c/o RLE neuropathic pain.    6/5/23:  c/o posterior thigh pain to touch and bruising behind right knee after going back to work.    4/2024:  no complaints.  Did not f/u due to insurance issues.    Past Medical History:   Diagnosis Date    Hyperlipidemia     Hypertension     MI (myocardial infarction)      X2    S/P PTCA (percutaneous transluminal coronary angioplasty) 2015    ST elevation myocardial infarction (STEMI) 2014     Past Surgical History:   Procedure Laterality Date    AORTOGRAPHY WITH SERIALOGRAPHY Bilateral 2023    Procedure: AORTOGRAM, WITH BILATERAL LOWER EXTREMITY ANGIOGRAMS;  Surgeon: Ian Castorena MD;  Location: [facility] OR;  Service: Vascular;  Laterality: Bilateral;  RN PREOP 23    CORONARY ANGIOPLASTY WITH STENT PLACEMENT      x2    CREATION OF FEMOROPOPLITEAL ARTERIAL BYPASS USING GRAFT Right 4/3/2023    Procedure: CREATION, BYPASS, ARTERIAL, FEMORAL TO POPLITEAL, USING VEIN;  Surgeon: Ian Castorena MD;  Location: [facility] OR [address] [place] [address];  Service: Vascular;  Laterality: Right;  Vein bypass  fluro time  kvp  ma     LAPAROSCOPIC CHOLECYSTECTOMY N/A 3/23/2023    Procedure: CHOLECYSTECTOMY, LAPAROSCOPIC;  Surgeon: Ana María Ellington MD;  Location: [facility] OR [address] [place] [address];  Service: General;  Laterality: N/A;    LEFT HEART CATHETERIZATION Left 2022    Procedure: Left heart cath;  Surgeon: Romulo Gupta MD;  Location: [facility] CATH/EP LAB;  Service: Cardiology;  Laterality: Left;     Family History   Problem Relation Age of Onset    COPD Mother     Heart disease Mother     Stroke Mother      Social History     Socioeconomic History    Marital status: Single   Tobacco Use    Smoking status: Former     Current packs/day: 0.00     Average packs/day: 1 pack/day for 25.0 years (25.0 ttl pk-yrs)     Types: Cigarettes     Start date: 1996     Quit date: 2021     Years since quittin.6    Smokeless tobacco: Never    Tobacco comments:     I now use a vape pen at 5 mg per day   Substance and Sexual Activity    Alcohol use: Yes     Comment: socially    Drug use: Not Currently    Sexual activity: Yes     Partners: Female     Social Determinants of Health     Financial Resource Strain: Low Risk  (2023)    Overall Financial Resource Strain (CARDIA)     Difficulty of  Paying Living Expenses: Not hard at all   Food Insecurity: No Food Insecurity (4/4/2023)    Hunger Vital Sign     Worried About Running Out of Food in the Last Year: Never true     Ran Out of Food in the Last Year: Never true   Transportation Needs: No Transportation Needs (4/4/2023)    PRAPARE - Transportation     Lack of Transportation (Medical): No     Lack of Transportation (Non-Medical): No   Social Connections: Unknown (4/4/2023)    Social Connection and Isolation Panel [NHANES]     Marital Status: Living with partner   Housing Stability: Unknown (4/4/2023)    Housing Stability Vital Sign     Unable to Pay for Housing in the Last Year: Patient refused       Current Outpatient Medications:     acetaminophen (TYLENOL) 325 MG tablet, Take 2 tablets (650 mg total) by mouth every 6 (six) hours as needed for Pain. (Patient not taking: Reported on 3/20/2024), Disp: 30 tablet, Rfl: 0    aspirin (ECOTRIN) 81 MG EC tablet, Take 1 tablet (81 mg total) by mouth once daily., Disp: 360 tablet, Rfl: 0    atenoloL (TENORMIN) 50 MG tablet, Take 1 tablet (50 mg total) by mouth once daily., Disp: 90 tablet, Rfl: 3    atorvastatin (LIPITOR) 20 MG tablet, Take 1 tablet (20 mg total) by mouth once daily., Disp: 90 tablet, Rfl: 3    clopidogreL (PLAVIX) 75 mg tablet, Take 1 tablet (75 mg total) by mouth once daily., Disp: 90 tablet, Rfl: 3    INV lisinopril 10 MG Tab, Take 1 tablet (10 mg total) by mouth once daily., Disp: 90 tablet, Rfl: 3    tamsulosin (FLOMAX) 0.4 mg Cap, Take 1 capsule (0.4 mg total) by mouth once daily., Disp: 90 capsule, Rfl: 3    REVIEW OF SYSTEMS:  General: No fevers or chills; ENT: No sore throat; Allergy and Immunology: no persistent infections; Hematological and Lymphatic: No history of bleeding or easy bruising; Endocrine: negative; Respiratory: no cough, shortness of breath, or wheezing; Cardiovascular: no chest pain or dyspnea on exertion; Gastrointestinal: no abdominal pain/back, change in bowel  "habits, or bloody stools; Genito-Urinary: no dysuria, trouble voiding, or hematuria; Musculoskeletal: edema; Neurological: no TIA or stroke symptoms; Psychiatric: no nervousness, anxiety or depression.    PHYSICAL EXAM:                General appearance:  Alert, well-appearing, and in no distress.  Oriented to person, place, and time                    Neurological: Normal speech, no focal findings noted; CN II - XII grossly intact. RLE with sensation to light touch, LLE with sensation to light touch.            Musculoskeletal: Digits/nail without cyanosis/clubbing.  Strength 5/5 BLE.                    Neck: Supple, no significant adenopathy                  Chest:  No wheezes, symmetric air entry. No use of accessory muscles               Cardiac: Normal rate and regular rhythm            Abdomen: Soft, nontender, nondistended      Extremities:   2+ R DP pulse, 2+ L DP pulse; RLE aneurysm non palpable, +staples dry, no infection      1+ RLE edema, 1+ LLE edema    Skin:  RLE no ulcer; LLE no ulcer      RLE no spider veins, LLE no spider veins      RLE + varicose veins, LLE + varicose veins    CEAP 3/3    VCSS 10    LAB RESULTS:  No results found for: "CBC"  Lab Results   Component Value Date    LABPROT 11.3 04/03/2023    INR 1.1 04/03/2023     Lab Results   Component Value Date     04/01/2024    K 4.9 04/01/2024     04/01/2024    CO2 26 04/01/2024     04/01/2024    BUN 21 04/01/2024    CREATININE 1.2 04/01/2024    CALCIUM 9.6 04/01/2024    ANIONGAP 8 04/01/2024    EGFRNONAA >60.0 05/31/2022     Lab Results   Component Value Date    WBC 8.20 04/01/2024    RBC 6.28 (H) 04/01/2024    HGB 17.4 04/01/2024    HCT 53.2 04/01/2024    MCV 85 04/01/2024    MCH 27.7 04/01/2024    MCHC 32.7 04/01/2024    RDW 15.5 (H) 04/01/2024     04/01/2024    MPV 10.4 04/01/2024    GRAN 5.1 04/01/2024    GRAN 61.5 04/01/2024    LYMPH 1.9 04/01/2024    LYMPH 23.3 04/01/2024    MONO 1.0 04/01/2024    MONO 12.6 " 04/01/2024    EOS 0.1 04/01/2024    BASO 0.04 04/01/2024    EOSINOPHIL 1.7 04/01/2024    BASOPHIL 0.5 04/01/2024    DIFFMETHOD Automated 04/01/2024     .  Lab Results   Component Value Date    HGBA1C 5.5 03/22/2024       IMAGING:  All pertinent imaging has been reviewed and interpreted independently.    Venous US 11/2022 Impression:  Details    Narrative    Indication   ========     Venous Insufficiency     Lower Extremity Veins   =================     Rt CFV:    complete compression, complete color fill   Rt prox femoral V: complete compression, complete color fill   Rt mid femoral V:  complete compression, complete color fill   Rt distal femoral V:   complete compression, complete color fill   Rt prox popliteal V:   complete compression, complete color fill   Rt mid popliteal V:    complete compression, complete color fill   Rt distal popliteal V: complete compression, complete color fill   Rt post tibial V:  complete compression, complete color fill,   Rt peroneal V: complete compression, complete color fill   Rt GSV:    complete compression, complete color fill, significant reflux   Rt SSV:    complete compression, complete color fill, no reflux   Add rt lower V details:    complete compression, complete color fill, Reflux Present   Lt CFV:    complete compression, complete color fill   Lt prox femoral V: complete compression, complete color fill   Lt mid femoral V:  complete compression, complete color fill   Lt distal femoral V:   complete compression, complete color fill   Lt prox popliteal V:   complete compression, complete color fill   Lt mid popliteal V:    complete compression, complete color fill   Lt distal popliteal V: complete compression, complete color fill   Lt post tibial V:  complete compression, complete color fill   Lt peroneal V: complete compression, complete color fill   Lt GSV:    complete compression, complete color fill, significant reflux   Lt SSV:    complete compression, complete  color fill, no reflux   Add lt lower V details:    complete compression, complete color fill, Reflux Present   Additional lower vein: Saphenofemoral Junction   Other: No reflux noted in the Accessory Vein     Lower Extremity Vein Mapping   =======================     Rt prox GSV thigh AP diam  6.2 mm   Rt mid GSV thigh AP diam   5.8 mm   Rt distal GSV thigh AP diam    8.3 mm   Rt GSV knee AP diam    6.5 mm   Rt prox GSV calf AP diam   4.0 mm   Rt mid GSV calf AP diam    2.4 mm   Rt distal GSV calf AP diam 1.9 mm   Lt prox GSV thigh AP diam  3.7 mm   Lt mid GSV thigh AP diam   3.4 mm   Lt distal GSV thigh AP diam    3.4 mm   Lt GSV knee AP diam    4.1 mm   Lt prox GSV calf AP diam   4.3 mm   Lt mid GSV calf AP diam    3.0 mm   Lt distal GSV calf AP diam 2.6 mm     Comment   ========     The diagnostic criteria used in our lab: Significant reflux is present when retrograde flow is noted both in the vein and at the   saphenofemoral junction (SFJ) or saphenopopliteal junction (SPJ)for 500 milliseconds (.5 second) following provocative maneuvers   when the patient is in the reverse trendelenburg position.     Impression   =========     Right Leg: Duplex imaging of the right lower extremity veins demonstrates patent and compressible veins.   There is no evidence of a venous thrombosis in the deep or superficial veins.   Significant reflux noted in the right GSV including the Saphenofemoral Junction (SFJ). No significant reflux noted in the SSV or   accessory vein.   Incidental finding of dilated distal SFA measuring 2.1 cm x 2.3 cm and proximal popliteal artery measuring 1.8 cm x 1.6 cm.     Left Leg: Duplex imaging of the left lower extremity veins demonstrates patent and compressible veins.   There is no evidence of a venous thrombosis in the deep or superficial veins.   Significant reflux noted in the left GSV including the Saphenofemoral Junction (SFJ). No significant reflux noted in the SSV or   accessory vein.    Incidental finding of dilated distal SFA measuring 2.1 cm x 2.1 cm, proximal popliteal artery measuring 1.9 cm x 1.9 cm and mid   popliteal artery measuring 2.3 cm x 2.0 cm.       DATE OF SERVICE: 11/14/2022                                                        Sonographer: AG HASTINGS RVS   Electronically Signed by: Shantanu Gu II at 11/15/2022-13:34           Specimen Collected: 11/14/22 13:48 Last Resulted: 11/15/22 13:37           CTA BLE runoff 2023 reviewed.    5/2023  No HD significant stenosis in RLE bypass graft, normal VIVIAN    IMP/PLAN:  63 y.o. male with   Patient Active Problem List   Diagnosis    Essential hypertension    Status post insertion of drug eluting coronary artery stent    History of myocardial infarction    Mixed hyperlipidemia    Tobacco abuse    Atherosclerotic heart disease of native coronary artery with unstable angina pectoris    Chronic venous insufficiency    Recurrent biliary colic    Popliteal artery aneurysm    Cardiomyopathy, ischemic    being managed by PCP and specialists who is here today for evaluation of venous insufficiency and BLE asymptomatic SFA and popliteal aneurysms.    -recommend, elevation, dietary changes associated with water and sodium intake discussed at length with patient  -Exercise   -s/p R SFA-popliteal vein bypass - recovering well.  Keep inc clean and dry.   -RLE US, toe pressures and CTA runoff  -Plan for LLE angiogram   -RTC 4-6 weeks    I spent 12 minutes evaluating this patient and greater than 50% of the time was spent counseling, coordinator care and discussing the plan of care.  All questions were answered and patient stated understanding with agreement with the above treatment plan.    Ian Castorena MD [facility]  Vascular and Endovascular Surgery

## 2024-04-09 DIAGNOSIS — I72.4 POPLITEAL ARTERY ANEURYSM: ICD-10-CM

## 2024-04-09 DIAGNOSIS — Z95.828 S/P FEMORAL-POPLITEAL BYPASS SURGERY: Primary | ICD-10-CM

## 2024-04-09 DIAGNOSIS — I73.9 PERIPHERAL VASCULAR DISEASE, UNSPECIFIED: ICD-10-CM

## 2024-04-09 DIAGNOSIS — I72.4 FEMORAL ARTERY ANEURYSM: ICD-10-CM

## 2024-04-25 ENCOUNTER — HOSPITAL ENCOUNTER (OUTPATIENT)
Dept: RADIOLOGY | Facility: HOSPITAL | Age: 64
Discharge: HOME | End: 2024-04-25
Attending: SURGERY
Payer: COMMERCIAL

## 2024-04-25 ENCOUNTER — HOSPITAL ENCOUNTER (OUTPATIENT)
Dept: RADIOLOGY | Facility: HOSPITAL | Age: 64
Discharge: HOME | End: 2024-04-25
Attending: FAMILY MEDICINE
Payer: COMMERCIAL

## 2024-04-25 DIAGNOSIS — I72.4 FEMORAL ARTERY ANEURYSM: ICD-10-CM

## 2024-04-25 DIAGNOSIS — F17.200 TOBACCO DEPENDENCE: ICD-10-CM

## 2024-04-25 DIAGNOSIS — I73.9 PERIPHERAL VASCULAR DISEASE, UNSPECIFIED: ICD-10-CM

## 2024-04-25 DIAGNOSIS — I72.4 POPLITEAL ARTERY ANEURYSM: ICD-10-CM

## 2024-04-25 DIAGNOSIS — Z95.828 S/P FEMORAL-POPLITEAL BYPASS SURGERY: ICD-10-CM

## 2024-04-25 PROCEDURE — 71271 CT THORAX LUNG CANCER SCR C-: CPT | Mod: TC

## 2024-04-25 PROCEDURE — 25500020 PHARM REV CODE 255: Performed by: SURGERY

## 2024-04-25 PROCEDURE — 75635 CT ANGIO ABDOMINAL ARTERIES: CPT | Mod: 26,,, | Performed by: RADIOLOGY

## 2024-04-25 PROCEDURE — 75635 CT ANGIO ABDOMINAL ARTERIES: CPT | Mod: TC

## 2024-04-25 PROCEDURE — 71271 CT THORAX LUNG CANCER SCR C-: CPT | Mod: 26,,, | Performed by: STUDENT IN AN ORGANIZED HEALTH CARE EDUCATION/TRAINING PROGRAM

## 2024-04-25 RX ADMIN — IOHEXOL 100 ML: 350 INJECTION, SOLUTION INTRAVENOUS at 07:04

## 2024-05-06 ENCOUNTER — HOSPITAL ENCOUNTER (OUTPATIENT)
Dept: VASCULAR SURGERY | Facility: CLINIC | Age: 64
Discharge: HOME | End: 2024-05-06
Attending: SURGERY
Payer: COMMERCIAL

## 2024-05-06 ENCOUNTER — OFFICE VISIT (OUTPATIENT)
Dept: VASCULAR SURGERY | Facility: CLINIC | Age: 64
End: 2024-05-06
Payer: COMMERCIAL

## 2024-05-06 DIAGNOSIS — I72.4 FEMORAL ARTERY ANEURYSM: ICD-10-CM

## 2024-05-06 DIAGNOSIS — I72.4 POPLITEAL ARTERY ANEURYSM: ICD-10-CM

## 2024-05-06 DIAGNOSIS — I71.43 INFRARENAL ABDOMINAL AORTIC ANEURYSM (AAA) WITHOUT RUPTURE: ICD-10-CM

## 2024-05-06 DIAGNOSIS — Z95.828 S/P FEMORAL-POPLITEAL BYPASS SURGERY: ICD-10-CM

## 2024-05-06 DIAGNOSIS — I73.9 PERIPHERAL VASCULAR DISEASE, UNSPECIFIED: ICD-10-CM

## 2024-05-06 DIAGNOSIS — Z95.828 S/P FEMORAL-POPLITEAL BYPASS SURGERY: Primary | ICD-10-CM

## 2024-05-06 DIAGNOSIS — I87.2 CHRONIC VENOUS INSUFFICIENCY: ICD-10-CM

## 2024-05-06 PROCEDURE — 93926 LOWER EXTREMITY STUDY: CPT | Mod: S$GLB,,, | Performed by: SURGERY

## 2024-05-06 PROCEDURE — 1159F MED LIST DOCD IN RCRD: CPT | Mod: CPTII,S$GLB,, | Performed by: SURGERY

## 2024-05-06 PROCEDURE — 93923 UPR/LXTR ART STDY 3+ LVLS: CPT | Mod: S$GLB,,, | Performed by: SURGERY

## 2024-05-06 PROCEDURE — 3044F HG A1C LEVEL LT 7.0%: CPT | Mod: CPTII,S$GLB,, | Performed by: SURGERY

## 2024-05-06 PROCEDURE — 4010F ACE/ARB THERAPY RXD/TAKEN: CPT | Mod: CPTII,S$GLB,, | Performed by: SURGERY

## 2024-05-06 PROCEDURE — 99214 OFFICE O/P EST MOD 30 MIN: CPT | Mod: S$GLB,,, | Performed by: SURGERY

## 2024-05-06 NOTE — PROGRESS NOTES
Ian Castorena MD, RPVI                                 [facility]                           [facility]                             05/06/2024    HPI:  Giorgi Eden is a 63 y.o. male with   Patient Active Problem List   Diagnosis    Essential hypertension    Status post insertion of drug eluting coronary artery stent    History of myocardial infarction    Mixed hyperlipidemia    Tobacco abuse    Atherosclerotic heart disease of native coronary artery with unstable angina pectoris    Chronic venous insufficiency    Recurrent biliary colic    Popliteal artery aneurysm    Cardiomyopathy, ischemic    Peripheral vascular disease, unspecified    S/P femoral-popliteal bypass surgery    being managed by PCP and specialists who is here today for evaluation of BLE edema and RLE pain.  Patient states location is R calf occurring for months.  Associated signs and symptoms include edema.  Quality is pain and severity is 3/10.  Symptoms began yrs ago.  Alleviating factors include elevation.  Worsening factors include dependency.  Patient has not been wearing compression stockings for greater than 3 months.  No FH of venous disease.  Symptoms do limit patient's functional status and daily activities.  no DVT history.  no venous interventions.  no low sodium diet.  no excessive water intake.    Migraine with aura: no  PFO/ASD/right to left shunt: no  Pregnant: no  Breastfeeding: no    no MI  no Stroke  Tobacco use: no    1/2023:  c/o persistent BLE edema and pain despite compression and elevation > 3 mo.  No claudication.    4/2023:  s/p R SFA-BK popliteal vein bypass 4/3/23 with ligation of femoropopliteal aneurysm.  Presents due to concern for staple removal.    5/3/23:  Doing well, c/o RLE neuropathic pain.    6/5/23:  c/o posterior thigh pain to touch and bruising behind right knee after going back to work.    4/2024:  no complaints.  Did not f/u due to insurance issues.    5/2024:  no new  issues.  +ASA, Plavix and statin    Past Medical History:   Diagnosis Date    Hyperlipidemia     Hypertension     MI (myocardial infarction)     X2    S/P PTCA (percutaneous transluminal coronary angioplasty) 2015    ST elevation myocardial infarction (STEMI) 2014     Past Surgical History:   Procedure Laterality Date    AORTOGRAPHY WITH SERIALOGRAPHY Bilateral 2023    Procedure: AORTOGRAM, WITH BILATERAL LOWER EXTREMITY ANGIOGRAMS;  Surgeon: Ian Castorena MD;  Location: [facility] OR;  Service: Vascular;  Laterality: Bilateral;  RN PREOP 23    CORONARY ANGIOPLASTY WITH STENT PLACEMENT      x2    CREATION OF FEMOROPOPLITEAL ARTERIAL BYPASS USING GRAFT Right 4/3/2023    Procedure: CREATION, BYPASS, ARTERIAL, FEMORAL TO POPLITEAL, USING VEIN;  Surgeon: Ian Castorena MD;  Location: [facility] OR [address] [place] [address];  Service: Vascular;  Laterality: Right;  Vein bypass  fluro time  kvp  ma     LAPAROSCOPIC CHOLECYSTECTOMY N/A 3/23/2023    Procedure: CHOLECYSTECTOMY, LAPAROSCOPIC;  Surgeon: Ana María Ellington MD;  Location: [facility] OR [address] [place] [address];  Service: General;  Laterality: N/A;    LEFT HEART CATHETERIZATION Left 2022    Procedure: Left heart cath;  Surgeon: Romulo Gupta MD;  Location: [facility] CATH/EP LAB;  Service: Cardiology;  Laterality: Left;     Family History   Problem Relation Name Age of Onset    COPD Mother Nighat     Heart disease Mother Nighat     Stroke Mother Nighat      Social History     Socioeconomic History    Marital status: Single   Tobacco Use    Smoking status: Former     Current packs/day: 0.00     Average packs/day: 1 pack/day for 25.0 years (25.0 ttl pk-yrs)     Types: Cigarettes     Start date: 1996     Quit date: 2021     Years since quittin.7    Smokeless tobacco: Never    Tobacco comments:     I now use a vape pen at 5 mg per day   Substance and Sexual Activity    Alcohol use: Yes     Comment: socially    Drug use: Not Currently    Sexual activity: Yes     Partners:  Female     Social Determinants of Health     Financial Resource Strain: Low Risk  (4/4/2023)    Overall Financial Resource Strain (CARDIA)     Difficulty of Paying Living Expenses: Not hard at all   Food Insecurity: No Food Insecurity (4/4/2023)    Hunger Vital Sign     Worried About Running Out of Food in the Last Year: Never true     Ran Out of Food in the Last Year: Never true   Transportation Needs: No Transportation Needs (4/4/2023)    PRAPARE - Transportation     Lack of Transportation (Medical): No     Lack of Transportation (Non-Medical): No   Housing Stability: Unknown (4/4/2023)    Housing Stability Vital Sign     Unable to Pay for Housing in the Last Year: Patient refused       Current Outpatient Medications:     acetaminophen (TYLENOL) 325 MG tablet, Take 2 tablets (650 mg total) by mouth every 6 (six) hours as needed for Pain. (Patient not taking: Reported on 3/20/2024), Disp: 30 tablet, Rfl: 0    aspirin (ECOTRIN) 81 MG EC tablet, Take 1 tablet (81 mg total) by mouth once daily., Disp: 360 tablet, Rfl: 0    atenoloL (TENORMIN) 50 MG tablet, Take 1 tablet (50 mg total) by mouth once daily., Disp: 90 tablet, Rfl: 3    atorvastatin (LIPITOR) 20 MG tablet, Take 1 tablet (20 mg total) by mouth once daily., Disp: 90 tablet, Rfl: 3    clopidogreL (PLAVIX) 75 mg tablet, Take 1 tablet (75 mg total) by mouth once daily., Disp: 90 tablet, Rfl: 3    INV lisinopril 10 MG Tab, Take 1 tablet (10 mg total) by mouth once daily., Disp: 90 tablet, Rfl: 3    tamsulosin (FLOMAX) 0.4 mg Cap, Take 1 capsule (0.4 mg total) by mouth once daily., Disp: 90 capsule, Rfl: 3    REVIEW OF SYSTEMS:  General: No fevers or chills; ENT: No sore throat; Allergy and Immunology: no persistent infections; Hematological and Lymphatic: No history of bleeding or easy bruising; Endocrine: negative; Respiratory: no cough, shortness of breath, or wheezing; Cardiovascular: no chest pain or dyspnea on exertion; Gastrointestinal: no abdominal  "pain/back, change in bowel habits, or bloody stools; Genito-Urinary: no dysuria, trouble voiding, or hematuria; Musculoskeletal: edema; Neurological: no TIA or stroke symptoms; Psychiatric: no nervousness, anxiety or depression.    PHYSICAL EXAM:                General appearance:  Alert, well-appearing, and in no distress.  Oriented to person, place, and time                    Neurological: Normal speech, no focal findings noted; CN II - XII grossly intact. RLE with sensation to light touch, LLE with sensation to light touch.            Musculoskeletal: Digits/nail without cyanosis/clubbing.  Strength 5/5 BLE.                    Neck: Supple, no significant adenopathy                  Chest:  No wheezes, symmetric air entry. No use of accessory muscles               Cardiac: Normal rate and regular rhythm            Abdomen: Soft, nontender, nondistended      Extremities:   2+ R DP pulse, 2+ L DP pulse; RLE aneurysm non palpable, +staples dry, no infection      1+ RLE edema, 1+ LLE edema    Skin:  RLE no ulcer; LLE no ulcer      RLE no spider veins, LLE no spider veins      RLE + varicose veins, LLE + varicose veins    CEAP 3/3    VCSS 6    LAB RESULTS:  No results found for: "CBC"  Lab Results   Component Value Date    LABPROT 11.3 04/03/2023    INR 1.1 04/03/2023     Lab Results   Component Value Date     04/01/2024    K 4.9 04/01/2024     04/01/2024    CO2 26 04/01/2024     04/01/2024    BUN 21 04/01/2024    CREATININE 1.2 04/01/2024    CALCIUM 9.6 04/01/2024    ANIONGAP 8 04/01/2024    EGFRNONAA >60.0 05/31/2022     Lab Results   Component Value Date    WBC 8.20 04/01/2024    RBC 6.28 (H) 04/01/2024    HGB 17.4 04/01/2024    HCT 53.2 04/01/2024    MCV 85 04/01/2024    MCH 27.7 04/01/2024    MCHC 32.7 04/01/2024    RDW 15.5 (H) 04/01/2024     04/01/2024    MPV 10.4 04/01/2024    GRAN 5.1 04/01/2024    GRAN 61.5 04/01/2024    LYMPH 1.9 04/01/2024    LYMPH 23.3 04/01/2024    MONO 1.0 " 04/01/2024    MONO 12.6 04/01/2024    EOS 0.1 04/01/2024    BASO 0.04 04/01/2024    EOSINOPHIL 1.7 04/01/2024    BASOPHIL 0.5 04/01/2024    DIFFMETHOD Automated 04/01/2024     .  Lab Results   Component Value Date    HGBA1C 5.5 03/22/2024       IMAGING:  All pertinent imaging has been reviewed and interpreted independently.    Venous US 11/2022 Impression:  Details    Narrative    Indication   ========     Venous Insufficiency     Lower Extremity Veins   =================     Rt CFV:    complete compression, complete color fill   Rt prox femoral V: complete compression, complete color fill   Rt mid femoral V:  complete compression, complete color fill   Rt distal femoral V:   complete compression, complete color fill   Rt prox popliteal V:   complete compression, complete color fill   Rt mid popliteal V:    complete compression, complete color fill   Rt distal popliteal V: complete compression, complete color fill   Rt post tibial V:  complete compression, complete color fill,   Rt peroneal V: complete compression, complete color fill   Rt GSV:    complete compression, complete color fill, significant reflux   Rt SSV:    complete compression, complete color fill, no reflux   Add rt lower V details:    complete compression, complete color fill, Reflux Present   Lt CFV:    complete compression, complete color fill   Lt prox femoral V: complete compression, complete color fill   Lt mid femoral V:  complete compression, complete color fill   Lt distal femoral V:   complete compression, complete color fill   Lt prox popliteal V:   complete compression, complete color fill   Lt mid popliteal V:    complete compression, complete color fill   Lt distal popliteal V: complete compression, complete color fill   Lt post tibial V:  complete compression, complete color fill   Lt peroneal V: complete compression, complete color fill   Lt GSV:    complete compression, complete color fill, significant reflux   Lt SSV:    complete  compression, complete color fill, no reflux   Add lt lower V details:    complete compression, complete color fill, Reflux Present   Additional lower vein: Saphenofemoral Junction   Other: No reflux noted in the Accessory Vein     Lower Extremity Vein Mapping   =======================     Rt prox GSV thigh AP diam  6.2 mm   Rt mid GSV thigh AP diam   5.8 mm   Rt distal GSV thigh AP diam    8.3 mm   Rt GSV knee AP diam    6.5 mm   Rt prox GSV calf AP diam   4.0 mm   Rt mid GSV calf AP diam    2.4 mm   Rt distal GSV calf AP diam 1.9 mm   Lt prox GSV thigh AP diam  3.7 mm   Lt mid GSV thigh AP diam   3.4 mm   Lt distal GSV thigh AP diam    3.4 mm   Lt GSV knee AP diam    4.1 mm   Lt prox GSV calf AP diam   4.3 mm   Lt mid GSV calf AP diam    3.0 mm   Lt distal GSV calf AP diam 2.6 mm     Comment   ========     The diagnostic criteria used in our lab: Significant reflux is present when retrograde flow is noted both in the vein and at the   saphenofemoral junction (SFJ) or saphenopopliteal junction (SPJ)for 500 milliseconds (.5 second) following provocative maneuvers   when the patient is in the reverse trendelenburg position.     Impression   =========     Right Leg: Duplex imaging of the right lower extremity veins demonstrates patent and compressible veins.   There is no evidence of a venous thrombosis in the deep or superficial veins.   Significant reflux noted in the right GSV including the Saphenofemoral Junction (SFJ). No significant reflux noted in the SSV or   accessory vein.   Incidental finding of dilated distal SFA measuring 2.1 cm x 2.3 cm and proximal popliteal artery measuring 1.8 cm x 1.6 cm.     Left Leg: Duplex imaging of the left lower extremity veins demonstrates patent and compressible veins.   There is no evidence of a venous thrombosis in the deep or superficial veins.   Significant reflux noted in the left GSV including the Saphenofemoral Junction (SFJ). No significant reflux noted in the SSV or    accessory vein.   Incidental finding of dilated distal SFA measuring 2.1 cm x 2.1 cm, proximal popliteal artery measuring 1.9 cm x 1.9 cm and mid   popliteal artery measuring 2.3 cm x 2.0 cm.       DATE OF SERVICE: 11/14/2022                                                        Sonographer: AG HASTINGS RVS   Electronically Signed by: Shantanu Gu II at 11/15/2022-13:34           Specimen Collected: 11/14/22 13:48 Last Resulted: 11/15/22 13:37           CTA BLE runoff 2023 reviewed.    5/2023  No HD significant stenosis in RLE bypass graft, normal VIVIAN    5/2024  No HD significant stenosis in RLE bypass graft, normal VIVIAN    IMP/PLAN:  63 y.o. male with   Patient Active Problem List   Diagnosis    Essential hypertension    Status post insertion of drug eluting coronary artery stent    History of myocardial infarction    Mixed hyperlipidemia    Tobacco abuse    Atherosclerotic heart disease of native coronary artery with unstable angina pectoris    Chronic venous insufficiency    Recurrent biliary colic    Popliteal artery aneurysm    Cardiomyopathy, ischemic    Peripheral vascular disease, unspecified    S/P femoral-popliteal bypass surgery    being managed by PCP and specialists who is here today for evaluation of venous insufficiency and BLE asymptomatic SFA and popliteal aneurysms.    -recommend, elevation, dietary changes associated with water and sodium intake discussed at length with patient  -Exercise   -s/p R SFA-popliteal vein bypass - recovering well.  Keep inc clean and dry.   -RTC 7 mo per patient preference with RLE arterial US and VIVIAN  --Plan for LLE angiogram and subsequent L SFA - BK popliteal vein bypass for popliteal aneurysm     I spent 12 minutes evaluating this patient and greater than 50% of the time was spent counseling, coordinator care and discussing the plan of care.  All questions were answered and patient stated understanding with agreement with the above treatment plan.    Ian  MD Kell RPVI  Vascular and Endovascular Surgery

## 2024-05-30 ENCOUNTER — TELEPHONE (OUTPATIENT)
Dept: VASCULAR SURGERY | Facility: CLINIC | Age: 64
End: 2024-05-30
Payer: COMMERCIAL

## 2024-05-30 DIAGNOSIS — Z95.828 S/P FEMORAL-POPLITEAL BYPASS SURGERY: Primary | ICD-10-CM

## 2024-06-10 ENCOUNTER — PATIENT MESSAGE (OUTPATIENT)
Dept: INTERNAL MEDICINE | Facility: CLINIC | Age: 64
End: 2024-06-10
Payer: COMMERCIAL

## 2024-11-26 ENCOUNTER — OFFICE VISIT (OUTPATIENT)
Dept: URGENT CARE | Facility: CLINIC | Age: 64
End: 2024-11-26
Payer: COMMERCIAL

## 2024-11-26 VITALS
BODY MASS INDEX: 30.44 KG/M2 | DIASTOLIC BLOOD PRESSURE: 91 MMHG | HEIGHT: 68 IN | HEART RATE: 75 BPM | WEIGHT: 200.81 LBS | RESPIRATION RATE: 16 BRPM | OXYGEN SATURATION: 95 % | SYSTOLIC BLOOD PRESSURE: 136 MMHG | TEMPERATURE: 98 F

## 2024-11-26 DIAGNOSIS — R05.9 COUGH, UNSPECIFIED TYPE: ICD-10-CM

## 2024-11-26 DIAGNOSIS — J02.9 SORE THROAT: ICD-10-CM

## 2024-11-26 DIAGNOSIS — J06.9 VIRAL URI WITH COUGH: Primary | ICD-10-CM

## 2024-11-26 LAB
CTP QC/QA: YES
SARS-COV-2 AG RESP QL IA.RAPID: NEGATIVE

## 2024-11-26 RX ORDER — PROMETHAZINE HYDROCHLORIDE AND DEXTROMETHORPHAN HYDROBROMIDE 6.25; 15 MG/5ML; MG/5ML
5 SYRUP ORAL EVERY 8 HOURS PRN
Qty: 118 ML | Refills: 0 | Status: SHIPPED | OUTPATIENT
Start: 2024-11-26 | End: 2024-12-06

## 2024-11-26 RX ORDER — IPRATROPIUM BROMIDE 21 UG/1
1 SPRAY, METERED NASAL 2 TIMES DAILY
Qty: 30 ML | Refills: 0 | Status: SHIPPED | OUTPATIENT
Start: 2024-11-26 | End: 2024-12-03

## 2024-11-26 RX ORDER — BENZONATATE 100 MG/1
100 CAPSULE ORAL 3 TIMES DAILY PRN
Qty: 30 CAPSULE | Refills: 0 | Status: SHIPPED | OUTPATIENT
Start: 2024-11-26 | End: 2024-12-06

## 2024-11-26 NOTE — PATIENT INSTRUCTIONS
Don't take both cough medications at the same time. When the next dose is due, you may take the other cough medication.

## 2024-11-26 NOTE — PROGRESS NOTES
"Subjective:      Patient ID: Giorgi Eden is a 64 y.o. male.    Vitals:  height is 5' 8" (1.727 m) and weight is 91.1 kg (200 lb 13.4 oz). His oral temperature is 98.1 °F (36.7 °C). His blood pressure is 136/91 (abnormal) and his pulse is 75. His respiration is 16 and oxygen saturation is 95%.     Chief Complaint: Sore Throat    This pt complains of sore throat x 1 day. S/S: cough and congestion.  No headache, nausea, vomiting, diarrhea, or fever.     Home tx: DayQuil and Mucinex    PMH: none     Provider note begins below:    Patient comes to the clinic with his spouse for complaint of cough and sinus congestion x 1 week.  Spouse had similar symptoms recently but has been improving.  Patient states his worsened with worse cough last night.  No fever or chills recently.    Sore Throat   This is a new problem. The current episode started in the past 7 days. The problem has been unchanged. Sore throat worse side: both sides. The pain is at a severity of 3/10. Associated symptoms include congestion and coughing.       HENT:  Positive for congestion and sore throat.    Respiratory:  Positive for cough.       Objective:     Physical Exam   Constitutional: He is oriented to person, place, and time. He appears well-developed. He is cooperative.  Non-toxic appearance. He does not appear ill. No distress.   HENT:   Head: Normocephalic and atraumatic.   Ears:   Right Ear: Hearing, external ear and ear canal normal. Tympanic membrane is injected.   Left Ear: Hearing, tympanic membrane, external ear and ear canal normal.   Nose: Rhinorrhea present. No mucosal edema or nasal deformity. No epistaxis. Right sinus exhibits no maxillary sinus tenderness and no frontal sinus tenderness. Left sinus exhibits no maxillary sinus tenderness and no frontal sinus tenderness.   Mouth/Throat: Uvula is midline, oropharynx is clear and moist and mucous membranes are normal. No trismus in the jaw. Normal dentition. No uvula swelling. " Cobblestoning present. No oropharyngeal exudate, posterior oropharyngeal edema or posterior oropharyngeal erythema.   Eyes: Conjunctivae and lids are normal. No scleral icterus.   Neck: Trachea normal and phonation normal. Neck supple. No edema present. No erythema present. No neck rigidity present.   Cardiovascular: Normal rate, regular rhythm, normal heart sounds and normal pulses.   Pulmonary/Chest: Effort normal and breath sounds normal. No respiratory distress. He has no decreased breath sounds. He has no wheezes. He has no rhonchi. He has no rales.   Abdominal: Normal appearance.   Musculoskeletal: Normal range of motion.         General: No deformity. Normal range of motion.   Neurological: He is alert and oriented to person, place, and time. He exhibits normal muscle tone. Coordination normal.   Skin: Skin is warm, dry, intact, not diaphoretic and not pale.   Psychiatric: His speech is normal and behavior is normal. Judgment and thought content normal.   Nursing note and vitals reviewed.    Results for orders placed or performed in visit on 11/26/24   SARS Coronavirus 2 Antigen, POCT Manual Read    Collection Time: 11/26/24  3:30 PM   Result Value Ref Range    SARS Coronavirus 2 Antigen Negative Negative     Acceptable Yes        Assessment:     1. Viral URI with cough    2. Cough, unspecified type    3. Sore throat        Plan:   Labs ordered at this visit reviewed.       Viral URI with cough  -     benzonatate (TESSALON) 100 MG capsule; Take 1 capsule (100 mg total) by mouth 3 (three) times daily as needed for Cough.  Dispense: 30 capsule; Refill: 0  -     promethazine-dextromethorphan (PROMETHAZINE-DM) 6.25-15 mg/5 mL Syrp; Take 5 mLs by mouth every 8 (eight) hours as needed (cough).  Dispense: 118 mL; Refill: 0  -     ipratropium (ATROVENT) 21 mcg (0.03 %) nasal spray; 1 spray by Each Nostril route 2 (two) times daily. for 7 days  Dispense: 30 mL; Refill: 0    Cough, unspecified type  -      SARS Coronavirus 2 Antigen, POCT Manual Read  -     benzonatate (TESSALON) 100 MG capsule; Take 1 capsule (100 mg total) by mouth 3 (three) times daily as needed for Cough.  Dispense: 30 capsule; Refill: 0  -     promethazine-dextromethorphan (PROMETHAZINE-DM) 6.25-15 mg/5 mL Syrp; Take 5 mLs by mouth every 8 (eight) hours as needed (cough).  Dispense: 118 mL; Refill: 0    Sore throat  -     (Magic mouthwash) 1:1:1 diphenhydrAMINE(Benadryl) 12.5mg/5ml liq, aluminum & magnesium hydroxide-simethicone (Maalox), LIDOcaine viscous 2%; Swish and spit 5 mLs every 4 (four) hours as needed (gargle and spit for sore throat pain). for mouth sores  Dispense: 360 mL; Refill: 0      Patient Instructions   Don't take both cough medications at the same time. When the next dose is due, you may take the other cough medication.

## 2024-12-02 ENCOUNTER — OFFICE VISIT (OUTPATIENT)
Dept: INTERNAL MEDICINE | Facility: CLINIC | Age: 64
End: 2024-12-02
Payer: COMMERCIAL

## 2024-12-02 VITALS
DIASTOLIC BLOOD PRESSURE: 96 MMHG | HEIGHT: 68 IN | WEIGHT: 203.69 LBS | BODY MASS INDEX: 30.87 KG/M2 | SYSTOLIC BLOOD PRESSURE: 138 MMHG | HEART RATE: 107 BPM | TEMPERATURE: 99 F | OXYGEN SATURATION: 96 %

## 2024-12-02 DIAGNOSIS — J01.00 ACUTE NON-RECURRENT MAXILLARY SINUSITIS: Primary | ICD-10-CM

## 2024-12-02 DIAGNOSIS — Z01.89 ENCOUNTER FOR ROUTINE LABORATORY TESTING: ICD-10-CM

## 2024-12-02 PROCEDURE — 99999 PR PBB SHADOW E&M-EST. PATIENT-LVL IV: CPT | Mod: PBBFAC,,, | Performed by: FAMILY MEDICINE

## 2024-12-02 RX ORDER — ALBUTEROL SULFATE 90 UG/1
2 INHALANT RESPIRATORY (INHALATION) EVERY 6 HOURS PRN
Qty: 18 G | Refills: 0 | Status: SHIPPED | OUTPATIENT
Start: 2024-12-02 | End: 2025-12-02

## 2024-12-02 RX ORDER — PROMETHAZINE HYDROCHLORIDE AND DEXTROMETHORPHAN HYDROBROMIDE 6.25; 15 MG/5ML; MG/5ML
5 SYRUP ORAL EVERY 8 HOURS PRN
Qty: 118 ML | Refills: 0 | Status: SHIPPED | OUTPATIENT
Start: 2024-12-02 | End: 2024-12-12

## 2024-12-02 RX ORDER — DOXYCYCLINE HYCLATE 100 MG
100 TABLET ORAL 2 TIMES DAILY
Qty: 14 TABLET | Refills: 0 | Status: SHIPPED | OUTPATIENT
Start: 2024-12-02 | End: 2024-12-09

## 2024-12-02 NOTE — PROGRESS NOTES
"Subjective:     Patient ID: Giorgi Eden is a 64 y.o. male.   Chief Complaint: Cough, Sore Throat, Epistaxis (Nosebleed ), and Nasal Congestion    HPI:  History of Present Illness    CHIEF COMPLAINT:  Patient presents today with symptoms of a respiratory infection.    RESPIRATORY SYMPTOMS:  He reports a persistent sore throat, particularly when coughing. The cough is disruptive to his sleep when it occurs at night. His left eye has been red and "shot" in the morning for the past couple of days.    He went to urgent care for this issue last week. He was prescribed symptom management, including atrovent nasal spray. He discontinued the prescribed nasal spray due to experiencing nosebleeds. He has completed the previously prescribed cough syrup and requests a refill for symptomatic relief, particularly to aid with sleep when coughing occurs.    Review of Systems   Constitutional:  Negative for chills, fatigue and fever.   HENT:  Positive for nasal congestion, ear pain, sore throat and trouble swallowing.    Eyes:  Positive for pain. Negative for visual disturbance.   Respiratory:  Positive for cough and stridor.    Neurological:  Positive for headaches.     Answers submitted by the patient for this visit:  Sore Throat Questionnaire (Submitted on 12/2/2024)  Chief Complaint: Sore throat  Onset: in the past 7 days  Progression since onset: unchanged  Pain worse on: neither  Fever duration: 5 days or more  Pain - numeric: 3/10  plugged ear sensation: Yes  mono: Yes  Treatments tried: gargles, oral narcotic analgesics  Improvement on treatment: mild  Pain severity: moderate         Objective:      Vitals:    12/02/24 1042   BP: (!) 138/96   BP Location: Right arm   Patient Position: Sitting   Pulse: 107   Temp: 98.8 °F (37.1 °C)   TempSrc: Oral   SpO2: 96%   Weight: 92.4 kg (203 lb 11.3 oz)   Height: 5' 8" (1.727 m)      Physical Exam  Vitals reviewed.   Constitutional:       General: He is not in acute distress.     " Appearance: Normal appearance. He is not ill-appearing.   HENT:      Head: Normocephalic and atraumatic.      Right Ear: Ear canal and external ear normal. A middle ear effusion is present. There is no impacted cerumen. Tympanic membrane is not erythematous or bulging.      Left Ear: Ear canal and external ear normal. A middle ear effusion is present. There is no impacted cerumen. Tympanic membrane is not erythematous or bulging.      Nose:      Right Sinus: No maxillary sinus tenderness or frontal sinus tenderness.      Left Sinus: Maxillary sinus tenderness and frontal sinus tenderness present.      Mouth/Throat:      Mouth: Mucous membranes are moist.      Pharynx: Uvula midline. Pharyngeal swelling, posterior oropharyngeal erythema and postnasal drip present. No oropharyngeal exudate or uvula swelling.      Tonsils: No tonsillar exudate or tonsillar abscesses.   Eyes:      Conjunctiva/sclera:      Left eye: Left conjunctiva is injected.   Cardiovascular:      Rate and Rhythm: Normal rate and regular rhythm.      Pulses: Normal pulses.      Heart sounds: Normal heart sounds. No murmur heard.     No friction rub. No gallop.   Pulmonary:      Effort: No respiratory distress.      Breath sounds: Normal breath sounds. No stridor. No wheezing, rhonchi or rales.   Abdominal:      General: Abdomen is flat. Bowel sounds are normal.      Palpations: Abdomen is soft.   Musculoskeletal:      Cervical back: Normal range of motion.   Lymphadenopathy:      Cervical: No cervical adenopathy.   Neurological:      General: No focal deficit present.      Mental Status: He is alert and oriented to person, place, and time. Mental status is at baseline.   Psychiatric:         Mood and Affect: Mood normal.         Behavior: Behavior normal.         Thought Content: Thought content normal.         Judgment: Judgment normal.           Assessment/Plan:             ICD-10-CM ICD-9-CM   1. Acute non-recurrent maxillary sinusitis  J01.00  461.0   2. Encounter for routine laboratory testing  Z01.89 V72.60     Orders Placed This Encounter   Procedures    Comprehensive Metabolic Panel    CBC Auto Differential    Lipid Panel    Hemoglobin A1C    TSH    T4, Free    PSA, Screening       Assessment & Plan    - Considered viral infection turned bronchitis or sinus infection turned bronchitis, based on patient's symptoms including eye involvement  - Will prescribe antibiotics due to eye symptoms and recurrence of illness after initial improvement  - Assessed lungs as sounding tight, likely due to inflammation    1. Acute non-recurrent maxillary sinusitis (Primary)  - Refilled cough syrup for nighttime symptoms  - Discontinued nasal spray due to epistaxis  - Started doxycycline 100mg twice daily for 7 days  - Started albuterol inhaler for tight lungs and cough  - Contact office if not improving by start of next week    - albuterol (VENTOLIN HFA) 90 mcg/actuation inhaler; Inhale 2 puffs into the lungs every 6 (six) hours as needed for Wheezing. Rescue  Dispense: 18 g; Refill: 0  - doxycycline (VIBRA-TABS) 100 MG tablet; Take 1 tablet (100 mg total) by mouth 2 (two) times daily. for 7 days  Dispense: 14 tablet; Refill: 0  - promethazine-dextromethorphan (PROMETHAZINE-DM) 6.25-15 mg/5 mL Syrp; Take 5 mLs by mouth every 8 (eight) hours as needed (cough).  Dispense: 118 mL; Refill: 0    2. Encounter for routine laboratory testing  - Ordered routine labs to be completed 1-2 weeks before next appointment  - Follow up in April for annual visit    - Comprehensive Metabolic Panel; Future  - CBC Auto Differential; Future  - Lipid Panel; Future  - Hemoglobin A1C; Future  - TSH; Future  - T4, Free; Future  - PSA, Screening; Future    FOLLOW-UP:  - Follow up in April for annual visit.  - Contact the office if not improving by the start of next week.               No follow-ups on file.     Nigel Merida MD, FAAFP  Family Medicine Physician  [facility]  12/02/2024      This note was generated with the assistance of ambient listening technology. Verbal consent was obtained by the patient and accompanying visitor(s) for the recording of patient appointment to facilitate this note. I attest to having reviewed and edited the generated note for accuracy, though some syntax or spelling errors may persist. Please contact the author of this note for any clarification.

## 2024-12-11 ENCOUNTER — PATIENT MESSAGE (OUTPATIENT)
Dept: INTERNAL MEDICINE | Facility: CLINIC | Age: 64
End: 2024-12-11
Payer: COMMERCIAL

## 2024-12-11 ENCOUNTER — PATIENT MESSAGE (OUTPATIENT)
Dept: ADMINISTRATIVE | Facility: HOSPITAL | Age: 64
End: 2024-12-11
Payer: COMMERCIAL

## 2024-12-11 DIAGNOSIS — K12.2 ABSCESS OR CELLULITIS, ORAL SOFT TISSUE: Primary | ICD-10-CM

## 2024-12-12 ENCOUNTER — PATIENT OUTREACH (OUTPATIENT)
Dept: ADMINISTRATIVE | Facility: HOSPITAL | Age: 64
End: 2024-12-12
Payer: COMMERCIAL

## 2024-12-12 DIAGNOSIS — Z12.11 COLON CANCER SCREENING: Primary | ICD-10-CM

## 2024-12-12 RX ORDER — AMOXICILLIN AND CLAVULANATE POTASSIUM 875; 125 MG/1; MG/1
1 TABLET, FILM COATED ORAL EVERY 12 HOURS
Qty: 28 TABLET | Refills: 0 | Status: SHIPPED | OUTPATIENT
Start: 2024-12-12 | End: 2024-12-26

## 2025-01-09 ENCOUNTER — PATIENT MESSAGE (OUTPATIENT)
Dept: INTERNAL MEDICINE | Facility: CLINIC | Age: 65
End: 2025-01-09
Payer: COMMERCIAL

## 2025-01-09 DIAGNOSIS — Z12.11 ENCOUNTER FOR SCREENING FOR MALIGNANT NEOPLASM OF COLON: Primary | ICD-10-CM

## 2025-01-14 ENCOUNTER — TELEPHONE (OUTPATIENT)
Dept: ENDOSCOPY | Facility: HOSPITAL | Age: 65
End: 2025-01-14
Payer: COMMERCIAL

## 2025-01-14 NOTE — TELEPHONE ENCOUNTER
Calling as she was having issues scheduling PAT appointment for pt. I scheduled, with new time options available. Also let staff know that our staff should reach out within the next few days as well since pt is listed as urgent order.

## 2025-01-30 ENCOUNTER — CLINICAL SUPPORT (OUTPATIENT)
Dept: ENDOSCOPY | Facility: HOSPITAL | Age: 65
End: 2025-01-30
Attending: FAMILY MEDICINE
Payer: COMMERCIAL

## 2025-01-30 VITALS — WEIGHT: 203 LBS | BODY MASS INDEX: 30.77 KG/M2 | HEIGHT: 68 IN

## 2025-01-30 DIAGNOSIS — Z12.11 ENCOUNTER FOR SCREENING FOR MALIGNANT NEOPLASM OF COLON: ICD-10-CM

## 2025-01-31 ENCOUNTER — TELEPHONE (OUTPATIENT)
Dept: ENDOSCOPY | Facility: HOSPITAL | Age: 65
End: 2025-01-31
Payer: COMMERCIAL

## 2025-01-31 NOTE — TELEPHONE ENCOUNTER
Dear Dr Merida,    Patient has a scheduled procedure Colonoscopy on 02//02/2025 and is currently taking a blood thinner. In order to ensure patient safety, we would like to confirm that the patient can place their blood thinner medication on hold for the procedure. Can he/she discontinue Plavix (clopidogrel) for a minimum of 5 days prior to the procedure?     Thank you for your prompt reply.    [facility] Endoscopy Scheduling

## 2025-01-31 NOTE — PLAN OF CARE
Referral for procedure from PAT appointment      Spoke to patient to schedule procedure(s) Colonoscopy       Physician to perform procedure(s) Dr. AUDELIA Kaplan  Date of Procedure (s) 02/27/2025  Arrival Time 8:00 AM   Time of Procedure(s) 9:00 Am    Location of Procedure(s) [place] [address] Floor  Type of Rx Prep sent to patient: PEG  Instructions provided to [facility] via [facility]    Patient was informed on the following information and verbalized understanding. Screening questionnaire reviewed with patient and complete. If procedure requires anesthesia, a responsible adult needs to be present to accompany the patient home, patient cannot drive after receiving anesthesia. Appointment details are tentative, especially check-in time. Patient will receive a prep-op call 7 days prior to confirm check-in time for procedure. If applicable the patient should contact their pharmacy to verify Rx for procedure prep is ready for pick-up. Patient was advised to call the scheduling department at [phone] if pharmacy states no Rx is available. Patient was advised to call the endoscopy scheduling department if any questions or concerns arise.      [facility] Endoscopy Scheduling Department

## 2025-01-31 NOTE — TELEPHONE ENCOUNTER
----- Message from Med Assistant Edilson sent at 2025  7:09 PM CST -----  Regardin/27 BT  The patient is currently under an internal PCP, doug Elliott. Is patient okay to hold blood thinner Plavix (clopidogrel) for their upcoming scheduled Colonoscopy on 2025.       Notes:

## 2025-02-03 NOTE — TELEPHONE ENCOUNTER
Dear Dr Merida,     Patient has a scheduled procedure Colonoscopy on 02//02/2025 and is currently taking a blood thinner. In order to ensure patient safety, we would like to confirm that the patient can place their blood thinner medication on hold for the procedure. Can he/she discontinue Plavix (clopidogrel) for a minimum of 5 days prior to the procedure?     Thank you for your prompt reply.     [facility] Endoscopy Scheduling

## 2025-02-08 ENCOUNTER — PATIENT MESSAGE (OUTPATIENT)
Dept: INTERNAL MEDICINE | Facility: CLINIC | Age: 65
End: 2025-02-08
Payer: COMMERCIAL

## 2025-02-14 NOTE — TELEPHONE ENCOUNTER
Dear Dr Merida,    Patient has a scheduled procedure Colonoscopy on 02/27/2025 and is currently taking a blood thinner. In order to ensure patient safety, we would like to confirm that the patient can place their blood thinner medication on hold for the procedure. Can he/she discontinue Plavix (clopidogrel) for a minimum of 5 days prior to the procedure?     Thank you for your prompt reply.    [facility] Endoscopy Scheduling

## 2025-02-18 ENCOUNTER — TELEPHONE (OUTPATIENT)
Dept: ENDOSCOPY | Facility: HOSPITAL | Age: 65
End: 2025-02-18
Payer: COMMERCIAL

## 2025-02-18 DIAGNOSIS — Z12.11 SPECIAL SCREENING FOR MALIGNANT NEOPLASMS, COLON: Primary | ICD-10-CM

## 2025-02-18 NOTE — TELEPHONE ENCOUNTER
Contacted patient regarding screening colonoscopy and needing cardiology clearance for holding blood thinners.  He said that he hasn't seen cardiology in over 2 years.  Informed him that his PCP put in a referral and he was scheduled to see cardiology last year and but he canceled that appointment and did not reschedule.  Explained that the procedure will be canceled and he needs to be cleared prior to rescheduling procedure.  Stated understanding.  Follow up PAT appointment scheduled.   Main line phone number provided to call back after clearance.  Transferred to cardiology to schedule.

## 2025-02-18 NOTE — TELEPHONE ENCOUNTER
Dear Dr Merida,     Patient has a scheduled procedure Colonoscopy on 02/27/2025 and is currently taking a blood thinner. In order to ensure patient safety, we would like to confirm that the patient can place their blood thinner medication on hold for the procedure. Can he/she discontinue Plavix (clopidogrel) for a minimum of 5 days prior to the procedure?     Thank you for your prompt reply.     [facility] Endoscopy Scheduling             .

## 2025-02-28 ENCOUNTER — OFFICE VISIT (OUTPATIENT)
Dept: CARDIOLOGY | Facility: CLINIC | Age: 65
End: 2025-02-28
Payer: COMMERCIAL

## 2025-02-28 VITALS
OXYGEN SATURATION: 95 % | HEIGHT: 68 IN | HEART RATE: 77 BPM | SYSTOLIC BLOOD PRESSURE: 138 MMHG | DIASTOLIC BLOOD PRESSURE: 91 MMHG | BODY MASS INDEX: 31.04 KG/M2 | WEIGHT: 204.81 LBS

## 2025-02-28 DIAGNOSIS — I25.110 ATHEROSCLEROSIS OF NATIVE CORONARY ARTERY OF NATIVE HEART WITH UNSTABLE ANGINA PECTORIS: Primary | ICD-10-CM

## 2025-02-28 DIAGNOSIS — I10 ESSENTIAL HYPERTENSION: ICD-10-CM

## 2025-02-28 DIAGNOSIS — Z95.5 STATUS POST INSERTION OF DRUG ELUTING CORONARY ARTERY STENT: ICD-10-CM

## 2025-02-28 DIAGNOSIS — Z72.0 TOBACCO ABUSE: ICD-10-CM

## 2025-02-28 DIAGNOSIS — I73.9 PERIPHERAL VASCULAR DISEASE, UNSPECIFIED: ICD-10-CM

## 2025-02-28 DIAGNOSIS — Z95.828 S/P FEMORAL-POPLITEAL BYPASS SURGERY: ICD-10-CM

## 2025-02-28 DIAGNOSIS — E78.2 MIXED HYPERLIPIDEMIA: ICD-10-CM

## 2025-02-28 PROCEDURE — 99999 PR PBB SHADOW E&M-EST. PATIENT-LVL IV: CPT | Mod: PBBFAC,,, | Performed by: INTERNAL MEDICINE

## 2025-02-28 RX ORDER — LISINOPRIL 20 MG/1
20 TABLET ORAL DAILY
Qty: 90 TABLET | Refills: 3 | Status: SHIPPED | OUTPATIENT
Start: 2025-02-28 | End: 2026-02-23

## 2025-02-28 RX ORDER — POLYETHYLENE GLYCOL-3350 AND ELECTROLYTES WITH FLAVOR PACK 240; 5.84; 2.98; 6.72; 22.72 G/278.26G; G/278.26G; G/278.26G; G/278.26G; G/278.26G
4000 POWDER, FOR SOLUTION ORAL ONCE
COMMUNITY
Start: 2025-02-18

## 2025-02-28 NOTE — PROGRESS NOTES
Subjective:   Patient ID:  Giorgi Eden is a 64 y.o. male who presents for evaluation of No chief complaint on file.      HPI: Very pleasant man here to establish care and to be evaluated prior to colonoscopy.    He is doing well with no new symptoms or cardiovascular complaints and no change in exercise capacity.  He denies chest discomfort, DALTON, palpitations, PND/orthopnea, lightheadedness and syncope.    Doesn't smoke cigarettes but does vape.          May 2022 Angiogram/PCI (Dr. Gupta)     The Prox RCA lesion was 99% stenosed.Diffuse disease with aneurysm  The estimated blood loss was none.  The PCI was successful.  There was three vessel coronary artery disease.  The Prox RCA-1 lesion was 99% stenosed with 10% stenosis post-intervention.  The Prox RCA to Mid RCA lesion was 70% stenosed with 0% stenosis post-intervention.  The Prox RCA-3 lesion was 80% stenosed with 0% stenosis post-intervention.  A stent was successfully placed. 38 mm drug-eluting stent to 7 by mm across the subtotal occlusion the 1st angle the right coronary artery to the distal vessel. There is as aneurysmal dilation post stenosis which is residual  The Dist Cx lesion was 70% stenosed. Jailed by stent place from mid circumflex to the 1st obtuse marginal.  The 2nd Mrg lesion was 50% stenosed. Distal in stent stenosis at the ostium of the 2nd marginal.       Past Medical History:   Diagnosis Date    Hyperlipidemia     Hypertension     MI (myocardial infarction)     X2    S/P PTCA (percutaneous transluminal coronary angioplasty) 11/07/2015    ST elevation myocardial infarction (STEMI) 08/07/2014       Past Surgical History:   Procedure Laterality Date    AORTOGRAPHY WITH SERIALOGRAPHY Bilateral 1/26/2023    Procedure: AORTOGRAM, WITH BILATERAL LOWER EXTREMITY ANGIOGRAMS;  Surgeon: Ian Castorena MD;  Location: [facility] OR;  Service: Vascular;  Laterality: Bilateral;  RN PREOP 1/24/23    CORONARY ANGIOPLASTY WITH STENT PLACEMENT      x2     CREATION OF FEMOROPOPLITEAL ARTERIAL BYPASS USING GRAFT Right 4/3/2023    Procedure: CREATION, BYPASS, ARTERIAL, FEMORAL TO POPLITEAL, USING VEIN;  Surgeon: Ian Castorena MD;  [facility];  Service: Vascular;  Laterality: Right;  Vein bypass  fluro time  kvp  ma     LAPAROSCOPIC CHOLECYSTECTOMY N/A 3/23/2023    Procedure: CHOLECYSTECTOMY, LAPAROSCOPIC;  Surgeon: Ana María Ellington MD;  [facility];  Service: General;  Laterality: N/A;    LEFT HEART CATHETERIZATION Left 5/31/2022    Procedure: Left heart cath;  Surgeon: Romulo Gupta MD;  Location: [facility] CATH/EP LAB;  Service: Cardiology;  Laterality: Left;       Social History[1]    Family History   Problem Relation Name Age of Onset    COPD Mother Nihgat     Heart disease Mother Nighat     Stroke Mother Nighat        Current Outpatient Medications   Medication Sig    (Magic mouthwash) 1:1:1 diphenhydrAMINE(Benadryl) 12.5mg/5ml liq, aluminum & magnesium hydroxide-simethicone (Maalox), LIDOcaine viscous 2% Swish and spit 5 mLs every 4 (four) hours as needed (gargle and spit for sore throat pain). for mouth sores    acetaminophen (TYLENOL) 325 MG tablet Take 2 tablets (650 mg total) by mouth every 6 (six) hours as needed for Pain.    albuterol (VENTOLIN HFA) 90 mcg/actuation inhaler Inhale 2 puffs into the lungs every 6 (six) hours as needed for Wheezing. Rescue    aspirin (ECOTRIN) 81 MG EC tablet Take 1 tablet (81 mg total) by mouth once daily.    atenoloL (TENORMIN) 50 MG tablet Take 1 tablet (50 mg total) by mouth once daily.    atorvastatin (LIPITOR) 20 MG tablet Take 1 tablet (20 mg total) by mouth once daily.    clopidogreL (PLAVIX) 75 mg tablet Take 1 tablet (75 mg total) by mouth once daily.    GAVILYTE-C 240-22.72-6.72 -5.84 gram SolR Take 4,000 mLs by mouth once.    INV lisinopril 10 MG Tab Take 1 tablet (10 mg total) by mouth once daily.    tamsulosin (FLOMAX) 0.4 mg Cap Take 1 capsule (0.4 mg total) by mouth once daily.      No current facility-administered medications for this visit.       Review of patient's allergies indicates:  No Known Allergies    ROS  The review of systems is negative except as above.    Objective:   Physical Exam  Vitals reviewed.   Constitutional:       Appearance: He is well-developed.   HENT:      Head: Normocephalic and atraumatic.   Eyes:      General: No scleral icterus.     Conjunctiva/sclera: Conjunctivae normal.   Neck:      Vascular: No JVD.   Cardiovascular:      Rate and Rhythm: Normal rate and regular rhythm.      Pulses: Intact distal pulses.      Heart sounds: Normal heart sounds. No murmur heard.     No friction rub. No gallop.   Pulmonary:      Effort: Pulmonary effort is normal.      Breath sounds: Normal breath sounds. No wheezing or rales.   Abdominal:      General: Bowel sounds are normal. There is no distension.      Palpations: Abdomen is soft.      Tenderness: There is no abdominal tenderness.   Musculoskeletal:         General: Normal range of motion.      Cervical back: Normal range of motion and neck supple.   Skin:     General: Skin is warm and dry.      Findings: No erythema or rash.   Neurological:      Mental Status: He is alert and oriented to person, place, and time.   Psychiatric:         Behavior: Behavior normal.         Thought Content: Thought content normal.         Judgment: Judgment normal.     Lab Results   Component Value Date    WBC 8.20 04/01/2024    HGB 17.4 04/01/2024    HCT 53.2 04/01/2024    MCV 85 04/01/2024     04/01/2024         Chemistry        Component Value Date/Time     04/01/2024 0708    K 4.9 04/01/2024 0708     04/01/2024 0708    CO2 26 04/01/2024 0708    BUN 21 04/01/2024 0708    CREATININE 1.2 04/01/2024 0708     04/01/2024 0708        Component Value Date/Time    CALCIUM 9.6 04/01/2024 0708    ALKPHOS 75 04/01/2024 0708    AST 22 04/01/2024 0708    ALT 36 04/01/2024 0708    BILITOT 0.6 04/01/2024 0708    ESTGFRAFRICA  >60.0 05/31/2022 1222    EGFRNONAA >60.0 05/31/2022 1222            Lab Results   Component Value Date    CHOL 119 (L) 03/22/2024    CHOL 111 (L) 09/24/2022    CHOL 124 05/31/2022     Lab Results   Component Value Date    HDL 36 (L) 03/22/2024    HDL 45 09/24/2022    HDL 29 (L) 05/31/2022     Lab Results   Component Value Date    LDLCALC 55.4 (L) 03/22/2024    LDLCALC 50.4 (L) 09/24/2022    LDLCALC 49.8 (L) 05/31/2022     Lab Results   Component Value Date    TRIG 138 03/22/2024    TRIG 78 09/24/2022    TRIG 226 (H) 05/31/2022     Lab Results   Component Value Date    CHOLHDL 30.3 03/22/2024    CHOLHDL 40.5 09/24/2022    CHOLHDL 23.4 05/31/2022       Lab Results   Component Value Date    TSH 1.689 03/22/2024       Lab Results   Component Value Date    HGBA1C 5.5 03/22/2024         Assessment:     1. Atherosclerosis of native coronary artery of native heart with unstable angina pectoris    2. Essential hypertension    3. Mixed hyperlipidemia    4. Peripheral vascular disease, unspecified    5. S/P femoral-popliteal bypass surgery    6. Status post insertion of drug eluting coronary artery stent    7. Tobacco abuse        Plan:     He has no angina, heart failure, or unstable arrhythmia.  No further cardiovascular testing is required prior to proceeding to his colonoscopy.    Continue current medicines.  Increase lisinopril to 20mg daily.    Diet/exercise goals reinforced.    F/U 12 months                     [1]   Social History  Tobacco Use    Smoking status: Former     Current packs/day: 0.00     Average packs/day: 1 pack/day for 25.0 years (25.0 ttl pk-yrs)     Types: Cigarettes     Start date: 7/30/1996     Quit date: 7/30/2021     Years since quitting: 3.5    Smokeless tobacco: Never    Tobacco comments:     I now use a vape pen at 5 mg per day   Substance Use Topics    Alcohol use: Yes     Comment: socially    Drug use: Not Currently

## 2025-03-06 ENCOUNTER — TELEPHONE (OUTPATIENT)
Dept: ENDOSCOPY | Facility: HOSPITAL | Age: 65
End: 2025-03-06
Payer: COMMERCIAL

## 2025-03-06 ENCOUNTER — PATIENT MESSAGE (OUTPATIENT)
Dept: CARDIOLOGY | Facility: CLINIC | Age: 65
End: 2025-03-06
Payer: COMMERCIAL

## 2025-03-06 NOTE — TELEPHONE ENCOUNTER
Referral for procedure from PAT appointment      Spoke to patient's consort to schedule procedure(s) Colonoscopy       Physician to perform procedure(s) Dr. GRANT Geiger  Date of Procedure (s) 04/21/25  Arrival Time 7:30 AM  Time of Procedure(s) 8:30 AM   Location of Procedure(s) [place] 2nd Floor  Type of Rx Prep sent to patient: PEG  Instructions provided to patient via [facility]    Patient was informed on the following information and verbalized understanding. Screening questionnaire reviewed with patient and complete. If procedure requires anesthesia, a responsible adult needs to be present to accompany the patient home, patient cannot drive after receiving anesthesia. Appointment details are tentative, especially check-in time. Patient will receive a prep-op call 7 days prior to confirm check-in time for procedure. If applicable the patient should contact their pharmacy to verify Rx for procedure prep is ready for pick-up. Patient was advised to call the scheduling department at [phone] if pharmacy states no Rx is available. Patient was advised to call the endoscopy scheduling department if any questions or concerns arise.       Endoscopy Scheduling Department         1) Stop taking Plavix (clopidogrel) 5 days (prior to the procedure) on:  04/16/25

## 2025-04-03 NOTE — TELEPHONE ENCOUNTER
Pt girlfriend called to informed us cardiology clearance was cleared Stated the cardiology will send in results   
No-Patient/Caregiver offered and refused free interpretation services.

## 2025-04-07 ENCOUNTER — PATIENT MESSAGE (OUTPATIENT)
Dept: INTERNAL MEDICINE | Facility: CLINIC | Age: 65
End: 2025-04-07
Payer: COMMERCIAL

## 2025-04-14 ENCOUNTER — TELEPHONE (OUTPATIENT)
Dept: ENDOSCOPY | Facility: HOSPITAL | Age: 65
End: 2025-04-14

## 2025-04-14 NOTE — TELEPHONE ENCOUNTER
Patient confirmed scheduled procedure for 4/21/25 and understands when he is supposed to hold Plavix as instructed.

## 2025-04-15 ENCOUNTER — ANESTHESIA EVENT (OUTPATIENT)
Dept: ENDOSCOPY | Facility: HOSPITAL | Age: 65
End: 2025-04-15
Payer: COMMERCIAL

## 2025-04-15 ENCOUNTER — OFFICE VISIT (OUTPATIENT)
Dept: INTERNAL MEDICINE | Facility: CLINIC | Age: 65
End: 2025-04-15
Payer: COMMERCIAL

## 2025-04-15 VITALS
DIASTOLIC BLOOD PRESSURE: 72 MMHG | WEIGHT: 206.13 LBS | OXYGEN SATURATION: 96 % | BODY MASS INDEX: 31.24 KG/M2 | SYSTOLIC BLOOD PRESSURE: 116 MMHG | HEIGHT: 68 IN | HEART RATE: 89 BPM

## 2025-04-15 DIAGNOSIS — Z72.0 TOBACCO ABUSE: ICD-10-CM

## 2025-04-15 DIAGNOSIS — I10 ESSENTIAL HYPERTENSION: ICD-10-CM

## 2025-04-15 DIAGNOSIS — Z00.00 ENCOUNTER FOR ANNUAL GENERAL MEDICAL EXAMINATION WITHOUT ABNORMAL FINDINGS IN ADULT: Primary | ICD-10-CM

## 2025-04-15 DIAGNOSIS — I25.2 HISTORY OF MYOCARDIAL INFARCTION: ICD-10-CM

## 2025-04-15 DIAGNOSIS — E78.2 MIXED HYPERLIPIDEMIA: ICD-10-CM

## 2025-04-15 DIAGNOSIS — N20.0 NEPHROLITHIASIS: ICD-10-CM

## 2025-04-15 DIAGNOSIS — Z12.5 ENCOUNTER FOR SCREENING FOR MALIGNANT NEOPLASM OF PROSTATE: ICD-10-CM

## 2025-04-15 DIAGNOSIS — Z23 NEED FOR VACCINATION: ICD-10-CM

## 2025-04-15 DIAGNOSIS — E66.09 CLASS 1 OBESITY DUE TO EXCESS CALORIES WITH SERIOUS COMORBIDITY AND BODY MASS INDEX (BMI) OF 31.0 TO 31.9 IN ADULT: ICD-10-CM

## 2025-04-15 DIAGNOSIS — E66.811 CLASS 1 OBESITY DUE TO EXCESS CALORIES WITH SERIOUS COMORBIDITY AND BODY MASS INDEX (BMI) OF 31.0 TO 31.9 IN ADULT: ICD-10-CM

## 2025-04-15 DIAGNOSIS — Z12.11 ENCOUNTER FOR SCREENING FOR MALIGNANT NEOPLASM OF COLON: ICD-10-CM

## 2025-04-15 PROCEDURE — 3074F SYST BP LT 130 MM HG: CPT | Mod: CPTII,S$GLB,, | Performed by: FAMILY MEDICINE

## 2025-04-15 PROCEDURE — 1159F MED LIST DOCD IN RCRD: CPT | Mod: CPTII,S$GLB,, | Performed by: FAMILY MEDICINE

## 2025-04-15 PROCEDURE — 1160F RVW MEDS BY RX/DR IN RCRD: CPT | Mod: CPTII,S$GLB,, | Performed by: FAMILY MEDICINE

## 2025-04-15 PROCEDURE — 3078F DIAST BP <80 MM HG: CPT | Mod: CPTII,S$GLB,, | Performed by: FAMILY MEDICINE

## 2025-04-15 PROCEDURE — 4010F ACE/ARB THERAPY RXD/TAKEN: CPT | Mod: CPTII,S$GLB,, | Performed by: FAMILY MEDICINE

## 2025-04-15 PROCEDURE — 99999 PR PBB SHADOW E&M-EST. PATIENT-LVL III: CPT | Mod: PBBFAC,,, | Performed by: FAMILY MEDICINE

## 2025-04-15 PROCEDURE — 3008F BODY MASS INDEX DOCD: CPT | Mod: CPTII,S$GLB,, | Performed by: FAMILY MEDICINE

## 2025-04-15 PROCEDURE — 3044F HG A1C LEVEL LT 7.0%: CPT | Mod: CPTII,S$GLB,, | Performed by: FAMILY MEDICINE

## 2025-04-15 PROCEDURE — 99396 PREV VISIT EST AGE 40-64: CPT | Mod: S$GLB,,, | Performed by: FAMILY MEDICINE

## 2025-04-15 RX ORDER — CLOPIDOGREL BISULFATE 75 MG/1
75 TABLET ORAL DAILY
Qty: 90 TABLET | Refills: 3 | Status: SHIPPED | OUTPATIENT
Start: 2025-04-15 | End: 2026-04-15

## 2025-04-15 RX ORDER — ATORVASTATIN CALCIUM 20 MG/1
20 TABLET, FILM COATED ORAL DAILY
Qty: 90 TABLET | Refills: 3 | Status: SHIPPED | OUTPATIENT
Start: 2025-04-15 | End: 2026-04-10

## 2025-04-15 RX ORDER — TAMSULOSIN HYDROCHLORIDE 0.4 MG/1
0.4 CAPSULE ORAL DAILY
Qty: 90 CAPSULE | Refills: 3 | Status: SHIPPED | OUTPATIENT
Start: 2025-04-15 | End: 2026-04-10

## 2025-04-15 RX ORDER — LISINOPRIL 20 MG/1
20 TABLET ORAL DAILY
Qty: 90 TABLET | Refills: 3 | Status: SHIPPED | OUTPATIENT
Start: 2025-04-15 | End: 2026-04-15

## 2025-04-15 RX ORDER — ALPRAZOLAM 0.5 MG/1
0.5 TABLET ORAL ONCE
Qty: 1 TABLET | Refills: 0 | Status: SHIPPED | OUTPATIENT
Start: 2025-04-15 | End: 2025-04-15

## 2025-04-15 RX ORDER — ATENOLOL 50 MG/1
50 TABLET ORAL DAILY
Qty: 90 TABLET | Refills: 3 | Status: SHIPPED | OUTPATIENT
Start: 2025-04-15 | End: 2026-04-15

## 2025-04-15 NOTE — PROGRESS NOTES
[facility]  Annual Exam/Wellness Visit    Patient Information  Giorgi Eden  64 y.o. male    PCP  Nigel Merida MD    Reason for Visit  Annual Exam      Subjective:  History of Present Illness    CHIEF COMPLAINT:  Patient presents today for general exam    He is currently taking Flomax, Lisinopril 20 mg (recently increased by cardiologist), Plavix, Lipitor, Atenolol, Aspirin, and Albuterol Inhaler.    He has a history of smoking.    He reports significant anxiety related to medical procedures, particularly MRI scans, and expresses need for sedation or medication to manage anxiety during these procedures.    He declines flu vaccine due to previous negative experience of becoming ill after administration.         Health Maintenance         Date Due Completion Date    Pneumococcal Vaccines (Age 50+) (1 of 2 - PCV) Never done ---    Shingles Vaccine (1 of 2) Never done ---    RSV Vaccine (Age 60+ and Pregnant patients) (1 - Risk 60-74 years 1-dose series) Never done ---    COVID-19 Vaccine (1 - 2024-25 season) Never done ---    LDCT Lung Screen 04/25/2025 4/25/2024    PROSTATE-SPECIFIC ANTIGEN 04/19/2026 4/19/2025    High Dose Statin 04/21/2026 4/21/2025    Hemoglobin A1c (Diabetic Prevention Screening) 04/19/2028 4/19/2025    Lipid Panel 04/19/2030 4/19/2025    Colorectal Cancer Screening 04/21/2030 4/21/2025    TETANUS VACCINE 01/01/2031 1/1/2021            Review of Systems   Constitutional:  Negative for chills, fatigue and fever.   HENT:  Negative for nasal congestion, ear pain, postnasal drip and sore throat.    Eyes:  Negative for visual disturbance.   Respiratory:  Negative for cough, shortness of breath and wheezing.    Cardiovascular:  Negative for chest pain and palpitations.   Gastrointestinal:  Negative for abdominal pain, change in bowel habit, constipation, diarrhea, nausea and vomiting.   Genitourinary:  Negative for dysuria and hematuria.   Musculoskeletal:  Negative for  "back pain, leg pain and neck pain.   Neurological:  Negative for dizziness, numbness and headaches.   Hematological:  Negative for adenopathy.   Psychiatric/Behavioral:  Negative for dysphoric mood, sleep disturbance and suicidal ideas. The patient is not nervous/anxious.         Patient answers are not available for this visit.      Problem List and History  Problem List[1]  Past Medical History[2]  Past Surgical History[3]  Social History     Substance and Sexual Activity   Sexual Activity Yes    Partners: Female     Tobacco Use History[4]  Social History     Substance and Sexual Activity   Alcohol Use Yes    Comment: socially     Social History     Substance and Sexual Activity   Drug Use Not Currently       Medication List  Current Medications[5]    Objective:  Vitals:    04/15/25 1450   BP: 116/72   Pulse: 89   SpO2: 96%   Weight: 93.5 kg (206 lb 2.1 oz)   Height: 5' 8" (1.727 m)       Physical Exam  Vitals reviewed.   Constitutional:       General: He is not in acute distress.     Appearance: Normal appearance. He is obese. He is not ill-appearing.   HENT:      Head: Normocephalic and atraumatic.      Right Ear: Tympanic membrane, ear canal and external ear normal. There is no impacted cerumen.      Left Ear: Tympanic membrane, ear canal and external ear normal. There is no impacted cerumen.      Nose: Nose normal. No congestion or rhinorrhea.      Mouth/Throat:      Mouth: Mucous membranes are moist.      Pharynx: Oropharynx is clear. No oropharyngeal exudate or posterior oropharyngeal erythema.   Eyes:      General: No scleral icterus.        Right eye: No discharge.         Left eye: No discharge.      Conjunctiva/sclera: Conjunctivae normal.   Neck:      Thyroid: No thyroid mass, thyromegaly or thyroid tenderness.   Cardiovascular:      Rate and Rhythm: Normal rate and regular rhythm.      Pulses: Normal pulses.      Heart sounds: Normal heart sounds. No murmur heard.     No friction rub. No gallop. "   Pulmonary:      Effort: Pulmonary effort is normal. No respiratory distress.      Breath sounds: Normal breath sounds. No stridor. No wheezing, rhonchi or rales.   Abdominal:      General: Abdomen is flat. Bowel sounds are normal. There is no distension.      Palpations: Abdomen is soft. There is no mass.      Tenderness: There is no abdominal tenderness. There is no guarding.      Hernia: No hernia is present.   Musculoskeletal:         General: No swelling or deformity. Normal range of motion.      Cervical back: Normal range of motion and neck supple. No tenderness.   Lymphadenopathy:      Cervical: No cervical adenopathy.   Skin:     General: Skin is warm and dry.   Neurological:      General: No focal deficit present.      Mental Status: He is alert and oriented to person, place, and time. Mental status is at baseline.      Gait: Gait normal.      Deep Tendon Reflexes: Reflexes normal.   Psychiatric:         Mood and Affect: Mood normal.         Behavior: Behavior normal.         Thought Content: Thought content normal.         Judgment: Judgment normal.         Lab Results  CBC  WBC   Date/Time Value Ref Range Status   04/19/2025 08:16 AM 8.15 3.90 - 12.70 K/uL Final     Hemoglobin   Date/Time Value Ref Range Status   04/01/2024 07:08 AM 17.4 14.0 - 18.0 g/dL Final     HGB   Date/Time Value Ref Range Status   04/19/2025 08:16 AM 17.7 14.0 - 18.0 gm/dL Final     Hematocrit   Date/Time Value Ref Range Status   04/01/2024 07:08 AM 53.2 40.0 - 54.0 % Final     HCT   Date/Time Value Ref Range Status   04/19/2025 08:16 AM 54.7 (H) 40.0 - 54.0 % Final     MCV   Date/Time Value Ref Range Status   04/19/2025 08:16 AM 84 82 - 98 fL Final   04/01/2024 07:08 AM 85 82 - 98 fL Final     Platelet Count   Date/Time Value Ref Range Status   04/19/2025 08:16  150 - 450 K/uL Final     Platelets   Date/Time Value Ref Range Status   04/01/2024 07:08  150 - 450 K/uL Final       CMP  Sodium   Date/Time Value Ref Range  Status   04/19/2025 08:16  (L) 136 - 145 mmol/L Final   04/01/2024 07:08  136 - 145 mmol/L Final     Potassium   Date/Time Value Ref Range Status   04/19/2025 08:16 AM 5.3 (H) 3.5 - 5.1 mmol/L Final     Comment:     *No Visible Hemolysis   04/01/2024 07:08 AM 4.9 3.5 - 5.1 mmol/L Final     Chloride   Date/Time Value Ref Range Status   04/19/2025 08:16  95 - 110 mmol/L Final   04/01/2024 07:08  95 - 110 mmol/L Final     CO2   Date/Time Value Ref Range Status   04/19/2025 08:16 AM 23 23 - 29 mmol/L Final   04/01/2024 07:08 AM 26 23 - 29 mmol/L Final     BUN   Date/Time Value Ref Range Status   04/19/2025 08:16 AM 25 (H) 8 - 23 mg/dL Final     Creatinine   Date/Time Value Ref Range Status   04/19/2025 08:16 AM 1.1 0.5 - 1.4 mg/dL Final     Prothrombin Time   Date/Time Value Ref Range Status   04/03/2023 08:32 PM 11.3 9.0 - 12.5 sec Final     Albumin   Date/Time Value Ref Range Status   04/19/2025 08:16 AM 3.8 3.5 - 5.2 g/dL Final   04/01/2024 07:08 AM 3.9 3.5 - 5.2 g/dL Final     Total Bilirubin   Date/Time Value Ref Range Status   04/01/2024 07:08 AM 0.6 0.1 - 1.0 mg/dL Final     Comment:     For infants and newborns, interpretation of results should be based  on gestational age, weight and in agreement with clinical  observations.    Premature Infant recommended reference ranges:  Up to 24 hours.............<8.0 mg/dL  Up to 48 hours............<12.0 mg/dL  3-5 days..................<15.0 mg/dL  6-29 days.................<15.0 mg/dL       Bilirubin Total   Date/Time Value Ref Range Status   04/19/2025 08:16 AM 0.8 0.1 - 1.0 mg/dL Final     Comment:     For infants and newborns, interpretation of results should be based   on gestational age, weight and in agreement with clinical   observations.    Premature Infant recommended reference ranges:   0-24 hours:  <8.0 mg/dL   24-48 hours: <12.0 mg/dL   3-5 days:    <15.0 mg/dL   6-29 days:   <15.0 mg/dL     Alkaline Phosphatase   Date/Time Value Ref  Range Status   04/01/2024 07:08 AM 75 55 - 135 U/L Final     ALP   Date/Time Value Ref Range Status   04/19/2025 08:16 AM 73 40 - 150 unit/L Final     AST   Date/Time Value Ref Range Status   04/19/2025 08:16 AM 23 11 - 45 unit/L Final   04/01/2024 07:08 AM 22 10 - 40 U/L Final     ALT   Date/Time Value Ref Range Status   04/19/2025 08:16 AM 36 10 - 44 unit/L Final   04/01/2024 07:08 AM 36 10 - 44 U/L Final     Anion Gap   Date/Time Value Ref Range Status   04/19/2025 08:16 AM 8 8 - 16 mmol/L Final     eGFR   Date/Time Value Ref Range Status   04/19/2025 08:16 AM >60 >60 mL/min/1.73/m2 Final     Comment:     Estimated GFR calculated using the CKD-EPI creatinine (2021) equation.   04/01/2024 07:08 AM >60.0 >60 mL/min/1.73 m^2 Final       Lipids  Cholesterol Total   Date/Time Value Ref Range Status   04/19/2025 08:16  120 - 199 mg/dL Final     Comment:     The National Cholesterol Education Program (NCEP) has set the  following guidelines (reference ranges) for Cholesterol:  Optimal.....................<200 mg/dL  Borderline High.............200-239 mg/dL  High........................> or = 240 mg/dL     Cholesterol   Date/Time Value Ref Range Status   03/22/2024 06:58  (L) 120 - 199 mg/dL Final     Comment:     The National Cholesterol Education Program (NCEP) has set the  following guidelines (reference ranges) for Cholesterol:  Optimal.....................<200 mg/dL  Borderline High.............200-239 mg/dL  High........................> or = 240 mg/dL       Triglycerides   Date/Time Value Ref Range Status   03/22/2024 06:58  30 - 150 mg/dL Final     Comment:     The National Cholesterol Education Program (NCEP) has set the  following guidelines (reference values) for triglycerides:  Normal......................<150 mg/dL  Borderline High.............150-199 mg/dL  High........................200-499 mg/dL       Triglyceride   Date/Time Value Ref Range Status   04/19/2025 08:16  30 - 150  mg/dL Final     Comment:     The National Cholesterol Education Program (NCEP) has set the  following guidelines (reference values) for triglycerides:  Normal......................<150 mg/dL  Borderline High.............150-199 mg/dL  High........................200-499 mg/dL     HDL Cholesterol   Date/Time Value Ref Range Status   04/19/2025 08:16 AM 35 (L) 40 - 75 mg/dL Final     Comment:     The National Cholesterol Education Program (NCEP) has set the   following guidelines (reference values) for HDL Cholesterol:   Low...............<40 mg/dL   Optimal...........>60 mg/dL       Thyroid Function  TSH   Date/Time Value Ref Range Status   04/19/2025 08:16 AM 1.700 0.400 - 4.000 uIU/mL Final   03/22/2024 06:58 AM 1.689 0.400 - 4.000 uIU/mL Final     Free T4   Date/Time Value Ref Range Status   04/19/2025 08:16 AM 0.83 0.71 - 1.51 ng/dL Final       Diabetes Screen  Hemoglobin A1C   Date/Time Value Ref Range Status   03/22/2024 06:58 AM 5.5 4.0 - 5.6 % Final     Comment:     ADA Screening Guidelines:  5.7-6.4%  Consistent with prediabetes  >or=6.5%  Consistent with diabetes    High levels of fetal hemoglobin interfere with the HbA1C  assay. Heterozygous hemoglobin variants (HbS, HgC, etc)do  not significantly interfere with this assay.   However, presence of multiple variants may affect accuracy.       Hemoglobin A1c   Date/Time Value Ref Range Status   04/19/2025 08:16 AM 5.6 4.0 - 5.6 % Final     Comment:     ADA Screening Guidelines:  5.7-6.4%  Consistent with prediabetes  >=6.5%  Consistent with diabetes    High levels of fetal hemoglobin interfere with the HbA1C  assay. Heterozygous hemoglobin variants (HbS, HgC, etc)do  not significantly interfere with this assay.   However, presence of multiple variants may affect accuracy.       Other Labs  Prostate Specific Antigen   Date/Time Value Ref Range Status   04/19/2025 08:16 AM 1.31 <=4.00 ng/mL Final     Comment:     PSA Expected levels:  Hormonal therapy: < 0.05  ng/mL   Prostatectomy: < 0.01 ng/mL   Radiation therapy: < 1.00 ng/mL       PSA, Screen   Date/Time Value Ref Range Status   03/22/2024 06:58 AM 0.75 0.00 - 4.00 ng/mL Final     Comment:     The testing method is a chemiluminescent microparticle immunoassay   manufactured by [employer] and performed on the [employer]   or   [employer] system. Values obtained with different assay manufacturers   for   methods may be different and cannot be used interchangeably.  PSA Expected levels:  Hormonal Therapy: <0.05 ng/ml  Prostatectomy: <0.01 ng/ml  Radiation Therapy: <1.00 ng/ml         Assessment and Plan:    ICD-10-CM ICD-9-CM   1. Encounter for annual general medical examination without abnormal findings in adult  Z00.00 V70.0   2. Need for vaccination  Z23 V05.9   3. Encounter for screening for malignant neoplasm of colon  Z12.11 V76.51   4. Encounter for screening for malignant neoplasm of prostate  Z12.5 V76.44   5. Tobacco abuse  Z72.0 305.1   6. Essential hypertension  I10 401.9   7. History of myocardial infarction  I25.2 412   8. Mixed hyperlipidemia  E78.2 272.2   9. Nephrolithiasis  N20.0 592.0   10. Class 1 obesity due to excess calories with serious comorbidity and body mass index (BMI) of 31.0 to 31.9 in adult  E66.811 278.00    E66.09 V85.31    Z68.31      Orders Placed This Encounter    CT Chest Lung Screening Low Dose    Comprehensive Metabolic Panel    CBC Auto Differential    Lipid Panel    Hemoglobin A1C    TSH    T4, Free    PSA, Screening    atenoloL (TENORMIN) 50 MG tablet    atorvastatin (LIPITOR) 20 MG tablet    clopidogreL (PLAVIX) 75 mg tablet    tamsulosin (FLOMAX) 0.4 mg Cap    lisinopriL (PRINIVIL,ZESTRIL) 20 MG tablet    ALPRAZolam (XANAX) 0.5 MG tablet       Assessment & Plan    - BP excellently controlled on current lisinopril dose.  - General health exam completed today    1. Encounter for annual general medical examination without abnormal findings in adult (Primary)  Health  maintenance updated  Chronic issues reviewed  Fasting labs ordered  - Comprehensive Metabolic Panel; Future  - CBC Auto Differential; Future  - Lipid Panel; Future  - Hemoglobin A1C; Future  - TSH; Future  - T4, Free; Future    2. Need for vaccination  Influenza declined    3. Encounter for screening for malignant neoplasm of colon  Due for scope, pending scheduling    4. Encounter for screening for malignant neoplasm of prostate  - PSA, Screening; Future    5. Tobacco abuse  Xanax ordered for claustrophobia  - CT Chest Lung Screening Low Dose; Future  - ALPRAZolam (XANAX) 0.5 MG tablet; Take 1 tablet (0.5 mg total) by mouth once. Take 30 minutes before imaging study to prevent claustrophobia. for 1 dose  Dispense: 1 tablet; Refill: 0    6. Essential hypertension  Excellent control, stable  Refill lisinopril  - lisinopriL (PRINIVIL,ZESTRIL) 20 MG tablet; Take 1 tablet (20 mg total) by mouth once daily.  Dispense: 90 tablet; Refill: 3    7. History of myocardial infarction  Stable, no acute issues  Continue atenolol, lipitor, plavix as prescribed by cardiology  - atenoloL (TENORMIN) 50 MG tablet; Take 1 tablet (50 mg total) by mouth once daily.  Dispense: 90 tablet; Refill: 3  - atorvastatin (LIPITOR) 20 MG tablet; Take 1 tablet (20 mg total) by mouth once daily.  Dispense: 90 tablet; Refill: 3  - clopidogreL (PLAVIX) 75 mg tablet; Take 1 tablet (75 mg total) by mouth once daily.  Dispense: 90 tablet; Refill: 3    8. Mixed hyperlipidemia  Aggressive management d/t hx MI  Refill lipitor  F/u FLP  - Comprehensive Metabolic Panel; Future  - Lipid Panel; Future  - atorvastatin (LIPITOR) 20 MG tablet; Take 1 tablet (20 mg total) by mouth once daily.  Dispense: 90 tablet; Refill: 3    9. Nephrolithiasis  No active issues  Refill flomax  - tamsulosin (FLOMAX) 0.4 mg Cap; Take 1 capsule (0.4 mg total) by mouth once daily.  Dispense: 90 capsule; Refill: 3    10. Class 1 obesity due to excess calories with serious comorbidity  and body mass index (BMI) of 31.0 to 31.9 in adult  Encourage healthy diet and exercise habits to optimize health and minimize chance of comorbidity development    Check-Out:  Follow-Up  No follow-ups on file.    Upcoming Appointments  No future appointments.      Nigel Merida MD, FAAFP  Family Medicine Physician  [facility]  4/15/2025      This note was generated with the assistance of ambient listening technology. Verbal consent was obtained by the patient and accompanying visitor(s) for the recording of patient appointment to facilitate this note. I attest to having reviewed and edited the generated note for accuracy, though some syntax or spelling errors may persist. Please contact the author of this note for any clarification.                                   [1]   Patient Active Problem List  Diagnosis    Essential hypertension    Status post insertion of drug eluting coronary artery stent    History of myocardial infarction    Mixed hyperlipidemia    Tobacco abuse    Atherosclerotic heart disease of native coronary artery with unstable angina pectoris    Chronic venous insufficiency    Recurrent biliary colic    Popliteal artery aneurysm    Cardiomyopathy, ischemic    Peripheral vascular disease, unspecified    S/P femoral-popliteal bypass surgery    Class 1 obesity due to excess calories with serious comorbidity and body mass index (BMI) of 31.0 to 31.9 in adult   [2]   Past Medical History:  Diagnosis Date    History of claustrophobia     Hyperlipidemia     Hypertension     MI (myocardial infarction)     X2    S/P PTCA (percutaneous transluminal coronary angioplasty) 11/07/2015    ST elevation myocardial infarction (STEMI) 08/07/2014   [3]   Past Surgical History:  Procedure Laterality Date    AORTOGRAPHY WITH SERIALOGRAPHY Bilateral 1/26/2023    Procedure: AORTOGRAM, WITH BILATERAL LOWER EXTREMITY ANGIOGRAMS;  Surgeon: Ian Castorena MD;  Location: [facility] OR;   Service: Vascular;  Laterality: Bilateral;  RN PREOP 1/24/23    COLONOSCOPY, SCREENING, LOW RISK PATIENT N/A 4/21/2025    Procedure: COLONOSCOPY, SCREENING, LOW RISK PATIENT;  Surgeon: Miguel Geiger MD;  Location: [facility] ENDOSCOPY;  Service: Endoscopy;  Laterality: N/A;  Dr. Merida, peg, portal, Plavix, ASam  2/18-plavix still pending-tb  3/6 R/S, pt has PEG, Ok to hold Plavix per Dr Merida, Cardiac cl 2/28 - PC  4/14-patient confirmed, understands when to hold plavix-SB    CORONARY ANGIOPLASTY WITH STENT PLACEMENT      x2    CREATION OF FEMOROPOPLITEAL ARTERIAL BYPASS USING GRAFT Right 4/3/2023    Procedure: CREATION, BYPASS, ARTERIAL, FEMORAL TO POPLITEAL, USING VEIN;  Surgeon: Ian Castorena MD;  Location: [facility] OR [address] [place] [address];  Service: Vascular;  Laterality: Right;  Vein bypass  fluro time  kvp  ma     LAPAROSCOPIC CHOLECYSTECTOMY N/A 3/23/2023    Procedure: CHOLECYSTECTOMY, LAPAROSCOPIC;  Surgeon: Ana María Ellington MD;  Location: [facility] OR [address] [place] [address];  Service: General;  Laterality: N/A;    LEFT HEART CATHETERIZATION Left 5/31/2022    Procedure: Left heart cath;  Surgeon: Romulo Gupta MD;  Location: [facility] CATH/EP LAB;  Service: Cardiology;  Laterality: Left;   [4]   Social History  Tobacco Use   Smoking Status Former    Current packs/day: 0.00    Average packs/day: 1 pack/day for 25.0 years (25.0 ttl pk-yrs)    Types: Cigarettes    Start date: 7/30/1996    Quit date: 7/30/2021    Years since quitting: 3.7   Smokeless Tobacco Never   Tobacco Comments    I now use a vape pen at 5 mg per day   [5]   Current Outpatient Medications:     (Magic mouthwash) 1:1:1 diphenhydrAMINE(Benadryl) 12.5mg/5ml liq, aluminum & magnesium hydroxide-simethicone (Maalox), LIDOcaine viscous 2%, Swish and spit 5 mLs every 4 (four) hours as needed (gargle and spit for sore throat pain). for mouth sores, Disp: 360 mL, Rfl: 0    acetaminophen (TYLENOL) 325 MG tablet, Take 2 tablets (650 mg total) by mouth every 6 (six) hours as  needed for Pain., Disp: 30 tablet, Rfl: 0    albuterol (VENTOLIN HFA) 90 mcg/actuation inhaler, Inhale 2 puffs into the lungs every 6 (six) hours as needed for Wheezing. Rescue, Disp: 18 g, Rfl: 0    ALPRAZolam (XANAX) 0.5 MG tablet, Take 1 tablet (0.5 mg total) by mouth once. Take 30 minutes before imaging study to prevent claustrophobia. for 1 dose, Disp: 1 tablet, Rfl: 0    aspirin (ECOTRIN) 81 MG EC tablet, Take 1 tablet (81 mg total) by mouth once daily., Disp: 360 tablet, Rfl: 0    atenoloL (TENORMIN) 50 MG tablet, Take 1 tablet (50 mg total) by mouth once daily., Disp: 90 tablet, Rfl: 3    atorvastatin (LIPITOR) 20 MG tablet, Take 1 tablet (20 mg total) by mouth once daily., Disp: 90 tablet, Rfl: 3    clopidogreL (PLAVIX) 75 mg tablet, Take 1 tablet (75 mg total) by mouth once daily., Disp: 90 tablet, Rfl: 3    GAVILYTE-C 240-22.72-6.72 -5.84 gram SolR, Take 4,000 mLs by mouth once., Disp: , Rfl:     lisinopriL (PRINIVIL,ZESTRIL) 20 MG tablet, Take 1 tablet (20 mg total) by mouth once daily., Disp: 90 tablet, Rfl: 3    tamsulosin (FLOMAX) 0.4 mg Cap, Take 1 capsule (0.4 mg total) by mouth once daily., Disp: 90 capsule, Rfl: 3

## 2025-04-16 NOTE — ANESTHESIA PREPROCEDURE EVALUATION
04/16/2025  Giorgi Eden is a 64 y.o., male.    Past Medical History:   Diagnosis Date    Hyperlipidemia     Hypertension     MI (myocardial infarction)     X2    S/P PTCA (percutaneous transluminal coronary angioplasty) 11/07/2015    ST elevation myocardial infarction (STEMI) 08/07/2014     Patient Active Problem List   Diagnosis    Essential hypertension    Status post insertion of drug eluting coronary artery stent    History of myocardial infarction    Mixed hyperlipidemia    Tobacco abuse    Atherosclerotic heart disease of native coronary artery with unstable angina pectoris    Chronic venous insufficiency    Recurrent biliary colic    Popliteal artery aneurysm    Cardiomyopathy, ischemic    Peripheral vascular disease, unspecified    S/P femoral-popliteal bypass surgery    Class 1 obesity due to excess calories with serious comorbidity and body mass index (BMI) of 31.0 to 31.9 in adult       Past Surgical History:   Procedure Laterality Date    AORTOGRAPHY WITH SERIALOGRAPHY Bilateral 1/26/2023    Procedure: AORTOGRAM, WITH BILATERAL LOWER EXTREMITY ANGIOGRAMS;  Surgeon: Ian Castorena MD;  Location: [facility];  Service: Vascular;  Laterality: Bilateral;  RN PREOP 1/24/23    CORONARY ANGIOPLASTY WITH STENT PLACEMENT      x2    CREATION OF FEMOROPOPLITEAL ARTERIAL BYPASS USING GRAFT Right 4/3/2023    Procedure: CREATION, BYPASS, ARTERIAL, FEMORAL TO POPLITEAL, USING VEIN;  Surgeon: Ian Castorena MD;  Location: [facility] OR [address] [place] [address];  Service: Vascular;  Laterality: Right;  Vein bypass  fluro time  kvp  ma     LAPAROSCOPIC CHOLECYSTECTOMY N/A 3/23/2023    Procedure: CHOLECYSTECTOMY, LAPAROSCOPIC;  Surgeon: Ana María Ellington MD;  Location: [facility] OR [address] [place] [address];  Service: General;  Laterality: N/A;    LEFT HEART CATHETERIZATION Left 5/31/2022    Procedure: Left heart cath;   Surgeon: Romulo Gupta MD;  Location: [facility] CATH[facility]EP LAB;  Service: Cardiology;  Laterality: Left;         Pre-op Assessment    I have reviewed the Patient Summary Reports.    I have reviewed the NPO Status.   I have reviewed the Medications.     Review of Systems  Anesthesia Hx:  No problems with previous Anesthesia             Denies Family Hx of Anesthesia complications.    Denies Personal Hx of Anesthesia complications.                    Hematology/Oncology:  Hematology Normal   Oncology Normal                                   EENT/Dental:  EENT/Dental Normal           Cardiovascular:     Hypertension  Past MI CAD      Angina        ECG has been reviewed.                            Pulmonary:  Pulmonary Normal                       Renal/:  Renal/ Normal                 Hepatic/GI:  Hepatic/GI Normal                    Musculoskeletal:  Musculoskeletal Normal                Neurological:  Neurology Normal                                      Endocrine:  Endocrine Normal            Dermatological:  Skin Normal    Psych:  Psychiatric Normal                  Physical Exam  General: Well nourished    Airway:  Mallampati: II   Mouth Opening: Normal  TM Distance: Normal    Chest/Lungs:  Normal Respiratory Rate    Heart:  Rate: Normal    Anesthesia Plan  Type of Anesthesia, risks & benefits discussed:    Anesthesia Type: Gen Natural Airway  Intra-op Monitoring Plan: Standard ASA Monitors  Post Op Pain Control Plan: multimodal analgesia and IV/PO Opioids PRN  Induction:  IV  Informed Consent: Informed consent signed with the Patient and all parties understand the risks and agree with anesthesia plan.  All questions answered. Patient consented to blood products? No  ASA Score: 3  Day of Surgery Review of History & Physical: H&P Update referred to the surgeon/provider.    Ready For Surgery From Anesthesia Perspective.     .       (0) independent

## 2025-04-19 ENCOUNTER — LAB VISIT (OUTPATIENT)
Dept: LAB | Facility: HOSPITAL | Age: 65
End: 2025-04-19
Payer: COMMERCIAL

## 2025-04-19 DIAGNOSIS — Z00.00 ENCOUNTER FOR ANNUAL GENERAL MEDICAL EXAMINATION WITHOUT ABNORMAL FINDINGS IN ADULT: ICD-10-CM

## 2025-04-19 DIAGNOSIS — Z12.5 ENCOUNTER FOR SCREENING FOR MALIGNANT NEOPLASM OF PROSTATE: ICD-10-CM

## 2025-04-19 DIAGNOSIS — E78.2 MIXED HYPERLIPIDEMIA: ICD-10-CM

## 2025-04-19 LAB
ABSOLUTE EOSINOPHIL (OHS): 0.11 K/UL
ABSOLUTE MONOCYTE (OHS): 1.06 K/UL (ref 0.3–1)
ABSOLUTE NEUTROPHIL COUNT (OHS): 4.91 K/UL (ref 1.8–7.7)
ALBUMIN SERPL BCP-MCNC: 3.8 G/DL (ref 3.5–5.2)
ALP SERPL-CCNC: 73 UNIT/L (ref 40–150)
ALT SERPL W/O P-5'-P-CCNC: 36 UNIT/L (ref 10–44)
ANION GAP (OHS): 8 MMOL/L (ref 8–16)
AST SERPL-CCNC: 23 UNIT/L (ref 11–45)
BASOPHILS # BLD AUTO: 0.04 K/UL
BASOPHILS NFR BLD AUTO: 0.5 %
BILIRUB SERPL-MCNC: 0.8 MG/DL (ref 0.1–1)
BUN SERPL-MCNC: 25 MG/DL (ref 8–23)
CALCIUM SERPL-MCNC: 9.6 MG/DL (ref 8.7–10.5)
CHLORIDE SERPL-SCNC: 104 MMOL/L (ref 95–110)
CHOLEST SERPL-MCNC: 120 MG/DL (ref 120–199)
CHOLEST/HDLC SERPL: 3.4 {RATIO} (ref 2–5)
CO2 SERPL-SCNC: 23 MMOL/L (ref 23–29)
CREAT SERPL-MCNC: 1.1 MG/DL (ref 0.5–1.4)
EAG (OHS): 114 MG/DL (ref 68–131)
ERYTHROCYTE [DISTWIDTH] IN BLOOD BY AUTOMATED COUNT: 14.4 % (ref 11.5–14.5)
GFR SERPLBLD CREATININE-BSD FMLA CKD-EPI: >60 ML/MIN/1.73/M2
GLUCOSE SERPL-MCNC: 98 MG/DL (ref 70–110)
HBA1C MFR BLD: 5.6 % (ref 4–5.6)
HCT VFR BLD AUTO: 54.7 % (ref 40–54)
HDLC SERPL-MCNC: 35 MG/DL (ref 40–75)
HDLC SERPL: 29.2 % (ref 20–50)
HGB BLD-MCNC: 17.7 GM/DL (ref 14–18)
IMM GRANULOCYTES # BLD AUTO: 0.06 K/UL (ref 0–0.04)
IMM GRANULOCYTES NFR BLD AUTO: 0.7 % (ref 0–0.5)
LDLC SERPL CALC-MCNC: 61.6 MG/DL (ref 63–159)
LYMPHOCYTES # BLD AUTO: 1.97 K/UL (ref 1–4.8)
MCH RBC QN AUTO: 27.1 PG (ref 27–31)
MCHC RBC AUTO-ENTMCNC: 32.4 G/DL (ref 32–36)
MCV RBC AUTO: 84 FL (ref 82–98)
NONHDLC SERPL-MCNC: 85 MG/DL
NUCLEATED RBC (/100WBC) (OHS): 0 /100 WBC
PLATELET # BLD AUTO: 254 K/UL (ref 150–450)
PMV BLD AUTO: 10.5 FL (ref 9.2–12.9)
POTASSIUM SERPL-SCNC: 5.3 MMOL/L (ref 3.5–5.1)
PROT SERPL-MCNC: 7.9 GM/DL (ref 6–8.4)
PSA SERPL-MCNC: 1.31 NG/ML
RBC # BLD AUTO: 6.53 M/UL (ref 4.6–6.2)
RELATIVE EOSINOPHIL (OHS): 1.3 %
RELATIVE LYMPHOCYTE (OHS): 24.2 % (ref 18–48)
RELATIVE MONOCYTE (OHS): 13 % (ref 4–15)
RELATIVE NEUTROPHIL (OHS): 60.3 % (ref 38–73)
SODIUM SERPL-SCNC: 135 MMOL/L (ref 136–145)
T4 FREE SERPL-MCNC: 0.83 NG/DL (ref 0.71–1.51)
TRIGL SERPL-MCNC: 117 MG/DL (ref 30–150)
TSH SERPL-ACNC: 1.7 UIU/ML (ref 0.4–4)
WBC # BLD AUTO: 8.15 K/UL (ref 3.9–12.7)

## 2025-04-19 PROCEDURE — 84439 ASSAY OF FREE THYROXINE: CPT

## 2025-04-19 PROCEDURE — 36415 COLL VENOUS BLD VENIPUNCTURE: CPT

## 2025-04-19 PROCEDURE — 85025 COMPLETE CBC W/AUTO DIFF WBC: CPT

## 2025-04-19 PROCEDURE — 80053 COMPREHEN METABOLIC PANEL: CPT

## 2025-04-19 PROCEDURE — 80061 LIPID PANEL: CPT

## 2025-04-19 PROCEDURE — 84443 ASSAY THYROID STIM HORMONE: CPT

## 2025-04-19 PROCEDURE — 83036 HEMOGLOBIN GLYCOSYLATED A1C: CPT

## 2025-04-19 PROCEDURE — 84153 ASSAY OF PSA TOTAL: CPT

## 2025-04-21 ENCOUNTER — HOSPITAL ENCOUNTER (OUTPATIENT)
Facility: HOSPITAL | Age: 65
Discharge: HOME | End: 2025-04-21
Attending: STUDENT IN AN ORGANIZED HEALTH CARE EDUCATION/TRAINING PROGRAM | Admitting: STUDENT IN AN ORGANIZED HEALTH CARE EDUCATION/TRAINING PROGRAM
Payer: COMMERCIAL

## 2025-04-21 ENCOUNTER — ANESTHESIA (OUTPATIENT)
Dept: ENDOSCOPY | Facility: HOSPITAL | Age: 65
End: 2025-04-21
Payer: COMMERCIAL

## 2025-04-21 VITALS
RESPIRATION RATE: 16 BRPM | HEIGHT: 68 IN | SYSTOLIC BLOOD PRESSURE: 96 MMHG | TEMPERATURE: 98 F | WEIGHT: 206 LBS | DIASTOLIC BLOOD PRESSURE: 64 MMHG | HEART RATE: 59 BPM | BODY MASS INDEX: 31.22 KG/M2 | OXYGEN SATURATION: 99 %

## 2025-04-21 DIAGNOSIS — Z12.11 COLON CANCER SCREENING: Primary | ICD-10-CM

## 2025-04-21 DIAGNOSIS — Z12.11 ENCOUNTER FOR SCREENING FOR MALIGNANT NEOPLASM OF COLON: ICD-10-CM

## 2025-04-21 PROCEDURE — 45380 COLONOSCOPY AND BIOPSY: CPT | Mod: 33,XS,, | Performed by: STUDENT IN AN ORGANIZED HEALTH CARE EDUCATION/TRAINING PROGRAM

## 2025-04-21 PROCEDURE — 99900035 HC TECH TIME PER 15 MIN (STAT)

## 2025-04-21 PROCEDURE — 27201089 HC SNARE, DISP (ANY): Performed by: STUDENT IN AN ORGANIZED HEALTH CARE EDUCATION/TRAINING PROGRAM

## 2025-04-21 PROCEDURE — 45380 COLONOSCOPY AND BIOPSY: CPT | Mod: 33,XS | Performed by: STUDENT IN AN ORGANIZED HEALTH CARE EDUCATION/TRAINING PROGRAM

## 2025-04-21 PROCEDURE — 37000008 HC ANESTHESIA 1ST 15 MINUTES: Performed by: STUDENT IN AN ORGANIZED HEALTH CARE EDUCATION/TRAINING PROGRAM

## 2025-04-21 PROCEDURE — 27201012 HC FORCEPS, HOT/COLD, DISP: Performed by: STUDENT IN AN ORGANIZED HEALTH CARE EDUCATION/TRAINING PROGRAM

## 2025-04-21 PROCEDURE — 94761 N-INVAS EAR/PLS OXIMETRY MLT: CPT

## 2025-04-21 PROCEDURE — 45385 COLONOSCOPY W/LESION REMOVAL: CPT | Mod: 33,,, | Performed by: STUDENT IN AN ORGANIZED HEALTH CARE EDUCATION/TRAINING PROGRAM

## 2025-04-21 PROCEDURE — 63600175 PHARM REV CODE 636 W HCPCS: Performed by: NURSE ANESTHETIST, CERTIFIED REGISTERED

## 2025-04-21 PROCEDURE — 25000003 PHARM REV CODE 250: Performed by: NURSE ANESTHETIST, CERTIFIED REGISTERED

## 2025-04-21 PROCEDURE — 45385 COLONOSCOPY W/LESION REMOVAL: CPT | Mod: 33 | Performed by: STUDENT IN AN ORGANIZED HEALTH CARE EDUCATION/TRAINING PROGRAM

## 2025-04-21 PROCEDURE — 37000009 HC ANESTHESIA EA ADD 15 MINS: Performed by: STUDENT IN AN ORGANIZED HEALTH CARE EDUCATION/TRAINING PROGRAM

## 2025-04-21 PROCEDURE — 88305 TISSUE EXAM BY PATHOLOGIST: CPT | Mod: TC | Performed by: STUDENT IN AN ORGANIZED HEALTH CARE EDUCATION/TRAINING PROGRAM

## 2025-04-21 RX ORDER — PROPOFOL 10 MG/ML
INJECTION, EMULSION INTRAVENOUS
Status: DISCONTINUED | OUTPATIENT
Start: 2025-04-21 | End: 2025-04-21

## 2025-04-21 RX ORDER — LIDOCAINE HYDROCHLORIDE 20 MG/ML
INJECTION INTRAVENOUS
Status: DISCONTINUED | OUTPATIENT
Start: 2025-04-21 | End: 2025-04-21

## 2025-04-21 RX ORDER — SODIUM CHLORIDE 9 MG/ML
INJECTION, SOLUTION INTRAVENOUS CONTINUOUS
Status: DISCONTINUED | OUTPATIENT
Start: 2025-04-21 | End: 2025-04-21 | Stop reason: HOSPADM

## 2025-04-21 RX ORDER — LORAZEPAM 0.5 MG/1
0.5 TABLET ORAL ONCE
COMMUNITY
Start: 2025-04-21 | End: 2025-04-21

## 2025-04-21 RX ORDER — MIDAZOLAM HYDROCHLORIDE 1 MG/ML
INJECTION INTRAMUSCULAR; INTRAVENOUS
Status: DISCONTINUED | OUTPATIENT
Start: 2025-04-21 | End: 2025-04-21

## 2025-04-21 RX ADMIN — LIDOCAINE HYDROCHLORIDE 100 MG: 20 INJECTION INTRAVENOUS at 08:04

## 2025-04-21 RX ADMIN — MIDAZOLAM HYDROCHLORIDE 2 MG: 1 INJECTION, SOLUTION INTRAMUSCULAR; INTRAVENOUS at 07:04

## 2025-04-21 RX ADMIN — PROPOFOL 150 MCG/KG/MIN: 10 INJECTION, EMULSION INTRAVENOUS at 08:04

## 2025-04-21 RX ADMIN — PROPOFOL 150 MG: 10 INJECTION, EMULSION INTRAVENOUS at 08:04

## 2025-04-21 RX ADMIN — SODIUM CHLORIDE: 0.9 INJECTION, SOLUTION INTRAVENOUS at 07:04

## 2025-04-21 NOTE — H&P
Short Stay Endoscopy History and Physical    PCP - Nigel Merida MD  Referring Physician - Nigel Merida MD  [address] [place] [address]    Procedure - Colonoscopy  ASA - per anesthesia  Mallampati - per anesthesia  History of Anesthesia problems - no  Family history Anesthesia problems -  no   Plan of anesthesia - General    HPI  64 y.o. male  Reason for procedure:   + cologuard      ROS:  Constitutional: No fevers, chills, No weight loss  CV: No chest pain  Pulm: No cough, No shortness of breath  GI: see HPI    Medical History:  has a past medical history of Hyperlipidemia, Hypertension, MI (myocardial infarction), S/P PTCA (percutaneous transluminal coronary angioplasty) (11/07/2015), and ST elevation myocardial infarction (STEMI) (08/07/2014).    Surgical History:  has a past surgical history that includes Coronary angioplasty with stent; Left heart catheterization (Left, 5/31/2022); Aortography with serialography (Bilateral, 1/26/2023); Laparoscopic cholecystectomy (N/A, 3/23/2023); and Creation of femoropopliteal arterial bypass using graft (Right, 4/3/2023).    Family History: family history includes COPD in his mother; Heart disease in his mother; Stroke in his mother..    Social History:  reports that he quit smoking about 3 years ago. His smoking use included cigarettes. He started smoking about 28 years ago. He has a 25 pack-year smoking history. He has never used smokeless tobacco. He reports current alcohol use. He reports that he does not currently use drugs.    Review of patient's allergies indicates:  No Known Allergies    Medications:   Prescriptions Prior to Admission[1]    Physical Exam:    Vital Signs: There were no vitals filed for this visit.    General Appearance: Well appearing in no acute distress  Abdomen: Soft, non tender, non distended with normal bowel sounds, no masses    Labs:  Lab Results   Component Value Date    WBC 8.15 04/19/2025    HGB 17.7 04/19/2025    HCT  54.7 (H) 04/19/2025     04/19/2025    CHOL 120 04/19/2025    TRIG 117 04/19/2025    HDL 35 (L) 04/19/2025    ALT 36 04/19/2025    AST 23 04/19/2025     (L) 04/19/2025    K 5.3 (H) 04/19/2025     04/19/2025    CREATININE 1.1 04/19/2025    BUN 25 (H) 04/19/2025    CO2 23 04/19/2025    TSH 1.700 04/19/2025    PSA 1.31 04/19/2025    INR 1.1 04/03/2023    HGBA1C 5.6 04/19/2025       I have explained the risks and benefits of this endoscopic procedure to the patient including but not limited to bleeding, inflammation, infection, perforation, and death.      Miguel Geiger MD         [1]   Medications Prior to Admission   Medication Sig Dispense Refill Last Dose/Taking    (Magic mouthwash) 1:1:1 diphenhydrAMINE(Benadryl) 12.5mg/5ml liq, aluminum & magnesium hydroxide-simethicone (Maalox), LIDOcaine viscous 2% Swish and spit 5 mLs every 4 (four) hours as needed (gargle and spit for sore throat pain). for mouth sores 360 mL 0     acetaminophen (TYLENOL) 325 MG tablet Take 2 tablets (650 mg total) by mouth every 6 (six) hours as needed for Pain. 30 tablet 0     albuterol (VENTOLIN HFA) 90 mcg/actuation inhaler Inhale 2 puffs into the lungs every 6 (six) hours as needed for Wheezing. Rescue 18 g 0     ALPRAZolam (XANAX) 0.5 MG tablet Take 1 tablet (0.5 mg total) by mouth once. Take 30 minutes before imaging study to prevent claustrophobia. for 1 dose 1 tablet 0     aspirin (ECOTRIN) 81 MG EC tablet Take 1 tablet (81 mg total) by mouth once daily. 360 tablet 0     atenoloL (TENORMIN) 50 MG tablet Take 1 tablet (50 mg total) by mouth once daily. 90 tablet 3     atorvastatin (LIPITOR) 20 MG tablet Take 1 tablet (20 mg total) by mouth once daily. 90 tablet 3     clopidogreL (PLAVIX) 75 mg tablet Take 1 tablet (75 mg total) by mouth once daily. 90 tablet 3     GAVILYTE-C 240-22.72-6.72 -5.84 gram SolR Take 4,000 mLs by mouth once.       lisinopriL (PRINIVIL,ZESTRIL) 20 MG tablet Take 1 tablet (20 mg total)  by mouth once daily. 90 tablet 3     tamsulosin (FLOMAX) 0.4 mg Cap Take 1 capsule (0.4 mg total) by mouth once daily. 90 capsule 3

## 2025-04-21 NOTE — ANESTHESIA POSTPROCEDURE EVALUATION
Anesthesia Post Evaluation    Patient: Giorgi Eden    Procedure(s) Performed: Procedure(s) (LRB):  COLONOSCOPY, SCREENING, LOW RISK PATIENT (N/A)    Final Anesthesia Type: general      Patient location during evaluation: PACU  Patient participation: Yes- Able to Participate  Level of consciousness: awake and alert  Post-procedure vital signs: reviewed and stable  Pain management: adequate  Airway patency: patent    PONV status at discharge: No PONV  Anesthetic complications: no      Cardiovascular status: stable  Respiratory status: spontaneous ventilation  Hydration status: euvolemic  Follow-up not needed.          Vitals Value Taken Time   BP 96/63 04/21/25 09:01   Temp 36.8 °C (98.2 °F) 04/21/25 08:24   Pulse 59 04/21/25 09:02   Resp 20 04/21/25 09:01   SpO2 99 % 04/21/25 09:02   Vitals shown include unfiled device data.      Event Time   Out of Recovery 08:54:00         Pain/Joce Score: Joce Score: 10 (4/21/2025  8:54 AM)

## 2025-04-21 NOTE — PROVATION PATIENT INSTRUCTIONS
Discharge Summary/Instructions after an Endoscopic Procedure  Patient Name: Giorgi Eden  Patient MRN: [id number]  Patient YOB: 1960 Monday, April 21, 2025  Miguel Geiger MD  Dear patient,  As a result of recent federal legislation (The Federal Cures Act), you may   receive lab or pathology results from your procedure in your [facility]   account before your physician is able to contact you. Your physician or   their representative will relay the results to you with their   recommendations at their soonest availability.  Thank you,  RESTRICTIONS:  During your procedure today, you received medications for sedation.  These   medications may affect your judgment, balance and coordination.  Therefore,   for 24 hours, you have the following restrictions:   - DO NOT drive a car, operate machinery, make legal/financial decisions,   sign important papers or drink alcohol.    ACTIVITY:  Today: no heavy lifting, straining or running due to procedural   sedation/anesthesia.  The following day: return to full activity including work.  DIET:  Eat and drink normally unless instructed otherwise.     TREATMENT FOR COMMON SIDE EFFECTS:  - Mild abdominal pain, nausea, belching, bloating or excessive gas:  rest,   eat lightly and use a heating pad.  - Sore Throat: treat with throat lozenges and/or gargle with warm salt   water.  - Because air was used during the procedure, expelling large amounts of air   from your rectum or belching is normal.  - If a bowel prep was taken, you may not have a bowel movement for 1-3 days.    This is normal.  SYMPTOMS TO WATCH FOR AND REPORT TO YOUR PHYSICIAN:  1. Abdominal pain or bloating, other than gas cramps.  2. Chest pain.  3. Back pain.  4. Signs of infection such as: chills or fever occurring within 24 hours   after the procedure.  5. Rectal bleeding, which would show as bright red, maroon, or black stools.   (A tablespoon of blood from the rectum is not serious, especially  if   hemorrhoids are present.)  6. Vomiting.  7. Weakness or dizziness.  GO DIRECTLY TO THE NEAREST EMERGENCY ROOM IF YOU HAVE ANY OF THE FOLLOWING:      Difficulty breathing              Chills and/or fever over 101 F   Persistent vomiting and/or vomiting blood   Severe abdominal pain   Severe chest pain   Black, tarry stools   Bleeding- more than one tablespoon   Any other symptom or condition that you feel may need urgent attention  Your doctor recommends these additional instructions:  If any biopsies were taken, your doctors clinic will contact you in 1 to 2   weeks with any results.  - Discharge patient to home (ambulatory).   - Patient has a contact number available for emergencies.  The signs and   symptoms of potential delayed complications were discussed with the   patient.  Return to normal activities tomorrow.  Written discharge   instructions were provided to the patient.   - Resume previous diet.   - Continue present medications.   - Return to primary care physician as previously scheduled.   - Repeat colonoscopy in 5 years for surveillance.  For questions, problems or results please call your physician - Miguel Geiger MD at Work:  [phone].  [facility], EMERGENCY ROOM PHONE NUMBER: [phone]  IF A COMPLICATION OR EMERGENCY SITUATION ARISES AND YOU ARE UNABLE TO REACH   YOUR PHYSICIAN - GO DIRECTLY TO THE EMERGENCY ROOM.  Miguel Geiger MD  4/21/2025 8:19:44 AM  This report has been verified and signed electronically.  Dear patient,  As a result of recent federal legislation (The Federal Cures Act), you may   receive lab or pathology results from your procedure in your [facility]   account before your physician is able to contact you. Your physician or   their representative will relay the results to you with their   recommendations at their soonest availability.  Thank you,  PROVATION

## 2025-04-21 NOTE — TRANSFER OF CARE
"Anesthesia Transfer of Care Note    Patient: Giorgi Eden    Procedure(s) Performed: Procedure(s) (LRB):  COLONOSCOPY, SCREENING, LOW RISK PATIENT (N/A)    Patient location: GI    Anesthesia Type: general    Transport from OR: Transported from OR on room air with adequate spontaneous ventilation    Post pain: adequate analgesia    Post assessment: no apparent anesthetic complications and tolerated procedure well    Post vital signs: stable    Level of consciousness: awake and alert    Nausea/Vomiting: no nausea/vomiting    Complications: none    Transfer of care protocol was followed    Last vitals: Visit Vitals  BP (!) 137/93 (BP Location: Left arm, Patient Position: Sitting)   Pulse 79   Temp 36.6 °C (97.9 °F) (Temporal)   Resp 18   Ht 5' 8" (1.727 m)   Wt 93.4 kg (206 lb)   SpO2 97%   BMI 31.32 kg/m²     "

## 2025-04-21 NOTE — PLAN OF CARE
Patient vital signs stable and pt has no complaints at this time. Discharge instructions reviewed with pt and understood. No questions or concerns from pt at this time. IV removed and pt belongings returned. Pt escorted to vehicle via wheelchair.   done

## 2025-04-25 LAB
ESTROGEN SERPL-MCNC: NORMAL PG/ML
INSULIN SERPL-ACNC: NORMAL U[IU]/ML
LAB AP CLINICAL INFORMATION: NORMAL
LAB AP GROSS DESCRIPTION: NORMAL
LAB AP PERFORMING LOCATION(S): NORMAL
LAB AP REPORT FOOTNOTES: NORMAL

## 2025-04-28 ENCOUNTER — RESULTS FOLLOW-UP (OUTPATIENT)
Dept: GASTROENTEROLOGY | Facility: CLINIC | Age: 65
End: 2025-04-28
Payer: COMMERCIAL

## 2025-04-29 ENCOUNTER — RESULTS FOLLOW-UP (OUTPATIENT)
Dept: INTERNAL MEDICINE | Facility: CLINIC | Age: 65
End: 2025-04-29

## 2025-05-01 ENCOUNTER — TELEPHONE (OUTPATIENT)
Dept: PULMONOLOGY | Facility: CLINIC | Age: 65
End: 2025-05-01
Payer: COMMERCIAL

## 2025-05-05 ENCOUNTER — TELEPHONE (OUTPATIENT)
Dept: PULMONOLOGY | Facility: CLINIC | Age: 65
End: 2025-05-05
Payer: COMMERCIAL

## 2025-05-05 NOTE — TELEPHONE ENCOUNTER
Called and spoke with pt to confirm scheduling of follow up LDCT scan.  Agreeable with scheduling on June 19th, instructions given on where to go.

## 2025-06-19 ENCOUNTER — HOSPITAL ENCOUNTER (OUTPATIENT)
Dept: RADIOLOGY | Facility: HOSPITAL | Age: 65
Discharge: HOME | End: 2025-06-19
Attending: FAMILY MEDICINE
Payer: COMMERCIAL

## 2025-06-19 DIAGNOSIS — Z72.0 TOBACCO ABUSE: ICD-10-CM

## 2025-06-19 PROCEDURE — 71271 CT THORAX LUNG CANCER SCR C-: CPT | Mod: TC

## 2025-06-21 ENCOUNTER — PATIENT MESSAGE (OUTPATIENT)
Dept: INTERNAL MEDICINE | Facility: CLINIC | Age: 65
End: 2025-06-21
Payer: COMMERCIAL

## 2025-06-21 ENCOUNTER — PATIENT MESSAGE (OUTPATIENT)
Dept: CARDIOLOGY | Facility: CLINIC | Age: 65
End: 2025-06-21
Payer: COMMERCIAL

## 2025-06-23 NOTE — TELEPHONE ENCOUNTER
Pt called and confirmed that the Atenolol-Chlorthalidone was discontinued in 2022 but it looks like he is still on the Atenolol.  Pt states he did not read the message correctly and agrees he is taking Atenolol.  No further questions.

## 2025-08-07 ENCOUNTER — ANESTHESIA EVENT (OUTPATIENT)
Dept: SURGERY | Facility: HOSPITAL | Age: 65
DRG: 254 | End: 2025-08-07

## 2025-08-07 ENCOUNTER — PATIENT MESSAGE (OUTPATIENT)
Dept: VASCULAR SURGERY | Facility: CLINIC | Age: 65
End: 2025-08-07

## 2025-08-07 ENCOUNTER — HOSPITAL ENCOUNTER (INPATIENT)
Facility: HOSPITAL | Age: 65
LOS: 1 days | Discharge: HOME | DRG: 254 | End: 2025-08-08
Attending: EMERGENCY MEDICINE | Admitting: SURGERY

## 2025-08-07 ENCOUNTER — ANESTHESIA (OUTPATIENT)
Dept: SURGERY | Facility: HOSPITAL | Age: 65
DRG: 254 | End: 2025-08-07

## 2025-08-07 DIAGNOSIS — M79.606 LEG PAIN: ICD-10-CM

## 2025-08-07 DIAGNOSIS — Z86.79 HISTORY OF PERIPHERAL ARTERIAL DISEASE: ICD-10-CM

## 2025-08-07 DIAGNOSIS — R20.0 LEG NUMBNESS: ICD-10-CM

## 2025-08-07 DIAGNOSIS — I72.4 ANEURYSM OF LEFT POPLITEAL ARTERY: Primary | ICD-10-CM

## 2025-08-07 DIAGNOSIS — I73.9 CLAUDICATION: ICD-10-CM

## 2025-08-07 LAB
ABSOLUTE EOSINOPHIL (OHS): 0.1 K/UL
ABSOLUTE EOSINOPHIL (OHS): 0.15 K/UL
ABSOLUTE MONOCYTE (OHS): 1.06 K/UL (ref 0.3–1)
ABSOLUTE MONOCYTE (OHS): 1.26 K/UL (ref 0.3–1)
ABSOLUTE NEUTROPHIL COUNT (OHS): 6.59 K/UL (ref 1.8–7.7)
ABSOLUTE NEUTROPHIL COUNT (OHS): 6.94 K/UL (ref 1.8–7.7)
ALBUMIN SERPL BCP-MCNC: 4 G/DL (ref 3.5–5.2)
ALP SERPL-CCNC: 69 UNIT/L (ref 40–150)
ALT SERPL W/O P-5'-P-CCNC: 56 UNIT/L (ref 0–55)
ANION GAP (OHS): 11 MMOL/L (ref 8–16)
APTT PPP: 27.6 SECONDS (ref 21–32)
APTT PPP: 27.7 SECONDS (ref 21–32)
APTT PPP: 29 SECONDS (ref 21–32)
APTT PPP: >150 SECONDS (ref 21–32)
AST SERPL-CCNC: 105 UNIT/L (ref 0–50)
BASOPHILS # BLD AUTO: 0.02 K/UL
BASOPHILS # BLD AUTO: 0.07 K/UL
BASOPHILS NFR BLD AUTO: 0.2 %
BASOPHILS NFR BLD AUTO: 0.6 %
BILIRUB SERPL-MCNC: 1.1 MG/DL (ref 0.1–1)
BUN SERPL-MCNC: 17 MG/DL (ref 8–23)
CALCIUM SERPL-MCNC: 9.3 MG/DL (ref 8.7–10.5)
CHLORIDE SERPL-SCNC: 105 MMOL/L (ref 95–110)
CO2 SERPL-SCNC: 22 MMOL/L (ref 23–29)
CREAT SERPL-MCNC: 1.1 MG/DL (ref 0.5–1.4)
ERYTHROCYTE [DISTWIDTH] IN BLOOD BY AUTOMATED COUNT: 13.9 % (ref 11.5–14.5)
ERYTHROCYTE [DISTWIDTH] IN BLOOD BY AUTOMATED COUNT: 14.1 % (ref 11.5–14.5)
GFR SERPLBLD CREATININE-BSD FMLA CKD-EPI: >60 ML/MIN/1.73/M2
GLUCOSE SERPL-MCNC: 103 MG/DL (ref 70–110)
HCT VFR BLD AUTO: 50.3 % (ref 40–54)
HCT VFR BLD AUTO: 50.8 % (ref 40–54)
HGB BLD-MCNC: 16.7 GM/DL (ref 14–18)
HGB BLD-MCNC: 16.7 GM/DL (ref 14–18)
IMM GRANULOCYTES # BLD AUTO: 0.03 K/UL (ref 0–0.04)
IMM GRANULOCYTES # BLD AUTO: 0.04 K/UL (ref 0–0.04)
IMM GRANULOCYTES NFR BLD AUTO: 0.3 % (ref 0–0.5)
IMM GRANULOCYTES NFR BLD AUTO: 0.4 % (ref 0–0.5)
INDIRECT COOMBS: NORMAL
INR PPP: 1.1 (ref 0.8–1.2)
INR PPP: 1.1 (ref 0.8–1.2)
LYMPHOCYTES # BLD AUTO: 1.78 K/UL (ref 1–4.8)
LYMPHOCYTES # BLD AUTO: 2.88 K/UL (ref 1–4.8)
MCH RBC QN AUTO: 27.9 PG (ref 27–31)
MCH RBC QN AUTO: 28 PG (ref 27–31)
MCHC RBC AUTO-ENTMCNC: 32.9 G/DL (ref 32–36)
MCHC RBC AUTO-ENTMCNC: 33.2 G/DL (ref 32–36)
MCV RBC AUTO: 84 FL (ref 82–98)
MCV RBC AUTO: 85 FL (ref 82–98)
NUCLEATED RBC (/100WBC) (OHS): 0 /100 WBC
NUCLEATED RBC (/100WBC) (OHS): 0 /100 WBC
PLATELET # BLD AUTO: 223 K/UL (ref 150–450)
PLATELET # BLD AUTO: 233 K/UL (ref 150–450)
PMV BLD AUTO: 10.3 FL (ref 9.2–12.9)
PMV BLD AUTO: 10.7 FL (ref 9.2–12.9)
POC ACTIVATED CLOTTING TIME K: 170 SEC (ref 74–137)
POC ACTIVATED CLOTTING TIME K: 239 SEC (ref 74–137)
POC ACTIVATED CLOTTING TIME K: 245 SEC (ref 74–137)
POTASSIUM SERPL-SCNC: 4.4 MMOL/L (ref 3.5–5.1)
PROT SERPL-MCNC: 7.8 GM/DL (ref 6–8.4)
PROTHROMBIN TIME: 11.9 SECONDS (ref 9–12.5)
PROTHROMBIN TIME: 12 SECONDS (ref 9–12.5)
RBC # BLD AUTO: 5.96 M/UL (ref 4.6–6.2)
RBC # BLD AUTO: 5.98 M/UL (ref 4.6–6.2)
RELATIVE EOSINOPHIL (OHS): 1 %
RELATIVE EOSINOPHIL (OHS): 1.4 %
RELATIVE LYMPHOCYTE (OHS): 17.9 % (ref 18–48)
RELATIVE LYMPHOCYTE (OHS): 26.2 % (ref 18–48)
RELATIVE MONOCYTE (OHS): 10.7 % (ref 4–15)
RELATIVE MONOCYTE (OHS): 11.5 % (ref 4–15)
RELATIVE NEUTROPHIL (OHS): 59.9 % (ref 38–73)
RELATIVE NEUTROPHIL (OHS): 69.9 % (ref 38–73)
RH BLD: NORMAL
SAMPLE: ABNORMAL
SODIUM SERPL-SCNC: 138 MMOL/L (ref 136–145)
WBC # BLD AUTO: 10.99 K/UL (ref 3.9–12.7)
WBC # BLD AUTO: 9.93 K/UL (ref 3.9–12.7)

## 2025-08-07 PROCEDURE — 99223 1ST HOSP IP/OBS HIGH 75: CPT | Mod: 57,,, | Performed by: SURGERY

## 2025-08-07 PROCEDURE — 047L35Z DILATION OF LEFT FEMORAL ARTERY WITH TWO DRUG-ELUTING INTRALUMINAL DEVICES, PERCUTANEOUS APPROACH: ICD-10-PCS | Performed by: SURGERY

## 2025-08-07 PROCEDURE — C1894 INTRO/SHEATH, NON-LASER: HCPCS | Performed by: SURGERY

## 2025-08-07 PROCEDURE — C1760 CLOSURE DEV, VASC: HCPCS | Performed by: SURGERY

## 2025-08-07 PROCEDURE — 20600001 HC STEP DOWN PRIVATE ROOM

## 2025-08-07 PROCEDURE — 71000039 HC RECOVERY, EACH ADD'L HOUR: Performed by: SURGERY

## 2025-08-07 PROCEDURE — 63600175 PHARM REV CODE 636 W HCPCS: Performed by: SURGERY

## 2025-08-07 PROCEDURE — B44GZZ3 ULTRASONOGRAPHY OF LEFT LOWER EXTREMITY ARTERIES, INTRAVASCULAR: ICD-10-PCS | Performed by: SURGERY

## 2025-08-07 PROCEDURE — 63600175 PHARM REV CODE 636 W HCPCS: Performed by: STUDENT IN AN ORGANIZED HEALTH CARE EDUCATION/TRAINING PROGRAM

## 2025-08-07 PROCEDURE — 85025 COMPLETE CBC W/AUTO DIFF WBC: CPT | Performed by: PHYSICIAN ASSISTANT

## 2025-08-07 PROCEDURE — 63600175 PHARM REV CODE 636 W HCPCS: Performed by: NURSE ANESTHETIST, CERTIFIED REGISTERED

## 2025-08-07 PROCEDURE — B44LZZ3 ULTRASONOGRAPHY OF FEMORAL ARTERY, INTRAVASCULAR: ICD-10-PCS | Performed by: SURGERY

## 2025-08-07 PROCEDURE — 85610 PROTHROMBIN TIME: CPT | Performed by: STUDENT IN AN ORGANIZED HEALTH CARE EDUCATION/TRAINING PROGRAM

## 2025-08-07 PROCEDURE — 99285 EMERGENCY DEPT VISIT HI MDM: CPT | Mod: 25

## 2025-08-07 PROCEDURE — 25000003 PHARM REV CODE 250: Performed by: NURSE ANESTHETIST, CERTIFIED REGISTERED

## 2025-08-07 PROCEDURE — 25500020 PHARM REV CODE 255: Performed by: SURGERY

## 2025-08-07 PROCEDURE — C1725 CATH, TRANSLUMIN NON-LASER: HCPCS | Performed by: SURGERY

## 2025-08-07 PROCEDURE — 25000003 PHARM REV CODE 250: Performed by: SURGERY

## 2025-08-07 PROCEDURE — C1753 CATH, INTRAVAS ULTRASOUND: HCPCS | Performed by: SURGERY

## 2025-08-07 PROCEDURE — 047N35Z DILATION OF LEFT POPLITEAL ARTERY WITH TWO DRUG-ELUTING INTRALUMINAL DEVICES, PERCUTANEOUS APPROACH: ICD-10-PCS | Performed by: SURGERY

## 2025-08-07 PROCEDURE — 85730 THROMBOPLASTIN TIME PARTIAL: CPT | Performed by: STUDENT IN AN ORGANIZED HEALTH CARE EDUCATION/TRAINING PROGRAM

## 2025-08-07 PROCEDURE — 85025 COMPLETE CBC W/AUTO DIFF WBC: CPT | Performed by: STUDENT IN AN ORGANIZED HEALTH CARE EDUCATION/TRAINING PROGRAM

## 2025-08-07 PROCEDURE — 37000008 HC ANESTHESIA 1ST 15 MINUTES: Performed by: SURGERY

## 2025-08-07 PROCEDURE — 27201423 OPTIME MED/SURG SUP & DEVICES STERILE SUPPLY: Performed by: SURGERY

## 2025-08-07 PROCEDURE — 63600175 PHARM REV CODE 636 W HCPCS: Performed by: ANESTHESIOLOGY

## 2025-08-07 PROCEDURE — 86901 BLOOD TYPING SEROLOGIC RH(D): CPT | Performed by: EMERGENCY MEDICINE

## 2025-08-07 PROCEDURE — 71000016 HC POSTOP RECOV ADDL HR: Performed by: SURGERY

## 2025-08-07 PROCEDURE — 36000707: Performed by: SURGERY

## 2025-08-07 PROCEDURE — 71000033 HC RECOVERY, INTIAL HOUR: Performed by: SURGERY

## 2025-08-07 PROCEDURE — 80053 COMPREHEN METABOLIC PANEL: CPT | Performed by: PHYSICIAN ASSISTANT

## 2025-08-07 PROCEDURE — 85610 PROTHROMBIN TIME: CPT | Performed by: PHYSICIAN ASSISTANT

## 2025-08-07 PROCEDURE — C1769 GUIDE WIRE: HCPCS | Performed by: SURGERY

## 2025-08-07 PROCEDURE — 71000015 HC POSTOP RECOV 1ST HR: Performed by: SURGERY

## 2025-08-07 PROCEDURE — C1887 CATHETER, GUIDING: HCPCS | Performed by: SURGERY

## 2025-08-07 PROCEDURE — 85730 THROMBOPLASTIN TIME PARTIAL: CPT | Performed by: PHYSICIAN ASSISTANT

## 2025-08-07 PROCEDURE — C1874 STENT, COATED/COV W/DEL SYS: HCPCS | Performed by: SURGERY

## 2025-08-07 PROCEDURE — B41GYZZ FLUOROSCOPY OF LEFT LOWER EXTREMITY ARTERIES USING OTHER CONTRAST: ICD-10-PCS | Performed by: SURGERY

## 2025-08-07 PROCEDURE — 36000706: Performed by: SURGERY

## 2025-08-07 PROCEDURE — 25000003 PHARM REV CODE 250: Performed by: STUDENT IN AN ORGANIZED HEALTH CARE EDUCATION/TRAINING PROGRAM

## 2025-08-07 PROCEDURE — 37000009 HC ANESTHESIA EA ADD 15 MINS: Performed by: SURGERY

## 2025-08-07 PROCEDURE — 96374 THER/PROPH/DIAG INJ IV PUSH: CPT

## 2025-08-07 DEVICE — IMPLANTABLE DEVICE: Type: IMPLANTABLE DEVICE | Site: ARTERIAL | Status: FUNCTIONAL

## 2025-08-07 RX ORDER — SODIUM CHLORIDE 9 MG/ML
INJECTION, SOLUTION INTRAVENOUS CONTINUOUS
Status: DISCONTINUED | OUTPATIENT
Start: 2025-08-07 | End: 2025-08-08 | Stop reason: HOSPADM

## 2025-08-07 RX ORDER — CEFAZOLIN SODIUM 1 G/3ML
INJECTION, POWDER, FOR SOLUTION INTRAMUSCULAR; INTRAVENOUS
Status: DISCONTINUED | OUTPATIENT
Start: 2025-08-07 | End: 2025-08-07

## 2025-08-07 RX ORDER — ASPIRIN 81 MG/1
81 TABLET ORAL DAILY
Status: DISCONTINUED | OUTPATIENT
Start: 2025-08-07 | End: 2025-08-08 | Stop reason: HOSPADM

## 2025-08-07 RX ORDER — FENTANYL CITRATE 50 UG/ML
25 INJECTION, SOLUTION INTRAMUSCULAR; INTRAVENOUS EVERY 5 MIN PRN
Status: COMPLETED | OUTPATIENT
Start: 2025-08-07 | End: 2025-08-07

## 2025-08-07 RX ORDER — OXYCODONE HYDROCHLORIDE 5 MG/1
5 TABLET ORAL EVERY 4 HOURS PRN
Refills: 0 | Status: DISCONTINUED | OUTPATIENT
Start: 2025-08-07 | End: 2025-08-08 | Stop reason: HOSPADM

## 2025-08-07 RX ORDER — NITROGLYCERIN 5 MG/ML
INJECTION, SOLUTION INTRAVENOUS
Status: DISCONTINUED | OUTPATIENT
Start: 2025-08-07 | End: 2025-08-07 | Stop reason: HOSPADM

## 2025-08-07 RX ORDER — IODIXANOL 320 MG/ML
INJECTION, SOLUTION INTRAVASCULAR
Status: DISCONTINUED | OUTPATIENT
Start: 2025-08-07 | End: 2025-08-07 | Stop reason: HOSPADM

## 2025-08-07 RX ORDER — PHENYLEPHRINE HYDROCHLORIDE 10 MG/ML
INJECTION INTRAVENOUS
Status: DISCONTINUED | OUTPATIENT
Start: 2025-08-07 | End: 2025-08-07

## 2025-08-07 RX ORDER — GLUCAGON 1 MG
1 KIT INJECTION
Status: DISCONTINUED | OUTPATIENT
Start: 2025-08-07 | End: 2025-08-07 | Stop reason: HOSPADM

## 2025-08-07 RX ORDER — ROCURONIUM BROMIDE 10 MG/ML
INJECTION, SOLUTION INTRAVENOUS
Status: DISCONTINUED | OUTPATIENT
Start: 2025-08-07 | End: 2025-08-07

## 2025-08-07 RX ORDER — ONDANSETRON HYDROCHLORIDE 2 MG/ML
4 INJECTION, SOLUTION INTRAVENOUS EVERY 8 HOURS PRN
Status: DISCONTINUED | OUTPATIENT
Start: 2025-08-07 | End: 2025-08-08 | Stop reason: HOSPADM

## 2025-08-07 RX ORDER — VASOPRESSIN 20 [USP'U]/ML
INJECTION, SOLUTION INTRAMUSCULAR; SUBCUTANEOUS
Status: DISCONTINUED | OUTPATIENT
Start: 2025-08-07 | End: 2025-08-07

## 2025-08-07 RX ORDER — EPHEDRINE SULFATE 50 MG/ML
INJECTION, SOLUTION INTRAVENOUS
Status: DISCONTINUED | OUTPATIENT
Start: 2025-08-07 | End: 2025-08-07

## 2025-08-07 RX ORDER — HEPARIN SODIUM,PORCINE/D5W 25000/250
0-40 INTRAVENOUS SOLUTION INTRAVENOUS CONTINUOUS
Status: DISCONTINUED | OUTPATIENT
Start: 2025-08-07 | End: 2025-08-07

## 2025-08-07 RX ORDER — ALBUTEROL SULFATE 90 UG/1
2 INHALANT RESPIRATORY (INHALATION) EVERY 6 HOURS PRN
Status: DISCONTINUED | OUTPATIENT
Start: 2025-08-07 | End: 2025-08-08 | Stop reason: HOSPADM

## 2025-08-07 RX ORDER — SODIUM CHLORIDE 0.9 % (FLUSH) 0.9 %
10 SYRINGE (ML) INJECTION
Status: DISCONTINUED | OUTPATIENT
Start: 2025-08-07 | End: 2025-08-08 | Stop reason: HOSPADM

## 2025-08-07 RX ORDER — AMOXICILLIN 250 MG
1 CAPSULE ORAL 2 TIMES DAILY
Status: DISCONTINUED | OUTPATIENT
Start: 2025-08-07 | End: 2025-08-08 | Stop reason: HOSPADM

## 2025-08-07 RX ORDER — DEXAMETHASONE SODIUM PHOSPHATE 4 MG/ML
INJECTION, SOLUTION INTRA-ARTICULAR; INTRALESIONAL; INTRAMUSCULAR; INTRAVENOUS; SOFT TISSUE
Status: DISCONTINUED | OUTPATIENT
Start: 2025-08-07 | End: 2025-08-07

## 2025-08-07 RX ORDER — ACETAMINOPHEN 325 MG/1
650 TABLET ORAL EVERY 6 HOURS
Status: DISCONTINUED | OUTPATIENT
Start: 2025-08-07 | End: 2025-08-08 | Stop reason: HOSPADM

## 2025-08-07 RX ORDER — LIDOCAINE HYDROCHLORIDE 20 MG/ML
INJECTION INTRAVENOUS
Status: DISCONTINUED | OUTPATIENT
Start: 2025-08-07 | End: 2025-08-07

## 2025-08-07 RX ORDER — HEPARIN SOD,PORCINE/0.9 % NACL 1000/500ML
INTRAVENOUS SOLUTION INTRAVENOUS
Status: DISCONTINUED | OUTPATIENT
Start: 2025-08-07 | End: 2025-08-07 | Stop reason: HOSPADM

## 2025-08-07 RX ORDER — ATENOLOL 25 MG/1
50 TABLET ORAL DAILY
Status: DISCONTINUED | OUTPATIENT
Start: 2025-08-07 | End: 2025-08-08 | Stop reason: HOSPADM

## 2025-08-07 RX ORDER — ATORVASTATIN CALCIUM 20 MG/1
20 TABLET, FILM COATED ORAL DAILY
Status: DISCONTINUED | OUTPATIENT
Start: 2025-08-07 | End: 2025-08-08 | Stop reason: HOSPADM

## 2025-08-07 RX ORDER — PROPOFOL 10 MG/ML
VIAL (ML) INTRAVENOUS
Status: DISCONTINUED | OUTPATIENT
Start: 2025-08-07 | End: 2025-08-07

## 2025-08-07 RX ORDER — PAPAVERINE HYDROCHLORIDE 30 MG/ML
INJECTION INTRAMUSCULAR; INTRAVENOUS
Status: DISCONTINUED | OUTPATIENT
Start: 2025-08-07 | End: 2025-08-07 | Stop reason: HOSPADM

## 2025-08-07 RX ORDER — FENTANYL CITRATE 50 UG/ML
INJECTION, SOLUTION INTRAMUSCULAR; INTRAVENOUS
Status: DISCONTINUED | OUTPATIENT
Start: 2025-08-07 | End: 2025-08-07

## 2025-08-07 RX ORDER — TAMSULOSIN HYDROCHLORIDE 0.4 MG/1
0.4 CAPSULE ORAL DAILY
Status: DISCONTINUED | OUTPATIENT
Start: 2025-08-07 | End: 2025-08-08 | Stop reason: HOSPADM

## 2025-08-07 RX ORDER — HALOPERIDOL LACTATE 5 MG/ML
0.5 INJECTION, SOLUTION INTRAMUSCULAR EVERY 10 MIN PRN
Status: DISCONTINUED | OUTPATIENT
Start: 2025-08-07 | End: 2025-08-07 | Stop reason: HOSPADM

## 2025-08-07 RX ORDER — CLOPIDOGREL BISULFATE 75 MG/1
75 TABLET ORAL DAILY
Status: DISCONTINUED | OUTPATIENT
Start: 2025-08-08 | End: 2025-08-08 | Stop reason: HOSPADM

## 2025-08-07 RX ORDER — HEPARIN SODIUM 1000 [USP'U]/ML
INJECTION, SOLUTION INTRAVENOUS; SUBCUTANEOUS
Status: DISCONTINUED | OUTPATIENT
Start: 2025-08-07 | End: 2025-08-07

## 2025-08-07 RX ORDER — SODIUM CHLORIDE 0.9 % (FLUSH) 0.9 %
10 SYRINGE (ML) INJECTION
Status: DISCONTINUED | OUTPATIENT
Start: 2025-08-07 | End: 2025-08-07 | Stop reason: HOSPADM

## 2025-08-07 RX ORDER — ONDANSETRON HYDROCHLORIDE 2 MG/ML
INJECTION, SOLUTION INTRAVENOUS
Status: DISCONTINUED | OUTPATIENT
Start: 2025-08-07 | End: 2025-08-07

## 2025-08-07 RX ORDER — IBUPROFEN 600 MG/1
600 TABLET, FILM COATED ORAL 3 TIMES DAILY
COMMUNITY

## 2025-08-07 RX ORDER — MIDAZOLAM HYDROCHLORIDE 1 MG/ML
INJECTION INTRAMUSCULAR; INTRAVENOUS
Status: DISCONTINUED | OUTPATIENT
Start: 2025-08-07 | End: 2025-08-07

## 2025-08-07 RX ORDER — LISINOPRIL 10 MG/1
20 TABLET ORAL DAILY
Status: DISCONTINUED | OUTPATIENT
Start: 2025-08-07 | End: 2025-08-08 | Stop reason: HOSPADM

## 2025-08-07 RX ADMIN — FENTANYL CITRATE 25 MCG: 50 INJECTION INTRAMUSCULAR; INTRAVENOUS at 09:08

## 2025-08-07 RX ADMIN — FENTANYL CITRATE 25 MCG: 50 INJECTION, SOLUTION INTRAMUSCULAR; INTRAVENOUS at 06:08

## 2025-08-07 RX ADMIN — ROCURONIUM BROMIDE 20 MG: 10 INJECTION, SOLUTION INTRAVENOUS at 05:08

## 2025-08-07 RX ADMIN — SENNOSIDES AND DOCUSATE SODIUM 1 TABLET: 50; 8.6 TABLET ORAL at 08:08

## 2025-08-07 RX ADMIN — DEXAMETHASONE SODIUM PHOSPHATE 4 MG: 4 INJECTION, SOLUTION INTRAMUSCULAR; INTRAVENOUS at 05:08

## 2025-08-07 RX ADMIN — HEPARIN SODIUM AND DEXTROSE 18 UNITS/KG/HR: 10000; 5 INJECTION INTRAVENOUS at 11:08

## 2025-08-07 RX ADMIN — PHENYLEPHRINE HYDROCHLORIDE 0.5 MCG/KG/MIN: 10 INJECTION INTRAVENOUS at 05:08

## 2025-08-07 RX ADMIN — SODIUM CHLORIDE: 9 INJECTION, SOLUTION INTRAVENOUS at 03:08

## 2025-08-07 RX ADMIN — HEPARIN SODIUM 9000 UNITS: 1000 INJECTION, SOLUTION INTRAVENOUS; SUBCUTANEOUS at 05:08

## 2025-08-07 RX ADMIN — EPHEDRINE SULFATE 10 MG: 50 INJECTION INTRAVENOUS at 05:08

## 2025-08-07 RX ADMIN — VASOPRESSIN 2 UNITS: 20 INJECTION INTRAVENOUS at 05:08

## 2025-08-07 RX ADMIN — SODIUM CHLORIDE: 0.9 INJECTION, SOLUTION INTRAVENOUS at 04:08

## 2025-08-07 RX ADMIN — MIDAZOLAM HYDROCHLORIDE 2 MG: 2 INJECTION, SOLUTION INTRAMUSCULAR; INTRAVENOUS at 04:08

## 2025-08-07 RX ADMIN — FENTANYL CITRATE 25 MCG: 50 INJECTION INTRAMUSCULAR; INTRAVENOUS at 08:08

## 2025-08-07 RX ADMIN — VASOPRESSIN 1 UNITS: 20 INJECTION INTRAVENOUS at 05:08

## 2025-08-07 RX ADMIN — SUGAMMADEX 200 MG: 100 INJECTION, SOLUTION INTRAVENOUS at 06:08

## 2025-08-07 RX ADMIN — PHENYLEPHRINE HYDROCHLORIDE 100 MCG: 10 INJECTION INTRAVENOUS at 05:08

## 2025-08-07 RX ADMIN — ROCURONIUM BROMIDE 50 MG: 10 INJECTION, SOLUTION INTRAVENOUS at 04:08

## 2025-08-07 RX ADMIN — LIDOCAINE HYDROCHLORIDE 100 MG: 20 INJECTION INTRAVENOUS at 04:08

## 2025-08-07 RX ADMIN — HEPARIN SODIUM AND DEXTROSE 12 UNITS/KG/HR: 10000; 5 INJECTION INTRAVENOUS at 07:08

## 2025-08-07 RX ADMIN — EPHEDRINE SULFATE 15 MG: 50 INJECTION INTRAVENOUS at 05:08

## 2025-08-07 RX ADMIN — OXYCODONE HYDROCHLORIDE 5 MG: 5 TABLET ORAL at 08:08

## 2025-08-07 RX ADMIN — ONDANSETRON 4 MG: 2 INJECTION INTRAMUSCULAR; INTRAVENOUS at 05:08

## 2025-08-07 RX ADMIN — CEFAZOLIN 2 G: 330 INJECTION, POWDER, FOR SOLUTION INTRAMUSCULAR; INTRAVENOUS at 05:08

## 2025-08-07 RX ADMIN — PHENYLEPHRINE HYDROCHLORIDE 200 MCG: 10 INJECTION INTRAVENOUS at 05:08

## 2025-08-07 RX ADMIN — PROPOFOL 200 MG: 10 INJECTION, EMULSION INTRAVENOUS at 04:08

## 2025-08-07 RX ADMIN — FENTANYL CITRATE 50 MCG: 50 INJECTION, SOLUTION INTRAMUSCULAR; INTRAVENOUS at 04:08

## 2025-08-08 ENCOUNTER — PATIENT MESSAGE (OUTPATIENT)
Dept: ADMINISTRATIVE | Facility: HOSPITAL | Age: 65
End: 2025-08-08

## 2025-08-08 ENCOUNTER — TELEPHONE (OUTPATIENT)
Dept: VASCULAR SURGERY | Facility: CLINIC | Age: 65
End: 2025-08-08

## 2025-08-08 VITALS
DIASTOLIC BLOOD PRESSURE: 72 MMHG | TEMPERATURE: 98 F | HEART RATE: 83 BPM | SYSTOLIC BLOOD PRESSURE: 112 MMHG | RESPIRATION RATE: 14 BRPM | OXYGEN SATURATION: 94 % | WEIGHT: 200 LBS | HEIGHT: 68 IN | BODY MASS INDEX: 30.31 KG/M2

## 2025-08-08 LAB
ABSOLUTE EOSINOPHIL (OHS): 0 K/UL
ABSOLUTE MONOCYTE (OHS): 0.25 K/UL (ref 0.3–1)
ABSOLUTE NEUTROPHIL COUNT (OHS): 7.97 K/UL (ref 1.8–7.7)
ANION GAP (OHS): 9 MMOL/L (ref 8–16)
APTT PPP: 37.3 SECONDS (ref 21–32)
BASOPHILS # BLD AUTO: 0.02 K/UL
BASOPHILS NFR BLD AUTO: 0.2 %
BUN SERPL-MCNC: 15 MG/DL (ref 8–23)
CALCIUM SERPL-MCNC: 8 MG/DL (ref 8.7–10.5)
CHLORIDE SERPL-SCNC: 108 MMOL/L (ref 95–110)
CO2 SERPL-SCNC: 22 MMOL/L (ref 23–29)
CREAT SERPL-MCNC: 1 MG/DL (ref 0.5–1.4)
ERYTHROCYTE [DISTWIDTH] IN BLOOD BY AUTOMATED COUNT: 14.1 % (ref 11.5–14.5)
GFR SERPLBLD CREATININE-BSD FMLA CKD-EPI: >60 ML/MIN/1.73/M2
GLUCOSE SERPL-MCNC: 120 MG/DL (ref 70–110)
HCT VFR BLD AUTO: 45.8 % (ref 40–54)
HGB BLD-MCNC: 15 GM/DL (ref 14–18)
IMM GRANULOCYTES # BLD AUTO: 0.03 K/UL (ref 0–0.04)
IMM GRANULOCYTES NFR BLD AUTO: 0.3 % (ref 0–0.5)
LYMPHOCYTES # BLD AUTO: 0.78 K/UL (ref 1–4.8)
MAGNESIUM SERPL-MCNC: 1.8 MG/DL (ref 1.6–2.6)
MCH RBC QN AUTO: 27.9 PG (ref 27–31)
MCHC RBC AUTO-ENTMCNC: 32.8 G/DL (ref 32–36)
MCV RBC AUTO: 85 FL (ref 82–98)
NUCLEATED RBC (/100WBC) (OHS): 0 /100 WBC
PLATELET # BLD AUTO: 216 K/UL (ref 150–450)
PMV BLD AUTO: 10.4 FL (ref 9.2–12.9)
POTASSIUM SERPL-SCNC: 4.4 MMOL/L (ref 3.5–5.1)
RBC # BLD AUTO: 5.38 M/UL (ref 4.6–6.2)
RELATIVE EOSINOPHIL (OHS): 0 %
RELATIVE LYMPHOCYTE (OHS): 8.6 % (ref 18–48)
RELATIVE MONOCYTE (OHS): 2.8 % (ref 4–15)
RELATIVE NEUTROPHIL (OHS): 88.1 % (ref 38–73)
SODIUM SERPL-SCNC: 139 MMOL/L (ref 136–145)
WBC # BLD AUTO: 9.05 K/UL (ref 3.9–12.7)

## 2025-08-08 PROCEDURE — 83735 ASSAY OF MAGNESIUM: CPT | Performed by: STUDENT IN AN ORGANIZED HEALTH CARE EDUCATION/TRAINING PROGRAM

## 2025-08-08 PROCEDURE — 25000003 PHARM REV CODE 250: Performed by: STUDENT IN AN ORGANIZED HEALTH CARE EDUCATION/TRAINING PROGRAM

## 2025-08-08 PROCEDURE — 80048 BASIC METABOLIC PNL TOTAL CA: CPT | Performed by: STUDENT IN AN ORGANIZED HEALTH CARE EDUCATION/TRAINING PROGRAM

## 2025-08-08 PROCEDURE — 36415 COLL VENOUS BLD VENIPUNCTURE: CPT | Performed by: SURGERY

## 2025-08-08 PROCEDURE — 85025 COMPLETE CBC W/AUTO DIFF WBC: CPT | Performed by: STUDENT IN AN ORGANIZED HEALTH CARE EDUCATION/TRAINING PROGRAM

## 2025-08-08 PROCEDURE — 85730 THROMBOPLASTIN TIME PARTIAL: CPT | Performed by: SURGERY

## 2025-08-08 RX ORDER — OXYCODONE HYDROCHLORIDE 5 MG/1
5 TABLET ORAL EVERY 4 HOURS PRN
Qty: 42 TABLET | Refills: 0 | Status: SHIPPED | OUTPATIENT
Start: 2025-08-08 | End: 2025-08-15

## 2025-08-08 RX ORDER — AMOXICILLIN 250 MG
1 CAPSULE ORAL 2 TIMES DAILY
Qty: 28 TABLET | Refills: 0 | Status: SHIPPED | OUTPATIENT
Start: 2025-08-08 | End: 2025-08-22

## 2025-08-08 RX ORDER — ACETAMINOPHEN 325 MG/1
650 TABLET ORAL EVERY 6 HOURS
COMMUNITY
Start: 2025-08-08 | End: 2025-08-15

## 2025-08-08 RX ORDER — POLYETHYLENE GLYCOL 3350 17 G/17G
17 POWDER, FOR SOLUTION ORAL DAILY
Qty: 238 G | Refills: 0 | Status: SHIPPED | OUTPATIENT
Start: 2025-08-08 | End: 2025-08-22

## 2025-08-08 RX ADMIN — LISINOPRIL 20 MG: 10 TABLET ORAL at 09:08

## 2025-08-08 RX ADMIN — ATORVASTATIN CALCIUM 20 MG: 20 TABLET, FILM COATED ORAL at 09:08

## 2025-08-08 RX ADMIN — ASPIRIN 81 MG: 81 TABLET, COATED ORAL at 09:08

## 2025-08-08 RX ADMIN — TAMSULOSIN HYDROCHLORIDE 0.4 MG: 0.4 CAPSULE ORAL at 09:08

## 2025-08-08 RX ADMIN — SENNOSIDES AND DOCUSATE SODIUM 1 TABLET: 50; 8.6 TABLET ORAL at 09:08

## 2025-08-08 RX ADMIN — OXYCODONE HYDROCHLORIDE 5 MG: 5 TABLET ORAL at 12:08

## 2025-08-08 RX ADMIN — ATENOLOL 50 MG: 25 TABLET ORAL at 09:08

## 2025-08-08 RX ADMIN — CLOPIDOGREL 75 MG: 75 TABLET ORAL at 09:08

## 2025-08-11 ENCOUNTER — PATIENT OUTREACH (OUTPATIENT)
Dept: ADMINISTRATIVE | Facility: CLINIC | Age: 65
End: 2025-08-11

## 2025-08-11 ENCOUNTER — TELEPHONE (OUTPATIENT)
Dept: INTERNAL MEDICINE | Facility: CLINIC | Age: 65
End: 2025-08-11

## 2025-08-11 DIAGNOSIS — I73.9 PERIPHERAL VASCULAR DISEASE, UNSPECIFIED: Primary | ICD-10-CM

## 2025-08-16 ENCOUNTER — HOSPITAL ENCOUNTER (EMERGENCY)
Facility: HOSPITAL | Age: 65
Discharge: HOME | End: 2025-08-16
Attending: EMERGENCY MEDICINE

## 2025-08-16 VITALS
BODY MASS INDEX: 30.31 KG/M2 | OXYGEN SATURATION: 97 % | WEIGHT: 200 LBS | HEART RATE: 77 BPM | TEMPERATURE: 98 F | SYSTOLIC BLOOD PRESSURE: 110 MMHG | HEIGHT: 68 IN | RESPIRATION RATE: 18 BRPM | DIASTOLIC BLOOD PRESSURE: 74 MMHG

## 2025-08-16 DIAGNOSIS — G89.18 POST-OPERATIVE PAIN: Primary | ICD-10-CM

## 2025-08-16 LAB
ABSOLUTE EOSINOPHIL (OHS): 0.18 K/UL
ABSOLUTE MONOCYTE (OHS): 1.11 K/UL (ref 0.3–1)
ABSOLUTE NEUTROPHIL COUNT (OHS): 7.07 K/UL (ref 1.8–7.7)
ALBUMIN SERPL BCP-MCNC: 4.1 G/DL (ref 3.5–5.2)
ALP SERPL-CCNC: 75 UNIT/L (ref 40–150)
ALT SERPL W/O P-5'-P-CCNC: 56 UNIT/L (ref 0–55)
ANION GAP (OHS): 10 MMOL/L (ref 8–16)
AST SERPL-CCNC: 38 UNIT/L (ref 0–50)
BASOPHILS # BLD AUTO: 0.05 K/UL
BASOPHILS NFR BLD AUTO: 0.5 %
BILIRUB SERPL-MCNC: 0.8 MG/DL (ref 0.1–1)
BUN SERPL-MCNC: 27 MG/DL (ref 8–23)
CALCIUM SERPL-MCNC: 9.6 MG/DL (ref 8.7–10.5)
CHLORIDE SERPL-SCNC: 105 MMOL/L (ref 95–110)
CK SERPL-CCNC: 229 U/L (ref 20–200)
CO2 SERPL-SCNC: 24 MMOL/L (ref 23–29)
CREAT SERPL-MCNC: 1.2 MG/DL (ref 0.5–1.4)
ERYTHROCYTE [DISTWIDTH] IN BLOOD BY AUTOMATED COUNT: 13.9 % (ref 11.5–14.5)
GFR SERPLBLD CREATININE-BSD FMLA CKD-EPI: >60 ML/MIN/1.73/M2
GLUCOSE SERPL-MCNC: 93 MG/DL (ref 70–110)
HCT VFR BLD AUTO: 48.7 % (ref 40–54)
HGB BLD-MCNC: 16.6 GM/DL (ref 14–18)
IMM GRANULOCYTES # BLD AUTO: 0.09 K/UL (ref 0–0.04)
IMM GRANULOCYTES NFR BLD AUTO: 0.9 % (ref 0–0.5)
LACTATE SERPL-SCNC: 1.7 MMOL/L (ref 0.5–2.2)
LYMPHOCYTES # BLD AUTO: 1.56 K/UL (ref 1–4.8)
MCH RBC QN AUTO: 28.6 PG (ref 27–31)
MCHC RBC AUTO-ENTMCNC: 34.1 G/DL (ref 32–36)
MCV RBC AUTO: 84 FL (ref 82–98)
NUCLEATED RBC (/100WBC) (OHS): 0 /100 WBC
PLATELET # BLD AUTO: 306 K/UL (ref 150–450)
PMV BLD AUTO: 10.3 FL (ref 9.2–12.9)
POTASSIUM SERPL-SCNC: 4.5 MMOL/L (ref 3.5–5.1)
PROT SERPL-MCNC: 7.9 GM/DL (ref 6–8.4)
RBC # BLD AUTO: 5.8 M/UL (ref 4.6–6.2)
RELATIVE EOSINOPHIL (OHS): 1.8 %
RELATIVE LYMPHOCYTE (OHS): 15.5 % (ref 18–48)
RELATIVE MONOCYTE (OHS): 11 % (ref 4–15)
RELATIVE NEUTROPHIL (OHS): 70.3 % (ref 38–73)
SODIUM SERPL-SCNC: 139 MMOL/L (ref 136–145)
WBC # BLD AUTO: 10.06 K/UL (ref 3.9–12.7)

## 2025-08-16 PROCEDURE — 83605 ASSAY OF LACTIC ACID: CPT

## 2025-08-16 PROCEDURE — 63600175 PHARM REV CODE 636 W HCPCS

## 2025-08-16 PROCEDURE — 96374 THER/PROPH/DIAG INJ IV PUSH: CPT

## 2025-08-16 PROCEDURE — 99284 EMERGENCY DEPT VISIT MOD MDM: CPT | Mod: 25

## 2025-08-16 PROCEDURE — 82550 ASSAY OF CK (CPK): CPT

## 2025-08-16 PROCEDURE — 85025 COMPLETE CBC W/AUTO DIFF WBC: CPT

## 2025-08-16 PROCEDURE — 80053 COMPREHEN METABOLIC PANEL: CPT

## 2025-08-16 RX ORDER — MORPHINE SULFATE 2 MG/ML
2 INJECTION, SOLUTION INTRAMUSCULAR; INTRAVENOUS
Refills: 0 | Status: COMPLETED | OUTPATIENT
Start: 2025-08-16 | End: 2025-08-16

## 2025-08-16 RX ADMIN — MORPHINE SULFATE 2 MG: 2 INJECTION, SOLUTION INTRAMUSCULAR; INTRAVENOUS at 09:08

## 2025-08-17 LAB
OHS QRS DURATION: 98 MS
OHS QTC CALCULATION: 395 MS

## 2025-08-18 ENCOUNTER — OFFICE VISIT (OUTPATIENT)
Dept: VASCULAR SURGERY | Facility: CLINIC | Age: 65
End: 2025-08-18
Attending: SURGERY

## 2025-08-18 ENCOUNTER — HOSPITAL ENCOUNTER (OUTPATIENT)
Dept: VASCULAR SURGERY | Facility: CLINIC | Age: 65
Discharge: HOME | End: 2025-08-18
Attending: SURGERY

## 2025-08-18 VITALS
HEART RATE: 75 BPM | DIASTOLIC BLOOD PRESSURE: 75 MMHG | BODY MASS INDEX: 29.4 KG/M2 | WEIGHT: 194 LBS | HEIGHT: 68 IN | SYSTOLIC BLOOD PRESSURE: 109 MMHG | TEMPERATURE: 98 F

## 2025-08-18 DIAGNOSIS — I73.9 PERIPHERAL VASCULAR DISEASE, UNSPECIFIED: ICD-10-CM

## 2025-08-18 DIAGNOSIS — I75.022 BLUE TOE SYNDROME OF LEFT LOWER EXTREMITY: Primary | ICD-10-CM

## 2025-08-18 DIAGNOSIS — I72.4 ANEURYSM OF LEFT POPLITEAL ARTERY: ICD-10-CM

## 2025-08-18 PROCEDURE — 93926 LOWER EXTREMITY STUDY: CPT | Mod: PBBFAC | Performed by: SURGERY

## 2025-08-18 PROCEDURE — 99214 OFFICE O/P EST MOD 30 MIN: CPT | Mod: PBBFAC,25 | Performed by: SURGERY

## 2025-08-18 PROCEDURE — 93923 UPR/LXTR ART STDY 3+ LVLS: CPT | Mod: 26,S$PBB,, | Performed by: SURGERY

## 2025-08-18 PROCEDURE — 99999 PR PBB SHADOW E&M-EST. PATIENT-LVL IV: CPT | Mod: PBBFAC,,, | Performed by: SURGERY

## 2025-08-18 PROCEDURE — 99212 OFFICE O/P EST SF 10 MIN: CPT | Mod: S$PBB,,, | Performed by: SURGERY

## 2025-08-18 PROCEDURE — 93926 LOWER EXTREMITY STUDY: CPT | Mod: 26,S$PBB,, | Performed by: SURGERY

## 2025-08-18 PROCEDURE — 93923 UPR/LXTR ART STDY 3+ LVLS: CPT | Mod: PBBFAC | Performed by: SURGERY

## 2025-08-22 ENCOUNTER — PATIENT MESSAGE (OUTPATIENT)
Dept: VASCULAR SURGERY | Facility: CLINIC | Age: 65
End: 2025-08-22

## 2025-08-22 RX ORDER — GABAPENTIN 600 MG/1
600 TABLET ORAL 3 TIMES DAILY
Qty: 90 TABLET | Refills: 11 | Status: SHIPPED | OUTPATIENT
Start: 2025-08-22 | End: 2026-08-22

## 2025-08-24 ENCOUNTER — PATIENT MESSAGE (OUTPATIENT)
Dept: VASCULAR SURGERY | Facility: CLINIC | Age: 65
End: 2025-08-24

## 2025-08-26 ENCOUNTER — ANESTHESIA EVENT (OUTPATIENT)
Dept: SURGERY | Facility: HOSPITAL | Age: 65
End: 2025-08-26

## 2025-08-26 ENCOUNTER — HOSPITAL ENCOUNTER (INPATIENT)
Facility: HOSPITAL | Age: 65
LOS: 3 days | Discharge: HOME | DRG: 240 | End: 2025-08-29
Attending: EMERGENCY MEDICINE | Admitting: HOSPITALIST

## 2025-08-26 DIAGNOSIS — I96 WET GANGRENE: Primary | ICD-10-CM

## 2025-08-26 DIAGNOSIS — M86.9 OSTEOMYELITIS: ICD-10-CM

## 2025-08-26 DIAGNOSIS — E78.2 MIXED HYPERLIPIDEMIA: ICD-10-CM

## 2025-08-26 DIAGNOSIS — Z89.432 STATUS POST TRANSMETATARSAL AMPUTATION OF FOOT, LEFT: ICD-10-CM

## 2025-08-26 DIAGNOSIS — I25.2 HISTORY OF MYOCARDIAL INFARCTION: ICD-10-CM

## 2025-08-26 PROBLEM — D68.59 HYPERCOAGULABLE STATE: Status: ACTIVE | Noted: 2025-08-26

## 2025-08-26 PROBLEM — Z87.891 FORMER SMOKER: Status: ACTIVE | Noted: 2022-03-11

## 2025-08-26 LAB
ABSOLUTE EOSINOPHIL (OHS): 0.19 K/UL
ABSOLUTE MONOCYTE (OHS): 1.26 K/UL (ref 0.3–1)
ABSOLUTE NEUTROPHIL COUNT (OHS): 6.87 K/UL (ref 1.8–7.7)
ALBUMIN SERPL BCP-MCNC: 4 G/DL (ref 3.5–5.2)
ALP SERPL-CCNC: 79 UNIT/L (ref 40–150)
ALT SERPL W/O P-5'-P-CCNC: 40 UNIT/L (ref 0–55)
ANION GAP (OHS): 10 MMOL/L (ref 8–16)
APTT PPP: 27.4 SECONDS (ref 21–32)
AST SERPL-CCNC: 33 UNIT/L (ref 0–50)
BASOPHILS # BLD AUTO: 0.05 K/UL
BASOPHILS NFR BLD AUTO: 0.5 %
BILIRUB SERPL-MCNC: 0.8 MG/DL (ref 0.1–1)
BILIRUB UR QL STRIP.AUTO: NEGATIVE
BUN SERPL-MCNC: 20 MG/DL (ref 8–23)
CALCIUM SERPL-MCNC: 9.5 MG/DL (ref 8.7–10.5)
CHLORIDE SERPL-SCNC: 107 MMOL/L (ref 95–110)
CLARITY UR: CLEAR
CO2 SERPL-SCNC: 22 MMOL/L (ref 23–29)
COLOR UR AUTO: YELLOW
CREAT SERPL-MCNC: 1 MG/DL (ref 0.5–1.4)
CRP SERPL-MCNC: 16.8 MG/L
ERYTHROCYTE [DISTWIDTH] IN BLOOD BY AUTOMATED COUNT: 13.6 % (ref 11.5–14.5)
ERYTHROCYTE [SEDIMENTATION RATE] IN BLOOD BY PHOTOMETRIC METHOD: 16 MM/HR
GFR SERPLBLD CREATININE-BSD FMLA CKD-EPI: >60 ML/MIN/1.73/M2
GLUCOSE SERPL-MCNC: 101 MG/DL (ref 70–110)
GLUCOSE UR QL STRIP: NEGATIVE
HCT VFR BLD AUTO: 49.1 % (ref 40–54)
HGB BLD-MCNC: 16.1 GM/DL (ref 14–18)
HGB UR QL STRIP: NEGATIVE
IMM GRANULOCYTES # BLD AUTO: 0.04 K/UL (ref 0–0.04)
IMM GRANULOCYTES NFR BLD AUTO: 0.4 % (ref 0–0.5)
INDIRECT COOMBS: NORMAL
INR PPP: 1.1 (ref 0.8–1.2)
KETONES UR QL STRIP: NEGATIVE
LEUKOCYTE ESTERASE UR QL STRIP: NEGATIVE
LYMPHOCYTES # BLD AUTO: 2.02 K/UL (ref 1–4.8)
MAGNESIUM SERPL-MCNC: 1.8 MG/DL (ref 1.6–2.6)
MCH RBC QN AUTO: 27.4 PG (ref 27–31)
MCHC RBC AUTO-ENTMCNC: 32.8 G/DL (ref 32–36)
MCV RBC AUTO: 84 FL (ref 82–98)
NITRITE UR QL STRIP: NEGATIVE
NUCLEATED RBC (/100WBC) (OHS): 0 /100 WBC
PH UR STRIP: 7 [PH]
PLATELET # BLD AUTO: 309 K/UL (ref 150–450)
PMV BLD AUTO: 10.2 FL (ref 9.2–12.9)
POTASSIUM SERPL-SCNC: 4.2 MMOL/L (ref 3.5–5.1)
PROT SERPL-MCNC: 7.8 GM/DL (ref 6–8.4)
PROT UR QL STRIP: ABNORMAL
PROTHROMBIN TIME: 11.9 SECONDS (ref 9–12.5)
RBC # BLD AUTO: 5.87 M/UL (ref 4.6–6.2)
RELATIVE EOSINOPHIL (OHS): 1.8 %
RELATIVE LYMPHOCYTE (OHS): 19.4 % (ref 18–48)
RELATIVE MONOCYTE (OHS): 12.1 % (ref 4–15)
RELATIVE NEUTROPHIL (OHS): 65.8 % (ref 38–73)
RH BLD: NORMAL
SODIUM SERPL-SCNC: 139 MMOL/L (ref 136–145)
SP GR UR STRIP: >=1.03
SPECIMEN OUTDATE: NORMAL
TROPONIN I SERPL HS-MCNC: 9 NG/L
UROBILINOGEN UR STRIP-ACNC: NEGATIVE EU/DL
WBC # BLD AUTO: 10.43 K/UL (ref 3.9–12.7)

## 2025-08-26 PROCEDURE — 84484 ASSAY OF TROPONIN QUANT: CPT

## 2025-08-26 PROCEDURE — 63600175 PHARM REV CODE 636 W HCPCS

## 2025-08-26 PROCEDURE — 11000001 HC ACUTE MED/SURG PRIVATE ROOM

## 2025-08-26 PROCEDURE — 96365 THER/PROPH/DIAG IV INF INIT: CPT

## 2025-08-26 PROCEDURE — 86901 BLOOD TYPING SEROLOGIC RH(D): CPT | Performed by: EMERGENCY MEDICINE

## 2025-08-26 PROCEDURE — 85025 COMPLETE CBC W/AUTO DIFF WBC: CPT | Performed by: EMERGENCY MEDICINE

## 2025-08-26 PROCEDURE — 82040 ASSAY OF SERUM ALBUMIN: CPT | Performed by: EMERGENCY MEDICINE

## 2025-08-26 PROCEDURE — 99285 EMERGENCY DEPT VISIT HI MDM: CPT | Mod: 25

## 2025-08-26 PROCEDURE — 25500020 PHARM REV CODE 255: Performed by: HOSPITALIST

## 2025-08-26 PROCEDURE — 85652 RBC SED RATE AUTOMATED: CPT | Performed by: EMERGENCY MEDICINE

## 2025-08-26 PROCEDURE — 81003 URINALYSIS AUTO W/O SCOPE: CPT

## 2025-08-26 PROCEDURE — 99223 1ST HOSP IP/OBS HIGH 75: CPT | Mod: ,,, | Performed by: PODIATRIST

## 2025-08-26 PROCEDURE — 96367 TX/PROPH/DG ADDL SEQ IV INF: CPT

## 2025-08-26 PROCEDURE — 85730 THROMBOPLASTIN TIME PARTIAL: CPT | Performed by: EMERGENCY MEDICINE

## 2025-08-26 PROCEDURE — 85610 PROTHROMBIN TIME: CPT | Performed by: EMERGENCY MEDICINE

## 2025-08-26 PROCEDURE — 86140 C-REACTIVE PROTEIN: CPT | Performed by: EMERGENCY MEDICINE

## 2025-08-26 PROCEDURE — 25000003 PHARM REV CODE 250

## 2025-08-26 PROCEDURE — 63600175 PHARM REV CODE 636 W HCPCS: Performed by: HOSPITALIST

## 2025-08-26 PROCEDURE — 96375 TX/PRO/DX INJ NEW DRUG ADDON: CPT

## 2025-08-26 PROCEDURE — 25000003 PHARM REV CODE 250: Performed by: EMERGENCY MEDICINE

## 2025-08-26 PROCEDURE — 83735 ASSAY OF MAGNESIUM: CPT | Performed by: EMERGENCY MEDICINE

## 2025-08-26 PROCEDURE — 63600175 PHARM REV CODE 636 W HCPCS: Performed by: EMERGENCY MEDICINE

## 2025-08-26 PROCEDURE — 25000003 PHARM REV CODE 250: Performed by: HOSPITALIST

## 2025-08-26 RX ORDER — IBUPROFEN 200 MG
24 TABLET ORAL
Status: DISCONTINUED | OUTPATIENT
Start: 2025-08-26 | End: 2025-08-29 | Stop reason: HOSPADM

## 2025-08-26 RX ORDER — SODIUM CHLORIDE 0.9 % (FLUSH) 0.9 %
10 SYRINGE (ML) INJECTION EVERY 12 HOURS PRN
Status: DISCONTINUED | OUTPATIENT
Start: 2025-08-26 | End: 2025-08-29 | Stop reason: HOSPADM

## 2025-08-26 RX ORDER — TAMSULOSIN HYDROCHLORIDE 0.4 MG/1
0.4 CAPSULE ORAL DAILY
Status: DISCONTINUED | OUTPATIENT
Start: 2025-08-26 | End: 2025-08-29 | Stop reason: HOSPADM

## 2025-08-26 RX ORDER — GABAPENTIN 300 MG/1
600 CAPSULE ORAL 3 TIMES DAILY
Status: DISCONTINUED | OUTPATIENT
Start: 2025-08-26 | End: 2025-08-29 | Stop reason: HOSPADM

## 2025-08-26 RX ORDER — GLUCAGON 1 MG
1 KIT INJECTION
Status: DISCONTINUED | OUTPATIENT
Start: 2025-08-26 | End: 2025-08-29 | Stop reason: HOSPADM

## 2025-08-26 RX ORDER — NALOXONE HCL 0.4 MG/ML
0.02 VIAL (ML) INJECTION
Status: DISCONTINUED | OUTPATIENT
Start: 2025-08-26 | End: 2025-08-29 | Stop reason: HOSPADM

## 2025-08-26 RX ORDER — ASPIRIN 81 MG/1
81 TABLET ORAL DAILY
Status: DISCONTINUED | OUTPATIENT
Start: 2025-08-26 | End: 2025-08-26

## 2025-08-26 RX ORDER — ACETAMINOPHEN 325 MG/1
650 TABLET ORAL EVERY 4 HOURS PRN
Status: DISCONTINUED | OUTPATIENT
Start: 2025-08-26 | End: 2025-08-29 | Stop reason: HOSPADM

## 2025-08-26 RX ORDER — MORPHINE SULFATE 2 MG/ML
6 INJECTION, SOLUTION INTRAMUSCULAR; INTRAVENOUS
Refills: 0 | Status: COMPLETED | OUTPATIENT
Start: 2025-08-26 | End: 2025-08-26

## 2025-08-26 RX ORDER — LORAZEPAM 0.5 MG/1
0.5 TABLET ORAL ONCE
Status: COMPLETED | OUTPATIENT
Start: 2025-08-26 | End: 2025-08-26

## 2025-08-26 RX ORDER — ATORVASTATIN CALCIUM 10 MG/1
20 TABLET, FILM COATED ORAL DAILY
Status: DISCONTINUED | OUTPATIENT
Start: 2025-08-26 | End: 2025-08-26

## 2025-08-26 RX ORDER — IBUPROFEN 200 MG
16 TABLET ORAL
Status: DISCONTINUED | OUTPATIENT
Start: 2025-08-26 | End: 2025-08-29 | Stop reason: HOSPADM

## 2025-08-26 RX ORDER — ATORVASTATIN CALCIUM 40 MG/1
40 TABLET, FILM COATED ORAL DAILY
Status: DISCONTINUED | OUTPATIENT
Start: 2025-08-26 | End: 2025-08-29 | Stop reason: HOSPADM

## 2025-08-26 RX ORDER — PROMETHAZINE HYDROCHLORIDE 12.5 MG/1
25 TABLET ORAL EVERY 6 HOURS PRN
Status: DISCONTINUED | OUTPATIENT
Start: 2025-08-26 | End: 2025-08-29 | Stop reason: HOSPADM

## 2025-08-26 RX ORDER — ENOXAPARIN SODIUM 100 MG/ML
40 INJECTION SUBCUTANEOUS EVERY 24 HOURS
Status: DISCONTINUED | OUTPATIENT
Start: 2025-08-26 | End: 2025-08-29 | Stop reason: HOSPADM

## 2025-08-26 RX ORDER — ATENOLOL 25 MG/1
50 TABLET ORAL DAILY
Status: DISCONTINUED | OUTPATIENT
Start: 2025-08-26 | End: 2025-08-29 | Stop reason: HOSPADM

## 2025-08-26 RX ORDER — ONDANSETRON HYDROCHLORIDE 2 MG/ML
4 INJECTION, SOLUTION INTRAVENOUS EVERY 8 HOURS PRN
Status: DISCONTINUED | OUTPATIENT
Start: 2025-08-26 | End: 2025-08-29 | Stop reason: HOSPADM

## 2025-08-26 RX ORDER — LISINOPRIL 20 MG/1
20 TABLET ORAL DAILY
Status: DISCONTINUED | OUTPATIENT
Start: 2025-08-26 | End: 2025-08-29 | Stop reason: HOSPADM

## 2025-08-26 RX ORDER — CLOPIDOGREL BISULFATE 75 MG/1
75 TABLET ORAL DAILY
Status: DISCONTINUED | OUTPATIENT
Start: 2025-08-26 | End: 2025-08-26

## 2025-08-26 RX ORDER — TALC
6 POWDER (GRAM) TOPICAL NIGHTLY PRN
Status: DISCONTINUED | OUTPATIENT
Start: 2025-08-26 | End: 2025-08-29 | Stop reason: HOSPADM

## 2025-08-26 RX ADMIN — ENOXAPARIN SODIUM 40 MG: 40 INJECTION SUBCUTANEOUS at 04:08

## 2025-08-26 RX ADMIN — MORPHINE SULFATE 6 MG: 2 INJECTION, SOLUTION INTRAMUSCULAR; INTRAVENOUS at 07:08

## 2025-08-26 RX ADMIN — VANCOMYCIN HYDROCHLORIDE 1750 MG: 500 INJECTION, POWDER, LYOPHILIZED, FOR SOLUTION INTRAVENOUS at 08:08

## 2025-08-26 RX ADMIN — PIPERACILLIN AND TAZOBACTAM 4.5 G: 4; .5 INJECTION, POWDER, LYOPHILIZED, FOR SOLUTION INTRAVENOUS; PARENTERAL at 04:08

## 2025-08-26 RX ADMIN — IOHEXOL 100 ML: 350 INJECTION, SOLUTION INTRAVENOUS at 10:08

## 2025-08-26 RX ADMIN — GABAPENTIN 600 MG: 300 CAPSULE ORAL at 02:08

## 2025-08-26 RX ADMIN — VANCOMYCIN HYDROCHLORIDE 1250 MG: 1.25 INJECTION, POWDER, LYOPHILIZED, FOR SOLUTION INTRAVENOUS at 09:08

## 2025-08-26 RX ADMIN — PIPERACILLIN SODIUM AND TAZOBACTAM SODIUM 4.5 G: 4; .5 INJECTION, POWDER, LYOPHILIZED, FOR SOLUTION INTRAVENOUS at 07:08

## 2025-08-26 RX ADMIN — GABAPENTIN 600 MG: 300 CAPSULE ORAL at 09:08

## 2025-08-26 RX ADMIN — LORAZEPAM 0.5 MG: 0.5 TABLET ORAL at 07:08

## 2025-08-27 ENCOUNTER — ANESTHESIA (OUTPATIENT)
Dept: SURGERY | Facility: HOSPITAL | Age: 65
End: 2025-08-27

## 2025-08-27 LAB
ABSOLUTE EOSINOPHIL (OHS): 0.25 K/UL
ABSOLUTE EOSINOPHIL (OHS): 0.26 K/UL
ABSOLUTE MONOCYTE (OHS): 1 K/UL (ref 0.3–1)
ABSOLUTE MONOCYTE (OHS): 1.26 K/UL (ref 0.3–1)
ABSOLUTE NEUTROPHIL COUNT (OHS): 5.63 K/UL (ref 1.8–7.7)
ABSOLUTE NEUTROPHIL COUNT (OHS): 5.7 K/UL (ref 1.8–7.7)
ALBUMIN SERPL BCP-MCNC: 3.8 G/DL (ref 3.5–5.2)
ALP SERPL-CCNC: 79 UNIT/L (ref 40–150)
ALT SERPL W/O P-5'-P-CCNC: 50 UNIT/L (ref 0–55)
ANION GAP (OHS): 7 MMOL/L (ref 8–16)
AST SERPL-CCNC: 35 UNIT/L (ref 0–50)
BASOPHILS # BLD AUTO: 0.04 K/UL
BASOPHILS # BLD AUTO: 0.04 K/UL
BASOPHILS NFR BLD AUTO: 0.4 %
BASOPHILS NFR BLD AUTO: 0.5 %
BILIRUB SERPL-MCNC: 0.5 MG/DL (ref 0.1–1)
BUN SERPL-MCNC: 19 MG/DL (ref 8–23)
CALCIUM SERPL-MCNC: 9.4 MG/DL (ref 8.7–10.5)
CHLORIDE SERPL-SCNC: 108 MMOL/L (ref 95–110)
CO2 SERPL-SCNC: 26 MMOL/L (ref 23–29)
CREAT SERPL-MCNC: 1.1 MG/DL (ref 0.5–1.4)
ERYTHROCYTE [DISTWIDTH] IN BLOOD BY AUTOMATED COUNT: 13.6 % (ref 11.5–14.5)
ERYTHROCYTE [DISTWIDTH] IN BLOOD BY AUTOMATED COUNT: 13.8 % (ref 11.5–14.5)
GFR SERPLBLD CREATININE-BSD FMLA CKD-EPI: >60 ML/MIN/1.73/M2
GLUCOSE SERPL-MCNC: 94 MG/DL (ref 70–110)
HCT VFR BLD AUTO: 42.3 % (ref 40–54)
HCT VFR BLD AUTO: 49.5 % (ref 40–54)
HGB BLD-MCNC: 14.1 GM/DL (ref 14–18)
HGB BLD-MCNC: 15.9 GM/DL (ref 14–18)
HOLD SPECIMEN: NORMAL
IMM GRANULOCYTES # BLD AUTO: 0.04 K/UL (ref 0–0.04)
IMM GRANULOCYTES # BLD AUTO: 0.05 K/UL (ref 0–0.04)
IMM GRANULOCYTES NFR BLD AUTO: 0.4 % (ref 0–0.5)
IMM GRANULOCYTES NFR BLD AUTO: 0.6 % (ref 0–0.5)
LYMPHOCYTES # BLD AUTO: 1.73 K/UL (ref 1–4.8)
LYMPHOCYTES # BLD AUTO: 2.17 K/UL (ref 1–4.8)
MAGNESIUM SERPL-MCNC: 1.9 MG/DL (ref 1.6–2.6)
MCH RBC QN AUTO: 27.9 PG (ref 27–31)
MCH RBC QN AUTO: 28.6 PG (ref 27–31)
MCHC RBC AUTO-ENTMCNC: 32.1 G/DL (ref 32–36)
MCHC RBC AUTO-ENTMCNC: 33.3 G/DL (ref 32–36)
MCV RBC AUTO: 86 FL (ref 82–98)
MCV RBC AUTO: 87 FL (ref 82–98)
NUCLEATED RBC (/100WBC) (OHS): 0 /100 WBC
NUCLEATED RBC (/100WBC) (OHS): 0 /100 WBC
PHOSPHATE SERPL-MCNC: 3.2 MG/DL (ref 2.7–4.5)
PLATELET # BLD AUTO: 242 K/UL (ref 150–450)
PLATELET # BLD AUTO: 288 K/UL (ref 150–450)
PMV BLD AUTO: 10 FL (ref 9.2–12.9)
PMV BLD AUTO: 9.8 FL (ref 9.2–12.9)
POTASSIUM SERPL-SCNC: 5 MMOL/L (ref 3.5–5.1)
PROT SERPL-MCNC: 7.5 GM/DL (ref 6–8.4)
RBC # BLD AUTO: 4.93 M/UL (ref 4.6–6.2)
RBC # BLD AUTO: 5.7 M/UL (ref 4.6–6.2)
RELATIVE EOSINOPHIL (OHS): 2.7 %
RELATIVE EOSINOPHIL (OHS): 3 %
RELATIVE LYMPHOCYTE (OHS): 19.7 % (ref 18–48)
RELATIVE LYMPHOCYTE (OHS): 23.1 % (ref 18–48)
RELATIVE MONOCYTE (OHS): 11.4 % (ref 4–15)
RELATIVE MONOCYTE (OHS): 13.4 % (ref 4–15)
RELATIVE NEUTROPHIL (OHS): 60 % (ref 38–73)
RELATIVE NEUTROPHIL (OHS): 64.8 % (ref 38–73)
SODIUM SERPL-SCNC: 141 MMOL/L (ref 136–145)
VANCOMYCIN TROUGH SERPL-MCNC: 16.1 UG/ML (ref 10–22)
WBC # BLD AUTO: 8.78 K/UL (ref 3.9–12.7)
WBC # BLD AUTO: 9.39 K/UL (ref 3.9–12.7)

## 2025-08-27 PROCEDURE — 87116 MYCOBACTERIA CULTURE: CPT | Performed by: PODIATRIST

## 2025-08-27 PROCEDURE — 63600175 PHARM REV CODE 636 W HCPCS

## 2025-08-27 PROCEDURE — 85025 COMPLETE CBC W/AUTO DIFF WBC: CPT | Performed by: NURSE PRACTITIONER

## 2025-08-27 PROCEDURE — 99900035 HC TECH TIME PER 15 MIN (STAT)

## 2025-08-27 PROCEDURE — 84100 ASSAY OF PHOSPHORUS: CPT

## 2025-08-27 PROCEDURE — 11000001 HC ACUTE MED/SURG PRIVATE ROOM

## 2025-08-27 PROCEDURE — 87070 CULTURE OTHR SPECIMN AEROBIC: CPT | Performed by: PODIATRIST

## 2025-08-27 PROCEDURE — 27201423 OPTIME MED/SURG SUP & DEVICES STERILE SUPPLY: Performed by: PODIATRIST

## 2025-08-27 PROCEDURE — 36000707: Performed by: PODIATRIST

## 2025-08-27 PROCEDURE — 27000221 HC OXYGEN, UP TO 24 HOURS

## 2025-08-27 PROCEDURE — 71000016 HC POSTOP RECOV ADDL HR: Performed by: PODIATRIST

## 2025-08-27 PROCEDURE — 0Y6N0ZB DETACHMENT AT LEFT FOOT, PARTIAL 2ND RAY, OPEN APPROACH: ICD-10-PCS | Performed by: HOSPITALIST

## 2025-08-27 PROCEDURE — 25000003 PHARM REV CODE 250

## 2025-08-27 PROCEDURE — 25000003 PHARM REV CODE 250: Performed by: HOSPITALIST

## 2025-08-27 PROCEDURE — 0Y6N0ZC DETACHMENT AT LEFT FOOT, PARTIAL 3RD RAY, OPEN APPROACH: ICD-10-PCS | Performed by: HOSPITALIST

## 2025-08-27 PROCEDURE — 37000009 HC ANESTHESIA EA ADD 15 MINS: Performed by: PODIATRIST

## 2025-08-27 PROCEDURE — 99233 SBSQ HOSP IP/OBS HIGH 50: CPT | Mod: 57,,, | Performed by: PODIATRIST

## 2025-08-27 PROCEDURE — 36415 COLL VENOUS BLD VENIPUNCTURE: CPT | Performed by: NURSE PRACTITIONER

## 2025-08-27 PROCEDURE — 63600175 PHARM REV CODE 636 W HCPCS: Performed by: HOSPITALIST

## 2025-08-27 PROCEDURE — 0Y6N0ZF DETACHMENT AT LEFT FOOT, PARTIAL 5TH RAY, OPEN APPROACH: ICD-10-PCS | Performed by: HOSPITALIST

## 2025-08-27 PROCEDURE — 88311 DECALCIFY TISSUE: CPT | Mod: TC | Performed by: PATHOLOGY

## 2025-08-27 PROCEDURE — 94761 N-INVAS EAR/PLS OXIMETRY MLT: CPT

## 2025-08-27 PROCEDURE — 84460 ALANINE AMINO (ALT) (SGPT): CPT

## 2025-08-27 PROCEDURE — 37000008 HC ANESTHESIA 1ST 15 MINUTES: Performed by: PODIATRIST

## 2025-08-27 PROCEDURE — 71000039 HC RECOVERY, EACH ADD'L HOUR: Performed by: PODIATRIST

## 2025-08-27 PROCEDURE — 28805 AMPUTATION THRU METATARSAL: CPT | Mod: LT,,, | Performed by: PODIATRIST

## 2025-08-27 PROCEDURE — 87102 FUNGUS ISOLATION CULTURE: CPT | Performed by: PODIATRIST

## 2025-08-27 PROCEDURE — 36415 COLL VENOUS BLD VENIPUNCTURE: CPT

## 2025-08-27 PROCEDURE — 71000015 HC POSTOP RECOV 1ST HR: Performed by: PODIATRIST

## 2025-08-27 PROCEDURE — 87206 SMEAR FLUORESCENT/ACID STAI: CPT | Performed by: PODIATRIST

## 2025-08-27 PROCEDURE — 83735 ASSAY OF MAGNESIUM: CPT

## 2025-08-27 PROCEDURE — 36000706: Performed by: PODIATRIST

## 2025-08-27 PROCEDURE — 85025 COMPLETE CBC W/AUTO DIFF WBC: CPT

## 2025-08-27 PROCEDURE — 0Y6N0Z9 DETACHMENT AT LEFT FOOT, PARTIAL 1ST RAY, OPEN APPROACH: ICD-10-PCS | Performed by: HOSPITALIST

## 2025-08-27 PROCEDURE — 0Y6N0ZD DETACHMENT AT LEFT FOOT, PARTIAL 4TH RAY, OPEN APPROACH: ICD-10-PCS | Performed by: HOSPITALIST

## 2025-08-27 PROCEDURE — 87205 SMEAR GRAM STAIN: CPT | Performed by: PODIATRIST

## 2025-08-27 PROCEDURE — C1713 ANCHOR/SCREW BN/BN,TIS/BN: HCPCS | Performed by: PODIATRIST

## 2025-08-27 PROCEDURE — 87210 SMEAR WET MOUNT SALINE/INK: CPT | Performed by: PODIATRIST

## 2025-08-27 PROCEDURE — 63600175 PHARM REV CODE 636 W HCPCS: Performed by: PODIATRIST

## 2025-08-27 PROCEDURE — 87075 CULTR BACTERIA EXCEPT BLOOD: CPT | Performed by: PODIATRIST

## 2025-08-27 PROCEDURE — 71000033 HC RECOVERY, INTIAL HOUR: Performed by: PODIATRIST

## 2025-08-27 DEVICE — KIT STIMULAN RAPID CURE 5CC: Type: IMPLANTABLE DEVICE | Site: FOOT | Status: FUNCTIONAL

## 2025-08-27 RX ORDER — BUPIVACAINE HYDROCHLORIDE 2.5 MG/ML
INJECTION, SOLUTION EPIDURAL; INFILTRATION; INTRACAUDAL; PERINEURAL
Status: DISCONTINUED | OUTPATIENT
Start: 2025-08-27 | End: 2025-08-27 | Stop reason: HOSPADM

## 2025-08-27 RX ORDER — PHENYLEPHRINE HYDROCHLORIDE 10 MG/ML
INJECTION INTRAVENOUS
Status: COMPLETED
Start: 2025-08-27 | End: 2025-08-27

## 2025-08-27 RX ORDER — EPHEDRINE SULFATE 50 MG/ML
INJECTION, SOLUTION INTRAVENOUS
Status: DISCONTINUED | OUTPATIENT
Start: 2025-08-27 | End: 2025-08-27

## 2025-08-27 RX ORDER — HYDROMORPHONE HYDROCHLORIDE 1 MG/ML
1 INJECTION, SOLUTION INTRAMUSCULAR; INTRAVENOUS; SUBCUTANEOUS ONCE
Status: COMPLETED | OUTPATIENT
Start: 2025-08-27 | End: 2025-08-27

## 2025-08-27 RX ORDER — GLUCAGON 1 MG
1 KIT INJECTION
Status: DISCONTINUED | OUTPATIENT
Start: 2025-08-27 | End: 2025-08-27 | Stop reason: HOSPADM

## 2025-08-27 RX ORDER — ACETAMINOPHEN 500 MG
1000 TABLET ORAL
Status: COMPLETED | OUTPATIENT
Start: 2025-08-27 | End: 2025-08-27

## 2025-08-27 RX ORDER — PHENYLEPHRINE HCL IN 0.9% NACL 20MG/250ML
0-3 PLASTIC BAG, INJECTION (ML) INTRAVENOUS CONTINUOUS
Status: DISCONTINUED | OUTPATIENT
Start: 2025-08-27 | End: 2025-08-28

## 2025-08-27 RX ORDER — CLOPIDOGREL BISULFATE 75 MG/1
75 TABLET ORAL DAILY
Status: DISCONTINUED | OUTPATIENT
Start: 2025-08-28 | End: 2025-08-29 | Stop reason: HOSPADM

## 2025-08-27 RX ORDER — LIDOCAINE HYDROCHLORIDE 20 MG/ML
INJECTION INTRAVENOUS
Status: DISCONTINUED | OUTPATIENT
Start: 2025-08-27 | End: 2025-08-27

## 2025-08-27 RX ORDER — PHENYLEPHRINE HCL IN 0.9% NACL 1 MG/10 ML
SYRINGE (ML) INTRAVENOUS
Status: COMPLETED
Start: 2025-08-27 | End: 2025-08-27

## 2025-08-27 RX ORDER — MIDAZOLAM HYDROCHLORIDE 1 MG/ML
INJECTION INTRAMUSCULAR; INTRAVENOUS
Status: DISCONTINUED | OUTPATIENT
Start: 2025-08-27 | End: 2025-08-27

## 2025-08-27 RX ORDER — OXYCODONE HYDROCHLORIDE 5 MG/1
5 TABLET ORAL ONCE
Status: COMPLETED | OUTPATIENT
Start: 2025-08-28 | End: 2025-08-28

## 2025-08-27 RX ORDER — SODIUM CHLORIDE 0.9 % (FLUSH) 0.9 %
10 SYRINGE (ML) INJECTION
Status: DISCONTINUED | OUTPATIENT
Start: 2025-08-27 | End: 2025-08-27 | Stop reason: HOSPADM

## 2025-08-27 RX ORDER — FENTANYL CITRATE 50 UG/ML
INJECTION, SOLUTION INTRAMUSCULAR; INTRAVENOUS
Status: DISCONTINUED | OUTPATIENT
Start: 2025-08-27 | End: 2025-08-27

## 2025-08-27 RX ORDER — ASPIRIN 81 MG/1
81 TABLET ORAL DAILY
Status: DISCONTINUED | OUTPATIENT
Start: 2025-08-28 | End: 2025-08-29 | Stop reason: HOSPADM

## 2025-08-27 RX ORDER — VASOPRESSIN 20 [USP'U]/ML
INJECTION, SOLUTION INTRAMUSCULAR; SUBCUTANEOUS
Status: DISCONTINUED | OUTPATIENT
Start: 2025-08-27 | End: 2025-08-27

## 2025-08-27 RX ORDER — OXYCODONE HYDROCHLORIDE 5 MG/1
5 TABLET ORAL
Status: DISCONTINUED | OUTPATIENT
Start: 2025-08-27 | End: 2025-08-27 | Stop reason: HOSPADM

## 2025-08-27 RX ORDER — LIDOCAINE HYDROCHLORIDE 10 MG/ML
INJECTION, SOLUTION INFILTRATION; PERINEURAL
Status: DISCONTINUED | OUTPATIENT
Start: 2025-08-27 | End: 2025-08-27 | Stop reason: HOSPADM

## 2025-08-27 RX ORDER — CEPHALEXIN 500 MG/1
500 CAPSULE ORAL EVERY 6 HOURS
Status: DISCONTINUED | OUTPATIENT
Start: 2025-08-27 | End: 2025-08-29 | Stop reason: HOSPADM

## 2025-08-27 RX ORDER — ASPIRIN 325 MG
325 TABLET ORAL DAILY
Status: ON HOLD | COMMUNITY
End: 2025-08-29 | Stop reason: HOSPADM

## 2025-08-27 RX ORDER — PROPOFOL 10 MG/ML
VIAL (ML) INTRAVENOUS CONTINUOUS PRN
Status: DISCONTINUED | OUTPATIENT
Start: 2025-08-27 | End: 2025-08-27

## 2025-08-27 RX ORDER — ONDANSETRON HYDROCHLORIDE 2 MG/ML
INJECTION, SOLUTION INTRAVENOUS
Status: DISCONTINUED | OUTPATIENT
Start: 2025-08-27 | End: 2025-08-27

## 2025-08-27 RX ORDER — VANCOMYCIN HYDROCHLORIDE 500 MG/10ML
INJECTION, POWDER, LYOPHILIZED, FOR SOLUTION INTRAVENOUS
Status: DISCONTINUED | OUTPATIENT
Start: 2025-08-27 | End: 2025-08-27 | Stop reason: HOSPADM

## 2025-08-27 RX ORDER — HYDROMORPHONE HYDROCHLORIDE 1 MG/ML
0.2 INJECTION, SOLUTION INTRAMUSCULAR; INTRAVENOUS; SUBCUTANEOUS EVERY 5 MIN PRN
Status: DISCONTINUED | OUTPATIENT
Start: 2025-08-27 | End: 2025-08-27 | Stop reason: HOSPADM

## 2025-08-27 RX ORDER — PHENYLEPHRINE HCL IN 0.9% NACL 1 MG/10 ML
SYRINGE (ML) INTRAVENOUS
Status: DISCONTINUED | OUTPATIENT
Start: 2025-08-27 | End: 2025-08-27

## 2025-08-27 RX ORDER — HALOPERIDOL LACTATE 5 MG/ML
0.5 INJECTION, SOLUTION INTRAMUSCULAR EVERY 10 MIN PRN
Status: DISCONTINUED | OUTPATIENT
Start: 2025-08-27 | End: 2025-08-27 | Stop reason: HOSPADM

## 2025-08-27 RX ADMIN — PIPERACILLIN AND TAZOBACTAM 4.5 G: 4; .5 INJECTION, POWDER, LYOPHILIZED, FOR SOLUTION INTRAVENOUS; PARENTERAL at 11:08

## 2025-08-27 RX ADMIN — Medication 100 MCG: at 01:08

## 2025-08-27 RX ADMIN — VASOPRESSIN 2 UNITS: 20 INJECTION, SOLUTION INTRAMUSCULAR; SUBCUTANEOUS at 01:08

## 2025-08-27 RX ADMIN — CEPHALEXIN 500 MG: 500 CAPSULE ORAL at 06:08

## 2025-08-27 RX ADMIN — ENOXAPARIN SODIUM 40 MG: 40 INJECTION SUBCUTANEOUS at 05:08

## 2025-08-27 RX ADMIN — Medication 200 MCG: at 01:08

## 2025-08-27 RX ADMIN — MIDAZOLAM 1 MG: 1 INJECTION INTRAMUSCULAR; INTRAVENOUS at 01:08

## 2025-08-27 RX ADMIN — LISINOPRIL 20 MG: 20 TABLET ORAL at 09:08

## 2025-08-27 RX ADMIN — PROPOFOL 50 MG: 10 INJECTION, EMULSION INTRAVENOUS at 02:08

## 2025-08-27 RX ADMIN — VASOPRESSIN 2 UNITS: 20 INJECTION, SOLUTION INTRAMUSCULAR; SUBCUTANEOUS at 02:08

## 2025-08-27 RX ADMIN — ACETAMINOPHEN 650 MG: 325 TABLET ORAL at 07:08

## 2025-08-27 RX ADMIN — GABAPENTIN 600 MG: 300 CAPSULE ORAL at 09:08

## 2025-08-27 RX ADMIN — ATORVASTATIN CALCIUM 40 MG: 40 TABLET, FILM COATED ORAL at 09:08

## 2025-08-27 RX ADMIN — PROPOFOL 30 MG: 10 INJECTION, EMULSION INTRAVENOUS at 01:08

## 2025-08-27 RX ADMIN — ONDANSETRON 4 MG: 2 INJECTION INTRAMUSCULAR; INTRAVENOUS at 02:08

## 2025-08-27 RX ADMIN — PROPOFOL 100 MCG/KG/MIN: 10 INJECTION, EMULSION INTRAVENOUS at 01:08

## 2025-08-27 RX ADMIN — TAMSULOSIN HYDROCHLORIDE 0.4 MG: 0.4 CAPSULE ORAL at 09:08

## 2025-08-27 RX ADMIN — ATENOLOL 50 MG: 25 TABLET ORAL at 09:08

## 2025-08-27 RX ADMIN — VASOPRESSIN 1 UNITS: 20 INJECTION, SOLUTION INTRAMUSCULAR; SUBCUTANEOUS at 01:08

## 2025-08-27 RX ADMIN — ACETAMINOPHEN 1000 MG: 500 TABLET ORAL at 12:08

## 2025-08-27 RX ADMIN — PROPOFOL 50 MG: 10 INJECTION, EMULSION INTRAVENOUS at 01:08

## 2025-08-27 RX ADMIN — HYDROMORPHONE HYDROCHLORIDE 1 MG: 1 INJECTION, SOLUTION INTRAMUSCULAR; INTRAVENOUS; SUBCUTANEOUS at 08:08

## 2025-08-27 RX ADMIN — Medication 0.5 MCG/KG/MIN: at 03:08

## 2025-08-27 RX ADMIN — GABAPENTIN 600 MG: 300 CAPSULE ORAL at 08:08

## 2025-08-27 RX ADMIN — FENTANYL CITRATE 25 MCG: 50 INJECTION INTRAMUSCULAR; INTRAVENOUS at 01:08

## 2025-08-27 RX ADMIN — PROPOFOL 40 MG: 10 INJECTION, EMULSION INTRAVENOUS at 01:08

## 2025-08-27 RX ADMIN — LIDOCAINE HYDROCHLORIDE 80 MG: 20 INJECTION INTRAVENOUS at 01:08

## 2025-08-27 RX ADMIN — VANCOMYCIN HYDROCHLORIDE 1250 MG: 1.25 INJECTION, POWDER, LYOPHILIZED, FOR SOLUTION INTRAVENOUS at 09:08

## 2025-08-27 RX ADMIN — PIPERACILLIN AND TAZOBACTAM 4.5 G: 4; .5 INJECTION, POWDER, LYOPHILIZED, FOR SOLUTION INTRAVENOUS; PARENTERAL at 12:08

## 2025-08-27 RX ADMIN — EPHEDRINE SULFATE 5 MG: 50 INJECTION INTRAVENOUS at 01:08

## 2025-08-27 RX ADMIN — SODIUM CHLORIDE: 0.9 INJECTION, SOLUTION INTRAVENOUS at 01:08

## 2025-08-28 LAB
ABSOLUTE EOSINOPHIL (OHS): 0.21 K/UL
ABSOLUTE MONOCYTE (OHS): 1.26 K/UL (ref 0.3–1)
ABSOLUTE NEUTROPHIL COUNT (OHS): 6.99 K/UL (ref 1.8–7.7)
ACID FAST MOD KINY STN SPEC: NORMAL
ALBUMIN SERPL BCP-MCNC: 3.4 G/DL (ref 3.5–5.2)
ALP SERPL-CCNC: 70 UNIT/L (ref 40–150)
ALT SERPL W/O P-5'-P-CCNC: 40 UNIT/L (ref 0–55)
ANION GAP (OHS): 11 MMOL/L (ref 8–16)
AST SERPL-CCNC: 32 UNIT/L (ref 0–50)
BASOPHILS # BLD AUTO: 0.03 K/UL
BASOPHILS NFR BLD AUTO: 0.3 %
BILIRUB SERPL-MCNC: 0.5 MG/DL (ref 0.1–1)
BUN SERPL-MCNC: 14 MG/DL (ref 8–23)
CALCIUM SERPL-MCNC: 8.7 MG/DL (ref 8.7–10.5)
CHLORIDE SERPL-SCNC: 107 MMOL/L (ref 95–110)
CO2 SERPL-SCNC: 22 MMOL/L (ref 23–29)
CREAT SERPL-MCNC: 1.1 MG/DL (ref 0.5–1.4)
ERYTHROCYTE [DISTWIDTH] IN BLOOD BY AUTOMATED COUNT: 13.4 % (ref 11.5–14.5)
GFR SERPLBLD CREATININE-BSD FMLA CKD-EPI: >60 ML/MIN/1.73/M2
GLUCOSE SERPL-MCNC: 144 MG/DL (ref 70–110)
GRAM STN SPEC: NORMAL
GRAM STN SPEC: NORMAL
HCT VFR BLD AUTO: 44.3 % (ref 40–54)
HGB BLD-MCNC: 14 GM/DL (ref 14–18)
IMM GRANULOCYTES # BLD AUTO: 0.04 K/UL (ref 0–0.04)
IMM GRANULOCYTES NFR BLD AUTO: 0.4 % (ref 0–0.5)
KOH PREP SPEC: NORMAL
LYMPHOCYTES # BLD AUTO: 1.67 K/UL (ref 1–4.8)
MAGNESIUM SERPL-MCNC: 1.6 MG/DL (ref 1.6–2.6)
MCH RBC QN AUTO: 27.2 PG (ref 27–31)
MCHC RBC AUTO-ENTMCNC: 31.6 G/DL (ref 32–36)
MCV RBC AUTO: 86 FL (ref 82–98)
NUCLEATED RBC (/100WBC) (OHS): 0 /100 WBC
PHOSPHATE SERPL-MCNC: 2.4 MG/DL (ref 2.7–4.5)
PLATELET # BLD AUTO: 254 K/UL (ref 150–450)
PMV BLD AUTO: 10 FL (ref 9.2–12.9)
POTASSIUM SERPL-SCNC: 4.4 MMOL/L (ref 3.5–5.1)
PROT SERPL-MCNC: 6.7 GM/DL (ref 6–8.4)
RBC # BLD AUTO: 5.15 M/UL (ref 4.6–6.2)
RELATIVE EOSINOPHIL (OHS): 2.1 %
RELATIVE LYMPHOCYTE (OHS): 16.4 % (ref 18–48)
RELATIVE MONOCYTE (OHS): 12.4 % (ref 4–15)
RELATIVE NEUTROPHIL (OHS): 68.4 % (ref 38–73)
SODIUM SERPL-SCNC: 140 MMOL/L (ref 136–145)
WBC # BLD AUTO: 10.2 K/UL (ref 3.9–12.7)

## 2025-08-28 PROCEDURE — 63600175 PHARM REV CODE 636 W HCPCS

## 2025-08-28 PROCEDURE — 84100 ASSAY OF PHOSPHORUS: CPT

## 2025-08-28 PROCEDURE — 25000003 PHARM REV CODE 250: Performed by: HOSPITALIST

## 2025-08-28 PROCEDURE — 99024 POSTOP FOLLOW-UP VISIT: CPT | Mod: ,,, | Performed by: PODIATRIST

## 2025-08-28 PROCEDURE — 80053 COMPREHEN METABOLIC PANEL: CPT

## 2025-08-28 PROCEDURE — 25000003 PHARM REV CODE 250

## 2025-08-28 PROCEDURE — 83735 ASSAY OF MAGNESIUM: CPT

## 2025-08-28 PROCEDURE — 36415 COLL VENOUS BLD VENIPUNCTURE: CPT

## 2025-08-28 PROCEDURE — 97530 THERAPEUTIC ACTIVITIES: CPT

## 2025-08-28 PROCEDURE — 97161 PT EVAL LOW COMPLEX 20 MIN: CPT

## 2025-08-28 PROCEDURE — 97116 GAIT TRAINING THERAPY: CPT

## 2025-08-28 PROCEDURE — 11000001 HC ACUTE MED/SURG PRIVATE ROOM

## 2025-08-28 PROCEDURE — 63600175 PHARM REV CODE 636 W HCPCS: Performed by: HOSPITALIST

## 2025-08-28 PROCEDURE — 97166 OT EVAL MOD COMPLEX 45 MIN: CPT

## 2025-08-28 PROCEDURE — 85025 COMPLETE CBC W/AUTO DIFF WBC: CPT

## 2025-08-28 RX ORDER — SODIUM,POTASSIUM PHOSPHATES 280-250MG
2 POWDER IN PACKET (EA) ORAL 2 TIMES DAILY
Status: COMPLETED | OUTPATIENT
Start: 2025-08-28 | End: 2025-08-28

## 2025-08-28 RX ORDER — MORPHINE SULFATE 4 MG/ML
3 INJECTION, SOLUTION INTRAMUSCULAR; INTRAVENOUS ONCE
Status: COMPLETED | OUTPATIENT
Start: 2025-08-28 | End: 2025-08-28

## 2025-08-28 RX ORDER — OXYCODONE HYDROCHLORIDE 10 MG/1
10 TABLET ORAL EVERY 6 HOURS PRN
Refills: 0 | Status: DISCONTINUED | OUTPATIENT
Start: 2025-08-28 | End: 2025-08-29 | Stop reason: HOSPADM

## 2025-08-28 RX ORDER — OXYCODONE HYDROCHLORIDE 5 MG/1
5 TABLET ORAL EVERY 6 HOURS PRN
Refills: 0 | Status: DISCONTINUED | OUTPATIENT
Start: 2025-08-28 | End: 2025-08-29 | Stop reason: HOSPADM

## 2025-08-28 RX ORDER — HYDROMORPHONE HYDROCHLORIDE 1 MG/ML
0.5 INJECTION, SOLUTION INTRAMUSCULAR; INTRAVENOUS; SUBCUTANEOUS EVERY 6 HOURS PRN
Status: DISCONTINUED | OUTPATIENT
Start: 2025-08-28 | End: 2025-08-29 | Stop reason: HOSPADM

## 2025-08-28 RX ADMIN — CEPHALEXIN 500 MG: 500 CAPSULE ORAL at 05:08

## 2025-08-28 RX ADMIN — OXYCODONE HYDROCHLORIDE 10 MG: 10 TABLET ORAL at 09:08

## 2025-08-28 RX ADMIN — OXYCODONE HYDROCHLORIDE 10 MG: 10 TABLET ORAL at 05:08

## 2025-08-28 RX ADMIN — HYDROMORPHONE HYDROCHLORIDE 0.5 MG: 1 INJECTION, SOLUTION INTRAMUSCULAR; INTRAVENOUS; SUBCUTANEOUS at 08:08

## 2025-08-28 RX ADMIN — OXYCODONE HYDROCHLORIDE 5 MG: 5 TABLET ORAL at 12:08

## 2025-08-28 RX ADMIN — POTASSIUM & SODIUM PHOSPHATES POWDER PACK 280-160-250 MG 2 PACKET: 280-160-250 PACK at 08:08

## 2025-08-28 RX ADMIN — MORPHINE SULFATE 3 MG: 4 INJECTION INTRAVENOUS at 01:08

## 2025-08-28 RX ADMIN — ASPIRIN 81 MG: 81 TABLET, DELAYED RELEASE ORAL at 09:08

## 2025-08-28 RX ADMIN — LISINOPRIL 20 MG: 20 TABLET ORAL at 09:08

## 2025-08-28 RX ADMIN — HYDROMORPHONE HYDROCHLORIDE 0.5 MG: 1 INJECTION, SOLUTION INTRAMUSCULAR; INTRAVENOUS; SUBCUTANEOUS at 10:08

## 2025-08-28 RX ADMIN — GABAPENTIN 600 MG: 300 CAPSULE ORAL at 09:08

## 2025-08-28 RX ADMIN — ACETAMINOPHEN 650 MG: 325 TABLET ORAL at 12:08

## 2025-08-28 RX ADMIN — GABAPENTIN 600 MG: 300 CAPSULE ORAL at 08:08

## 2025-08-28 RX ADMIN — CEPHALEXIN 500 MG: 500 CAPSULE ORAL at 11:08

## 2025-08-28 RX ADMIN — CLOPIDOGREL 75 MG: 75 TABLET ORAL at 09:08

## 2025-08-28 RX ADMIN — ATORVASTATIN CALCIUM 40 MG: 40 TABLET, FILM COATED ORAL at 09:08

## 2025-08-28 RX ADMIN — TAMSULOSIN HYDROCHLORIDE 0.4 MG: 0.4 CAPSULE ORAL at 09:08

## 2025-08-28 RX ADMIN — ATENOLOL 50 MG: 25 TABLET ORAL at 09:08

## 2025-08-28 RX ADMIN — CEPHALEXIN 500 MG: 500 CAPSULE ORAL at 10:08

## 2025-08-28 RX ADMIN — ENOXAPARIN SODIUM 40 MG: 40 INJECTION SUBCUTANEOUS at 05:08

## 2025-08-28 RX ADMIN — POTASSIUM & SODIUM PHOSPHATES POWDER PACK 280-160-250 MG 2 PACKET: 280-160-250 PACK at 09:08

## 2025-08-28 RX ADMIN — GABAPENTIN 600 MG: 300 CAPSULE ORAL at 05:08

## 2025-08-28 RX ADMIN — CEPHALEXIN 500 MG: 500 CAPSULE ORAL at 12:08

## 2025-08-29 VITALS
WEIGHT: 198 LBS | OXYGEN SATURATION: 96 % | HEIGHT: 68 IN | RESPIRATION RATE: 18 BRPM | BODY MASS INDEX: 30.01 KG/M2 | HEART RATE: 83 BPM | SYSTOLIC BLOOD PRESSURE: 136 MMHG | TEMPERATURE: 98 F | DIASTOLIC BLOOD PRESSURE: 92 MMHG

## 2025-08-29 PROCEDURE — 25000003 PHARM REV CODE 250

## 2025-08-29 PROCEDURE — 25000003 PHARM REV CODE 250: Performed by: HOSPITALIST

## 2025-08-29 PROCEDURE — 63600175 PHARM REV CODE 636 W HCPCS: Performed by: HOSPITALIST

## 2025-08-29 RX ORDER — ATORVASTATIN CALCIUM 40 MG/1
40 TABLET, FILM COATED ORAL DAILY
Qty: 90 TABLET | Refills: 3 | Status: SHIPPED | OUTPATIENT
Start: 2025-08-29 | End: 2026-08-24

## 2025-08-29 RX ORDER — OXYCODONE HYDROCHLORIDE 10 MG/1
10 TABLET ORAL EVERY 6 HOURS PRN
Qty: 20 TABLET | Refills: 0 | Status: CANCELLED | OUTPATIENT
Start: 2025-08-29

## 2025-08-29 RX ORDER — ATORVASTATIN CALCIUM 40 MG/1
40 TABLET, FILM COATED ORAL DAILY
Qty: 90 TABLET | Refills: 3 | Status: CANCELLED | OUTPATIENT
Start: 2025-08-29 | End: 2026-08-24

## 2025-08-29 RX ORDER — OXYCODONE HYDROCHLORIDE 10 MG/1
10 TABLET ORAL EVERY 6 HOURS PRN
Qty: 20 TABLET | Refills: 0 | Status: SHIPPED | OUTPATIENT
Start: 2025-08-29

## 2025-08-29 RX ORDER — CEPHALEXIN 500 MG/1
500 CAPSULE ORAL EVERY 6 HOURS
Qty: 48 CAPSULE | Refills: 0 | Status: SHIPPED | OUTPATIENT
Start: 2025-08-29 | End: 2025-09-10

## 2025-08-29 RX ORDER — ASPIRIN 81 MG/1
81 TABLET ORAL DAILY
Qty: 90 TABLET | Refills: 3 | Status: SHIPPED | OUTPATIENT
Start: 2025-08-29 | End: 2026-08-29

## 2025-08-29 RX ORDER — ASPIRIN 81 MG/1
81 TABLET ORAL DAILY
Qty: 90 TABLET | Refills: 3 | Status: CANCELLED | OUTPATIENT
Start: 2025-08-29 | End: 2026-08-29

## 2025-08-29 RX ORDER — CEPHALEXIN 500 MG/1
500 CAPSULE ORAL EVERY 6 HOURS
Qty: 52 CAPSULE | Refills: 0 | Status: CANCELLED | OUTPATIENT
Start: 2025-08-29 | End: 2025-09-11

## 2025-08-29 RX ADMIN — LISINOPRIL 20 MG: 20 TABLET ORAL at 09:08

## 2025-08-29 RX ADMIN — CEPHALEXIN 500 MG: 500 CAPSULE ORAL at 05:08

## 2025-08-29 RX ADMIN — GABAPENTIN 600 MG: 300 CAPSULE ORAL at 09:08

## 2025-08-29 RX ADMIN — ATENOLOL 50 MG: 25 TABLET ORAL at 09:08

## 2025-08-29 RX ADMIN — ASPIRIN 81 MG: 81 TABLET, DELAYED RELEASE ORAL at 09:08

## 2025-08-29 RX ADMIN — GABAPENTIN 600 MG: 300 CAPSULE ORAL at 03:08

## 2025-08-29 RX ADMIN — TAMSULOSIN HYDROCHLORIDE 0.4 MG: 0.4 CAPSULE ORAL at 09:08

## 2025-08-29 RX ADMIN — OXYCODONE HYDROCHLORIDE 10 MG: 10 TABLET ORAL at 05:08

## 2025-08-29 RX ADMIN — CLOPIDOGREL 75 MG: 75 TABLET ORAL at 09:08

## 2025-08-29 RX ADMIN — OXYCODONE HYDROCHLORIDE 10 MG: 10 TABLET ORAL at 12:08

## 2025-08-29 RX ADMIN — ATORVASTATIN CALCIUM 40 MG: 40 TABLET, FILM COATED ORAL at 09:08

## 2025-08-29 RX ADMIN — HYDROMORPHONE HYDROCHLORIDE 0.5 MG: 1 INJECTION, SOLUTION INTRAMUSCULAR; INTRAVENOUS; SUBCUTANEOUS at 01:08

## 2025-08-29 RX ADMIN — CEPHALEXIN 500 MG: 500 CAPSULE ORAL at 06:08

## 2025-08-29 RX ADMIN — CEPHALEXIN 500 MG: 500 CAPSULE ORAL at 12:08

## 2025-08-29 RX ADMIN — OXYCODONE HYDROCHLORIDE 10 MG: 10 TABLET ORAL at 06:08

## 2025-08-31 ENCOUNTER — PATIENT MESSAGE (OUTPATIENT)
Dept: PODIATRY | Facility: CLINIC | Age: 65
End: 2025-08-31

## 2025-09-01 LAB — BACTERIA SPEC AEROBE CULT: NO GROWTH

## 2025-09-02 LAB
ESTROGEN SERPL-MCNC: NORMAL PG/ML
FUNGUS SPEC CULT: NORMAL
INSULIN SERPL-ACNC: NORMAL U[IU]/ML
LAB AP CLINICAL INFORMATION: NORMAL
LAB AP GROSS DESCRIPTION: NORMAL
LAB AP PERFORMING LOCATION(S): NORMAL
LAB AP REPORT FOOTNOTES: NORMAL

## 2025-09-04 LAB — BACTERIA SPEC ANAEROBE CULT: NORMAL

## (undated) DEVICE — TAPE SILK 3IN

## (undated) DEVICE — VISE RADIFOCUS MULTI TORQUE

## (undated) DEVICE — INFLATOR ENCORE

## (undated) DEVICE — CATH IV INTROCAN 20G X 1.1

## (undated) DEVICE — GOWN POLY REINF BRTH SLV XL

## (undated) DEVICE — PENCIL ROCKER SWITCH 10FT CORD

## (undated) DEVICE — NDL HYPO REG 25G X 1 1/2

## (undated) DEVICE — GAUZE CNFRM STRL 4INX4.1YD

## (undated) DEVICE — Device

## (undated) DEVICE — DRESSING TRANS 4X4 TEGADERM

## (undated) DEVICE — PACK UNIVERSAL SPLIT II

## (undated) DEVICE — CATHETER DIAGNOSTIC DXTERITY 6FR JL 4

## (undated) DEVICE — TRAY MINOR GEN SURG OMC

## (undated) DEVICE — GUIDEWIRE V-18 CONTROL .18

## (undated) DEVICE — DRAPE THREE-QTR REINF 53X77IN

## (undated) DEVICE — SCISSOR 5MMX35CM DIRECT DRIVE

## (undated) DEVICE — SUT MONOCRYL 3-0 SH U/D

## (undated) DEVICE — SUT SILK 0 STRANDS 30IN BLK

## (undated) DEVICE — DRAPE U SPLIT SHEET 54X76IN

## (undated) DEVICE — GUIDEWIRE STF .035X180CM ANG

## (undated) DEVICE — TRAY PERIPHERAL VASCULAR OMC

## (undated) DEVICE — TROCAR ENDOPATH XCEL 5MM 7.5CM

## (undated) DEVICE — SUT 7/0 18IN PROLENE BL MO

## (undated) DEVICE — SUT SILK 2-0 SH 18IN BLACK

## (undated) DEVICE — TAPE UMBILICAL 1/8X36IN WHITE

## (undated) DEVICE — BANDAGE ESMARK ELASTIC ST 4X9

## (undated) DEVICE — TRAY CATH FOL SIL URIMTR 16FR

## (undated) DEVICE — DECANTER FLUID TRNSF WHITE 9IN

## (undated) DEVICE — SET DECANTER MEDICHOICE

## (undated) DEVICE — STOCKINET 4INX48

## (undated) DEVICE — DRAPE HALF SURGICAL 40X58IN

## (undated) DEVICE — SET MICROPUNCTURE

## (undated) DEVICE — STAPLER SKIN PROXIMATE WIDE

## (undated) DEVICE — COVER INSTR ELASTIC BAND 40X20

## (undated) DEVICE — TRAY MINOR ORTHO OMC

## (undated) DEVICE — DRESSING XEROFORM NONADH 1X8IN

## (undated) DEVICE — STOPCOCK MED PRSS 3-WAY

## (undated) DEVICE — TRAY SKIN SCRUB WET PREMIUM

## (undated) DEVICE — SUT LIGACLIP SMALL XTRA

## (undated) DEVICE — TOURNIQUET SB QC DP 18X4IN

## (undated) DEVICE — LOOP VESSEL BLUE MAXI

## (undated) DEVICE — DRAPE T EXTRM SURG 121X128X90

## (undated) DEVICE — DRAPE C-ARM ELAS CLIP 42X120IN

## (undated) DEVICE — SUT 3-0 12-18IN SILK

## (undated) DEVICE — CATHETER GUIDE LAUNCHER JR35 6FX110

## (undated) DEVICE — BANDAGE ELAS SOFTWRAP ST 6X5YD

## (undated) DEVICE — ELECTRODE REM PLYHSV RETURN 9

## (undated) DEVICE — SYS LABEL CORRECT MED

## (undated) DEVICE — BLADE SURG CARBON STEEL SZ11

## (undated) DEVICE — SUT 0 18IN SILK BLK BRAIDE

## (undated) DEVICE — KIT COLLECTION E SWAB REGULAR

## (undated) DEVICE — COVER LIGHT HANDLE 80/CA

## (undated) DEVICE — SUT MCRYL PLUS 4-0 PS2 27IN

## (undated) DEVICE — SPONGE GAUZE 16PLY 4X4

## (undated) DEVICE — LOOP VESSEL RED MINI STERILE

## (undated) DEVICE — KIT INTRO MICRO NIT VSI 4FR

## (undated) DEVICE — BLADE SAG MIC FINE 9.5X25.5MM

## (undated) DEVICE — BANDAGE ELAS SOFTWRAP ST 4X5YD

## (undated) DEVICE — SPONGE SURGIFOAM 100 8.5X12X10

## (undated) DEVICE — SUT 4-0 12-18IN SILK BLACK

## (undated) DEVICE — CATHETER GUIDE LAUNCHER IMA 6FX90CM

## (undated) DEVICE — CLIP MED TICALL

## (undated) DEVICE — DRAPE STERI LONG

## (undated) DEVICE — DRAPE CORETEMP FLD WRM 56X62IN

## (undated) DEVICE — SHEATH PINNACLE 6FRX10CM W/GUIDEWIRE

## (undated) DEVICE — KIT PROCEDURE STER INLET CLOSU

## (undated) DEVICE — SUT PROLENE 5-0 36IN C-1

## (undated) DEVICE — CATH VISIONS PV IVUS .035

## (undated) DEVICE — CATHETER DIAGNOSTIC DXTERITY 6FR JR 4.0

## (undated) DEVICE — GUIDEWIRE RUNTHROUGH 180CM

## (undated) DEVICE — GOWN NONREINF SET-IN SLV 2XL

## (undated) DEVICE — DRAPE STERI INSTRUMENT 1018

## (undated) DEVICE — TOWEL OR DISP STRL BLUE 4/PK

## (undated) DEVICE — SYR ONLY LUER LOCK 20CC

## (undated) DEVICE — SUT 2-0 SILK 30IN BLK BRAID

## (undated) DEVICE — DRESSING GZ ISLAND ADH 6X6IN

## (undated) DEVICE — APPLIER CLIP ENDO LIGAMAX 5MM

## (undated) DEVICE — GUIDEWIRE STD .035X180CM ANG

## (undated) DEVICE — SUT 2-0 12-18IN SILK

## (undated) DEVICE — TROCAR ENDOPATH XCEL 5X75MM

## (undated) DEVICE — ADHESIVE DERMABOND ADVANCED

## (undated) DEVICE — APPLICATOR CHLORAPREP ORN 26ML

## (undated) DEVICE — DRAPE ABDOMINAL TIBURON 14X11

## (undated) DEVICE — SOL NS 1000CC

## (undated) DEVICE — PAD CAST SPECIALIST STRL 4

## (undated) DEVICE — SOL 9P NACL IRR PIC IL

## (undated) DEVICE — SET BLD COLL SAFETY 21G X 3/4

## (undated) DEVICE — INTRODUCER VASC RADPQ 5FRX10CM

## (undated) DEVICE — CATHETER DIAGNOSTIC DXTERITY 6F PIGST

## (undated) DEVICE — SPONGE LAP 18X18 PREWASHED

## (undated) DEVICE — BLADE SCALP OPHTL BEVEL STR

## (undated) DEVICE — COVERS PROBE NR-48 STERILE

## (undated) DEVICE — SUT MONOCRYL 2-0 CT-1 VIL

## (undated) DEVICE — GAUZE WOVEN STRL 12-PLY 4X4IN

## (undated) DEVICE — GUIDEWIRE DOUBLE ENDED .035 DIA. 150CML

## (undated) DEVICE — DRAPE BAG ISOLATION 20 X 20

## (undated) DEVICE — NDL 18GA X1 1/2 REG BEVEL

## (undated) DEVICE — BAG TISS RETRV MONARCH 10MM

## (undated) DEVICE — GOWN SURGICAL X-LARGE

## (undated) DEVICE — SUT 0 VICRYL / UR6 (J603)

## (undated) DEVICE — SPONGE COTTON TRAY 4X4IN

## (undated) DEVICE — IRRIGATOR ENDOSCOPY DISP.

## (undated) DEVICE — ELECTRODE MEGADYNE RETURN DUAL

## (undated) DEVICE — CONTAINER SPECIMEN OR STER 4OZ

## (undated) DEVICE — SUT PROLENE 6-0 C-1 30IN BL

## (undated) DEVICE — CONTRAST FLEXCIL INJECTION

## (undated) DEVICE — CATH ANGLED GLIDE CATH 5FR

## (undated) DEVICE — HEMOSTAT SURGICEL 4X8IN

## (undated) DEVICE — TROCAR KII BLLN 12MM 10CM

## (undated) DEVICE — SUT SILK 3-0 STRANDS 30IN

## (undated) DEVICE — DRAPE INCISE IOBAN 2 23X33IN

## (undated) DEVICE — TUBING HF INSUFFLATION W/ FLTR

## (undated) DEVICE — SUT PERCLOSE PROSTYLE MEDIATE

## (undated) DEVICE — CATHETER BALLOON EUPHORA 2.50X20MM

## (undated) DEVICE — CATH 5FR OMNIFLUSH 65CM .038

## (undated) DEVICE — KIT ANTIFOG W/SPONG & FLUID

## (undated) DEVICE — DRESSING ABSRBNT ISLAND 3.6X8